# Patient Record
Sex: FEMALE | Race: WHITE | NOT HISPANIC OR LATINO | URBAN - METROPOLITAN AREA
[De-identification: names, ages, dates, MRNs, and addresses within clinical notes are randomized per-mention and may not be internally consistent; named-entity substitution may affect disease eponyms.]

---

## 2015-03-27 RX ORDER — FUROSEMIDE 40 MG
1 TABLET ORAL
Qty: 0 | Refills: 0 | COMMUNITY
Start: 2015-03-27

## 2017-10-15 ENCOUNTER — INPATIENT (INPATIENT)
Facility: HOSPITAL | Age: 82
LOS: 16 days | Discharge: HOME | DRG: 682 | End: 2017-11-01
Attending: INTERNAL MEDICINE | Admitting: INTERNAL MEDICINE
Payer: MEDICARE

## 2017-10-15 VITALS
SYSTOLIC BLOOD PRESSURE: 139 MMHG | OXYGEN SATURATION: 99 % | HEART RATE: 57 BPM | TEMPERATURE: 98 F | RESPIRATION RATE: 18 BRPM | DIASTOLIC BLOOD PRESSURE: 59 MMHG

## 2017-10-15 PROCEDURE — 99285 EMERGENCY DEPT VISIT HI MDM: CPT | Mod: 25,GC

## 2017-10-15 PROCEDURE — 93010 ELECTROCARDIOGRAM REPORT: CPT

## 2017-10-15 NOTE — ED PROVIDER NOTE - PHYSICAL EXAMINATION
Gen: AAOx3, non-toxic, appears tired  Head: NCAT  HEENT: EOMI, oral mucosa moist, normal conjunctiva  Lung: CTAB,  no respiratory distress, no wheezes/rhonchi/rales B/L, speaking in full sentences  CV: irregular rhythm, bradycardia, +2 radial pulses bilaterally, +1 bilateral pedal edema  Abd: soft, NTND, no guarding, negative guaiac  MSK: no visible deformities  Neuro: No focal sensory or motor deficits  Skin: Warm, well perfused, no rash  Psych: normal affect.  ~Anna Dailey M.D. Resident

## 2017-10-15 NOTE — ED PROVIDER NOTE - ATTENDING CONTRIBUTION TO CARE
Attending MD Lopez:  I personally have seen and examined this patient.  Resident note reviewed and agree on plan of care and except where noted.  See MDM for details.

## 2017-10-15 NOTE — ED ADULT NURSE NOTE - OBJECTIVE STATEMENT
Pt presents to ED awake and alert, brought in by EMS and accompanied by her family d/t generalized weakness. Pt Pt presents to ED awake and alert, brought in by EMS and accompanied by her family d/t generalized weakness. Pt states recently she had an incident with her INR being high and increased bleeding that has since resolved after being managed by her PCP. Pt reports since that incident, she feels like she has not "bounced back" and has decreased energy. Pt reports normal PO intake, ambulating normally with her cane and afebrile. EMS reports pt had a BG of 226 and had a low O2 sat of 93% so they placed her on 2L nasal cannula. Pt reports dark stools which she attributes to taking iron supplements. Pt denies dysuria, abdominal pain, cough, LE edema, chest pain or any other s/s. Pt found to be in Afib, bradycardic and tachypneic. A&Ox4. No acute respiratory distress noted and pt is speaking in complete sentences.

## 2017-10-15 NOTE — ED PROVIDER NOTE - MEDICAL DECISION MAKING DETAILS
88 yo F PMHx Afib on Coumadin, valve replacement, breast and uterine CA presents with generalized weakness, with bradycardia, DDx: GI bleed, electrolyte disturbances, dehydration, sepsis, will obtain basic labs, cardiac enzymes, CXR, UA 86 yo F PMHx Afib on Coumadin, valve replacement, breast and uterine CA presents with generalized weakness, with bradycardia, DDx: GI bleed, electrolyte disturbances, dehydration, sepsis, will obtain basic labs, cardiac enzymes, CXR, UA    John LOWE: 88 y/o female with PMH of HTN, HLD, CHF, A fib on Coumadin, s/p Aortic valve replacement here with generalized weakness. Patient is A/XO3 and reports generalized weakness for the past 2 weeks associated with intermittent SOB. Patient called EMS tonight and her FS was 220. Patient also reports her INR was 6.1  two weeks ago and she saw her GI doctor who ruled out melena. Patient also reports her INR last was 2.3. Denies CP, palpitations, fever, cough, abd pain/distention, LE edema, urinary symptoms, back pain, rash or travel. Exam shows a female A/OX3 and with decreased lung sounds and heart S1S2 and irregular. No LE edema, No JVD. Abd is nontender and nondistended. Patient is moving all her extremities.  Patient appears slightly dehydrated. Consider GI bleed, Worse anemia, UTI/Pyelo, CAP, New endocrine dysfunction. Plan CBC, CMp, Cardiacs, CXR, EKG, Cultures, UA, Guaiac, IVF and reassess

## 2017-10-15 NOTE — ED ADULT NURSE NOTE - PMH
AF (atrial fibrillation)  on coumadin and aspirin  Arthritis    Breast cancer diagnosed in 2000  s/p right lumpectomy and right axillary lymph node removal, radiation  Dyslipidemia    HTN (hypertension)    Severe aortic stenosis    Uterine cancer

## 2017-10-15 NOTE — ED PROVIDER NOTE - NS ED ROS FT
GENERAL: No fever or chills, EYES: no change in vision, HEENT: no trouble swallowing or speaking, CARDIAC: no chest pain, PULMONARY: no cough or SOB, GI: no abdominal pain, no nausea, no vomiting, no diarrhea or constipation, : No changes in urination, SKIN: no rashes, NEURO: no headache,  MSK: No joint pain ~Anna Dailey M.D. Resident GENERAL: No fever or chills, +fatigue EYES: no change in vision, HEENT: no trouble swallowing or speaking, CARDIAC: no chest pain, PULMONARY: no cough, +SOB, GI: no abdominal pain, no nausea, no vomiting, no diarrhea or constipation, : No changes in urination, SKIN: no rashes, NEURO: no headache,  MSK: No joint pain ~Anna Dailey M.D. Resident

## 2017-10-15 NOTE — ED PROVIDER NOTE - OBJECTIVE STATEMENT
John LWOE: 86 y/o female with PMH of HTN, HLD, CHF, A fib on Coumadin, s/p Aortic valve replacement here with generalized weakness. Patient is A/XO3 and reports generalized weakness for the past 2 weeks associated with intermittent SOB. Patient called EMS tonight and her FS was 220. Patient also reports her INR was 6.1  two weeks ago and she saw her GI doctor who ruled out melena. Patient also reports her INR last was 2.3. Denies CP, palpitations, fever, cough, abd pain/distention, LE edema, urinary symptoms, back pain, rash or travel. Exam shows a female A/OX3 and with decreased lung sounds and heart S1S2 and irregular. No LE edema, No JVD. Abd is nontender and nondistended. Patient is moving all her extremities.  Patient appears slightly dehydrated. Consider GI bleed, Worse anemia, UTI/Pyelo, CAP, New endocrine dysfunction. Plan CBC, CMp John LOWE: 86 y/o female with PMH of HTN, HLD, CHF, A fib on Coumadin, s/p Aortic valve replacement here with generalized weakness. Patient is A/XO3 and reports generalized weakness for the past 2 weeks associated with intermittent SOB. Patient called EMS tonight and her FS was 220. Patient also reports her INR was 6.1  two weeks ago and she saw her GI doctor who ruled out melena. Patient also reports her INR last was 2.3. Denies CP, palpitations, fever, cough, abd pain/distention, LE edema, urinary symptoms, back pain, rash or travel. Exam shows a female A/OX3 and with decreased lung sounds and heart S1S2 and irregular. No LE edema, No JVD. Abd is nontender and nondistended. Patient is moving all her extremities.  Patient appears slightly dehydrated. Consider GI bleed, Worse anemia, UTI/Pyelo, CAP, New endocrine dysfunction. Plan CBC, CMp, Cardiacs, CXR, EKG, Cultures, UA, Guaiac, IVF and reassess

## 2017-10-15 NOTE — ED PROVIDER NOTE - PSH
H/O: hysterectomy  4/2012  S/P cholecystectomy    S/P D&C    s/p surgical excision of left eye Skin cancer  Bilateral lower eye lids

## 2017-10-16 DIAGNOSIS — R53.1 WEAKNESS: ICD-10-CM

## 2017-10-16 DIAGNOSIS — M79.89 OTHER SPECIFIED SOFT TISSUE DISORDERS: ICD-10-CM

## 2017-10-16 DIAGNOSIS — R00.1 BRADYCARDIA, UNSPECIFIED: ICD-10-CM

## 2017-10-16 DIAGNOSIS — I10 ESSENTIAL (PRIMARY) HYPERTENSION: ICD-10-CM

## 2017-10-16 DIAGNOSIS — N17.9 ACUTE KIDNEY FAILURE, UNSPECIFIED: ICD-10-CM

## 2017-10-16 DIAGNOSIS — I48.0 PAROXYSMAL ATRIAL FIBRILLATION: ICD-10-CM

## 2017-10-16 LAB
ALBUMIN SERPL ELPH-MCNC: 3.5 G/DL — SIGNIFICANT CHANGE UP (ref 3.3–5)
ALP SERPL-CCNC: 73 U/L — SIGNIFICANT CHANGE UP (ref 40–120)
ALT FLD-CCNC: 16 U/L RC — SIGNIFICANT CHANGE UP (ref 10–45)
ANION GAP SERPL CALC-SCNC: 10 MMOL/L — SIGNIFICANT CHANGE UP (ref 5–17)
ANION GAP SERPL CALC-SCNC: 12 MMOL/L — SIGNIFICANT CHANGE UP (ref 5–17)
APPEARANCE UR: ABNORMAL
APTT BLD: 44.3 SEC — HIGH (ref 27.5–37.4)
AST SERPL-CCNC: 15 U/L — SIGNIFICANT CHANGE UP (ref 10–40)
BACTERIA # UR AUTO: ABNORMAL /HPF
BASOPHILS # BLD AUTO: 0 K/UL — SIGNIFICANT CHANGE UP (ref 0–0.2)
BASOPHILS # BLD AUTO: 0.1 K/UL — SIGNIFICANT CHANGE UP (ref 0–0.2)
BASOPHILS NFR BLD AUTO: 0.5 % — SIGNIFICANT CHANGE UP (ref 0–2)
BASOPHILS NFR BLD AUTO: 0.6 % — SIGNIFICANT CHANGE UP (ref 0–2)
BILIRUB SERPL-MCNC: 0.2 MG/DL — SIGNIFICANT CHANGE UP (ref 0.2–1.2)
BILIRUB UR-MCNC: NEGATIVE — SIGNIFICANT CHANGE UP
BUN SERPL-MCNC: 53 MG/DL — HIGH (ref 7–23)
BUN SERPL-MCNC: 54 MG/DL — HIGH (ref 7–23)
CALCIUM SERPL-MCNC: 8.4 MG/DL — SIGNIFICANT CHANGE UP (ref 8.4–10.5)
CALCIUM SERPL-MCNC: 9.1 MG/DL — SIGNIFICANT CHANGE UP (ref 8.4–10.5)
CHLORIDE SERPL-SCNC: 103 MMOL/L — SIGNIFICANT CHANGE UP (ref 96–108)
CHLORIDE SERPL-SCNC: 108 MMOL/L — SIGNIFICANT CHANGE UP (ref 96–108)
CK MB BLD-MCNC: 11.8 % — HIGH (ref 0–3.5)
CK MB CFR SERPL CALC: 2.9 NG/ML — SIGNIFICANT CHANGE UP (ref 0–3.8)
CK MB CFR SERPL CALC: 4.6 NG/ML — HIGH (ref 0–3.8)
CK SERPL-CCNC: 26 U/L — SIGNIFICANT CHANGE UP (ref 25–170)
CK SERPL-CCNC: 39 U/L — SIGNIFICANT CHANGE UP (ref 25–170)
CO2 SERPL-SCNC: 29 MMOL/L — SIGNIFICANT CHANGE UP (ref 22–31)
CO2 SERPL-SCNC: 30 MMOL/L — SIGNIFICANT CHANGE UP (ref 22–31)
COLOR SPEC: YELLOW — SIGNIFICANT CHANGE UP
CREAT SERPL-MCNC: 2.3 MG/DL — HIGH (ref 0.5–1.3)
CREAT SERPL-MCNC: 2.39 MG/DL — HIGH (ref 0.5–1.3)
DIFF PNL FLD: NEGATIVE — SIGNIFICANT CHANGE UP
EOSINOPHIL # BLD AUTO: 0.1 K/UL — SIGNIFICANT CHANGE UP (ref 0–0.5)
EOSINOPHIL # BLD AUTO: 0.1 K/UL — SIGNIFICANT CHANGE UP (ref 0–0.5)
EOSINOPHIL NFR BLD AUTO: 1.4 % — SIGNIFICANT CHANGE UP (ref 0–6)
EOSINOPHIL NFR BLD AUTO: 1.5 % — SIGNIFICANT CHANGE UP (ref 0–6)
EPI CELLS # UR: SIGNIFICANT CHANGE UP /HPF
GAS PNL BLDV: SIGNIFICANT CHANGE UP
GLUCOSE SERPL-MCNC: 129 MG/DL — HIGH (ref 70–99)
GLUCOSE SERPL-MCNC: 132 MG/DL — HIGH (ref 70–99)
GLUCOSE UR QL: NEGATIVE — SIGNIFICANT CHANGE UP
HCT VFR BLD CALC: 31.5 % — LOW (ref 34.5–45)
HCT VFR BLD CALC: 34.3 % — LOW (ref 34.5–45)
HGB BLD-MCNC: 10.1 G/DL — LOW (ref 11.5–15.5)
HGB BLD-MCNC: 10.8 G/DL — LOW (ref 11.5–15.5)
INR BLD: 4.25 RATIO — HIGH (ref 0.88–1.16)
INR BLD: 4.4 RATIO — HIGH (ref 0.88–1.16)
KETONES UR-MCNC: NEGATIVE — SIGNIFICANT CHANGE UP
LEUKOCYTE ESTERASE UR-ACNC: ABNORMAL
LYMPHOCYTES # BLD AUTO: 0.6 K/UL — LOW (ref 1–3.3)
LYMPHOCYTES # BLD AUTO: 0.7 K/UL — LOW (ref 1–3.3)
LYMPHOCYTES # BLD AUTO: 6.2 % — LOW (ref 13–44)
LYMPHOCYTES # BLD AUTO: 8.9 % — LOW (ref 13–44)
MAGNESIUM SERPL-MCNC: 2.4 MG/DL — SIGNIFICANT CHANGE UP (ref 1.6–2.6)
MCHC RBC-ENTMCNC: 30.2 PG — SIGNIFICANT CHANGE UP (ref 27–34)
MCHC RBC-ENTMCNC: 31 PG — SIGNIFICANT CHANGE UP (ref 27–34)
MCHC RBC-ENTMCNC: 31.4 GM/DL — LOW (ref 32–36)
MCHC RBC-ENTMCNC: 32 GM/DL — SIGNIFICANT CHANGE UP (ref 32–36)
MCV RBC AUTO: 96.3 FL — SIGNIFICANT CHANGE UP (ref 80–100)
MCV RBC AUTO: 97 FL — SIGNIFICANT CHANGE UP (ref 80–100)
MONOCYTES # BLD AUTO: 0.8 K/UL — SIGNIFICANT CHANGE UP (ref 0–0.9)
MONOCYTES # BLD AUTO: 0.8 K/UL — SIGNIFICANT CHANGE UP (ref 0–0.9)
MONOCYTES NFR BLD AUTO: 8.9 % — SIGNIFICANT CHANGE UP (ref 2–14)
MONOCYTES NFR BLD AUTO: 9.3 % — SIGNIFICANT CHANGE UP (ref 2–14)
NEUTROPHILS # BLD AUTO: 6.6 K/UL — SIGNIFICANT CHANGE UP (ref 1.8–7.4)
NEUTROPHILS # BLD AUTO: 7.3 K/UL — SIGNIFICANT CHANGE UP (ref 1.8–7.4)
NEUTROPHILS NFR BLD AUTO: 79.8 % — HIGH (ref 43–77)
NEUTROPHILS NFR BLD AUTO: 82.8 % — HIGH (ref 43–77)
NITRITE UR-MCNC: NEGATIVE — SIGNIFICANT CHANGE UP
PH UR: 6 — SIGNIFICANT CHANGE UP (ref 5–8)
PHOSPHATE SERPL-MCNC: 4.1 MG/DL — SIGNIFICANT CHANGE UP (ref 2.5–4.5)
PLATELET # BLD AUTO: 254 K/UL — SIGNIFICANT CHANGE UP (ref 150–400)
PLATELET # BLD AUTO: 279 K/UL — SIGNIFICANT CHANGE UP (ref 150–400)
POTASSIUM SERPL-MCNC: 5.1 MMOL/L — SIGNIFICANT CHANGE UP (ref 3.5–5.3)
POTASSIUM SERPL-MCNC: 5.2 MMOL/L — SIGNIFICANT CHANGE UP (ref 3.5–5.3)
POTASSIUM SERPL-SCNC: 5.1 MMOL/L — SIGNIFICANT CHANGE UP (ref 3.5–5.3)
POTASSIUM SERPL-SCNC: 5.2 MMOL/L — SIGNIFICANT CHANGE UP (ref 3.5–5.3)
PROT SERPL-MCNC: 6.5 G/DL — SIGNIFICANT CHANGE UP (ref 6–8.3)
PROT UR-MCNC: 30 MG/DL
PROTHROM AB SERPL-ACNC: 47.3 SEC — HIGH (ref 9.8–12.7)
PROTHROM AB SERPL-ACNC: 49.4 SEC — HIGH (ref 9.8–12.7)
RBC # BLD: 3.25 M/UL — LOW (ref 3.8–5.2)
RBC # BLD: 3.56 M/UL — LOW (ref 3.8–5.2)
RBC # FLD: 16.6 % — HIGH (ref 10.3–14.5)
RBC # FLD: 16.7 % — HIGH (ref 10.3–14.5)
RBC CASTS # UR COMP ASSIST: SIGNIFICANT CHANGE UP /HPF (ref 0–2)
SODIUM SERPL-SCNC: 145 MMOL/L — SIGNIFICANT CHANGE UP (ref 135–145)
SODIUM SERPL-SCNC: 147 MMOL/L — HIGH (ref 135–145)
SP GR SPEC: 1.01 — SIGNIFICANT CHANGE UP (ref 1.01–1.02)
TROPONIN T SERPL-MCNC: 0.04 NG/ML — SIGNIFICANT CHANGE UP (ref 0–0.06)
TROPONIN T SERPL-MCNC: 0.07 NG/ML — HIGH (ref 0–0.06)
TSH SERPL-MCNC: 1.91 UIU/ML — SIGNIFICANT CHANGE UP (ref 0.27–4.2)
UROBILINOGEN FLD QL: NEGATIVE — SIGNIFICANT CHANGE UP
WBC # BLD: 8.2 K/UL — SIGNIFICANT CHANGE UP (ref 3.8–10.5)
WBC # BLD: 8.9 K/UL — SIGNIFICANT CHANGE UP (ref 3.8–10.5)
WBC # FLD AUTO: 8.2 K/UL — SIGNIFICANT CHANGE UP (ref 3.8–10.5)
WBC # FLD AUTO: 8.9 K/UL — SIGNIFICANT CHANGE UP (ref 3.8–10.5)
WBC UR QL: SIGNIFICANT CHANGE UP /HPF (ref 0–5)

## 2017-10-16 PROCEDURE — 93971 EXTREMITY STUDY: CPT | Mod: 26

## 2017-10-16 PROCEDURE — 99223 1ST HOSP IP/OBS HIGH 75: CPT

## 2017-10-16 PROCEDURE — 71020: CPT | Mod: 26

## 2017-10-16 PROCEDURE — 93010 ELECTROCARDIOGRAM REPORT: CPT

## 2017-10-16 RX ORDER — POTASSIUM CHLORIDE 20 MEQ
10 PACKET (EA) ORAL
Qty: 0 | Refills: 0 | COMMUNITY

## 2017-10-16 RX ORDER — ATORVASTATIN CALCIUM 80 MG/1
10 TABLET, FILM COATED ORAL AT BEDTIME
Qty: 0 | Refills: 0 | Status: DISCONTINUED | OUTPATIENT
Start: 2017-10-16 | End: 2017-11-01

## 2017-10-16 RX ORDER — METOPROLOL TARTRATE 50 MG
12.5 TABLET ORAL
Qty: 0 | Refills: 0 | Status: DISCONTINUED | OUTPATIENT
Start: 2017-10-16 | End: 2017-10-22

## 2017-10-16 RX ORDER — SODIUM CHLORIDE 9 MG/ML
500 INJECTION INTRAMUSCULAR; INTRAVENOUS; SUBCUTANEOUS ONCE
Qty: 0 | Refills: 0 | Status: COMPLETED | OUTPATIENT
Start: 2017-10-16 | End: 2017-10-16

## 2017-10-16 RX ORDER — ASCORBIC ACID 60 MG
500 TABLET,CHEWABLE ORAL DAILY
Qty: 0 | Refills: 0 | Status: DISCONTINUED | OUTPATIENT
Start: 2017-10-16 | End: 2017-11-01

## 2017-10-16 RX ORDER — WARFARIN SODIUM 2.5 MG/1
1 TABLET ORAL
Qty: 0 | Refills: 0 | COMMUNITY

## 2017-10-16 RX ORDER — CLOPIDOGREL BISULFATE 75 MG/1
75 TABLET, FILM COATED ORAL DAILY
Qty: 0 | Refills: 0 | Status: DISCONTINUED | OUTPATIENT
Start: 2017-10-16 | End: 2017-11-01

## 2017-10-16 RX ORDER — FERROUS SULFATE 325(65) MG
325 TABLET ORAL DAILY
Qty: 0 | Refills: 0 | Status: DISCONTINUED | OUTPATIENT
Start: 2017-10-16 | End: 2017-11-01

## 2017-10-16 RX ORDER — FERROUS SULFATE 325(65) MG
1 TABLET ORAL
Qty: 0 | Refills: 0 | COMMUNITY

## 2017-10-16 RX ORDER — INFLUENZA VIRUS VACCINE 15; 15; 15; 15 UG/.5ML; UG/.5ML; UG/.5ML; UG/.5ML
0.5 SUSPENSION INTRAMUSCULAR ONCE
Qty: 0 | Refills: 0 | Status: DISCONTINUED | OUTPATIENT
Start: 2017-10-16 | End: 2017-11-01

## 2017-10-16 RX ADMIN — Medication 500 MILLIGRAM(S): at 12:06

## 2017-10-16 RX ADMIN — ATORVASTATIN CALCIUM 10 MILLIGRAM(S): 80 TABLET, FILM COATED ORAL at 21:43

## 2017-10-16 RX ADMIN — SODIUM CHLORIDE 500 MILLILITER(S): 9 INJECTION INTRAMUSCULAR; INTRAVENOUS; SUBCUTANEOUS at 01:42

## 2017-10-16 RX ADMIN — Medication 325 MILLIGRAM(S): at 12:07

## 2017-10-16 RX ADMIN — CLOPIDOGREL BISULFATE 75 MILLIGRAM(S): 75 TABLET, FILM COATED ORAL at 12:06

## 2017-10-16 RX ADMIN — Medication 12.5 MILLIGRAM(S): at 06:25

## 2017-10-16 RX ADMIN — Medication 1 TABLET(S): at 12:07

## 2017-10-16 RX ADMIN — Medication 12.5 MILLIGRAM(S): at 21:43

## 2017-10-16 NOTE — H&P ADULT - NSHPLABSRESULTS_GEN_ALL_CORE
Labs personally reviewed:                        10.8   8.9   )-----------( 279      ( 16 Oct 2017 00:15 )             34.3       10-16    145  |  103  |  54<H>  ----------------------------<  132<H>  5.1   |  30  |  2.39<H>    Ca    9.1      16 Oct 2017 00:15    TPro  6.5  /  Alb  3.5  /  TBili  0.2  /  DBili  x   /  AST  15  /  ALT  16  /  AlkPhos  73  10-16      CARDIAC MARKERS ( 16 Oct 2017 00:15 )  x     / 0.05 ng/mL / 29 U/L / x     / 3.2 ng/mL        LIVER FUNCTIONS - ( 16 Oct 2017 00:15 )  Alb: 3.5 g/dL / Pro: 6.5 g/dL / ALK PHOS: 73 U/L / ALT: 16 U/L RC / AST: 15 U/L / GGT: x             PT/INR - ( 16 Oct 2017 00:15 )   PT: 47.3 sec;   INR: 4.25 ratio         PTT - ( 16 Oct 2017 00:15 )  PTT:44.3 sec    Imaging personally reviewed-cxr with small b/l pleural effusions, appears to have mesh from TAVR    EKG personally reviewed-afib with bradycardia to 52bpm    Telemetry monitoring showed brief dip to 39bpm but no sustained severe bradycardia.

## 2017-10-16 NOTE — H&P ADULT - NSHPREVIEWOFSYSTEMS_GEN_ALL_CORE
REVIEW OF SYSTEMS:  CONSTITUTIONAL: see hpi  EYES: No visual changes  ENT: No dysphagia  NECK: No stiffness  RESPIRATORY: No cough, wheezing, hemoptysis; sob at rest see hpi that is intermittent  CARDIOVASCULAR: No chest pain, feels fibrillation. Edema as in HPI  GASTROINTESTINAL: No abdominal pain. No nausea or vomiting. No diarrhea or constipation. No melena or hematochezia.  GENITOURINARY: No dysuria, frequency or hematuria  NEUROLOGICAL: No numbness or focal weakness  SKIN: No itching, burning, rashes, or lesions   ALLERGIES: No drug reactions

## 2017-10-16 NOTE — ED ADULT NURSE REASSESSMENT NOTE - NS ED NURSE REASSESS COMMENT FT1
TOMASA room states pt continues to everett to 30s. Pt is resting in stretcher comfortably with no complaints. Vitals as documented in flowsheet. Dr. Beyer aware

## 2017-10-16 NOTE — H&P ADULT - PROBLEM SELECTOR PLAN 5
Currently holding metoprolol and cardizem  Also with NANCY so holding lisinopril  Monitor BPs, may need to add non-negative inotrope agent that does not harm renal function.

## 2017-10-16 NOTE — CHART NOTE - NSCHARTNOTEFT_GEN_A_CORE
Upon reviewing the patient chart, noticed elevation of cardiac enzymes.   CARDIAC MARKERS ( 16 Oct 2017 18:28 )  x     / 0.07 ng/mL / 39 U/L / x     / 4.6 ng/mL  CARDIAC MARKERS ( 16 Oct 2017 09:17 )  x     / 0.04 ng/mL / 26 U/L / x     / 2.9 ng/mL  CARDIAC MARKERS ( 16 Oct 2017 00:15 )  x     / 0.05 ng/mL / 29 U/L / x     / 3.2 ng/mL    Basic Metabolic Panel - STAT (10.16.17 @ 09:17)    Sodium, Serum: 147 mmol/L    Potassium, Serum: 5.2 mmol/L    Chloride, Serum: 108 mmol/L    Carbon Dioxide, Serum: 29 mmol/L    Anion Gap, Serum: 10 mmol/L    Blood Urea Nitrogen, Serum: 53 mg/dL    Creatinine, Serum: 2.30 mg/dL    Glucose, Serum: 129 mg/dL    Calcium, Total Serum: 8.4 mg/dL    eGFR if Non : 18: Interpretative comment  The units for eGFR are ml/min/1.73m2 (normalized body surface area). The  eGFR is calculated from a serum creatinine using the CKD-EPI equation.  Other variables required for calculation are race, age and sex. Among  patients with chronic kidney disease (CKD), the eGFR is useful in  determining the stage of disease according to KDOQI CKD classification.  All eGFR results are reported numerically with the following  interpretation.          GFR                    With                 Without     (ml/min/1.73 m2)    Kidney Damage       Kidney Damage        >= 90                    Stage 1                     Normal        60-89                    Stage 2                     Decreased GFR        30-59     Stage 3                     Stage 3        15-29                    Stage 4                     Stage 4        < 15                      Stage 5                     Stage 5  Each stage of CKD assumes that the associated GFR level has been in  effect for at least 3 months. Determination of stages one and two (with  eGFR > 59 ml/min/m2) requires estimation of kidney damage for at least 3  months as defined by structural or functional abnormalities.  Limitations: All estimates of GFR will be less accurate for patients at  extremes of muscle mass (including but not limited to frail elderly,  critically ill, or cancer patients), those with unusual diets, and those  with conditions associated with reduced secretion or extrarenal  elimination of creatinine. The eGFR equation is not recommended for use  in patients with unstable creatinine levels. mL/min/1.73M2    eGFR if African American: 21 mL/min/1.73M2      Patient seen and evaluated at the bedside. Asymptomatic. Denies chest pain, palpitations, shortness of breath, dizziness, nausea, vomiting, headache and abdominal pain.     General: A&O x 3, resting comfortably in the bed  Cardiac: regular rate, irregular rhythm, a fib on tele 70-90s  Pulmonary: CTAB, no wheezing  Abdomen: soft, nontender, nondistended, bowel sounds present    HPI:  86 yo F with PMHx of right breast CA S/P lumpectomy in 2000, Uterine Ca S/P total hysterectomy in 2012, Skin CA S/P excision of bilateral lower eyelid skin CA, cholecystectomy, Osteoarthritis of bilateral knees, HTN, hyperlipidemia, paroxysmal Afib on coumadin, severe aortic stenosis now s/p TAVR, who presents with onset of generalized weakness x 2 weeks.  Patient reports that over the last two weeks she had noticed increased weakness. This was also associated with occasional intermittent episodes of shortness of breath that seemed to occur at rest that come and go for a few minutes at a time.  No chest pain. In addition she had noticed that she was going "into fibrillation" as patient reports that she can feel her episodic fibrillation.  No fevers, no chills, no nausea or vomiting. Patient reports that she has been very thirsty but her family told her not to drink too much water so she has been drinking much less liquids. Chronic R>L leg edema that is not changed (16 Oct 2017 05:43)    Elevated Cardiac enzymes R/O ACS- unlikely in setting of acute kidney injury Cr 2.30  - EKG obtained- atrial fibrillation rate 91, LVH, PVC's, no ST changes  - Will repeat cardiac enzymes with the morning labs   - Will continue to monitor for change in symptoms     Christine Padron PA-C   [id number]

## 2017-10-16 NOTE — H&P ADULT - HISTORY OF PRESENT ILLNESS
86 yo F with PMHx of right breast CA S/P lumpectomy in 2000, Uterine Ca S/P total hysterectomy in 2012, Skin CA S/P excision of bilateral lower eyelid skin CA, cholecystectomy, Osteoarthritis of bilateral knees, HTN, hyperlipidemia, Afib on coumadin, severe aortic stenosis now s/p TAVR, who presents with onset of generalized weakness x 2 weeks 88 yo F with PMHx of right breast CA S/P lumpectomy in 2000, Uterine Ca S/P total hysterectomy in 2012, Skin CA S/P excision of bilateral lower eyelid skin CA, cholecystectomy, Osteoarthritis of bilateral knees, HTN, hyperlipidemia, paroxysmal Afib on coumadin, severe aortic stenosis now s/p TAVR, who presents with onset of generalized weakness x 2 weeks.  Patient reports that over the last two weeks she had noticed increased weakness. This was also associated with occasional intermittent episodes of shortness of breath that seemed to occur at rest that come and go for a few minutes at a time.  No chest pain. In addition she had noticed that she was going "into fibrillation" as patient reports that she can feel her episodic fibrillation.  No fevers, no chills, no nausea or vomiting. Patient reports that she has been very thirsty but her family told her not to drink too much water so she has been drinking much less liquids. Chronic R>L leg edema that is not changed

## 2017-10-16 NOTE — PATIENT PROFILE ADULT. - ANESTHESIA, PREVIOUS REACTION, PROFILE
needed respirator  with D+C in 11/2011, pt saw pulmonologist since and had PFT/respiratory complications

## 2017-10-16 NOTE — H&P ADULT - PROBLEM SELECTOR PLAN 4
Holding rate control agents as above  Supra therapeutic INR noted  Hold coumadin for now and restart once INR no longer supertherapeutic.

## 2017-10-16 NOTE — PROGRESS NOTE ADULT - SUBJECTIVE AND OBJECTIVE BOX
Patient is a 87y old  Female who presents with a chief complaint of "I have uterine cancer" (16 Oct 2017 12:15)      SUBJECTIVE / OVERNIGHT EVENTS:    MEDICATIONS  (STANDING):  ascorbic acid 500 milliGRAM(s) Oral daily  atorvastatin 10 milliGRAM(s) Oral at bedtime  clopidogrel Tablet 75 milliGRAM(s) Oral daily  ferrous    sulfate 325 milliGRAM(s) Oral daily  influenza   Vaccine 0.5 milliLiter(s) IntraMuscular once  metoprolol 12.5 milliGRAM(s) Oral two times a day  multivitamin 1 Tablet(s) Oral daily    MEDICATIONS  (PRN):      Vital Signs Last 24 Hrs  T(F): 97.5 (10-16-17 @ 19:38), Max: 99.3 (10-16-17 @ 00:21)  HR: 80 (10-16-17 @ 19:38) (52 - 80)  BP: 135/80 (10-16-17 @ 19:38) (102/65 - 146/66)  RR: 17 (10-16-17 @ 19:38) (17 - 22)  SpO2: 99% (10-16-17 @ 19:38) (96% - 99%)  Telemetry:   CAPILLARY BLOOD GLUCOSE      POCT Blood Glucose.: 131 mg/dL (16 Oct 2017 00:19)    I&O's Summary    16 Oct 2017 07:01  -  16 Oct 2017 22:19  --------------------------------------------------------  IN: 0 mL / OUT: 250 mL / NET: -250 mL        PHYSICAL EXAM:  GENERAL: NAD, well-developed  HEAD:  Atraumatic, Normocephalic  EYES: EOMI, PERRLA, conjunctiva and sclera clear  NECK: Supple, No JVD  CHEST/LUNG: Clear to auscultation bilaterally; No wheeze  HEART: Regular rate and rhythm; No murmurs, rubs, or gallops  ABDOMEN: Soft, Nontender, Nondistended; Bowel sounds present  EXTREMITIES:  2+ Peripheral Pulses, No clubbing, cyanosis, or edema  PSYCH: AAOx3  NEUROLOGY: non-focal  SKIN: No rashes or lesions    LABS:                        10.1   8.2   )-----------( 254      ( 16 Oct 2017 09:03 )             31.5     10-16    147<H>  |  108  |  53<H>  ----------------------------<  129<H>  5.2   |  29  |  2.30<H>    Ca    8.4      16 Oct 2017 09:17  Phos  4.1     10-16  Mg     2.4     10-16    TPro  6.5  /  Alb  3.5  /  TBili  0.2  /  DBili  x   /  AST  15  /  ALT  16  /  AlkPhos  73  10-16    PT/INR - ( 16 Oct 2017 09:19 )   PT: 49.4 sec;   INR: 4.40 ratio         PTT - ( 16 Oct 2017 00:15 )  PTT:44.3 sec  CARDIAC MARKERS ( 16 Oct 2017 18:28 )  x     / 0.07 ng/mL / 39 U/L / x     / 4.6 ng/mL  CARDIAC MARKERS ( 16 Oct 2017 09:17 )  x     / 0.04 ng/mL / 26 U/L / x     / 2.9 ng/mL  CARDIAC MARKERS ( 16 Oct 2017 00:15 )  x     / 0.05 ng/mL / 29 U/L / x     / 3.2 ng/mL      Urinalysis Basic - ( 16 Oct 2017 12:01 )    Color: Yellow / Appearance: SL Turbid / S.012 / pH: x  Gluc: x / Ketone: Negative  / Bili: Negative / Urobili: Negative   Blood: x / Protein: 30 mg/dL / Nitrite: Negative   Leuk Esterase: Large / RBC: 0-2 /HPF / WBC 26-50 /HPF   Sq Epi: x / Non Sq Epi: OCC /HPF / Bacteria: Few /HPF        RADIOLOGY & ADDITIONAL TESTS:    Imaging Personally Reviewed:    Consultant(s) Notes Reviewed:      Care Discussed with Consultants/Other Providers:

## 2017-10-16 NOTE — ED ADULT NURSE REASSESSMENT NOTE - NS ED NURSE REASSESS COMMENT FT1
Received report from HALLEY Sultana, pt alerted and oriented x3, complaining of cold, warm blankets provided. Vitals WNL, Atrial Fibrilation noted on cardiac monitor. Patient denies pain at this moment. Will continue to monitor closely,

## 2017-10-16 NOTE — H&P ADULT - PROBLEM SELECTOR PLAN 3
Generalized weakness  Associated with afib as well as intermittent SOB though none at this time  Monitor on telemetry  Check TTE  Check cardiac enzymes Generalized weakness  Associated with afib as well as intermittent SOB though none at this time  Monitor on telemetry  Check TTE  Check cardiac enzymes  Check UA

## 2017-10-16 NOTE — H&P ADULT - PROBLEM SELECTOR PLAN 1
Pt with significant bradycardia, mostly in 50s in afib on monitoring but occasionally with lower drops.  Does have generalized weakness so have concern that this may be related to patient's weakness.  ? if patient may have excessive negative inotropes in system at this time.  Hold metoprolol and cardizem for now  Cardiology consult in AM  Telemetry monitoring  No need for calcium infusion or similar at this time.

## 2017-10-16 NOTE — CONSULT NOTE ADULT - SUBJECTIVE AND OBJECTIVE BOX
Patient is a 87y old  Female who presents with a chief complaint of "I have uterine cancer" (16 Oct 2017 12:15)    87 year old woman with a history of TAVR April 2015, chronic AF on anticoagulation with slow ventricular response, with recent supratherapeutic INR and worsening anemia, started on iron for presumed iron deficiency, presenting with a two week history of generalized weakness.  She denies lightheadedness, presyncope, or syncope, chest pain, dyspnea, or palpitations.  She denies fevers, chills, N/V/D, abdominal pain or urinary symptoms.  Lab data shows acute renal failure, Cr normal in September.  Hemoglobin here 10.1.  Denies obvious GI bleeding.  She denies decreased PO intake or NSAID use.    PAST MEDICAL & SURGICAL HISTORY:  Severe aortic stenosis  Uterine cancer  Arthritis  HTN (hypertension)  Breast cancer diagnosed in 2000: s/p right lumpectomy and right axillary lymph node removal, radiation  AF (atrial fibrillation): on coumadin and aspirin  Dyslipidemia  H/O: hysterectomy: 4/2012  s/p surgical excision of left eye Skin cancer: Bilateral lower eye lids  S/P D&C  S/P cholecystectomy    Allergies    No Known Allergies    Intolerances    digoxin (Rash; Drowsiness)    MEDICATIONS  (STANDING):  ascorbic acid 500 milliGRAM(s) Oral daily  atorvastatin 10 milliGRAM(s) Oral at bedtime  clopidogrel Tablet 75 milliGRAM(s) Oral daily  ferrous    sulfate 325 milliGRAM(s) Oral daily  influenza   Vaccine 0.5 milliLiter(s) IntraMuscular once  metoprolol 12.5 milliGRAM(s) Oral two times a day  multivitamin 1 Tablet(s) Oral daily    FAMILY HISTORY:  No pertinent family history in first degree relatives      ROS:  Positive:    General: See HPI  Cardiac: Denies chest pain, SOB, REYES, orthopnea, PND, claudication, edema, snoring, daytime somnolence, palpitations, syncope  Resp: Denies SOB, REYES, cough, sputum, wheezing, hemoptysis  GI: Denies change in bowel habits, diarrhea, weight loss, melena, tarry stools,   nausea, vomiting, jaundice, abdominal pain, dysphagia  : Denies dysuria, nocturia, hematuria  Neuro: Denies tinnitus, headache, visual changes, or vertigo  Musculoskeletal: Denies neck pain back pain joint pain.  Skin: Denies rash, itching, dryness.  Endocrine: Denies polydipsia, polyuria  Psychiatric: Denies depression, anxiety  All other review of systems is negative unless indicated above.    PHYSICAL EXAM:  Vital Signs Last 24 Hrs  T(C): 36.8 (16 Oct 2017 10:54), Max: 37.4 (16 Oct 2017 00:21)  T(F): 98.2 (16 Oct 2017 10:54), Max: 99.3 (16 Oct 2017 00:21)  HR: 69 (16 Oct 2017 10:54) (52 - 69)  BP: 133/60 (16 Oct 2017 10:54) (102/65 - 139/59)  BP(mean): --  RR: 20 (16 Oct 2017 10:54) (18 - 22)  SpO2: 96% (16 Oct 2017 10:54) (96% - 99%)  I&O's Summary    General Appearance: 	 Alert, cooperative, looks weak  HEENT: normocephalic, atraumatic, PERRLA, EOMI, slightly pale  Neck: JVP estimated at around 10 cm  Lungs:  clear to auscultation and percussion bilaterally  Cor:  pmi 5th ICS MCL, regular rate and rhythm, S1 normal intensity, S2 normal intensity, soft GHAZALA  Abdomen:	 soft, non-tender; bowel sounds normal; no masses,  no organomegaly  Extremities: 1-2+ edema  Skin: no rashes; diffuse ecchymoses    EKG:  AF with slow VR 52, LBBB  Telemetry:    LABS:                        10.1   8.2   )-----------( 254      ( 16 Oct 2017 09:03 )             31.5     10-16    147<H>  |  108  |  53<H>  ----------------------------<  129<H>  5.2   |  29  |  2.30<H>    Ca    8.4      16 Oct 2017 09:17  Phos  4.1     10-16  Mg     2.4     10-16    TPro  6.5  /  Alb  3.5  /  TBili  0.2  /  DBili  x   /  AST  15  /  ALT  16  /  AlkPhos  73  10-16    POCT Blood Glucose.: 131 mg/dL (16 Oct 2017 00:19)    PT/INR - ( 16 Oct 2017 09:19 )   PT: 49.4 sec;   INR: 4.40 ratio         PTT - ( 16 Oct 2017 00:15 )  PTT:44.3 sec    CXR: Small BL effusions  UA no clear UTI  No LE DVT  Trop negative X 2.    Impression/Plan: Generalized weakness, new acute renal failure, component of prerenal azotemia  Chronic AF with slow VR, do not think bradycardia is primary cause of her symptoms    Plan:  Hold diuretics, IVF, renal consultation  Hold diltiazem for slow VR  Hold Coumadin for supratherapeutic INR    Leopoldo Funes MD  [facility]

## 2017-10-16 NOTE — H&P ADULT - PROBLEM SELECTOR PLAN 2
Pt here with elevated creatinine concerning for NANCY  Unclear cause but given dry mucous membranes on exam and pt complaint of significant thirst but not drinking liquids may be prerenal?  Check urine electrolytes and urea nitrogen to calculate FeUrea  Trend creatinine on BMP

## 2017-10-16 NOTE — H&P ADULT - ASSESSMENT
88 yo F with PMHx of right breast CA S/P lumpectomy in 2000, Uterine Ca S/P total hysterectomy in 2012, Skin CA S/P excision of bilateral lower eyelid skin CA, cholecystectomy, Osteoarthritis of bilateral knees, HTN, hyperlipidemia, paroxysmal Afib on coumadin, severe aortic stenosis now s/p TAVR, who presents with onset of generalized weakness x 2 weeks

## 2017-10-17 DIAGNOSIS — Z71.89 OTHER SPECIFIED COUNSELING: ICD-10-CM

## 2017-10-17 LAB
ANION GAP SERPL CALC-SCNC: 11 MMOL/L — SIGNIFICANT CHANGE UP (ref 5–17)
APTT BLD: 42.8 SEC — HIGH (ref 27.5–37.4)
BUN SERPL-MCNC: 52 MG/DL — HIGH (ref 7–23)
CALCIUM SERPL-MCNC: 8.5 MG/DL — SIGNIFICANT CHANGE UP (ref 8.4–10.5)
CHLORIDE SERPL-SCNC: 107 MMOL/L — SIGNIFICANT CHANGE UP (ref 96–108)
CK MB CFR SERPL CALC: 4.2 NG/ML — HIGH (ref 0–3.8)
CK SERPL-CCNC: 38 U/L — SIGNIFICANT CHANGE UP (ref 25–170)
CO2 SERPL-SCNC: 28 MMOL/L — SIGNIFICANT CHANGE UP (ref 22–31)
CREAT SERPL-MCNC: 2.14 MG/DL — HIGH (ref 0.5–1.3)
GAS PNL BLDA: SIGNIFICANT CHANGE UP
GLUCOSE SERPL-MCNC: 124 MG/DL — HIGH (ref 70–99)
HCT VFR BLD CALC: 34.3 % — LOW (ref 34.5–45)
HGB BLD-MCNC: 10.6 G/DL — LOW (ref 11.5–15.5)
INR BLD: 3.34 RATIO — HIGH (ref 0.88–1.16)
MCHC RBC-ENTMCNC: 30.5 PG — SIGNIFICANT CHANGE UP (ref 27–34)
MCHC RBC-ENTMCNC: 30.8 GM/DL — LOW (ref 32–36)
MCV RBC AUTO: 99 FL — SIGNIFICANT CHANGE UP (ref 80–100)
PLATELET # BLD AUTO: 251 K/UL — SIGNIFICANT CHANGE UP (ref 150–400)
POTASSIUM SERPL-MCNC: 4.7 MMOL/L — SIGNIFICANT CHANGE UP (ref 3.5–5.3)
POTASSIUM SERPL-SCNC: 4.7 MMOL/L — SIGNIFICANT CHANGE UP (ref 3.5–5.3)
PROTHROM AB SERPL-ACNC: 37.3 SEC — HIGH (ref 9.8–12.7)
RBC # BLD: 3.47 M/UL — LOW (ref 3.8–5.2)
RBC # FLD: 16.6 % — HIGH (ref 10.3–14.5)
SODIUM SERPL-SCNC: 146 MMOL/L — HIGH (ref 135–145)
TROPONIN T SERPL-MCNC: 0.07 NG/ML — HIGH (ref 0–0.06)
WBC # BLD: 9.8 K/UL — SIGNIFICANT CHANGE UP (ref 3.8–10.5)
WBC # FLD AUTO: 9.8 K/UL — SIGNIFICANT CHANGE UP (ref 3.8–10.5)

## 2017-10-17 PROCEDURE — 70450 CT HEAD/BRAIN W/O DYE: CPT | Mod: 26

## 2017-10-17 PROCEDURE — 71250 CT THORAX DX C-: CPT | Mod: 26

## 2017-10-17 RX ORDER — SODIUM CHLORIDE 9 MG/ML
1000 INJECTION INTRAMUSCULAR; INTRAVENOUS; SUBCUTANEOUS
Qty: 0 | Refills: 0 | Status: DISCONTINUED | OUTPATIENT
Start: 2017-10-17 | End: 2017-10-17

## 2017-10-17 RX ORDER — SODIUM CHLORIDE 9 MG/ML
1000 INJECTION INTRAMUSCULAR; INTRAVENOUS; SUBCUTANEOUS
Qty: 0 | Refills: 0 | Status: DISCONTINUED | OUTPATIENT
Start: 2017-10-17 | End: 2017-10-18

## 2017-10-17 NOTE — PROGRESS NOTE ADULT - ASSESSMENT
86 yo F with PMHx of right breast CA S/P lumpectomy in 2000, Uterine Ca S/P total hysterectomy in 2012, Skin CA S/P excision of bilateral lower eyelid skin CA, cholecystectomy, Osteoarthritis of bilateral knees, HTN, hyperlipidemia, paroxysmal Afib on coumadin, severe aortic stenosis now s/p TAVR, who presents with onset of generalized weakness x 2 weeks and palpitations

## 2017-10-17 NOTE — CHART NOTE - NSCHARTNOTEFT_GEN_A_CORE
Pt. is Alert x 1 appears lethargic in am. Blood gas ordered stat. Pt. with respiratory acidosis. Placed on Bipap. Pulmonology will f/u.   Pt. Pending urine cultures and blood cultures. Dr. Young aware.   [facility].

## 2017-10-17 NOTE — PROGRESS NOTE ADULT - SUBJECTIVE AND OBJECTIVE BOX
OSCAR LOPEZ    Patient is a 87y old  Female who presents with a chief complaint of "I have uterine cancer" (16 Oct 2017 12:15)    Lethargy and respiratory acidosis noted.  Breathing appears stable.  Cr is about the same.    Allergies    No Known Allergies    digoxin (Rash; Drowsiness)    MEDICATIONS  (STANDING):  ascorbic acid 500 milliGRAM(s) Oral daily  atorvastatin 10 milliGRAM(s) Oral at bedtime  clopidogrel Tablet 75 milliGRAM(s) Oral daily  ferrous    sulfate 325 milliGRAM(s) Oral daily  influenza   Vaccine 0.5 milliLiter(s) IntraMuscular once  metoprolol 12.5 milliGRAM(s) Oral two times a day  multivitamin 1 Tablet(s) Oral daily  sodium chloride 0.9%. 1000 milliLiter(s) (100 mL/Hr) IV Continuous <Continuous>    MEDICATIONS  (PRN):    PHYSICAL EXAM:  Vital Signs Last 24 Hrs  T(C): 36.3 (17 Oct 2017 14:31), Max: 36.9 (16 Oct 2017 16:49)  T(F): 97.4 (17 Oct 2017 14:31), Max: 98.5 (16 Oct 2017 16:49)  HR: 68 (17 Oct 2017 14:42) (43 - 93)  BP: 139/59 (17 Oct 2017 14:31) (126/62 - 146/66)  BP(mean): --  RR: 20 (17 Oct 2017 14:42) (16 - 20)  SpO2: 100% (17 Oct 2017 14:42) (91% - 100%)  Daily Height in cm: 157.48 (16 Oct 2017 19:38)    Daily Weight in k.4 (17 Oct 2017 09:57)  I&O's Summary    16 Oct 2017 07:  -  17 Oct 2017 07:00  --------------------------------------------------------  IN: 240 mL / OUT: 250 mL / NET: -10 mL    17 Oct 2017 07:01  -  17 Oct 2017 16:09  --------------------------------------------------------  IN: 240 mL / OUT: 150 mL / NET: 90 mL    General Appearance: 	 Alert, cooperative, sleepy  HEENT: normocephalic, atraumatic, PERRLA, EOMI, conjunctiva normal, sclera anicteric,   Neck: no JVD  Lungs:  clear to auscultation and percussion bilaterally  Cor:  pmi 5th ICS MCL, Irregular rate and rhythm, S1 normal intensity, S2 normal intensity, GHAZALA  Abdomen:	 soft, non-tender; bowel sounds normal; no masses,  no organomegaly  Extremities: 1-2+ edema      EKG:  Telemetry:    Labs:  CBC Full  -  ( 17 Oct 2017 09:07 )  WBC Count : 9.8 K/uL  Hemoglobin : 10.6 g/dL  Hematocrit : 34.3 %  Platelet Count - Automated : 251 K/uL  Mean Cell Volume : 99.0 fl  Mean Cell Hemoglobin : 30.5 pg  Mean Cell Hemoglobin Concentration : 30.8 gm/dL  Auto Neutrophil # : x  Auto Lymphocyte # : x  Auto Monocyte # : x  Auto Eosinophil # : x  Auto Basophil # : x  Auto Neutrophil % : x  Auto Lymphocyte % : x  Auto Monocyte % : x  Auto Eosinophil % : x  Auto Basophil % : x  Basic Metabolic Panel (10.17.17 @ 09:06)    Sodium, Serum: 146 mmol/L    Potassium, Serum: 4.7 mmol/L    Chloride, Serum: 107 mmol/L    Carbon Dioxide, Serum: 28 mmol/L    Anion Gap, Serum: 11 mmol/L    Blood Urea Nitrogen, Serum: 52 mg/dL    Creatinine, Serum: 2.14 mg/dL    Glucose, Serum: 124 mg/dL    Calcium, Total Serum: 8.5 mg/dL    eGFR if Non : 20: Interpretative comment  The units for eGFR are ml/min/1.73m2 (normalized body surface area). The  eGFR is calculated from a serum creatinine using the CKD-EPI equation.  Other variables required for calculation are race, age and sex. Among  patients with chronic kidney disease (CKD), the eGFR is useful in  determining the stage of disease according to KDOQI CKD classification.  All eGFR results are reported numerically with the following  interpretation.          GFR                    With                 Without     (ml/min/1.73 m2)    Kidney Damage       Kidney Damage        >= 90                    Stage 1                     Normal        60-89                    Stage 2                     Decreased GFR        30-59     Stage 3                     Stage 3        15-29                    Stage 4                     Stage 4        < 15                      Stage 5                     Stage 5  Each stage of CKD assumes that the associated GFR level has been in  effect for at least 3 months. Determination of stages one and two (with  eGFR > 59 ml/min/m2) requires estimation of kidney damage for at least 3  months as defined by structural or functional abnormalities.  Limitations: All estimates of GFR will be less accurate for patients at  extremes of muscle mass (including but not limited to frail elderly,  critically ill, or cancer patients), those with unusual diets, and those  with conditions associated with reduced secretion or extrarenal  elimination of creatinine. The eGFR equation is not recommended for use  in patients with unstable creatinine levels. mL/min/1.73M2    eGFR if African American: 23 mL/min/1.73M2      CARDIAC MARKERS ( 17 Oct 2017 09:06 )  x     / x     / 38 U/L / x     / x      CARDIAC MARKERS ( 17 Oct 2017 09:05 )  x     / 0.07 ng/mL / x     / x     / 4.2 ng/mL  CARDIAC MARKERS ( 16 Oct 2017 18:28 )  x     / 0.07 ng/mL / 39 U/L / x     / 4.6 ng/mL  CARDIAC MARKERS ( 16 Oct 2017 09:17 )  x     / 0.04 ng/mL / 26 U/L / x     / 2.9 ng/mL  CARDIAC MARKERS ( 16 Oct 2017 00:15 )  x     / 0.05 ng/mL / 29 U/L / x     / 3.2 ng/mL      PT/INR - ( 17 Oct 2017 09:07 )   PT: 37.3 sec;   INR: 3.34 ratio         PTT - ( 17 Oct 2017 09:07 )  PTT:42.8 sec  Urinalysis Basic - ( 16 Oct 2017 12:01 )    Color: Yellow / Appearance: SL Turbid / S.012 / pH: x  Gluc: x / Ketone: Negative  / Bili: Negative / Urobili: Negative   Blood: x / Protein: 30 mg/dL / Nitrite: Negative   Leuk Esterase: Large / RBC: 0-2 /HPF / WBC 26-50 /HPF   Sq Epi: x / Non Sq Epi: OCC /HPF / Bacteria: Few /HPF      Impression/Plan: Respiratory acidosis/acidemia  Acute renal failure  Chronic AF and diastolic CHF, compensated    Plan:  Continue to hold diuretics, gentle IVF, renal evaluation  Noninvasive ventilation as per Dr. Eric Coon coumadin    Leopoldo Funes MD, FACC  [facility]

## 2017-10-17 NOTE — CONSULT NOTE ADULT - ASSESSMENT
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 Joel Goldberg David Katz, MD, [facility] - [phone]  Pulmonary Medicine

## 2017-10-17 NOTE — PROGRESS NOTE ADULT - SUBJECTIVE AND OBJECTIVE BOX
Patient is a 87y old  Female who presents with a chief complaint of "I have uterine cancer" (16 Oct 2017 12:15)      SUBJECTIVE / OVERNIGHT EVENTS: ptn is lethargic, opens her eyes when spoken too but after that goes to sleep. son states ptn is very functional and independent. no falls PTA as per sons knowledge.  has no knowledge of advanced directives from the ptn. ptn has 3 sons and hasnt established a HCP. on tele : afib , frequent pvcs    MEDICATIONS  (STANDING):  ascorbic acid 500 milliGRAM(s) Oral daily  atorvastatin 10 milliGRAM(s) Oral at bedtime  clopidogrel Tablet 75 milliGRAM(s) Oral daily  ferrous    sulfate 325 milliGRAM(s) Oral daily  influenza   Vaccine 0.5 milliLiter(s) IntraMuscular once  metoprolol 12.5 milliGRAM(s) Oral two times a day  multivitamin 1 Tablet(s) Oral daily  sodium chloride 0.9%. 1000 milliLiter(s) (60 mL/Hr) IV Continuous <Continuous>    MEDICATIONS  (PRN):      Vital Signs Last 24 Hrs  T(F): 97.4 (10-17-17 @ 14:31), Max: 98.1 (10-16-17 @ 22:55)  HR: 88 (10-17-17 @ 17:32) (43 - 93)  BP: 139/59 (10-17-17 @ 14:31) (126/62 - 143/61)  RR: 20 (10-17-17 @ 14:42) (16 - 20)  SpO2: 96% (10-17-17 @ 17:32) (91% - 100%)  Telemetry:   CAPILLARY BLOOD GLUCOSE        I&O's Summary    16 Oct 2017 07:  -  17 Oct 2017 07:00  --------------------------------------------------------  IN: 240 mL / OUT: 250 mL / NET: -10 mL    17 Oct 2017 07:  -  17 Oct 2017 20:44  --------------------------------------------------------  IN: 240 mL / OUT: 150 mL / NET: 90 mL        PHYSICAL EXAM:  GENERAL: NAD, well-developed  HEAD:  Atraumatic, Normocephalic  EYES: EOMI, PERRLA, conjunctiva and sclera clear  NECK: Supple, No JVD  CHEST/LUNG: Clear to auscultation bilaterally; No wheeze  HEART: Regular rate and rhythm; No murmurs, rubs, or gallops  ABDOMEN: Soft, Nontender, Nondistended; Bowel sounds present  EXTREMITIES:  2+ Peripheral Pulses, No clubbing, cyanosis, or edema  PSYCH: AAOx3  NEUROLOGY: non-focal  SKIN: No rashes or lesions    LABS:                        10.6   9.8   )-----------( 251      ( 17 Oct 2017 09:07 )             34.3     10-17    146<H>  |  107  |  52<H>  ----------------------------<  124<H>  4.7   |  28  |  2.14<H>    Ca    8.5      17 Oct 2017 09:06  Phos  4.1     10-16  Mg     2.4     10-16    TPro  6.5  /  Alb  3.5  /  TBili  0.2  /  DBili  x   /  AST  15  /  ALT  16  /  AlkPhos  73  10-16    PT/INR - ( 17 Oct 2017 09:07 )   PT: 37.3 sec;   INR: 3.34 ratio         PTT - ( 17 Oct 2017 09:07 )  PTT:42.8 sec  CARDIAC MARKERS ( 17 Oct 2017 09:06 )  x     / x     / 38 U/L / x     / x      CARDIAC MARKERS ( 17 Oct 2017 09:05 )  x     / 0.07 ng/mL / x     / x     / 4.2 ng/mL  CARDIAC MARKERS ( 16 Oct 2017 18:28 )  x     / 0.07 ng/mL / 39 U/L / x     / 4.6 ng/mL  CARDIAC MARKERS ( 16 Oct 2017 09:17 )  x     / 0.04 ng/mL / 26 U/L / x     / 2.9 ng/mL  CARDIAC MARKERS ( 16 Oct 2017 00:15 )  x     / 0.05 ng/mL / 29 U/L / x     / 3.2 ng/mL      Urinalysis Basic - ( 16 Oct 2017 12:01 )    Color: Yellow / Appearance: SL Turbid / S.012 / pH: x  Gluc: x / Ketone: Negative  / Bili: Negative / Urobili: Negative   Blood: x / Protein: 30 mg/dL / Nitrite: Negative   Leuk Esterase: Large / RBC: 0-2 /HPF / WBC 26-50 /HPF   Sq Epi: x / Non Sq Epi: OCC /HPF / Bacteria: Few /HPF        RADIOLOGY & ADDITIONAL TESTS:    Imaging Personally Reviewed:    Consultant(s) Notes Reviewed:      Care Discussed with Consultants/Other Providers:

## 2017-10-17 NOTE — CONSULT NOTE ADULT - SUBJECTIVE AND OBJECTIVE BOX
[facility] - [facility]  CONSULT NOTE    HPI: 87 year old gentlewoman, lifelong non-smoker, with history of severe aortic stenosis s/p TAVR in 2014, atrial fibrillation on chronic anticoagulation, breast cancer s/p right lumpectomy/lymph node dissection and RT in , uterine cancer s/p hysterectomy in  and skin cancer resection from the lower eyelids of both eyes.    PMHX:  Aortic stenosis  PAF (atrial fibrillation)  HTN (hypertension)  Dyslipidemia  Uterine cancer  Osteoarthritis  Breast cancer diagnosed in           PSHX:  TAVR 2015  right lumpectomy and right axillary lymph node removal, radiation in   H/O: hysterectomy  s/p surgical excision of left eye skin cancer  S/P D&C  S/P cholecystectomy      FAMILY HISTORY:  No pertinent family history in first degree relatives      SOCIAL HISTORY:    Pulmonary Medications:       Antimicrobials:      Cardiology:  metoprolol 12.5 milliGRAM(s) Oral two times a day      Other:  ascorbic acid 500 milliGRAM(s) Oral daily  atorvastatin 10 milliGRAM(s) Oral at bedtime  clopidogrel Tablet 75 milliGRAM(s) Oral daily  ferrous    sulfate 325 milliGRAM(s) Oral daily  influenza   Vaccine 0.5 milliLiter(s) IntraMuscular once  multivitamin 1 Tablet(s) Oral daily      Allergies    No Known Allergies    Intolerances    digoxin (Rash; Drowsiness)      HOME MEDICATIONS:    	furosemide 40 mg oral tablet: 1.5 tabs daily except 2 tabs per day saturday and    · 	Multiple Vitamins oral tablet: 1 tab(s) orally once a day  · 	lisinopril 20 mg oral tablet: 1 tab(s) orally once a day  · 	atorvastatin 10 mg oral tablet: 1 tab(s) orally once a day (at bedtime)  · 	diltiazem 360 mg/24 hours oral capsule, extended release: 1 cap(s) orally once a day  · 	docusate sodium 100 mg oral capsule: 1 cap(s) orally once a day, As needed, Constipation  · 	Vitamin C 500 mg oral tablet: 1 tab(s) orally once a day  · 	metoprolol tartrate 25 mg oral tablet: 0.5 tab(s) orally 2 times a day  · 	Klor-Con 10 oral tablet, extended release: 10 milliequivalent(s) orally 2 times a day  · 	ferrous sulfate 324 mg (65 mg elemental iron) oral tablet: 1 tab(s) orally once a day  · 	Plavix 75 mg oral tablet: 1 tab(s) orally once a day  · 	Coumadin 3 mg oral tablet: 1 tab(s) orally once a day M-F, / tablet on sat and sun    REVIEW OF SYSTEMS:  Constitutional: As per HPI  HEENT: Within normal limits  CV: As per HPI  Resp: As per HPI  GI: Within normal limits   : Within normal limits  Musculoskeletal: Within normal limits  Skin: Within normal limits  Neurological: Within normal limits  Psychiatric: Within normal limits  Endocrine: Within normal limits  Hematologic/Lymphatic: Within normal limits  Allergic/Immunologic: Within normal limits    [ ] All other systems negative    OBJECTIVE:      I&O's Detail    16 Oct 2017 07:01  -  17 Oct 2017 07:00  --------------------------------------------------------  IN:    Oral Fluid: 240 mL  Total IN: 240 mL    OUT:    Voided: 250 mL  Total OUT: 250 mL    Total NET: -10 mL      17 Oct 2017 07:01  -  17 Oct 2017 13:52  --------------------------------------------------------  IN:    Oral Fluid: 240 mL  Total IN: 240 mL    OUT:    Voided: 150 mL  Total OUT: 150 mL    Total NET: 90 mL      Daily Height in cm: 157.48 (16 Oct 2017 19:38)    Daily Weight in k.4 (17 Oct 2017 09:57)      PHYSICAL EXAM:  ICU Vital Signs Last 24 Hrs  T(C): 36.2 (17 Oct 2017 04:36), Max: 36.9 (16 Oct 2017 16:49)  T(F): 97.2 (17 Oct 2017 04:36), Max: 98.5 (16 Oct 2017 16:49)  HR: 88 (17 Oct 2017 04:36) (80 - 93)  BP: 143/61 (17 Oct 2017 04:36) (126/62 - 146/66)  BP(mean): --  ABP: --  ABP(mean): --  RR: 18 (17 Oct 2017 04:36) (16 - 18)  SpO2: 94% (17 Oct 2017 04:36) (91% - 99%)    General: Awake. Alert. Cooperative. No distress. Appears stated age 	  HEENT:   Atraumatic. Normocephalic. Anicteric. Normal oral mucosa, PERRL, EOMI  Neck: Supple. Trachea midline. Thyroid without enlargement/tenderness/nodules. No carotid bruit. No JVD	  Cardiovascular: Regular rate and rhythm. S1 S2 normal. No murmurs, rubs or gallops  Respiratory: Respirations unlabored. Clear to auscultation and percussion bilaterally  Abdomen: Soft. Non-tender. Non-distended. No organomegaly. No masses. Normal bowel sounds	  Extremities: Warm to touch. No clubbing or cyanosis. No pedal edema.  Pulses: 2+ peripheral pulses all extremities	  Skin: Normal skin color. No rashes or lesions. No ecchymoses, No cyanosis. Warm to touch  Lymph Nodes: Cervical, supraclavicular and axillary nodes normal  Neurological: Motor and sensory examination equal and normal. A and O x 3  Psychiatry: Appropriate mood and affect.      LABS:                          10.6   9.8   )-----------( 251      ( 17 Oct 2017 09:07 )             34.3                         10.1   8.2   )-----------( 254      ( 16 Oct 2017 09:03 )             31.5     10-    146<H>  |  107  |  52<H>  ----------------------------<  124<H>  4.7   |  28  |  2.14<H>    10-    147<H>  |  108  |  53<H>  ----------------------------<  129<H>  5.2   |  29  |  2.30<H>    Ca      8.5      10-    Ca      8.4      10-    Phos    4.1     10-16      Mg       2.4     10-    TPro  6.5  /  Alb  3.5  /  TBili  0.2  /  DBili  x   /  AST  15  /  ALT  16  /  AlkPhos  73  10-    PT/INR - ( 17 Oct 2017 09:07 )   PT: 37.3 sec;   INR: 3.34 ratio       PTT - ( 17 Oct 2017 09:07 )  PTT:42.8 sec    ABG - ( 17 Oct 2017 13:27 )  pH: 7.22  /  pCO2: 75    /  pO2: 111   / HCO3: 30    / Base Excess: .8    /  SaO2: 98        Venous Blood Gas:  10-16 @ 00:15  7.31/64/46/32/73  VBG Lactate: 1.4      CARDIAC MARKERS ( 17 Oct 2017 09:06 )  x     / x     / 38 U/L / x     / x      CARDIAC MARKERS ( 17 Oct 2017 09:05 )  x     / 0.07 ng/mL / x     / x     / 4.2 ng/mL  CARDIAC MARKERS ( 16 Oct 2017 18:28 )  x     / 0.07 ng/mL / 39 U/L / x     / 4.6 ng/mL  CARDIAC MARKERS ( 16 Oct 2017 09:17 )  x     / 0.04 ng/mL / 26 U/L / x     / 2.9 ng/mL  CARDIAC MARKERS ( 16 Oct 2017 00:15 )  x     / 0.05 ng/mL / 29 U/L / x     / 3.2 ng/mL      < from: Transthoracic Echocardiogram w/Doppler (12 @ 13:31) >    Patient name: OSCAR LOPEZ  YOB: 1929   Age: 82 (F)   MR#: [id number]  Study Date: 3/14/2012  [id number]: Jada Lagunas RDCS  Study quality: Technically fair  Referring Physician: Pro Tracy MD  Blood Pressure: 124/51 mmHg  Height: 5ft 3in  Weight: 164 lb  BSA: 1.8 m2  ------------------------------------------------------------------------  PROCEDURE: Transthoracic echocardiogram with 2-D, M-Mode  and complete spectral and color flow Doppler.  INDICATION: Aortic Stenosis (424.1), Atrial Fibrillation  (427.31)  ------------------------------------------------------------------------  Dimensions:    Normal Values:  LA:     3.1    2.0 - 4.0 cm  Ao:     3.1    2.0 - 3.8 cm  SEPTUM: 1.2    0.6 - 1.2cm  PWT:    1.3    0.6 - 1.1 cm  LVIDd:  4.3    3.0 - 5.6 cm  LVIDs:  2.6    1.8 - 4.0 cm  Derived variables:  LVMI: 110 g/m2  RWT: 0.60  Fractional short: 40 %  Ejection Fraction: >70 %  Doppler Peak Velocity (m/sec): AoV=4.0  ------------------------------------------------------------------------  Observations:  Mitral Valve: Mitral annular calcification and calcified  mitral leaflets with normal diastolic opening. Minimal  mitral regurgitation.  Aortic Valve/Aorta: Heavily calcified trileaflet aortic  valve with decreased opening. Peak transaortic valve  gradient equals 62 mm Hg, mean transaortic valve gradient  equals 37 mm Hg, estimated aortic valve area equals 0.9  sqcm (by continuity equation), consistent with severe  aortic stenosis.Mild-moderate aortic regurgitation.  Pressure halft - time is 361 ms.  Normal aortic root.  Left Atrium: Severely dilated left atrium.  LA volume index  = 51 cc/m2.  Left Ventricle: Hyperdynamic left ventricle. Mild to  moderate concentric left ventricular hypertrophy. Atrial  fibrillation precludes a comprehensive diastolic  assessment. However, findings are suggestive of moderate  diastolic dysfunction.  Right Heart: Severe right atrial enlargement. RA volume  index = 53 cc/m2.  Normal right ventricular size and  function. Normal tricuspid valve. Mild-moderate tricuspid  regurgitation. Normal pulmonic valve. Minimal pulmonic  regurgitation.  Pericardium/Pleura: Normal pericardium with no pericardial  effusion.  Hemodynamic: Estimated right atrial pressure is 5 mm Hg.  Estimated right ventricular systolic pressure equals 46 mm  Hg, assuming right atrial pressure equals 5 mm Hg,  consistent with mild pulmonary hypertension.  ------------------------------------------------------------------------  Conclusions:  1. Mitral annular calcification and calcified mitral  leaflets with normal diastolic opening. Minimal mitral  regurgitation.  2. Heavily calcified trileaflet aortic valve with decreased  opening. Peak transaortic valve gradientequals 62 mm Hg,  mean transaortic valve gradient equals 37 mm Hg, estimated  aortic valve area equals 0.9 sqcm (by continuity equation),  consistent with severe aortic stenosis. Mild-moderate  aortic regurgitation. Pressure halft - time is 361 ms.  3. Severely dilated left atrium.  LA volume index = 51  cc/m2.  4. Mild to moderate concentric left ventricular  hypertrophy.  5. Hyperdynamic left ventricle.  6. Atrial fibrillation precludes a comprehensive diastolic  assessment. However, findings are suggestive of moderate  diastolic dysfunction.  7. Severe right atrial enlargement. RA volume index = 53  cc/m2.  8. Normal right ventricular size and function.  9. Estimated right ventricular systolic pressure equals 46  mm Hg, assuming right atrial pressure equals 5 mm Hg,  consistent with mild pulmonary hypertension.  10. Normal tricuspid valve. Mild-moderate tricuspid  regurgitation.  *** No previous Echo exam.  ------------------------------------------------------------------------  Confirmed on  3/14/2012 - 15:25:18 by Armen Siddiqui M.D.  ------------------------------------------------------------------------    < end of copied text >      MICROBIOLOGY:   Urinalysis Basic - ( 16 Oct 2017 12:01 )    Color: Yellow / Appearance: SL Turbid / S.012 / pH: x  Gluc: x / Ketone: Negative  / Bili: Negative / Urobili: Negative   Blood: x / Protein: 30 mg/dL / Nitrite: Negative   Leuk Esterase: Large / RBC: 0-2 /HPF / WBC 26-50 /HPF   Sq Epi: x / Non Sq Epi: OCC /HPF / Bacteria: Few /HPF      RADIOLOGY:  [x ] Chest radiographs reviewed and interpreted by me    < from: Xray Chest 2 Views PA/Lat (10.16.17 @ 00:27) >    EXAM:  CHEST PA & LAT                            PROCEDURE DATE:  10/16/2017        INTERPRETATION:  CLINICAL INDICATION: Mild hypoxia for 6 hours.    TECHNIQUE: Frontal and Lateral views of the chest    COMPARISON: Chest x-ray 3/23/2015.    FINDINGS:     Status post TAVR. Surgical clips are noted in the right axilla and right   upper lobe.    The heart is enlarged, unchanged.    There is no focal consolidation.    Small bilateral pleural effusions. No pneumothorax.    IMPRESSION:   Small bilateral pleural effusions. No focal consolidation.       RYLEE EWING M.D., RADIOLOGY RESIDENT  This document has been electronically signed.  TEN PRICE M.D., ATTENDING RADIOLOGIST  This document has been electronically signed. Oct 16 2017  9:11AM      < end of copied text >  --------------------------------------------------------------------------------------------------------- [facility] - [facility]  CONSULT NOTE    HPI: 87 year old gentlewoman, lifelong non-smoker, with history of severe aortic stenosis s/p TAVR in 2015, atrial fibrillation on chronic anticoagulation, breast cancer s/p right lumpectomy/lymph node dissection and RT in , uterine cancer s/p hysterectomy in  and skin cancer resection from the lower eyelids of both eyes. The patient recently was found to have anemia in the setting of a supratherapeutic INR attributed to a GI bleed. However, FOBT was negative on 3 occasions and the patient's     PMHX:  Aortic stenosis  AF (atrial fibrillation)  HTN (hypertension)  Dyslipidemia  Uterine cancer  Osteoarthritis  Breast cancer diagnosed in     PSHX:  TAVR 2015  right lumpectomy and right axillary lymph node removal, radiation in   H/O: hysterectomy  s/p surgical excision of left eye skin cancer  S/P D&C  S/P cholecystectomy      FAMILY HISTORY:  No pertinent family history in first degree relatives      SOCIAL HISTORY:    Pulmonary Medications:       Antimicrobials:      Cardiology:  metoprolol 12.5 milliGRAM(s) Oral two times a day      Other:  ascorbic acid 500 milliGRAM(s) Oral daily  atorvastatin 10 milliGRAM(s) Oral at bedtime  clopidogrel Tablet 75 milliGRAM(s) Oral daily  ferrous    sulfate 325 milliGRAM(s) Oral daily  influenza   Vaccine 0.5 milliLiter(s) IntraMuscular once  multivitamin 1 Tablet(s) Oral daily      Allergies    No Known Allergies    Intolerances    digoxin (Rash; Drowsiness)      HOME MEDICATIONS:    	furosemide 40 mg oral tablet: 1.5 tabs daily except 2 tabs per day saturday and    · 	Multiple Vitamins oral tablet: 1 tab(s) orally once a day  · 	lisinopril 20 mg oral tablet: 1 tab(s) orally once a day  · 	atorvastatin 10 mg oral tablet: 1 tab(s) orally once a day (at bedtime)  · 	diltiazem 360 mg/24 hours oral capsule, extended release: 1 cap(s) orally once a day  · 	docusate sodium 100 mg oral capsule: 1 cap(s) orally once a day, As needed, Constipation  · 	Vitamin C 500 mg oral tablet: 1 tab(s) orally once a day  · 	metoprolol tartrate 25 mg oral tablet: 0.5 tab(s) orally 2 times a day  · 	Klor-Con 10 oral tablet, extended release: 10 milliequivalent(s) orally 2 times a day  · 	ferrous sulfate 324 mg (65 mg elemental iron) oral tablet: 1 tab(s) orally once a day  · 	Plavix 75 mg oral tablet: 1 tab(s) orally once a day  · 	Coumadin 3 mg oral tablet: 1 tab(s) orally once a day M-F,  tablet on sat and sun    REVIEW OF SYSTEMS:  Constitutional: As per HPI  HEENT: Within normal limits  CV: As per HPI  Resp: As per HPI  GI: Within normal limits   : Within normal limits  Musculoskeletal: Within normal limits  Skin: Within normal limits  Neurological: Within normal limits  Psychiatric: Within normal limits  Endocrine: Within normal limits  Hematologic/Lymphatic: Within normal limits  Allergic/Immunologic: Within normal limits    [ ] All other systems negative    OBJECTIVE:      I&O's Detail    16 Oct 2017 07:  -  17 Oct 2017 07:00  --------------------------------------------------------  IN:    Oral Fluid: 240 mL  Total IN: 240 mL    OUT:    Voided: 250 mL  Total OUT: 250 mL    Total NET: -10 mL      17 Oct 2017 07:01  -  17 Oct 2017 13:52  --------------------------------------------------------  IN:    Oral Fluid: 240 mL  Total IN: 240 mL    OUT:    Voided: 150 mL  Total OUT: 150 mL    Total NET: 90 mL      Daily Height in cm: 157.48 (16 Oct 2017 19:38)    Daily Weight in k.4 (17 Oct 2017 09:57)      PHYSICAL EXAM:  ICU Vital Signs Last 24 Hrs  T(C): 36.2 (17 Oct 2017 04:36), Max: 36.9 (16 Oct 2017 16:49)  T(F): 97.2 (17 Oct 2017 04:36), Max: 98.5 (16 Oct 2017 16:49)  HR: 88 (17 Oct 2017 04:36) (80 - 93)  BP: 143/61 (17 Oct 2017 04:36) (126/62 - 146/66)  BP(mean): --  ABP: --  ABP(mean): --  RR: 18 (17 Oct 2017 04:36) (16 - 18)  SpO2: 94% (17 Oct 2017 04:36) (91% - 99%)    General: Awake. Alert. Cooperative. No distress. Appears stated age 	  HEENT:   Atraumatic. Normocephalic. Anicteric. Normal oral mucosa, PERRL, EOMI  Neck: Supple. Trachea midline. Thyroid without enlargement/tenderness/nodules. No carotid bruit. No JVD	  Cardiovascular: Regular rate and rhythm. S1 S2 normal. No murmurs, rubs or gallops  Respiratory: Respirations unlabored. Clear to auscultation and percussion bilaterally  Abdomen: Soft. Non-tender. Non-distended. No organomegaly. No masses. Normal bowel sounds	  Extremities: Warm to touch. No clubbing or cyanosis. No pedal edema.  Pulses: 2+ peripheral pulses all extremities	  Skin: Normal skin color. No rashes or lesions. No ecchymoses, No cyanosis. Warm to touch  Lymph Nodes: Cervical, supraclavicular and axillary nodes normal  Neurological: Motor and sensory examination equal and normal. A and O x 3  Psychiatry: Appropriate mood and affect.      LABS:                          10.6   9.8   )-----------( 251      ( 17 Oct 2017 09:07 )             34.3                         10.1   8.2   )-----------( 254      ( 16 Oct 2017 09:03 )             31.5     10-17    146<H>  |  107  |  52<H>  ----------------------------<  124<H>  4.7   |  28  |  2.14<H>    10-16    147<H>  |  108  |  53<H>  ----------------------------<  129<H>  5.2   |  29  |  2.30<H>    Ca      8.5      10-17    Ca      8.4      10-16    Phos    4.1     10-16      Mg       2.4     10-16    TPro  6.5  /  Alb  3.5  /  TBili  0.2  /  DBili  x   /  AST  15  /  ALT  16  /  AlkPhos  73  10-16    PT/INR - ( 17 Oct 2017 09:07 )   PT: 37.3 sec;   INR: 3.34 ratio       PTT - ( 17 Oct 2017 09:07 )  PTT:42.8 sec    ABG - ( 17 Oct 2017 13:27 )  pH: 7.22  /  pCO2: 75    /  pO2: 111   / HCO3: 30    / Base Excess: .8    /  SaO2: 98        Venous Blood Gas:  10-16 @ 00:15  7.31/64/46/32/73  VBG Lactate: 1.4      CARDIAC MARKERS ( 17 Oct 2017 09:06 )  x     / x     / 38 U/L / x     / x      CARDIAC MARKERS ( 17 Oct 2017 09:05 )  x     / 0.07 ng/mL / x     / x     / 4.2 ng/mL  CARDIAC MARKERS ( 16 Oct 2017 18:28 )  x     / 0.07 ng/mL / 39 U/L / x     / 4.6 ng/mL  CARDIAC MARKERS ( 16 Oct 2017 09:17 )  x     / 0.04 ng/mL / 26 U/L / x     / 2.9 ng/mL  CARDIAC MARKERS ( 16 Oct 2017 00:15 )  x     / 0.05 ng/mL / 29 U/L / x     / 3.2 ng/mL      < from: Transthoracic Echocardiogram w/Doppler (12 @ 13:31) >    Patient name: OSCAR LOPEZ  YOB: 1929   Age: 82 (F)   MR#: [id number]  Study Date: 3/14/2012  [id number]: Jada Lagunas RDCS  Study quality: Technically fair  Referring Physician: Pro Tracy MD  Blood Pressure: 124/51 mmHg  Height: 5ft 3in  Weight: 164 lb  BSA: 1.8 m2  ------------------------------------------------------------------------  PROCEDURE: Transthoracic echocardiogram with 2-D, M-Mode  and complete spectral and color flow Doppler.  INDICATION: Aortic Stenosis (424.1), Atrial Fibrillation  (427.31)  ------------------------------------------------------------------------  Dimensions:    Normal Values:  LA:     3.1    2.0 - 4.0 cm  Ao:     3.1    2.0 - 3.8 cm  SEPTUM: 1.2    0.6 - 1.2cm  PWT:    1.3    0.6 - 1.1 cm  LVIDd:  4.3    3.0 - 5.6 cm  LVIDs:  2.6    1.8 - 4.0 cm  Derived variables:  LVMI: 110 g/m2  RWT: 0.60  Fractional short: 40 %  Ejection Fraction: >70 %  Doppler Peak Velocity (m/sec): AoV=4.0  ------------------------------------------------------------------------  Observations:  Mitral Valve: Mitral annular calcification and calcified  mitral leaflets with normal diastolic opening. Minimal  mitral regurgitation.  Aortic Valve/Aorta: Heavily calcified trileaflet aortic  valve with decreased opening. Peak transaortic valve  gradient equals 62 mm Hg, mean transaortic valve gradient  equals 37 mm Hg, estimated aortic valve area equals 0.9  sqcm (by continuity equation), consistent with severe  aortic stenosis.Mild-moderate aortic regurgitation.  Pressure halft - time is 361 ms.  Normal aortic root.  Left Atrium: Severely dilated left atrium.  LA volume index  = 51 cc/m2.  Left Ventricle: Hyperdynamic left ventricle. Mild to  moderate concentric left ventricular hypertrophy. Atrial  fibrillation precludes a comprehensive diastolic  assessment. However, findings are suggestive of moderate  diastolic dysfunction.  Right Heart: Severe right atrial enlargement. RA volume  index = 53 cc/m2.  Normal right ventricular size and  function. Normal tricuspid valve. Mild-moderate tricuspid  regurgitation. Normal pulmonic valve. Minimal pulmonic  regurgitation.  Pericardium/Pleura: Normal pericardium with no pericardial  effusion.  Hemodynamic: Estimated right atrial pressure is 5 mm Hg.  Estimated right ventricular systolic pressure equals 46 mm  Hg, assuming right atrial pressure equals 5 mm Hg,  consistent with mild pulmonary hypertension.  ------------------------------------------------------------------------  Conclusions:  1. Mitral annular calcification and calcified mitral  leaflets with normal diastolic opening. Minimal mitral  regurgitation.  2. Heavily calcified trileaflet aortic valve with decreased  opening. Peak transaortic valve gradientequals 62 mm Hg,  mean transaortic valve gradient equals 37 mm Hg, estimated  aortic valve area equals 0.9 sqcm (by continuity equation),  consistent with severe aortic stenosis. Mild-moderate  aortic regurgitation. Pressure halft - time is 361 ms.  3. Severely dilated left atrium.  LA volume index = 51  cc/m2.  4. Mild to moderate concentric left ventricular  hypertrophy.  5. Hyperdynamic left ventricle.  6. Atrial fibrillation precludes a comprehensive diastolic  assessment. However, findings are suggestive of moderate  diastolic dysfunction.  7. Severe right atrial enlargement. RA volume index = 53  cc/m2.  8. Normal right ventricular size and function.  9. Estimated right ventricular systolic pressure equals 46  mm Hg, assuming right atrial pressure equals 5 mm Hg,  consistent with mild pulmonary hypertension.  10. Normal tricuspid valve. Mild-moderate tricuspid  regurgitation.  *** No previous Echo exam.  ------------------------------------------------------------------------  Confirmed on  3/14/2012 - 15:25:18 by Armen Siddiqui M.D.  ------------------------------------------------------------------------    < end of copied text >      MICROBIOLOGY:   Urinalysis Basic - ( 16 Oct 2017 12:01 )    Color: Yellow / Appearance: SL Turbid / S.012 / pH: x  Gluc: x / Ketone: Negative  / Bili: Negative / Urobili: Negative   Blood: x / Protein: 30 mg/dL / Nitrite: Negative   Leuk Esterase: Large / RBC: 0-2 /HPF / WBC 26-50 /HPF   Sq Epi: x / Non Sq Epi: OCC /HPF / Bacteria: Few /HPF      RADIOLOGY:  [x ] Chest radiographs reviewed and interpreted by me    < from: Xray Chest 2 Views PA/Lat (10.16.17 @ 00:27) >    EXAM:  CHEST PA & LAT                            PROCEDURE DATE:  10/16/2017        INTERPRETATION:  CLINICAL INDICATION: Mild hypoxia for 6 hours.    TECHNIQUE: Frontal and Lateral views of the chest    COMPARISON: Chest x-ray 3/23/2015.    FINDINGS:     Status post TAVR. Surgical clips are noted in the right axilla and right   upper lobe.    The heart is enlarged, unchanged.    There is no focal consolidation.    Small bilateral pleural effusions. No pneumothorax.    IMPRESSION:   Small bilateral pleural effusions. No focal consolidation.       RYLEE EWING M.D., RADIOLOGY RESIDENT  This document has been electronically signed.  TEN PRICE M.D., ATTENDING RADIOLOGIST  This document has been electronically signed. Oct 16 2017  9:11AM      < end of copied text >  --------------------------------------------------------------------------------------------------------- [facility] - [facility]  CONSULT NOTE    HPI: 87 year old gentlewoman, lifelong non-smoker, with history of severe aortic stenosis s/p TAVR in 2015, atrial fibrillation on chronic anticoagulation, breast cancer s/p right lumpectomy/lymph node dissection and RT in , uterine cancer s/p hysterectomy in  and skin cancer resection from the lower eyelids of both eyes. The patient recently was found to have anemia in the setting of a supratherapeutic INR attributed to a GI bleed. However, FOBT was negative on 3 occasions and the patient's H/H returned to baseline. She was started on iron and EGD/colonscopy was deferred due to multiple medical issues and fear of renal compromise with a bowel prep. The patient has had several bouts of diarrhea over the last few days after drinking milk shakes. Her family has advised not to drink too much water despite her increased thirst given her history of cardiac disease. Over the last week, she has not "been feeling herself" with weakness and shortness of breath with exertion. She has no chest pain/pressure or palpitations. She has no cough, sputum production, chest congestion or wheeze. No fevers, chills or sweats. This morning, the patient was noted to be disoriented and and lethargic. ABG revealed an acute respiratory acidosis. She is hypoxic on room air.    PMHX:  Aortic stenosis  AF (atrial fibrillation)  HTN (hypertension)  Dyslipidemia  Uterine cancer  Osteoarthritis  Breast cancer diagnosed in     PSHX:  TAVR 2015  right lumpectomy and right axillary lymph node removal, radiation in   H/O: hysterectomy  s/p surgical excision of left eye skin cancer  S/P D&C  S/P cholecystectomy      FAMILY HISTORY:  No pertinent family history in first degree relatives      SOCIAL HISTORY: lifelong non-smoker    Pulmonary Medications:       Antimicrobials:      Cardiology:  metoprolol 12.5 milliGRAM(s) Oral two times a day      Other:  ascorbic acid 500 milliGRAM(s) Oral daily  atorvastatin 10 milliGRAM(s) Oral at bedtime  clopidogrel Tablet 75 milliGRAM(s) Oral daily  ferrous    sulfate 325 milliGRAM(s) Oral daily  influenza   Vaccine 0.5 milliLiter(s) IntraMuscular once  multivitamin 1 Tablet(s) Oral daily      Allergies    No Known Allergies    Intolerances    digoxin (Rash; Drowsiness)      HOME MEDICATIONS:    	furosemide 40 mg oral tablet: 1.5 tabs daily except 2 tabs per day saturday and    · 	Multiple Vitamins oral tablet: 1 tab(s) orally once a day  · 	lisinopril 20 mg oral tablet: 1 tab(s) orally once a day  · 	atorvastatin 10 mg oral tablet: 1 tab(s) orally once a day (at bedtime)  · 	diltiazem 360 mg/24 hours oral capsule, extended release: 1 cap(s) orally once a day  · 	docusate sodium 100 mg oral capsule: 1 cap(s) orally once a day, As needed, Constipation  · 	Vitamin C 500 mg oral tablet: 1 tab(s) orally once a day  · 	metoprolol tartrate 25 mg oral tablet: 0.5 tab(s) orally 2 times a day  · 	Klor-Con 10 oral tablet, extended release: 10 milliequivalent(s) orally 2 times a day  · 	ferrous sulfate 324 mg (65 mg elemental iron) oral tablet: 1 tab(s) orally once a day  · 	Plavix 75 mg oral tablet: 1 tab(s) orally once a day  · 	Coumadin 3 mg oral tablet: 1 tab(s) orally once a day -, 2 tablet on sat and sun    REVIEW OF SYSTEMS:  Constitutional: As per HPI  HEENT: Within normal limits  CV: As per HPI  Resp: As per HPI  GI: As per HPI  : Within normal limits  Musculoskeletal: Within normal limits  Skin: Within normal limits  Neurological: Within normal limits  Psychiatric: Within normal limits  Endocrine: Within normal limits  Hematologic/Lymphatic: Within normal limits  Allergic/Immunologic: Within normal limits    [x] All other systems negative    OBJECTIVE:      I&O's Detail    16 Oct 2017 07:  -  17 Oct 2017 07:00  --------------------------------------------------------  IN:    Oral Fluid: 240 mL  Total IN: 240 mL    OUT:    Voided: 250 mL  Total OUT: 250 mL    Total NET: -10 mL      17 Oct 2017 07:  -  17 Oct 2017 13:52  --------------------------------------------------------  IN:    Oral Fluid: 240 mL  Total IN: 240 mL    OUT:    Voided: 150 mL  Total OUT: 150 mL    Total NET: 90 mL      Daily Height in cm: 157.48 (16 Oct 2017 19:38)    Daily Weight in k.4 (17 Oct 2017 09:57)      PHYSICAL EXAM:  ICU Vital Signs Last 24 Hrs  T(C): 36.2 (17 Oct 2017 04:36), Max: 36.9 (16 Oct 2017 16:49)  T(F): 97.2 (17 Oct 2017 04:36), Max: 98.5 (16 Oct 2017 16:49)  HR: 88 (17 Oct 2017 04:36) (80 - 93)  BP: 143/61 (17 Oct 2017 04:36) (126/62 - 146/66)  BP(mean): --  ABP: --  ABP(mean): --  RR: 18 (17 Oct 2017 04:36) (16 - 18)  SpO2: 94% (17 Oct 2017 04:36) (91% - 99%) on 4lpm - 80% on room air    General: Awake. Confused. Cooperative. No distress. Appears stated age 	  HEENT:   Atraumatic. Normocephalic. Anicteric. Normal oral mucosa, PERRL, EOMI  Neck: Supple. Trachea midline. Thyroid without enlargement/tenderness/nodules. No carotid bruit. No JVD	  Cardiovascular: Irregularly irregular rate and rhythm. S1 S2 normal. II/VI systolic murmur  Respiratory: Respirations unlabored. Diffuse bilateral rales.   Abdomen: Soft. Non-tender. Non-distended. No organomegaly. No masses. Normal bowel sounds	  Extremities: Warm to touch. No clubbing or cyanosis. No pedal edema.  Pulses: 2+ peripheral pulses all extremities	  Skin: Normal skin color. No rashes or lesions. No ecchymoses, No cyanosis. Warm to touch  Lymph Nodes: Cervical, supraclavicular and axillary nodes normal  Neurological: Confused. Moving all extremities. A and O x 1  Psychiatry: Calm/confused      LABS:                          10.6   9.8   )-----------( 251      ( 17 Oct 2017 09:07 )             34.3                         10.1   8.2   )-----------( 254      ( 16 Oct 2017 09:03 )             31.5     10-    146<H>  |  107  |  52<H>  ----------------------------<  124<H>  4.7   |  28  |  2.14<H>    10-    147<H>  |  108  |  53<H>  ----------------------------<  129<H>  5.2   |  29  |  2.30<H>    Ca      8.5      10    Ca      8.4      10-    Phos    4.1     10-      Mg       2.4     10-    TPro  6.5  /  Alb  3.5  /  TBili  0.2  /  DBili  x   /  AST  15  /  ALT  16  /  AlkPhos  73  10    PT/INR - ( 17 Oct 2017 09:07 )   PT: 37.3 sec;   INR: 3.34 ratio       PTT - ( 17 Oct 2017 09:07 )  PTT:42.8 sec    ABG - ( 17 Oct 2017 13:27 )  pH: 7.22  /  pCO2: 75    /  pO2: 111   / HCO3: 30    / Base Excess: .8    /  SaO2: 98        Venous Blood Gas:  10-16 @ 00:15  7.31/64/46/32/73  VBG Lactate: 1.4      CARDIAC MARKERS ( 17 Oct 2017 09:06 )  x     / x     / 38 U/L / x     / x      CARDIAC MARKERS ( 17 Oct 2017 09:05 )  x     / 0.07 ng/mL / x     / x     / 4.2 ng/mL  CARDIAC MARKERS ( 16 Oct 2017 18:28 )  x     / 0.07 ng/mL / 39 U/L / x     / 4.6 ng/mL  CARDIAC MARKERS ( 16 Oct 2017 09:17 )  x     / 0.04 ng/mL / 26 U/L / x     / 2.9 ng/mL  CARDIAC MARKERS ( 16 Oct 2017 00:15 )  x     / 0.05 ng/mL / 29 U/L / x     / 3.2 ng/mL      < from: Transthoracic Echocardiogram w/Doppler (12 @ 13:31) >    Patient name: OSCAR LOPEZ  YOB: 1929   Age: 82 (F)   MR#: [id number]  Study Date: 3/14/2012  [id number]  Study quality: Technically fair  Referring Physician: Pro Tracy MD  Blood Pressure: 124/51 mmHg  Height: 5ft 3in  Weight: 164 lb  BSA: 1.8 m2  ------------------------------------------------------------------------  PROCEDURE: Transthoracic echocardiogram with 2-D, M-Mode  and complete spectral and color flow Doppler.  INDICATION: Aortic Stenosis (424.1), Atrial Fibrillation  (427.31)  ------------------------------------------------------------------------  Dimensions:    Normal Values:  LA:     3.1    2.0 - 4.0 cm  Ao:     3.1    2.0 - 3.8 cm  SEPTUM: 1.2    0.6 - 1.2cm  PWT:    1.3    0.6 - 1.1 cm  LVIDd:  4.3    3.0 - 5.6 cm  LVIDs:  2.6    1.8 - 4.0 cm  Derived variables:  LVMI: 110 g/m2  RWT: 0.60  Fractional short: 40 %  Ejection Fraction: >70 %  Doppler Peak Velocity (m/sec): AoV=4.0  ------------------------------------------------------------------------  Observations:  Mitral Valve: Mitral annular calcification and calcified  mitral leaflets with normal diastolic opening. Minimal  mitral regurgitation.  Aortic Valve/Aorta: Heavily calcified trileaflet aortic  valve with decreased opening. Peak transaortic valve  gradient equals 62 mm Hg, mean transaortic valve gradient  equals 37 mm Hg, estimated aortic valve area equals 0.9  sqcm (by continuity equation), consistent with severe  aortic stenosis.Mild-moderate aortic regurgitation.  Pressure halft - time is 361 ms.  Normal aortic root.  Left Atrium: Severely dilated left atrium.  LA volume index  = 51 cc/m2.  Left Ventricle: Hyperdynamic left ventricle. Mild to  moderate concentric left ventricular hypertrophy. Atrial  fibrillation precludes a comprehensive diastolic  assessment. However, findings are suggestive of moderate  diastolic dysfunction.  Right Heart: Severe right atrial enlargement. RA volume  index = 53 cc/m2.  Normal right ventricular size and  function. Normal tricuspid valve. Mild-moderate tricuspid  regurgitation. Normal pulmonic valve. Minimal pulmonic  regurgitation.  Pericardium/Pleura: Normal pericardium with no pericardial  effusion.  Hemodynamic: Estimated right atrial pressure is 5 mm Hg.  Estimated right ventricular systolic pressure equals 46 mm  Hg, assuming right atrial pressure equals 5 mm Hg,  consistent with mild pulmonary hypertension.  ------------------------------------------------------------------------  Conclusions:  1. Mitral annular calcification and calcified mitral  leaflets with normal diastolic opening. Minimal mitral  regurgitation.  2. Heavily calcified trileaflet aortic valve with decreased  opening. Peak transaortic valve gradientequals 62 mm Hg,  mean transaortic valve gradient equals 37 mm Hg, estimated  aortic valve area equals 0.9 sqcm (by continuity equation),  consistent with severe aortic stenosis. Mild-moderate  aortic regurgitation. Pressure halft - time is 361 ms.  3. Severely dilated left atrium.  LA volume index = 51  cc/m2.  4. Mild to moderate concentric left ventricular  hypertrophy.  5. Hyperdynamic left ventricle.  6. Atrial fibrillation precludes a comprehensive diastolic  assessment. However, findings are suggestive of moderate  diastolic dysfunction.  7. Severe right atrial enlargement. RA volume index = 53  cc/m2.  8. Normal right ventricular size and function.  9. Estimated right ventricular systolic pressure equals 46  mm Hg, assuming right atrial pressure equals 5 mm Hg,  consistent with mild pulmonary hypertension.  10. Normal tricuspid valve. Mild-moderate tricuspid  regurgitation.  *** No previous Echo exam.  ------------------------------------------------------------------------  Confirmed on  3/14/2012 - 15:25:18 by Armen Siddiqui M.D.  ------------------------------------------------------------------------    < end of copied text >      MICROBIOLOGY:   Urinalysis Basic - ( 16 Oct 2017 12:01 )    Color: Yellow / Appearance: SL Turbid / S.012 / pH: x  Gluc: x / Ketone: Negative  / Bili: Negative / Urobili: Negative   Blood: x / Protein: 30 mg/dL / Nitrite: Negative   Leuk Esterase: Large / RBC: 0-2 /HPF / WBC 26-50 /HPF   Sq Epi: x / Non Sq Epi: OCC /HPF / Bacteria: Few /HPF      RADIOLOGY:  [x ] Chest radiographs reviewed and interpreted by me    < from: Xray Chest 2 Views PA/Lat (10.16.17 @ 00:27) >    EXAM:  CHEST PA & LAT                            PROCEDURE DATE:  10/16/2017        INTERPRETATION:  CLINICAL INDICATION: Mild hypoxia for 6 hours.    TECHNIQUE: Frontal and Lateral views of the chest    COMPARISON: Chest x-ray 3/23/2015.    FINDINGS:     Status post TAVR. Surgical clips are noted in the right axilla and right   upper lobe.    The heart is enlarged, unchanged.    There is no focal consolidation.    Small bilateral pleural effusions. No pneumothorax.    IMPRESSION:   Small bilateral pleural effusions. No focal consolidation.       RYLEE EWING M.D., RADIOLOGY RESIDENT  This document has been electronically signed.  TEN PRICE M.D., ATTENDING RADIOLOGIST  This document has been electronically signed. Oct 16 2017  9:11AM      < end of copied text >  --------------------------------------------------------------------------------------------------------- [facility] - [facility]  CONSULT NOTE    HPI: 87 year old gentlewoman, lifelong non-smoker, with history of severe aortic stenosis s/p TAVR in 2015, atrial fibrillation on chronic anticoagulation with intermittent "strong heartbeats" for which beta-blockers have been adjusted on multiple occasions, chronic CHF for which her lasix dose was recently increased, breast cancer s/p right lumpectomy/lymph node dissection and RT in , uterine cancer s/p hysterectomy in  and skin cancer resection from the lower eyelids of both eyes. The patient recently was found to have anemia in the setting of a supratherapeutic INR attributed to a GI bleed. However, FOBT was negative on 3 occasions and the patient's H/H returned to baseline. She was started on iron and EGD/colonscopy was deferred due to multiple medical issues and fear of renal compromise with a bowel prep. The patient has had several bouts of diarrhea over the last few days after drinking milk shakes. Her family has advised not to drink too much water despite her increased thirst given her history of cardiac disease. Over the last week, she has not "been feeling herself" with weakness and shortness of breath with exertion. She has no chest pain/pressure or palpitations. She has no cough, sputum production, chest congestion or wheeze. No fevers, chills or sweats. This morning, the patient was noted to be disoriented and lethargic. ABG revealed an acute respiratory acidosis. She is hypoxic on room air.    PMHX:  Aortic stenosis  AF (atrial fibrillation)  HTN (hypertension)  Dyslipidemia  Uterine cancer  Osteoarthritis  Breast cancer diagnosed in     PSHX:  TAVR 2015  right lumpectomy and right axillary lymph node removal, radiation in   H/O: hysterectomy  s/p surgical excision of left eye skin cancer  S/P D&C  S/P cholecystectomy      FAMILY HISTORY:  No pertinent family history in first degree relatives      SOCIAL HISTORY: lifelong non-smoker    Pulmonary Medications:       Antimicrobials:      Cardiology:  metoprolol 12.5 milliGRAM(s) Oral two times a day      Other:  ascorbic acid 500 milliGRAM(s) Oral daily  atorvastatin 10 milliGRAM(s) Oral at bedtime  clopidogrel Tablet 75 milliGRAM(s) Oral daily  ferrous    sulfate 325 milliGRAM(s) Oral daily  influenza   Vaccine 0.5 milliLiter(s) IntraMuscular once  multivitamin 1 Tablet(s) Oral daily      Allergies    No Known Allergies    Intolerances    digoxin (Rash; Drowsiness)      HOME MEDICATIONS:    	furosemide 40 mg oral tablet: 1.5 tabs daily except 2 tabs per day saturday and    · 	Multiple Vitamins oral tablet: 1 tab(s) orally once a day  · 	lisinopril 20 mg oral tablet: 1 tab(s) orally once a day  · 	atorvastatin 10 mg oral tablet: 1 tab(s) orally once a day (at bedtime)  · 	diltiazem 360 mg/24 hours oral capsule, extended release: 1 cap(s) orally once a day  · 	docusate sodium 100 mg oral capsule: 1 cap(s) orally once a day, As needed, Constipation  · 	Vitamin C 500 mg oral tablet: 1 tab(s) orally once a day  · 	metoprolol tartrate 25 mg oral tablet: 0.5 tab(s) orally 2 times a day  · 	Klor-Con 10 oral tablet, extended release: 10 milliequivalent(s) orally 2 times a day  · 	ferrous sulfate 324 mg (65 mg elemental iron) oral tablet: 1 tab(s) orally once a day  · 	Plavix 75 mg oral tablet: 1 tab(s) orally once a day  · 	Coumadin 3 mg oral tablet: 1 tab(s) orally once a day -,  tablet on sat and sun    REVIEW OF SYSTEMS:  Constitutional: As per HPI  HEENT: Within normal limits  CV: As per HPI  Resp: As per HPI  GI: As per HPI  : Within normal limits  Musculoskeletal: Within normal limits  Skin: Within normal limits  Neurological: Within normal limits  Psychiatric: Within normal limits  Endocrine: Within normal limits  Hematologic/Lymphatic: Within normal limits  Allergic/Immunologic: Within normal limits    [x] All other systems negative    OBJECTIVE:      I&O's Detail    16 Oct 2017 07:  -  17 Oct 2017 07:00  --------------------------------------------------------  IN:    Oral Fluid: 240 mL  Total IN: 240 mL    OUT:    Voided: 250 mL  Total OUT: 250 mL    Total NET: -10 mL      17 Oct 2017 07:  -  17 Oct 2017 13:52  --------------------------------------------------------  IN:    Oral Fluid: 240 mL  Total IN: 240 mL    OUT:    Voided: 150 mL  Total OUT: 150 mL    Total NET: 90 mL      Daily Height in cm: 157.48 (16 Oct 2017 19:38)    Daily Weight in k.4 (17 Oct 2017 09:57)      PHYSICAL EXAM:  ICU Vital Signs Last 24 Hrs  T(C): 36.2 (17 Oct 2017 04:36), Max: 36.9 (16 Oct 2017 16:49)  T(F): 97.2 (17 Oct 2017 04:36), Max: 98.5 (16 Oct 2017 16:49)  HR: 88 (17 Oct 2017 04:36) (80 - 93)  BP: 143/61 (17 Oct 2017 04:36) (126/62 - 146/66)  BP(mean): --  ABP: --  ABP(mean): --  RR: 18 (17 Oct 2017 04:36) (16 - 18)  SpO2: 94% (17 Oct 2017 04:36) (91% - 99%) on 4lpm - 80% on room air    General: Awake. Confused. Cooperative. No distress. Appears stated age 	  HEENT:   Atraumatic. Normocephalic. Anicteric. Normal oral mucosa, PERRL, EOMI  Neck: Supple. Trachea midline. Thyroid without enlargement/tenderness/nodules. No carotid bruit. No JVD	  Cardiovascular: Irregularly irregular rate and rhythm. S1 S2 normal. II/VI systolic murmur  Respiratory: Respirations unlabored. Diffuse bilateral rales.   Abdomen: Soft. Non-tender. Non-distended. No organomegaly. No masses. Normal bowel sounds	  Extremities: Warm to touch. No clubbing or cyanosis. No pedal edema.  Pulses: 2+ peripheral pulses all extremities	  Skin: Normal skin color. No rashes or lesions. No ecchymoses, No cyanosis. Warm to touch  Lymph Nodes: Cervical, supraclavicular and axillary nodes normal  Neurological: Confused. Moving all extremities. A and O x 1  Psychiatry: Calm/confused      LABS:                          10.6   9.8   )-----------( 251      ( 17 Oct 2017 09:07 )             34.3                         10.1   8.2   )-----------( 254      ( 16 Oct 2017 09:03 )             31.5     10-    146<H>  |  107  |  52<H>  ----------------------------<  124<H>  4.7   |  28  |  2.14<H>    10-16    147<H>  |  108  |  53<H>  ----------------------------<  129<H>  5.2   |  29  |  2.30<H>    Ca      8.5      10-17    Ca      8.4      10-16    Phos    4.1     10-16      Mg       2.4     10-16    TPro  6.5  /  Alb  3.5  /  TBili  0.2  /  DBili  x   /  AST  15  /  ALT  16  /  AlkPhos  73  10    PT/INR - ( 17 Oct 2017 09:07 )   PT: 37.3 sec;   INR: 3.34 ratio       PTT - ( 17 Oct 2017 09:07 )  PTT:42.8 sec    ABG - ( 17 Oct 2017 13:27 )  pH: 7.22  /  pCO2: 75    /  pO2: 111   / HCO3: 30    / Base Excess: .8    /  SaO2: 98        Venous Blood Gas:  10-16 @ 00:15  7.31/64/46/32/73  VBG Lactate: 1.4      CARDIAC MARKERS ( 17 Oct 2017 09:06 )  x     / x     / 38 U/L / x     / x      CARDIAC MARKERS ( 17 Oct 2017 09:05 )  x     / 0.07 ng/mL / x     / x     / 4.2 ng/mL  CARDIAC MARKERS ( 16 Oct 2017 18:28 )  x     / 0.07 ng/mL / 39 U/L / x     / 4.6 ng/mL  CARDIAC MARKERS ( 16 Oct 2017 09:17 )  x     / 0.04 ng/mL / 26 U/L / x     / 2.9 ng/mL  CARDIAC MARKERS ( 16 Oct 2017 00:15 )  x     / 0.05 ng/mL / 29 U/L / x     / 3.2 ng/mL      < from: Transthoracic Echocardiogram w/Doppler (12 @ 13:31) >    Patient name: OSCAR LOPEZ  YOB: 1929   Age: 82 (F)   MR#: [id number]  Study Date: 3/14/2012  [id number]: Jada Lagunas RDCS  Study quality: Technically fair  Referring Physician: Pro Tracy MD  Blood Pressure: 124/51 mmHg  Height: 5ft 3in  Weight: 164 lb  BSA: 1.8 m2  ------------------------------------------------------------------------  PROCEDURE: Transthoracic echocardiogram with 2-D, M-Mode  and complete spectral and color flow Doppler.  INDICATION: Aortic Stenosis (424.1), Atrial Fibrillation  (427.31)  ------------------------------------------------------------------------  Dimensions:    Normal Values:  LA:     3.1    2.0 - 4.0 cm  Ao:     3.1    2.0 - 3.8 cm  SEPTUM: 1.2    0.6 - 1.2cm  PWT:    1.3    0.6 - 1.1 cm  LVIDd:  4.3    3.0 - 5.6 cm  LVIDs:  2.6    1.8 - 4.0 cm  Derived variables:  LVMI: 110 g/m2  RWT: 0.60  Fractional short: 40 %  Ejection Fraction: >70 %  Doppler Peak Velocity (m/sec): AoV=4.0  ------------------------------------------------------------------------  Observations:  Mitral Valve: Mitral annular calcification and calcified  mitral leaflets with normal diastolic opening. Minimal  mitral regurgitation.  Aortic Valve/Aorta: Heavily calcified trileaflet aortic  valve with decreased opening. Peak transaortic valve  gradient equals 62 mm Hg, mean transaortic valve gradient  equals 37 mm Hg, estimated aortic valve area equals 0.9  sqcm (by continuity equation), consistent with severe  aortic stenosis .Mild-moderate aortic regurgitation.  Pressure halft - time is 361 ms.  Normal aortic root.  Left Atrium: Severely dilated left atrium.  LA volume index  = 51 cc/m2.  Left Ventricle: Hyperdynamic left ventricle. Mild to  moderate concentric left ventricular hypertrophy. Atrial  fibrillation precludes a comprehensive diastolic  assessment. However, findings are suggestive of moderate  diastolic dysfunction.  Right Heart: Severe right atrial enlargement. RA volume  index = 53 cc/m2.  Normal right ventricular size and  function. Normal tricuspid valve. Mild-moderate tricuspid  regurgitation. Normal pulmonic valve. Minimal pulmonic  regurgitation.  Pericardium/Pleura: Normal pericardium with no pericardial  effusion.  Hemodynamic: Estimated right atrial pressure is 5 mm Hg.  Estimated right ventricular systolic pressure equals 46 mm  Hg, assuming right atrial pressure equals 5 mm Hg,  consistent with mild pulmonary hypertension.  ------------------------------------------------------------------------  Conclusions:  1. Mitral annular calcification and calcified mitral  leaflets with normal diastolic opening. Minimal mitral  regurgitation.  2. Heavily calcified trileaflet aortic valve with decreased  opening. Peak transaortic valve gradientequals 62 mm Hg,  mean transaortic valve gradient equals 37 mm Hg, estimated  aortic valve area equals 0.9 sqcm (by continuity equation),  consistent with severe aortic stenosis. Mild-moderate  aortic regurgitation. Pressure halft - time is 361 ms.  3. Severely dilated left atrium.  LA volume index = 51  cc/m2.  4. Mild to moderate concentric left ventricular  hypertrophy.  5. Hyperdynamic left ventricle.  6. Atrial fibrillation precludes a comprehensive diastolic  assessment. However, findings are suggestive of moderate  diastolic dysfunction.  7. Severe right atrial enlargement. RA volume index = 53  cc/m2.  8. Normal right ventricular size and function.  9. Estimated right ventricular systolic pressure equals 46  mm Hg, assuming right atrial pressure equals 5 mm Hg,  consistent with mild pulmonary hypertension.  10. Normal tricuspid valve. Mild-moderate tricuspid  regurgitation.  *** No previous Echo exam.  ------------------------------------------------------------------------  Confirmed on  3/14/2012 - 15:25:18 by Armen Siddiqui M.D.  ------------------------------------------------------------------------    < end of copied text >      MICROBIOLOGY:   Urinalysis Basic - ( 16 Oct 2017 12:01 )    Color: Yellow / Appearance: SL Turbid / S.012 / pH: x  Gluc: x / Ketone: Negative  / Bili: Negative / Urobili: Negative   Blood: x / Protein: 30 mg/dL / Nitrite: Negative   Leuk Esterase: Large / RBC: 0-2 /HPF / WBC 26-50 /HPF   Sq Epi: x / Non Sq Epi: OCC /HPF / Bacteria: Few /HPF      RADIOLOGY:  [x ] Chest radiographs reviewed and interpreted by me    < from: Xray Chest 2 Views PA/Lat (10.16.17 @ 00:27) >    EXAM:  CHEST PA & LAT                            PROCEDURE DATE:  10/16/2017        INTERPRETATION:  CLINICAL INDICATION: Mild hypoxia for 6 hours.    TECHNIQUE: Frontal and Lateral views of the chest    COMPARISON: Chest x-ray 3/23/2015.    FINDINGS:     Status post TAVR. Surgical clips are noted in the right axilla and right   upper lobe.    The heart is enlarged, unchanged.    There is no focal consolidation.    Small bilateral pleural effusions. No pneumothorax.    IMPRESSION:   Small bilateral pleural effusions. No focal consolidation.       RYLEE EWING M.D., RADIOLOGY RESIDENT  This document has been electronically signed.  TEN PRICE M.D., ATTENDING RADIOLOGIST  This document has been electronically signed. Oct 16 2017  9:11AM      < end of copied text >  --------------------------------------------------------------------------------------------------------- [facility] - [facility]  CONSULT NOTE    HPI: 87 year old gentlewoman, lifelong non-smoker, with history of severe aortic stenosis s/p TAVR in 2015, atrial fibrillation on chronic anticoagulation with intermittent "strong heartbeats" for which beta-blockers have been adjusted on multiple occasions, chronic CHF for which her lasix dose was recently increased, breast cancer s/p right lumpectomy/lymph node dissection and RT in , uterine cancer s/p hysterectomy in  and skin cancer resections from the lower eyelids of both eyes. The patient was recently was found to have anemia in the setting of a supratherapeutic INR which was attributed to a GI bleed. However, FOBT was negative on 3 occasions and the patient's H/H returned to baseline. She was started on iron supplementation and EGD/colonscopy was deferred due to her multiple medical issues and fear of renal compromise with dehydration related to a bowel prep. The patient has had several bouts of diarrhea over the last few days after drinking milk shakes. Her family has advised not to drink too much water despite her increased thirst given her history of cardiac disease. Over the last week, she has not "been feeling herself" with weakness and shortness of breath with exertion. She has no chest pain/pressure or palpitations. She has no cough, sputum production, chest congestion or wheeze. No fevers, chills or sweats. This morning, the patient was noted to be disoriented and lethargic. ABG revealed an acute respiratory acidosis. She is hypoxic on room air.    PMHX:  Aortic stenosis  AF (atrial fibrillation)  HTN (hypertension)  Dyslipidemia  Uterine cancer  Osteoarthritis  Breast cancer diagnosed in     PSHX:  TAVR 2015  right lumpectomy and right axillary lymph node removal, radiation in   H/O: hysterectomy  s/p surgical excision of bilateral eye skin cancers  S/P D&C  S/P cholecystectomy      FAMILY HISTORY:  No pertinent family history in first degree relatives      SOCIAL HISTORY: lifelong non-smoker;    Pulmonary Medications:       Antimicrobials:      Cardiology:  metoprolol 12.5 milliGRAM(s) Oral two times a day      Other:  ascorbic acid 500 milliGRAM(s) Oral daily  atorvastatin 10 milliGRAM(s) Oral at bedtime  clopidogrel Tablet 75 milliGRAM(s) Oral daily  ferrous    sulfate 325 milliGRAM(s) Oral daily  influenza   Vaccine 0.5 milliLiter(s) IntraMuscular once  multivitamin 1 Tablet(s) Oral daily      Allergies    No Known Allergies    Intolerances    digoxin (Rash; Drowsiness)      HOME MEDICATIONS:    	furosemide 40 mg oral tablet: 1.5 tabs daily except 2 tabs per day saturday and    · 	Multiple Vitamins oral tablet: 1 tab(s) orally once a day  · 	lisinopril 20 mg oral tablet: 1 tab(s) orally once a day  · 	atorvastatin 10 mg oral tablet: 1 tab(s) orally once a day (at bedtime)  · 	diltiazem 360 mg/24 hours oral capsule, extended release: 1 cap(s) orally once a day  · 	docusate sodium 100 mg oral capsule: 1 cap(s) orally once a day, As needed, Constipation  · 	Vitamin C 500 mg oral tablet: 1 tab(s) orally once a day  · 	metoprolol tartrate 25 mg oral tablet: 0.5 tab(s) orally 2 times a day  · 	Klor-Con 10 oral tablet, extended release: 10 milliequivalent(s) orally 2 times a day  · 	ferrous sulfate 324 mg (65 mg elemental iron) oral tablet: 1 tab(s) orally once a day  · 	Plavix 75 mg oral tablet: 1 tab(s) orally once a day  · 	Coumadin 3 mg oral tablet: 1 tab(s) orally once a day -F, /2 tablet on sat and sun    REVIEW OF SYSTEMS:  Constitutional: As per HPI  HEENT: Within normal limits  CV: As per HPI  Resp: As per HPI  GI: As per HPI  : Within normal limits  Musculoskeletal: Within normal limits  Skin: Within normal limits  Neurological: As per HPI  Psychiatric: Within normal limits  Endocrine: Within normal limits  Hematologic/Lymphatic: Within normal limits  Allergic/Immunologic: Within normal limits    [x] All other systems negative    OBJECTIVE:      I&O's Detail    16 Oct 2017 07:  -  17 Oct 2017 07:00  --------------------------------------------------------  IN:    Oral Fluid: 240 mL  Total IN: 240 mL    OUT:    Voided: 250 mL  Total OUT: 250 mL    Total NET: -10 mL      17 Oct 2017 07:  -  17 Oct 2017 13:52  --------------------------------------------------------  IN:    Oral Fluid: 240 mL  Total IN: 240 mL    OUT:    Voided: 150 mL  Total OUT: 150 mL    Total NET: 90 mL      Daily Height in cm: 157.48 (16 Oct 2017 19:38)    Daily Weight in k.4 (17 Oct 2017 09:57)      PHYSICAL EXAM:  ICU Vital Signs Last 24 Hrs  T(C): 36.2 (17 Oct 2017 04:36), Max: 36.9 (16 Oct 2017 16:49)  T(F): 97.2 (17 Oct 2017 04:36), Max: 98.5 (16 Oct 2017 16:49)  HR: 88 (17 Oct 2017 04:36) (80 - 93)  BP: 143/61 (17 Oct 2017 04:36) (126/62 - 146/66)  BP(mean): --  ABP: --  ABP(mean): --  RR: 18 (17 Oct 2017 04:36) (16 - 18)  SpO2: 94% (17 Oct 2017 04:36) (91% - 99%) on 4lpm - 80% on room air    General: Awake. Confused. Cooperative. No distress. Appears stated age 	  HEENT:   Atraumatic. Normocephalic. Anicteric. Normal oral mucosa, PERRL, EOMI  Neck: Supple. Trachea midline. Thyroid without enlargement/tenderness/nodules. No carotid bruit. No JVD	  Cardiovascular: Irregularly irregular rate and rhythm. S1 S2 normal. II/VI systolic murmur  Respiratory: Respirations unlabored. Diffuse bilateral rales.   Abdomen: Soft. Non-tender. Non-distended. No organomegaly. No masses. Normal bowel sounds	  Extremities: Warm to touch. No clubbing or cyanosis. No pedal edema.  Pulses: 2+ peripheral pulses all extremities	  Skin: Normal skin color. No rashes or lesions. No ecchymoses, No cyanosis. Warm to touch  Lymph Nodes: Cervical, supraclavicular and axillary nodes normal  Neurological: Confused. Moving all extremities. A and O x 1  Psychiatry: Calm/confused      LABS:                          10.6   9.8   )-----------( 251      ( 17 Oct 2017 09:07 )             34.3                         10.1   8.2   )-----------( 254      ( 16 Oct 2017 09:03 )             31.5     10    146<H>  |  107  |  52<H>  ----------------------------<  124<H>  4.7   |  28  |  2.14<H>    10-16    147<H>  |  108  |  53<H>  ----------------------------<  129<H>  5.2   |  29  |  2.30<H>    Ca      8.5      10-17    Ca      8.4      10-16    Phos    4.1     10-16      Mg       2.4     10-16    TPro  6.5  /  Alb  3.5  /  TBili  0.2  /  DBili  x   /  AST  15  /  ALT  16  /  AlkPhos  73  10    PT/INR - ( 17 Oct 2017 09:07 )   PT: 37.3 sec;   INR: 3.34 ratio       PTT - ( 17 Oct 2017 09:07 )  PTT:42.8 sec    ABG - ( 17 Oct 2017 13:27 )  pH: 7.22  /  pCO2: 75    /  pO2: 111   / HCO3: 30    / Base Excess: .8    /  SaO2: 98        Venous Blood Gas:  10-16 @ 00:15  7.31/64/46/32/73  VBG Lactate: 1.4      CARDIAC MARKERS ( 17 Oct 2017 09:06 )  x     / x     / 38 U/L / x     / x      CARDIAC MARKERS ( 17 Oct 2017 09:05 )  x     / 0.07 ng/mL / x     / x     / 4.2 ng/mL  CARDIAC MARKERS ( 16 Oct 2017 18:28 )  x     / 0.07 ng/mL / 39 U/L / x     / 4.6 ng/mL  CARDIAC MARKERS ( 16 Oct 2017 09:17 )  x     / 0.04 ng/mL / 26 U/L / x     / 2.9 ng/mL  CARDIAC MARKERS ( 16 Oct 2017 00:15 )  x     / 0.05 ng/mL / 29 U/L / x     / 3.2 ng/mL      < from: Transthoracic Echocardiogram w/Doppler (12 @ 13:31) >    Patient name: OSCAR LOPEZ  YOB: 1929   Age: 82 (F)   MR#: [id number]  Study Date: 3/14/2012  Location: [place] [id number]: Jada Lagunas MARGY  Study quality: Technically fair  Referring Physician: Pro Tracy MD  Blood Pressure: 124/51 mmHg  Height: 5ft 3in  Weight: 164 lb  BSA: 1.8 m2  ------------------------------------------------------------------------  PROCEDURE: Transthoracic echocardiogram with 2-D, M-Mode  and complete spectral and color flow Doppler.  INDICATION: Aortic Stenosis (424.1), Atrial Fibrillation  (427.31)  ------------------------------------------------------------------------  Dimensions:    Normal Values:  LA:     3.1    2.0 - 4.0 cm  Ao:     3.1    2.0 - 3.8 cm  SEPTUM: 1.2    0.6 - 1.2cm  PWT:    1.3    0.6 - 1.1 cm  LVIDd:  4.3    3.0 - 5.6 cm  LVIDs:  2.6    1.8 - 4.0 cm  Derived variables:  LVMI: 110 g/m2  RWT: 0.60  Fractional short: 40 %  Ejection Fraction: >70 %  Doppler Peak Velocity (m/sec): AoV=4.0  ------------------------------------------------------------------------  Observations:  Mitral Valve: Mitral annular calcification and calcified  mitral leaflets with normal diastolic opening. Minimal  mitral regurgitation.  Aortic Valve/Aorta: Heavily calcified trileaflet aortic  valve with decreased opening. Peak transaortic valve  gradient equals 62 mm Hg, mean transaortic valve gradient  equals 37 mm Hg, estimated aortic valve area equals 0.9  sqcm (by continuity equation), consistent with severe  aortic stenosis .Mild-moderate aortic regurgitation.  Pressure halft - time is 361 ms.  Normal aortic root.  Left Atrium: Severely dilated left atrium.  LA volume index  = 51 cc/m2.  Left Ventricle: Hyperdynamic left ventricle. Mild to  moderate concentric left ventricular hypertrophy. Atrial  fibrillation precludes a comprehensive diastolic  assessment. However, findings are suggestive of moderate  diastolic dysfunction.  Right Heart: Severe right atrial enlargement. RA volume  index = 53 cc/m2.  Normal right ventricular size and  function. Normal tricuspid valve. Mild-moderate tricuspid  regurgitation. Normal pulmonic valve. Minimal pulmonic  regurgitation.  Pericardium/Pleura: Normal pericardium with no pericardial  effusion.  Hemodynamic: Estimated right atrial pressure is 5 mm Hg.  Estimated right ventricular systolic pressure equals 46 mm  Hg, assuming right atrial pressure equals 5 mm Hg,  consistent with mild pulmonary hypertension.  ------------------------------------------------------------------------  Conclusions:  1. Mitral annular calcification and calcified mitral  leaflets with normal diastolic opening. Minimal mitral  regurgitation.  2. Heavily calcified trileaflet aortic valve with decreased  opening. Peak transaortic valve gradientequals 62 mm Hg,  mean transaortic valve gradient equals 37 mm Hg, estimated  aortic valve area equals 0.9 sqcm (by continuity equation),  consistent with severe aortic stenosis. Mild-moderate  aortic regurgitation. Pressure halft - time is 361 ms.  3. Severely dilated left atrium.  LA volume index = 51  cc/m2.  4. Mild to moderate concentric left ventricular  hypertrophy.  5. Hyperdynamic left ventricle.  6. Atrial fibrillation precludes a comprehensive diastolic  assessment. However, findings are suggestive of moderate  diastolic dysfunction.  7. Severe right atrial enlargement. RA volume index = 53  cc/m2.  8. Normal right ventricular size and function.  9. Estimated right ventricular systolic pressure equals 46  mm Hg, assuming right atrial pressure equals 5 mm Hg,  consistent with mild pulmonary hypertension.  10. Normal tricuspid valve. Mild-moderate tricuspid  regurgitation.  *** No previous Echo exam.  ------------------------------------------------------------------------  Confirmed on  3/14/2012 - 15:25:18 by Armen Siddiqui M.D.  ------------------------------------------------------------------------    < end of copied text >      MICROBIOLOGY:   Urinalysis Basic - ( 16 Oct 2017 12:01 )    Color: Yellow / Appearance: SL Turbid / S.012 / pH: x  Gluc: x / Ketone: Negative  / Bili: Negative / Urobili: Negative   Blood: x / Protein: 30 mg/dL / Nitrite: Negative   Leuk Esterase: Large / RBC: 0-2 /HPF / WBC 26-50 /HPF   Sq Epi: x / Non Sq Epi: OCC /HPF / Bacteria: Few /HPF      RADIOLOGY:  [x ] Chest radiographs reviewed and interpreted by me    < from: Xray Chest 2 Views PA/Lat (10.16.17 @ 00:27) >    EXAM:  CHEST PA & LAT                            PROCEDURE DATE:  10/16/2017        INTERPRETATION:  CLINICAL INDICATION: Mild hypoxia for 6 hours.    TECHNIQUE: Frontal and Lateral views of the chest    COMPARISON: Chest x-ray 3/23/2015.    FINDINGS:     Status post TAVR. Surgical clips are noted in the right axilla and right   upper lobe.    The heart is enlarged, unchanged.    There is no focal consolidation.    Small bilateral pleural effusions. No pneumothorax.    IMPRESSION:   Small bilateral pleural effusions. No focal consolidation.       RYLEE EWING M.D., RADIOLOGY RESIDENT  This document has been electronically signed.  TEN PRICE M.D., ATTENDING RADIOLOGIST  This document has been electronically signed. Oct 16 2017  9:11AM      < end of copied text >  --------------------------------------------------------------------------------------------------------- [facility] - [facility]  CONSULT NOTE    HPI: 87 year old gentlewoman, lifelong non-smoker, with history of severe aortic stenosis s/p TAVR in 2015, atrial fibrillation on chronic anticoagulation with intermittent "strong heartbeats" for which beta-blockers have been adjusted on multiple occasions, chronic CHF for which her lasix dose was recently increased, breast cancer s/p right lumpectomy/lymph node dissection and RT in , uterine cancer s/p hysterectomy in  and skin cancer resections from the lower eyelids of both eyes. The patient was recently was found to have anemia in the setting of a supratherapeutic INR which was attributed to a GI bleed. However, FOBT was negative on 3 occasions and the patient's H/H returned to baseline without intervention. She was started on iron supplementation and EGD/colonscopy was deferred due to her multiple medical issues and fear of renal compromise with dehydration related to a bowel prep. The patient has had several bouts of diarrhea over the last few days after drinking milk shakes. Her family has advised not to drink too much water despite her increased thirst given her history of cardiac disease. Over the last week, she has not "been feeling herself" with weakness and shortness of breath with exertion. She has no chest pain/pressure or palpitations. She has no cough, sputum production, chest congestion or wheeze. No fevers, chills or sweats. This morning, the patient was noted to be disoriented and lethargic. ABG revealed an acute respiratory acidosis. She is hypoxic on room air.    PMHX:  Aortic stenosis  AF (atrial fibrillation)  HTN (hypertension)  Dyslipidemia  Uterine cancer  Osteoarthritis  Breast cancer diagnosed in     PSHX:  TAVR 2015  right lumpectomy and right axillary lymph node removal, radiation in   H/O: hysterectomy  s/p surgical excision of bilateral eye skin cancers  S/P D&C  S/P cholecystectomy      FAMILY HISTORY:  No pertinent family history in first degree relatives      SOCIAL HISTORY: lifelong non-smoker;    Pulmonary Medications:       Antimicrobials:      Cardiology:  metoprolol 12.5 milliGRAM(s) Oral two times a day      Other:  ascorbic acid 500 milliGRAM(s) Oral daily  atorvastatin 10 milliGRAM(s) Oral at bedtime  clopidogrel Tablet 75 milliGRAM(s) Oral daily  ferrous    sulfate 325 milliGRAM(s) Oral daily  influenza   Vaccine 0.5 milliLiter(s) IntraMuscular once  multivitamin 1 Tablet(s) Oral daily      Allergies    No Known Allergies    Intolerances    digoxin (Rash; Drowsiness)      HOME MEDICATIONS:    	furosemide 40 mg oral tablet: 1.5 tabs daily except 2 tabs per day saturday and    · 	Multiple Vitamins oral tablet: 1 tab(s) orally once a day  · 	lisinopril 20 mg oral tablet: 1 tab(s) orally once a day  · 	atorvastatin 10 mg oral tablet: 1 tab(s) orally once a day (at bedtime)  · 	diltiazem 360 mg/24 hours oral capsule, extended release: 1 cap(s) orally once a day  · 	docusate sodium 100 mg oral capsule: 1 cap(s) orally once a day, As needed, Constipation  · 	Vitamin C 500 mg oral tablet: 1 tab(s) orally once a day  · 	metoprolol tartrate 25 mg oral tablet: 0.5 tab(s) orally 2 times a day  · 	Klor-Con 10 oral tablet, extended release: 10 milliequivalent(s) orally 2 times a day  · 	ferrous sulfate 324 mg (65 mg elemental iron) oral tablet: 1 tab(s) orally once a day  · 	Plavix 75 mg oral tablet: 1 tab(s) orally once a day  · 	Coumadin 3 mg oral tablet: 1 tab(s) orally once a day -F, /2 tablet on sat and sun    REVIEW OF SYSTEMS:  Constitutional: As per HPI  HEENT: Within normal limits  CV: As per HPI  Resp: As per HPI  GI: As per HPI  : Within normal limits  Musculoskeletal: Within normal limits  Skin: Within normal limits  Neurological: As per HPI  Psychiatric: Within normal limits  Endocrine: Within normal limits  Hematologic/Lymphatic: Within normal limits  Allergic/Immunologic: Within normal limits    [x] All other systems negative    OBJECTIVE:      I&O's Detail    16 Oct 2017 07:  -  17 Oct 2017 07:00  --------------------------------------------------------  IN:    Oral Fluid: 240 mL  Total IN: 240 mL    OUT:    Voided: 250 mL  Total OUT: 250 mL    Total NET: -10 mL      17 Oct 2017 07:  -  17 Oct 2017 13:52  --------------------------------------------------------  IN:    Oral Fluid: 240 mL  Total IN: 240 mL    OUT:    Voided: 150 mL  Total OUT: 150 mL    Total NET: 90 mL      Daily Height in cm: 157.48 (16 Oct 2017 19:38)    Daily Weight in k.4 (17 Oct 2017 09:57)      PHYSICAL EXAM:  ICU Vital Signs Last 24 Hrs  T(C): 36.2 (17 Oct 2017 04:36), Max: 36.9 (16 Oct 2017 16:49)  T(F): 97.2 (17 Oct 2017 04:36), Max: 98.5 (16 Oct 2017 16:49)  HR: 88 (17 Oct 2017 04:36) (80 - 93)  BP: 143/61 (17 Oct 2017 04:36) (126/62 - 146/66)  BP(mean): --  ABP: --  ABP(mean): --  RR: 18 (17 Oct 2017 04:36) (16 - 18)  SpO2: 94% (17 Oct 2017 04:36) (91% - 99%) on 4lpm - 80% on room air    General: Awake. Confused. Cooperative. No distress. Appears stated age 	  HEENT:   Atraumatic. Normocephalic. Anicteric. Normal oral mucosa, PERRL, EOMI  Neck: Supple. Trachea midline. Thyroid without enlargement/tenderness/nodules. No carotid bruit. No JVD	  Cardiovascular: Irregularly irregular rate and rhythm. S1 S2 normal. II/VI systolic murmur  Respiratory: Respirations unlabored. Diffuse bilateral rales. Kyphoscoliosis  Abdomen: Soft. Non-tender. Non-distended. No organomegaly. No masses. Normal bowel sounds	  Extremities: Warm to touch. No clubbing or cyanosis. No pedal edema.  Pulses: 2+ peripheral pulses all extremities	  Skin: Normal skin color. No rashes or lesions. No ecchymoses, No cyanosis. Warm to touch  Lymph Nodes: Cervical, supraclavicular and axillary nodes normal  Neurological: Confused. Moving all extremities. A and O x 1  Psychiatry: Calm/confused      LABS:                          10.6   9.8   )-----------( 251      ( 17 Oct 2017 09:07 )             34.3                         10.1   8.2   )-----------( 254      ( 16 Oct 2017 09:03 )             31.5     10-    146<H>  |  107  |  52<H>  ----------------------------<  124<H>  4.7   |  28  |  2.14<H>    10    147<H>  |  108  |  53<H>  ----------------------------<  129<H>  5.2   |  29  |  2.30<H>    Ca      8.5      10-17    Ca      8.4      10    Phos    4.1     10      Mg       2.4     10    TPro  6.5  /  Alb  3.5  /  TBili  0.2  /  DBili  x   /  AST  15  /  ALT  16  /  AlkPhos  73  10    PT/INR - ( 17 Oct 2017 09:07 )   PT: 37.3 sec;   INR: 3.34 ratio       PTT - ( 17 Oct 2017 09:07 )  PTT:42.8 sec    ABG - ( 17 Oct 2017 13:27 )  pH: 7.22  /  pCO2: 75    /  pO2: 111   / HCO3: 30    / Base Excess: .8    /  SaO2: 98        Venous Blood Gas:  10-16 @ 00:15  7.31/64/46/32/73  VBG Lactate: 1.4      CARDIAC MARKERS ( 17 Oct 2017 09:06 )  x     / x     / 38 U/L / x     / x      CARDIAC MARKERS ( 17 Oct 2017 09:05 )  x     / 0.07 ng/mL / x     / x     / 4.2 ng/mL  CARDIAC MARKERS ( 16 Oct 2017 18:28 )  x     / 0.07 ng/mL / 39 U/L / x     / 4.6 ng/mL  CARDIAC MARKERS ( 16 Oct 2017 09:17 )  x     / 0.04 ng/mL / 26 U/L / x     / 2.9 ng/mL  CARDIAC MARKERS ( 16 Oct 2017 00:15 )  x     / 0.05 ng/mL / 29 U/L / x     / 3.2 ng/mL      < from: Transthoracic Echocardiogram w/Doppler (12 @ 13:31) >    Patient name: OSCAR LOPEZ  YOB: 1929   Age: 82 (F)   MR#: [id number]  Study Date: 3/14/2012  [id number]: Jada Lagunas MARGY  Study quality: Technically fair  Referring Physician: Pro Tracy MD  Blood Pressure: 124/51 mmHg  Height: 5ft 3in  Weight: 164 lb  BSA: 1.8 m2  ------------------------------------------------------------------------  PROCEDURE: Transthoracic echocardiogram with 2-D, M-Mode  and complete spectral and color flow Doppler.  INDICATION: Aortic Stenosis (424.1), Atrial Fibrillation  (427.31)  ------------------------------------------------------------------------  Dimensions:    Normal Values:  LA:     3.1    2.0 - 4.0 cm  Ao:     3.1    2.0 - 3.8 cm  SEPTUM: 1.2    0.6 - 1.2cm  PWT:    1.3    0.6 - 1.1 cm  LVIDd:  4.3    3.0 - 5.6 cm  LVIDs:  2.6    1.8 - 4.0 cm  Derived variables:  LVMI: 110 g/m2  RWT: 0.60  Fractional short: 40 %  Ejection Fraction: >70 %  Doppler Peak Velocity (m/sec): AoV=4.0  ------------------------------------------------------------------------  Observations:  Mitral Valve: Mitral annular calcification and calcified  mitral leaflets with normal diastolic opening. Minimal  mitral regurgitation.  Aortic Valve/Aorta: Heavily calcified trileaflet aortic  valve with decreased opening. Peak transaortic valve  gradient equals 62 mm Hg, mean transaortic valve gradient  equals 37 mm Hg, estimated aortic valve area equals 0.9  sqcm (by continuity equation), consistent with severe  aortic stenosis .Mild-moderate aortic regurgitation.  Pressure halft - time is 361 ms.  Normal aortic root.  Left Atrium: Severely dilated left atrium.  LA volume index  = 51 cc/m2.  Left Ventricle: Hyperdynamic left ventricle. Mild to  moderate concentric left ventricular hypertrophy. Atrial  fibrillation precludes a comprehensive diastolic  assessment. However, findings are suggestive of moderate  diastolic dysfunction.  Right Heart: Severe right atrial enlargement. RA volume  index = 53 cc/m2.  Normal right ventricular size and  function. Normal tricuspid valve. Mild-moderate tricuspid  regurgitation. Normal pulmonic valve. Minimal pulmonic  regurgitation.  Pericardium/Pleura: Normal pericardium with no pericardial  effusion.  Hemodynamic: Estimated right atrial pressure is 5 mm Hg.  Estimated right ventricular systolic pressure equals 46 mm  Hg, assuming right atrial pressure equals 5 mm Hg,  consistent with mild pulmonary hypertension.  ------------------------------------------------------------------------  Conclusions:  1. Mitral annular calcification and calcified mitral  leaflets with normal diastolic opening. Minimal mitral  regurgitation.  2. Heavily calcified trileaflet aortic valve with decreased  opening. Peak transaortic valve gradientequals 62 mm Hg,  mean transaortic valve gradient equals 37 mm Hg, estimated  aortic valve area equals 0.9 sqcm (by continuity equation),  consistent with severe aortic stenosis. Mild-moderate  aortic regurgitation. Pressure halft - time is 361 ms.  3. Severely dilated left atrium.  LA volume index = 51  cc/m2.  4. Mild to moderate concentric left ventricular  hypertrophy.  5. Hyperdynamic left ventricle.  6. Atrial fibrillation precludes a comprehensive diastolic  assessment. However, findings are suggestive of moderate  diastolic dysfunction.  7. Severe right atrial enlargement. RA volume index = 53  cc/m2.  8. Normal right ventricular size and function.  9. Estimated right ventricular systolic pressure equals 46  mm Hg, assuming right atrial pressure equals 5 mm Hg,  consistent with mild pulmonary hypertension.  10. Normal tricuspid valve. Mild-moderate tricuspid  regurgitation.  *** No previous Echo exam.  ------------------------------------------------------------------------  Confirmed on  3/14/2012 - 15:25:18 by Armen Siddiqui M.D.  ------------------------------------------------------------------------    < end of copied text >      MICROBIOLOGY:   Urinalysis Basic - ( 16 Oct 2017 12:01 )    Color: Yellow / Appearance: SL Turbid / S.012 / pH: x  Gluc: x / Ketone: Negative  / Bili: Negative / Urobili: Negative   Blood: x / Protein: 30 mg/dL / Nitrite: Negative   Leuk Esterase: Large / RBC: 0-2 /HPF / WBC 26-50 /HPF   Sq Epi: x / Non Sq Epi: OCC /HPF / Bacteria: Few /HPF      RADIOLOGY:  [x ] Chest radiographs reviewed and interpreted by me    < from: Xray Chest 2 Views PA/Lat (10.. @ 00:27) >    EXAM:  CHEST PA & LAT                            PROCEDURE DATE:  10/16/2017        INTERPRETATION:  CLINICAL INDICATION: Mild hypoxia for 6 hours.    TECHNIQUE: Frontal and Lateral views of the chest    COMPARISON: Chest x-ray 3/23/2015.    FINDINGS:     Status post TAVR. Surgical clips are noted in the right axilla and right   upper lobe.    The heart is enlarged, unchanged.    There is no focal consolidation.    Small bilateral pleural effusions. No pneumothorax.    IMPRESSION:   Small bilateral pleural effusions. No focal consolidation.       RYLEE EWING M.D., RADIOLOGY RESIDENT  This document has been electronically signed.  TEN PRICE M.D., ATTENDING RADIOLOGIST  This document has been electronically signed. Oct 16 2017  9:11AM      < end of copied text >  ---------------------------------------------------------------------------------------------------------

## 2017-10-18 LAB
ANION GAP SERPL CALC-SCNC: 11 MMOL/L — SIGNIFICANT CHANGE UP (ref 5–17)
BUN SERPL-MCNC: 50 MG/DL — HIGH (ref 7–23)
CALCIUM SERPL-MCNC: 8.8 MG/DL — SIGNIFICANT CHANGE UP (ref 8.4–10.5)
CHLORIDE SERPL-SCNC: 106 MMOL/L — SIGNIFICANT CHANGE UP (ref 96–108)
CO2 SERPL-SCNC: 27 MMOL/L — SIGNIFICANT CHANGE UP (ref 22–31)
CREAT SERPL-MCNC: 2 MG/DL — HIGH (ref 0.5–1.3)
GAS PNL BLDA: SIGNIFICANT CHANGE UP
GLUCOSE SERPL-MCNC: 90 MG/DL — SIGNIFICANT CHANGE UP (ref 70–99)
HCT VFR BLD CALC: 33 % — LOW (ref 34.5–45)
HGB BLD-MCNC: 9.6 G/DL — LOW (ref 11.5–15.5)
INR BLD: 2.87 RATIO — HIGH (ref 0.88–1.16)
MCHC RBC-ENTMCNC: 28.6 PG — SIGNIFICANT CHANGE UP (ref 27–34)
MCHC RBC-ENTMCNC: 29.1 GM/DL — LOW (ref 32–36)
MCV RBC AUTO: 98.2 FL — SIGNIFICANT CHANGE UP (ref 80–100)
PLATELET # BLD AUTO: 229 K/UL — SIGNIFICANT CHANGE UP (ref 150–400)
POTASSIUM SERPL-MCNC: 5.1 MMOL/L — SIGNIFICANT CHANGE UP (ref 3.5–5.3)
POTASSIUM SERPL-SCNC: 5.1 MMOL/L — SIGNIFICANT CHANGE UP (ref 3.5–5.3)
PROTHROM AB SERPL-ACNC: 33.1 SEC — HIGH (ref 10–13.1)
RBC # BLD: 3.36 M/UL — LOW (ref 3.8–5.2)
RBC # FLD: 18.7 % — HIGH (ref 10.3–14.5)
SODIUM SERPL-SCNC: 144 MMOL/L — SIGNIFICANT CHANGE UP (ref 135–145)
WBC # BLD: 8.42 K/UL — SIGNIFICANT CHANGE UP (ref 3.8–10.5)
WBC # FLD AUTO: 8.42 K/UL — SIGNIFICANT CHANGE UP (ref 3.8–10.5)

## 2017-10-18 RX ORDER — WARFARIN SODIUM 2.5 MG/1
1 TABLET ORAL ONCE
Qty: 0 | Refills: 0 | Status: COMPLETED | OUTPATIENT
Start: 2017-10-18 | End: 2017-10-18

## 2017-10-18 RX ORDER — CEFTRIAXONE 500 MG/1
1 INJECTION, POWDER, FOR SOLUTION INTRAMUSCULAR; INTRAVENOUS EVERY 24 HOURS
Qty: 0 | Refills: 0 | Status: DISCONTINUED | OUTPATIENT
Start: 2017-10-19 | End: 2017-10-23

## 2017-10-18 RX ORDER — CEFTRIAXONE 500 MG/1
INJECTION, POWDER, FOR SOLUTION INTRAMUSCULAR; INTRAVENOUS
Qty: 0 | Refills: 0 | Status: DISCONTINUED | OUTPATIENT
Start: 2017-10-18 | End: 2017-10-23

## 2017-10-18 RX ORDER — BUDESONIDE, MICRONIZED 100 %
0.5 POWDER (GRAM) MISCELLANEOUS EVERY 12 HOURS
Qty: 0 | Refills: 0 | Status: DISCONTINUED | OUTPATIENT
Start: 2017-10-18 | End: 2017-11-01

## 2017-10-18 RX ORDER — SODIUM CHLORIDE 9 MG/ML
1000 INJECTION INTRAMUSCULAR; INTRAVENOUS; SUBCUTANEOUS
Qty: 0 | Refills: 0 | Status: DISCONTINUED | OUTPATIENT
Start: 2017-10-18 | End: 2017-10-19

## 2017-10-18 RX ORDER — SODIUM CHLORIDE 9 MG/ML
1000 INJECTION INTRAMUSCULAR; INTRAVENOUS; SUBCUTANEOUS
Qty: 0 | Refills: 0 | Status: DISCONTINUED | OUTPATIENT
Start: 2017-10-18 | End: 2017-10-18

## 2017-10-18 RX ORDER — CEFTRIAXONE 500 MG/1
1 INJECTION, POWDER, FOR SOLUTION INTRAMUSCULAR; INTRAVENOUS ONCE
Qty: 0 | Refills: 0 | Status: COMPLETED | OUTPATIENT
Start: 2017-10-18 | End: 2017-10-18

## 2017-10-18 RX ORDER — FUROSEMIDE 40 MG
20 TABLET ORAL ONCE
Qty: 0 | Refills: 0 | Status: COMPLETED | OUTPATIENT
Start: 2017-10-18 | End: 2017-10-18

## 2017-10-18 RX ORDER — IPRATROPIUM/ALBUTEROL SULFATE 18-103MCG
3 AEROSOL WITH ADAPTER (GRAM) INHALATION EVERY 6 HOURS
Qty: 0 | Refills: 0 | Status: DISCONTINUED | OUTPATIENT
Start: 2017-10-18 | End: 2017-11-01

## 2017-10-18 RX ADMIN — Medication 12.5 MILLIGRAM(S): at 17:40

## 2017-10-18 RX ADMIN — CLOPIDOGREL BISULFATE 75 MILLIGRAM(S): 75 TABLET, FILM COATED ORAL at 12:02

## 2017-10-18 RX ADMIN — SODIUM CHLORIDE 75 MILLILITER(S): 9 INJECTION INTRAMUSCULAR; INTRAVENOUS; SUBCUTANEOUS at 22:46

## 2017-10-18 RX ADMIN — WARFARIN SODIUM 1 MILLIGRAM(S): 2.5 TABLET ORAL at 23:00

## 2017-10-18 RX ADMIN — SODIUM CHLORIDE 50 MILLILITER(S): 9 INJECTION INTRAMUSCULAR; INTRAVENOUS; SUBCUTANEOUS at 18:03

## 2017-10-18 RX ADMIN — Medication 3 MILLILITER(S): at 17:43

## 2017-10-18 RX ADMIN — Medication 1 TABLET(S): at 12:02

## 2017-10-18 RX ADMIN — ATORVASTATIN CALCIUM 10 MILLIGRAM(S): 80 TABLET, FILM COATED ORAL at 21:41

## 2017-10-18 RX ADMIN — SODIUM CHLORIDE 50 MILLILITER(S): 9 INJECTION INTRAMUSCULAR; INTRAVENOUS; SUBCUTANEOUS at 21:41

## 2017-10-18 RX ADMIN — CEFTRIAXONE 100 GRAM(S): 500 INJECTION, POWDER, FOR SOLUTION INTRAMUSCULAR; INTRAVENOUS at 22:46

## 2017-10-18 RX ADMIN — Medication 20 MILLIGRAM(S): at 13:52

## 2017-10-18 RX ADMIN — Medication 500 MILLIGRAM(S): at 12:02

## 2017-10-18 RX ADMIN — Medication 12.5 MILLIGRAM(S): at 06:06

## 2017-10-18 RX ADMIN — Medication 3 MILLILITER(S): at 23:00

## 2017-10-18 RX ADMIN — Medication 0.5 MILLIGRAM(S): at 17:40

## 2017-10-18 RX ADMIN — Medication 325 MILLIGRAM(S): at 12:02

## 2017-10-18 NOTE — CHART NOTE - NSCHARTNOTEFT_GEN_A_CORE
MEDICINE NP    Notified by RN patient with AMS with elevated CO2 levels . This is a 88 yo F with PMHx of right breast CA S/P lumpectomy in 2000, Uterine Ca S/P total hysterectomy in 2012, Skin CA S/P excision of bilateral lower eyelid skin CA, cholecystectomy, Osteoarthritis of bilateral knees, HTN, hyperlipidemia, paroxysmal Afib on coumadin, severe aortic stenosis now s/p TAVR, who presents with onset of generalized weakness x 2 weeks. Seen and examined patient at bedside. Patient is A&Ox1 and confused, NAD. Patient incoherent.    VITAL SIGNS:  T(C): 36.6 (10-17-17 @ 21:00), Max: 36.6 (10-17-17 @ 21:00)  HR: 82 (10-18-17 @ 01:29) (43 - 90)  BP: 116/68 (10-17-17 @ 21:00) (116/68 - 143/61)  RR: 20 (10-17-17 @ 21:00) (18 - 20)  SpO2: 95% (10-18-17 @ 01:29) (89% - 100%)  Wt(kg): --      LABORATORY:                          10.6   9.8   )-----------( 251      ( 17 Oct 2017 09:07 )             34.3       10-17    146<H>  |  107  |  52<H>  ----------------------------<  124<H>  4.7   |  28  |  2.14<H>    Ca    8.5      17 Oct 2017 09:06  Phos  4.1     10-16  Mg     2.4     10-16            MICROBIOLOGY:           RADIOLOGY:          PHYSICAL EXAM:    Constitutional: AOx1. NAD.    Respiratory: clear lungs bilaterally. No wheezing, rhonchi, or crackles.    Cardiovascular: S1 S2. No murmurs.    Gastrointestinal: BS X4 active. soft. nontender.    Extremities/Vascular: +2 pulses bilaterally. No BLE edema.      ASSESSMENT/PLAN:   HPI:  88 yo F with PMHx of right breast CA S/P lumpectomy in 2000, Uterine Ca S/P total hysterectomy in 2012, Skin CA S/P excision of bilateral lower eyelid skin CA, cholecystectomy, Osteoarthritis of bilateral knees, HTN, hyperlipidemia, paroxysmal Afib on coumadin, severe aortic stenosis now s/p TAVR, who presents with onset of generalized weakness x 2 weeks. This was also associated with occasional intermittent episodes of shortness of breath that seemed to occur at rest that come and go for a few minutes at a time.  No chest pain. In addition she had noticed that she was going "into fibrillation" as patient reports that she can feel her episodic fibrillation.  No fevers, no chills, no nausea or vomiting. Patient reports that she has been very thirsty but her family told her not to drink too much water so she has been drinking much less liquids. Chronic R>L leg edema that is not changed (16 Oct 2017 05:43). Now with AMS        1) AMS  CT head  CT chest  -F/U primary team in AM

## 2017-10-18 NOTE — PROGRESS NOTE ADULT - SUBJECTIVE AND OBJECTIVE BOX
Patient is a 87y old  Female who presents with a chief complaint of "I have uterine cancer" (16 Oct 2017 12:15)      SUBJECTIVE / OVERNIGHT EVENTS: on BIPAP, awake, alert, chatty. family at bedside    MEDICATIONS  (STANDING):  ALBUTerol/ipratropium for Nebulization 3 milliLiter(s) Nebulizer every 6 hours  ascorbic acid 500 milliGRAM(s) Oral daily  atorvastatin 10 milliGRAM(s) Oral at bedtime  buDESOnide   0.5 milliGRAM(s) Respule 0.5 milliGRAM(s) Inhalation every 12 hours  clopidogrel Tablet 75 milliGRAM(s) Oral daily  ferrous    sulfate 325 milliGRAM(s) Oral daily  influenza   Vaccine 0.5 milliLiter(s) IntraMuscular once  metoprolol 12.5 milliGRAM(s) Oral two times a day  multivitamin 1 Tablet(s) Oral daily  sodium chloride 0.9%. 1000 milliLiter(s) (50 mL/Hr) IV Continuous <Continuous>    MEDICATIONS  (PRN):      Vital Signs Last 24 Hrs  T(F): 97.9 (10-18-17 @ 14:16), Max: 97.9 (10-18-17 @ 14:16)  HR: 88 (10-18-17 @ 20:40) (80 - 112)  BP: 139/70 (10-18-17 @ 14:16) (136/74 - 139/70)  RR: 20 (10-18-17 @ 14:16) (20 - 20)  SpO2: 97% (10-18-17 @ 20:40) (95% - 100%)  Telemetry:   CAPILLARY BLOOD GLUCOSE        I&O's Summary    17 Oct 2017 07:01  -  18 Oct 2017 07:00  --------------------------------------------------------  IN: 600 mL / OUT: 400 mL / NET: 200 mL    18 Oct 2017 07:01  -  18 Oct 2017 22:24  --------------------------------------------------------  IN: 60 mL / OUT: 250 mL / NET: -190 mL        PHYSICAL EXAM:  GENERAL: NAD, well-developed  HEAD:  Atraumatic, Normocephalic  EYES: EOMI, PERRLA, conjunctiva and sclera clear  NECK: Supple, No JVD  CHEST/LUNG: Clear to auscultation bilaterally; No wheeze  HEART: Regular rate and rhythm; No murmurs, rubs, or gallops  ABDOMEN: Soft, Nontender, Nondistended; Bowel sounds present  EXTREMITIES:  2+ Peripheral Pulses, No clubbing, cyanosis, or edema  PSYCH: AAOx3  NEUROLOGY: non-focal  SKIN: No rashes or lesions    LABS:                        9.6    8.42  )-----------( 229      ( 18 Oct 2017 07:30 )             33.0     10-18    144  |  106  |  50<H>  ----------------------------<  90  5.1   |  27  |  2.00<H>    Ca    8.8      18 Oct 2017 07:39      PT/INR - ( 18 Oct 2017 07:30 )   PT: 33.1 sec;   INR: 2.87 ratio         PTT - ( 17 Oct 2017 09:07 )  PTT:42.8 sec  CARDIAC MARKERS ( 17 Oct 2017 09:06 )  x     / x     / 38 U/L / x     / x      CARDIAC MARKERS ( 17 Oct 2017 09:05 )  x     / 0.07 ng/mL / x     / x     / 4.2 ng/mL          RADIOLOGY & ADDITIONAL TESTS:    Imaging Personally Reviewed:    Consultant(s) Notes Reviewed:      Care Discussed with Consultants/Other Providers:

## 2017-10-18 NOTE — PHYSICAL THERAPY INITIAL EVALUATION ADULT - PRECAUTIONS/LIMITATIONS, REHAB EVAL
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

## 2017-10-18 NOTE — PHYSICAL THERAPY INITIAL EVALUATION ADULT - CRITERIA FOR SKILLED THERAPEUTIC INTERVENTIONS
anticipated discharge recommendation/rehab potential/anticipated equipment needs at discharge/therapy frequency/impairments found/functional limitations in following categories/risk reduction/prevention/predicted duration of therapy intervention

## 2017-10-18 NOTE — PROGRESS NOTE ADULT - ASSESSMENT
88 yo F with PMHx of right breast CA S/P lumpectomy in 2000, Uterine Ca S/P total hysterectomy in 2012, Skin CA S/P excision of bilateral lower eyelid skin CA, cholecystectomy, Osteoarthritis of bilateral knees, HTN, hyperlipidemia, paroxysmal Afib on coumadin, severe aortic stenosis now s/p TAVR, who presents with onset of generalized weakness x 2 weeks and palpitations

## 2017-10-18 NOTE — CONSULT NOTE ADULT - SUBJECTIVE AND OBJECTIVE BOX
HPI:  Ms. Dutta is an 87 year-old woman with history of multiple medical problems including right breast CA S/P lumpectomy in , Uterine Ca S/P total hysterectomy in , hypertension, atrial fibrillation, and aortic stenosis s/p TAVR, who on 10/16/17 presented to the [facility] ER with generalized weakness x 2 weeks as well as intermittent shortness of breath      PAST MEDICAL & SURGICAL HISTORY:  Severe aortic stenosis  Uterine cancer  Arthritis  HTN (hypertension)  Breast cancer diagnosed in : s/p right lumpectomy and right axillary lymph node removal, radiation  AF (atrial fibrillation): on coumadin and aspirin  Dyslipidemia  H/O: hysterectomy: 2012  s/p surgical excision of left eye Skin cancer: Bilateral lower eye lids  S/P D&C  S/P cholecystectomy    Allergies  digoxin (Rash; Drowsiness)    SOCIAL HISTORY:  Denies ETOh,Smoking,     FAMILY HISTORY:  No pertinent family history in first degree relatives    REVIEW OF SYSTEMS:  CONSTITUTIONAL: (+)weakness, no fevers or chills  EYES/ENT: No visual changes;  No vertigo or throat pain   NECK: No pain or stiffness  RESPIRATORY: No cough, wheezing, hemoptysis; (+)shortness of breath  CARDIOVASCULAR: No chest pain or palpitations; (+)swelling  GASTROINTESTINAL: No abdominal or epigastric pain. No nausea, vomiting, or hematemesis; No diarrhea or constipation. No melena or hematochezia.  GENITOURINARY: No dysuria, frequency or hematuria  NEUROLOGICAL: No numbness or weakness  SKIN: No itching, burning, rashes, or lesions   All other review of systems is negative unless indicated above.    VITAL:  T(C): , Max: 36.6 (10-17-17 @ 21:00)  T(F): , Max: 97.8 (10-17-17 @ 21:00)  HR: 87 (10-18-17 @ 05:49)  BP: 136/74 (10-18-17 @ 04:23)  BP(mean): --  RR: 20 (10-18-17 @ 04:23)  SpO2: 96% (10-18-17 @ 05:49)    PHYSICAL EXAM:  Constitutional: NAD, Alert  HEENT: NCAT, MMM  Neck: Supple, No JVD  Respiratory: CTA-b/l  Cardiovascular: RRR s1s2, no m/r/g  Gastrointestinal: BS+, soft, NT/ND  Extremities: No peripheral edema b/l  Neurological: no focal deficits; strength grossly intact  Psychiatric: Normal mood, normal affect  Back: no CVAT b/l  Skin: No rashes, no nevi    LABS:                        9.6    8.42  )-----------( 229      ( 18 Oct 2017 07:30 )             33.0     Na(144)/K(5.1)/Cl(106)/HCO3(27)/BUN(50)/Cr(2.00)Glu(90)/Ca(8.8)/Mg(--)/PO4(--)    10-18 @ 07:39  Na(146)/K(4.7)/Cl(107)/HCO3(28)/BUN(52)/Cr(2.14)Glu(124)/Ca(8.5)/Mg(--)/PO4(--)    10-17 @ 09:06  Na(147)/K(5.2)/Cl(108)/HCO3(29)/BUN(53)/Cr(2.30)Glu(129)/Ca(8.4)/Mg(2.4)/PO4(4.1)    10-16 @ 09:17  Na(145)/K(5.1)/Cl(103)/HCO3(30)/BUN(54)/Cr(2.39)Glu(132)/Ca(9.1)/Mg(--)/PO4(--)    10-16 @ 00:15    (10/18/17) - ABG - 7.24/64/164/27      (3/28/15) - Cr 1.31    Urinalysis Basic - ( 16 Oct 2017 12:01 )  Color: Yellow / Appearance: SL Turbid / S.012 / pH: x  Gluc: x / Ketone: Negative  / Bili: Negative / Urobili: Negative   Blood: x / Protein: 30 mg/dL / Nitrite: Negative   Leuk Esterase: Large / RBC: 0-2 /HPF / WBC 26-50 /HPF   Sq Epi: x / Non Sq Epi: OCC /HPF / Bacteria: Few /HPF    IMAGING:  (10/16/17) - CXR - small b/l pleural effusions        IMPRESSION:  (1)Renal - CKD stage 3-4; is the creatinine of 2mg/dL her baseline at this point? Creatinine was 1.3mg/dL in 3/2015- unclear for now if that remains the baseline or whether chronic renal function has progressively worsened since then. Minimal proteinuria noted on urinalysis; large WBC on UA but few bacteria. AIN? No obvious culprit meds causing such a reaction.    (2)Hyperkalemia - borderline - acceptable for now    (3)Hypernatremia - mild - in association with limited free water intake    (4)Acid-base - primary respiratory acidosis; compensatory metabolic alkalosis. Pulmonary on board - input appreciated - hypercapnia due to kyphoscoliosis/respiratory muscle weakness?      RECOMMEND:  (1)Check urine: lytes, creatinine  (2)Follow up renal ultrasound  (3)Check SPEP/immunofixation  (4)Follow up CT chest  (5)No IVF/No diuretics for now  (6)Dose new meds for GFR 20-30ml/min  (7)Will make effort to obtain recent bloodwork from prior to admission in effort to better determine patient's baseline level of renal function    Thank you for involving [facility] in this patient's care.    With warm regards,    Quinten Parker MD   [facility]  [phone] HPI:  Ms. Dutta is an 87 year-old woman with history of multiple medical problems including right breast CA S/P lumpectomy in , Uterine Ca S/P total hysterectomy in , hypertension, atrial fibrillation, and aortic stenosis s/p TAVR, who on 10/16/17 presented to the [facility] ER with generalized weakness x 2 weeks as well as intermittent shortness of breath. She was noted to be hypercapnic soon after admission; placed on BIPAP; associated improvement in mentation. BUN/creatinine were 54/2.39 on admission - creatinine was 0.89mg/dL in mid September. In light of the NANCY, a renal consultation was requested.       Of note, in mid-2017; she was placed on Lasix - 60mg po qweekday and 80mg po qsat/sun. Despite this intervention, her shortness of breath did not improve. She denies recent NSAID or antibiotic use.     PAST MEDICAL & SURGICAL HISTORY:  Severe aortic stenosis  Uterine cancer  Arthritis  HTN (hypertension)  Breast cancer diagnosed in : s/p right lumpectomy and right axillary lymph node removal, radiation  AF (atrial fibrillation): on coumadin and aspirin  Dyslipidemia  H/O: hysterectomy: 2012  s/p surgical excision of left eye Skin cancer: Bilateral lower eye lids  S/P D&C  S/P cholecystectomy    Allergies  digoxin (Rash; Drowsiness)    SOCIAL HISTORY:  Denies ETOh,Smoking,     FAMILY HISTORY:  No pertinent family history in first degree relatives    REVIEW OF SYSTEMS:  CONSTITUTIONAL: (+)weakness, no fevers or chills  EYES/ENT: No visual changes;  No vertigo or throat pain   NECK: No pain or stiffness  RESPIRATORY: No cough, wheezing, hemoptysis; (+)shortness of breath  CARDIOVASCULAR: No chest pain or palpitations; (+)swelling  GASTROINTESTINAL: No abdominal or epigastric pain. No nausea, vomiting, or hematemesis; No diarrhea or constipation. No melena or hematochezia.  GENITOURINARY: No dysuria, frequency or hematuria  NEUROLOGICAL: No numbness or weakness  SKIN: No itching, burning, rashes, or lesions   All other review of systems is negative unless indicated above.    VITAL:  T(C): , Max: 36.6 (10-17-17 @ 21:00)  T(F): , Max: 97.8 (10-17-17 @ 21:00)  HR: 87 (10-18-17 @ 05:49)  BP: 136/74 (10-18-17 @ 04:23)  RR: 20 (10-18-17 @ 04:23)  SpO2: 96% (10-18-17 @ 05:49)    PHYSICAL EXAM:  Constitutional: NAD, Alert  HEENT: NCAT, MMM  Neck: Supple, No JVD  Respiratory: CTA-b/l  Cardiovascular: RRR s1s2, no m/r/g  Gastrointestinal: BS+, soft, NT/ND  Extremities: No peripheral edema b/l  Neurological: no focal deficits; strength grossly intact  Psychiatric: Normal mood, normal affect  Back: no CVAT b/l  Skin: No rashes, no nevi    LABS:                        9.6    8.42  )-----------( 229      ( 18 Oct 2017 07:30 )             33.0     Na(144)/K(5.1)/Cl(106)/HCO3(27)/BUN(50)/Cr(2.00)Glu(90)/Ca(8.8)/Mg(--)/PO4(--)    10-18 @ 07:39  Na(146)/K(4.7)/Cl(107)/HCO3(28)/BUN(52)/Cr(2.14)Glu(124)/Ca(8.5)/Mg(--)/PO4(--)    10-17 @ 09:06  Na(147)/K(5.2)/Cl(108)/HCO3(29)/BUN(53)/Cr(2.30)Glu(129)/Ca(8.4)/Mg(2.4)/PO4(4.1)    10-16 @ 09:17  Na(145)/K(5.1)/Cl(103)/HCO3(30)/BUN(54)/Cr(2.39)Glu(132)/Ca(9.1)/Mg(--)/PO4(--)    10-16 @ 00:15    (10/18/17) - ABG - 7.24/64/164/27      (3/28/15) - Cr 1.31    Urinalysis Basic - ( 16 Oct 2017 12:01 )  Color: Yellow / Appearance: SL Turbid / S.012 / pH: x  Gluc: x / Ketone: Negative  / Bili: Negative / Urobili: Negative   Blood: x / Protein: 30 mg/dL / Nitrite: Negative   Leuk Esterase: Large / RBC: 0-2 /HPF / WBC 26-50 /HPF   Sq Epi: x / Non Sq Epi: OCC /HPF / Bacteria: Few /HPF    IMAGING:  (10/16/17) - CXR - small b/l pleural effusions        IMPRESSION:  (1)Renal - NANCY on CKD 2-3 (base creatinine <1). Prerenal from overdiuresis? She has received 1.5L of IVF since admission; creatinine has improved a bit, but not particularly much. Minimal proteinuria noted on urinalysis; large WBC on UA but few bacteria. AIN from the Lasix? This is a possibility. Obstruction has also yet to be ruled out.    (2)Hyperkalemia - borderline - acceptable for now    (3)Hypernatremia - mild - in association with limited free water intake and NANCY    (4)Acid-base - primary respiratory acidosis; compensatory metabolic alkalosis. Pulmonary on board - input appreciated - hypercapnia due to kyphoscoliosis/respiratory muscle weakness?      RECOMMEND:  (1)Check urine: lytes, creatinine  (2)Follow up renal ultrasound  (3)Check SPEP/immunofixation  (4)Follow up CT chest  (5)NS 50cc/h  (6)Dose new meds for GFR 20-30ml/min  (7)Will make effort to obtain recent bloodwork from prior to admission in effort to better determine patient's baseline level of renal function    Thank you for involving [facility] in this patient's care.    With warm regards,    Quinten Parker MD   [facility]  [phone] HPI:  Ms. Dutta is an 87 year-old woman with history of multiple medical problems including right breast CA S/P lumpectomy in , Uterine Ca S/P total hysterectomy in , hypertension, atrial fibrillation, and aortic stenosis s/p TAVR, who on 10/16/17 presented to the [facility] ER with generalized weakness x 2 weeks as well as intermittent shortness of breath. She was noted to be hypercapnic soon after admission; placed on BIPAP; associated improvement in mentation. BUN/creatinine were 54/2.39 on admission - creatinine was 0.89mg/dL in mid September. In light of the NANCY, a renal consultation was requested.       Of note, in mid-2017; she was placed on Lasix - 60mg po qweekday and 80mg po qsat/sun. Despite this intervention, her shortness of breath did not improve. She denies recent NSAID or antibiotic use. She denies dysuria/hematuria/fever/chills. Denies rash. Breathing has improved, on BIPAP.    PAST MEDICAL & SURGICAL HISTORY:  Severe aortic stenosis  Uterine cancer  Arthritis  HTN (hypertension)  Breast cancer diagnosed in : s/p right lumpectomy and right axillary lymph node removal, radiation  AF (atrial fibrillation): on coumadin and aspirin  Dyslipidemia  H/O: hysterectomy: 2012  s/p surgical excision of left eye Skin cancer: Bilateral lower eye lids  S/P D&C  S/P cholecystectomy    Allergies  digoxin (Rash; Drowsiness)    SOCIAL HISTORY:  Denies ETOh,Smoking,     FAMILY HISTORY:  No pertinent family history in first degree relatives    REVIEW OF SYSTEMS:  CONSTITUTIONAL: (+)weakness, no fevers or chills  EYES/ENT: No visual changes;  No vertigo or throat pain   NECK: No pain or stiffness  RESPIRATORY: No cough, wheezing, hemoptysis; (+)shortness of breath  CARDIOVASCULAR: No chest pain or palpitations; (+)swelling  GASTROINTESTINAL: No abdominal or epigastric pain. No nausea, vomiting, or hematemesis; No diarrhea or constipation. No melena or hematochezia.  GENITOURINARY: No dysuria, frequency or hematuria  NEUROLOGICAL: No numbness or weakness  SKIN: No itching, burning, rashes, or lesions   All other review of systems is negative unless indicated above.    VITAL:  T(C): , Max: 36.6 (10-17-17 @ 21:00)  T(F): , Max: 97.8 (10-17-17 @ 21:00)  HR: 87 (10-18-17 @ 05:49)  BP: 136/74 (10-18-17 @ 04:23)  RR: 20 (10-18-17 @ 04:23)  SpO2: 96% (10-18-17 @ 05:49)    PHYSICAL EXAM:  Constitutional: NAD, Alert  HEENT: NCAT, MMM  Neck: Supple, No JVD  Respiratory: CTA-b/l  Cardiovascular: RRR s1s2, no m/r/g  Gastrointestinal: BS+, soft, NT/ND  Extremities: No peripheral edema b/l  Neurological: no focal deficits; strength grossly intact  Psychiatric: Normal mood, normal affect  Back: no CVAT b/l  Skin: No rashes, no nevi    LABS:                        9.6    8.42  )-----------( 229      ( 18 Oct 2017 07:30 )             33.0     Na(144)/K(5.1)/Cl(106)/HCO3(27)/BUN(50)/Cr(2.00)Glu(90)/Ca(8.8)/Mg(--)/PO4(--)    10-18 @ 07:39  Na(146)/K(4.7)/Cl(107)/HCO3(28)/BUN(52)/Cr(2.14)Glu(124)/Ca(8.5)/Mg(--)/PO4(--)    10-17 @ 09:06  Na(147)/K(5.2)/Cl(108)/HCO3(29)/BUN(53)/Cr(2.30)Glu(129)/Ca(8.4)/Mg(2.4)/PO4(4.1)    10-16 @ 09:17  Na(145)/K(5.1)/Cl(103)/HCO3(30)/BUN(54)/Cr(2.39)Glu(132)/Ca(9.1)/Mg(--)/PO4(--)    10-16 @ 00:15    (10/18/17) - ABG - 7.24/64/164/27      (3/28/15) - Cr 1.31    Urinalysis Basic - ( 16 Oct 2017 12:01 )  Color: Yellow / Appearance: SL Turbid / S.012 / pH: x  Gluc: x / Ketone: Negative  / Bili: Negative / Urobili: Negative   Blood: x / Protein: 30 mg/dL / Nitrite: Negative   Leuk Esterase: Large / RBC: 0-2 /HPF / WBC 26-50 /HPF   Sq Epi: x / Non Sq Epi: OCC /HPF / Bacteria: Few /HPF    IMAGING:  (10/16/17) - CXR - small b/l pleural effusions        IMPRESSION:  (1)Renal - NANCY on CKD 2-3 (base creatinine <1). Prerenal from overdiuresis? She has received 1.5L of IVF since admission; creatinine has improved a bit, but not particularly much. Minimal proteinuria noted on urinalysis; large WBC on UA but few bacteria. AIN from the Lasix? This is a possibility. Obstruction has also yet to be ruled out.    (2)Hyperkalemia - borderline - acceptable for now    (3)Hypernatremia - mild - in association with limited free water intake and NANCY    (4)Acid-base - primary respiratory acidosis; compensatory metabolic alkalosis. Pulmonary on board - input appreciated - hypercapnia due to kyphoscoliosis/respiratory muscle weakness?      RECOMMEND:  (1)Check urine: lytes, creatinine, eos  (2)Follow up renal ultrasound  (3)Check SPEP/immunofixation  (4)Follow up CT chest  (5)NS 50cc/h  (6)Dose new meds for GFR 20-30ml/min  (7)Will make effort to obtain recent bloodwork from prior to admission in effort to better determine patient's baseline level of renal function    Thank you for involving [facility] in this patient's care.    With warm regards,    Quinten Parker MD   [facility]  [phone]

## 2017-10-18 NOTE — PROGRESS NOTE ADULT - ASSESSMENT
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

## 2017-10-18 NOTE — PHYSICAL THERAPY INITIAL EVALUATION ADULT - GENERAL OBSERVATIONS, REHAB EVAL
Pt encountered supine in bed, + Bipap, Son at bed side, PIV , able to move all 4 extremities ad cordell.

## 2017-10-18 NOTE — PROGRESS NOTE ADULT - SUBJECTIVE AND OBJECTIVE BOX
OSCAR LOPEZ    Back on BIPAP this am for respiratory acidosis.  More awake and alert today.  Head CT no acute change.    Allergies    No Known Allergies    digoxin (Rash; Drowsiness)    MEDICATIONS  (STANDING):  ascorbic acid 500 milliGRAM(s) Oral daily  atorvastatin 10 milliGRAM(s) Oral at bedtime  clopidogrel Tablet 75 milliGRAM(s) Oral daily  ferrous    sulfate 325 milliGRAM(s) Oral daily  influenza   Vaccine 0.5 milliLiter(s) IntraMuscular once  metoprolol 12.5 milliGRAM(s) Oral two times a day  multivitamin 1 Tablet(s) Oral daily    PHYSICAL EXAM:  Vital Signs Last 24 Hrs  T(C): 36.4 (18 Oct 2017 04:23), Max: 36.6 (17 Oct 2017 21:00)  T(F): 97.6 (18 Oct 2017 04:23), Max: 97.8 (17 Oct 2017 21:00)  HR: 80 (18 Oct 2017 10:15) (43 - 106)  BP: 136/74 (18 Oct 2017 04:23) (116/68 - 139/59)  BP(mean): --  RR: 20 (18 Oct 2017 04:23) (20 - 20)  SpO2: 95% (18 Oct 2017 10:15) (89% - 100%)  Daily     Daily Weight in k.5 (18 Oct 2017 06:29)  I&O's Summary    17 Oct 2017 07:01  -  18 Oct 2017 07:00  --------------------------------------------------------  IN: 600 mL / OUT: 400 mL / NET: 200 mL    General Appearance: 	 Alert, cooperative, no distress on BIPAP  HEENT: normocephalic, atraumatic, PERRLA, EOMI, conjunctiva normal, sclera anicteric,   Neck: JVP estimated at around 10 cm  Lungs:  clear to auscultation and percussion bilaterally  Cor:  pmi 5th ICS MCL, Irregular rate and rhythm, S1 normal intensity, S2 normal intensity, GHAZALA  Abdomen:	 soft, non-tender; bowel sounds normal; no masses,  no organomegaly  1-2+ LE edema    EKG: AF  Telemetry:    Labs:  CBC Full  -  ( 18 Oct 2017 07:30 )  WBC Count : 8.42 K/uL  Hemoglobin : 9.6 g/dL  Hematocrit : 33.0 %  Platelet Count - Automated : 229 K/uL  Mean Cell Volume : 98.2 fl  Mean Cell Hemoglobin : 28.6 pg  Mean Cell Hemoglobin Concentration : 29.1 gm/dL  Auto Neutrophil # : x  Auto Lymphocyte # : x  Auto Monocyte # : x  Auto Eosinophil # : x  Auto Basophil # : x  Auto Neutrophil % : x  Auto Lymphocyte % : x  Auto Monocyte % : x  Auto Eosinophil % : x  Auto Basophil % : x    CARDIAC MARKERS ( 17 Oct 2017 09:06 )  x     / x     / 38 U/L / x     / x      CARDIAC MARKERS ( 17 Oct 2017 09:05 )  x     / 0.07 ng/mL / x     / x     / 4.2 ng/mL  CARDIAC MARKERS ( 16 Oct 2017 18:28 )  x     / 0.07 ng/mL / 39 U/L / x     / 4.6 ng/mL      PT/INR - ( 18 Oct 2017 07:30 )   PT: 33.1 sec;   INR: 2.87 ratio         PTT - ( 17 Oct 2017 09:07 )  PTT:42.8 sec  Urinalysis Basic - ( 16 Oct 2017 12:01 )    Color: Yellow / Appearance: SL Turbid / S.012 / pH: x  Gluc: x / Ketone: Negative  / Bili: Negative / Urobili: Negative   Blood: x / Protein: 30 mg/dL / Nitrite: Negative   Leuk Esterase: Large / RBC: 0-2 /HPF / WBC 26-50 /HPF   Sq Epi: x / Non Sq Epi: OCC /HPF / Bacteria: Few /HPF    Basic Metabolic Panel in AM (10.18.17 @ 07:39)    Sodium, Serum: 144 mmol/L    Potassium, Serum: 5.1 mmol/L    Chloride, Serum: 106 mmol/L    Carbon Dioxide, Serum: 27 mmol/L    Anion Gap, Serum: 11 mmol/L    Blood Urea Nitrogen, Serum: 50 mg/dL    Creatinine, Serum: 2.00 mg/dL    Glucose, Serum: 90 mg/dL    Calcium, Total Serum: 8.8 mg/dL    eGFR if Non : 22: Interpretative comment  The units for eGFR are ml/min/1.73m2 (normalized body surface area). The  eGFR is calculated from a serum creatinine using the CKD-EPI equation.  Other variables required for calculation are race, age and sex. Among  patients with chronic kidney disease (CKD), the eGFR is useful in  determining the stage of disease according to KDOQI CKD classification.  All eGFR results are reported numerically with the following  interpretation.          GFR                    With                 Without     (ml/min/1.73 m2)    Kidney Damage       Kidney Damage        >= 90                    Stage 1                     Normal        60-89                    Stage 2                     Decreased GFR        30-59     Stage 3                     Stage 3        15-29                    Stage 4                     Stage 4        < 15                      Stage 5                     Stage 5  Each stage of CKD assumes that the associated GFR level has been in  effect for at least 3 months. Determination of stages one and two (with  eGFR > 59 ml/min/m2) requires estimation of kidney damage for at least 3  months as defined by structural or functional abnormalities.  Limitations: All estimates of GFR will be less accurate for patients at  extremes of muscle mass (including but not limited to frail elderly,  critically ill, or cancer patients), those with unusual diets, and those  with conditions associated with reduced secretion or extrarenal  elimination of creatinine. The eGFR equation is not recommended for use  in patients with unstable creatinine levels. mL/min/1.73M2    eGFR if African American: 25 mL/min/1.73M2    Impression:  Acute renal failure (pts renal function was normal 3 mo ago)  Acute respiratory acidosis  Chronic diastolic CHF  Chronic AF  Anemia  History of supratherapeutic INR    Impression:  Appreciate Dr. Reyes input, BIPAP as needed, follow ABG  Keep I and Os even for now, follow Cr, lasix on hold  Rate control  Dose coumadin per INR    Leopoldo Funes MD, FACC  [facility]

## 2017-10-18 NOTE — PROGRESS NOTE ADULT - SUBJECTIVE AND OBJECTIVE BOX
[facility] - [facility]     PROGRESS NOTE    CHIEF COMPLAINT: ARF; COPD/emphysema; chronic CHFl NANCY    INTERVAL HISTORY: somewhat more awake and alert but still on BIPAP and remains confused and acidotic;     REVIEW OF SYSTEMS:  Constitutional: As per interval history  HEENT: Within normal limits  CV: As per interval history  Resp: As per interval history  GI: Within normal limits   : Within normal limits  Musculoskeletal: Within normal limits  Skin: Within normal limits  Neurological: As per interval history  Psychiatric: Within normal limits  Endocrine: Within normal limits  Hematologic/Lymphatic: Within normal limits  Allergic/Immunologic: Within normal limits      MEDICATIONS:     Pulmonary "      Anti-microbials:      Cardiovascular:  metoprolol 12.5 milliGRAM(s) Oral two times a day      Other:  ascorbic acid 500 milliGRAM(s) Oral daily  atorvastatin 10 milliGRAM(s) Oral at bedtime  clopidogrel Tablet 75 milliGRAM(s) Oral daily  ferrous    sulfate 325 milliGRAM(s) Oral daily  influenza   Vaccine 0.5 milliLiter(s) IntraMuscular once  multivitamin 1 Tablet(s) Oral daily        OBJECTIVE:    I&O's Detail    17 Oct 2017 07:01  -  18 Oct 2017 07:00  --------------------------------------------------------  IN:    Oral Fluid: 600 mL  Total IN: 600 mL    OUT:    Indwelling Catheter - Urethral: 250 mL    Voided: 150 mL  Total OUT: 400 mL    Total NET: 200 mL    Daily Weight in k.5 (18 Oct 2017 06:29)    PHYSICAL EXAM:       ICU Vital Signs Last 24 Hrs  T(C): 36.4 (18 Oct 2017 04:23), Max: 36.6 (17 Oct 2017 21:00)  T(F): 97.6 (18 Oct 2017 04:23), Max: 97.8 (17 Oct 2017 21:00)  HR: 80 (18 Oct 2017 10:15) (43 - 106)  BP: 136/74 (18 Oct 2017 04:23) (116/68 - 139/59)  BP(mean): --  ABP: --  ABP(mean): --  RR: 20 (18 Oct 2017 04:23) (20 - 20)  SpO2: 95% (18 Oct 2017 10:15) (89% - 100%) on BIPAP     General: Awake. Confused. Cooperative. No distress. Appears stated age 	  HEENT:   Atraumatic. Normocephalic. Anicteric. Normal oral mucosa, PERRL, EOMI. BIPAP mask  Neck: Supple. Trachea midline. Thyroid without enlargement/tenderness/nodules. No carotid bruit. No JVD	  Cardiovascular: Irregularly irregular rate and rhythm. S1 S2 normal. II/VI systolic murmur  Respiratory: Respirations unlabored. Decreased breath sounds at bases with dullness ~ 1/2 up. Kyphoscoliosis  Abdomen: Soft. Non-tender. Non-distended. No organomegaly. No masses. Normal bowel sounds	  Extremities: Warm to touch. No clubbing or cyanosis. No pedal edema.  Pulses: 2+ peripheral pulses all extremities	  Skin: Normal skin color. No rashes or lesions. No ecchymoses, No cyanosis. Warm to touch  Lymph Nodes: Cervical, supraclavicular and axillary nodes normal  Neurological: Confused. Moving all extremities. A and O x 1  Psychiatry: Calm/confused      LABS:                        9.6    8.42  )-----------( 229      ( 18 Oct 2017 07:30 )             33.0                         10.6   9.8   )-----------( 251      ( 17 Oct 2017 09:07 )             34.3     10-18    144  |  106  |  50<H>  ----------------------------<  90  5.1   |  27  |  2.00<H>    10-17    146<H>  |  107  |  52<H>  ----------------------------<  124<H>  4.7   |  28  |  2.14<H>    Ca      8.8      10-18    Ca      8.5      10-17    Phos    4.1     10-16      Mg       2.4     10-16    TPro  6.5  /  Alb  3.5  /  TBili  0.2  /  DBili  x   /  AST  15  /  ALT  16  /  AlkPhos  73  10-16    PT/INR - ( 18 Oct 2017 07:30 )   PT: 33.1 sec;   INR: 2.87 ratio       PTT - ( 17 Oct 2017 09:07 )  PTT:42.8 sec    ABG - ( 18 Oct 2017 09:53 )  pH: 7.26  /  pCO2: 67    /  pO2: 122   / HCO3: 29    / Base Excess: 1.2   /  SaO2: 99        ABG - ( 18 Oct 2017 05:42 )  pH: 7.24  /  pCO2: 64    /  pO2: 164   / HCO3: 27    / Base Excess: -.9   /  SaO2: 100       ABG - ( 17 Oct 2017 23:01 )  pH: 7.27  /  pCO2: 64    /  pO2: 173   / HCO3: 28    / Base Excess: .5    /  SaO2: 100       ABG - ( 17 Oct 2017 20:52 )  pH: 7.21  /  pCO2: 75    /  pO2: 112   / HCO3: 29    / Base Excess: .3    /  SaO2: 99        CARDIAC MARKERS ( 17 Oct 2017 09:06 )  x     / x     / 38 U/L / x     / x      CARDIAC MARKERS ( 17 Oct 2017 09:05 )  x     / 0.07 ng/mL / x     / x     / 4.2 ng/mL  CARDIAC MARKERS ( 16 Oct 2017 18:28 )  x     / 0.07 ng/mL / 39 U/L / x     / 4.6 ng/mL  CARDIAC MARKERS ( 16 Oct 2017 09:17 )  x     / 0.04 ng/mL / 26 U/L / x     / 2.9 ng/mL  CARDIAC MARKERS ( 16 Oct 2017 00:15 )  x     / 0.05 ng/mL / 29 U/L / x     / 3.2 ng/mL    < from: Transthoracic Echocardiogram w/Doppler (12 @ 13:31) >    Patient name: OSCAR LOPEZ  YOB: 1929   Age: 82 (F)   MR#: [id number]  Study Date: 3/14/2012  Location: [place] [id number]: Jada Lagunas RDCS  Study quality: Technically fair  Referring Physician: Pro Tracy MD  Blood Pressure: 124/51 mmHg  Height: 5ft 3in  Weight: 164 lb  BSA: 1.8 m2  ------------------------------------------------------------------------  PROCEDURE: Transthoracic echocardiogram with 2-D, M-Mode  and complete spectral and color flow Doppler.  INDICATION: Aortic Stenosis (424.1), Atrial Fibrillation  (427.31)  ------------------------------------------------------------------------  Dimensions:    Normal Values:  LA:     3.1    2.0 - 4.0 cm  Ao:     3.1    2.0 - 3.8 cm  SEPTUM: 1.2    0.6 - 1.2cm  PWT:    1.3    0.6 - 1.1 cm  LVIDd:  4.3    3.0 - 5.6 cm  LVIDs:  2.6    1.8 - 4.0 cm  Derived variables:  LVMI: 110 g/m2  RWT: 0.60  Fractional short: 40 %  Ejection Fraction: >70 %  Doppler Peak Velocity (m/sec): AoV=4.0  ------------------------------------------------------------------------  Observations:  Mitral Valve: Mitral annular calcification and calcified  mitral leaflets with normal diastolic opening. Minimal  mitral regurgitation.  Aortic Valve/Aorta: Heavily calcified trileaflet aortic  valve with decreased opening. Peak transaortic valve  gradient equals 62 mm Hg, mean transaortic valve gradient  equals 37 mm Hg, estimated aortic valve area equals 0.9  sqcm (by continuity equation), consistent with severe  aortic stenosis .Mild-moderate aortic regurgitation.  Pressure halft - time is 361 ms.  Normal aortic root.  Left Atrium: Severely dilated left atrium.  LA volume index  = 51 cc/m2.  Left Ventricle: Hyperdynamic left ventricle. Mild to  moderate concentric left ventricular hypertrophy. Atrial  fibrillation precludes a comprehensive diastolic  assessment. However, findings are suggestive of moderate  diastolic dysfunction.  Right Heart: Severe right atrial enlargement. RA volume  index = 53 cc/m2.  Normal right ventricular size and  function. Normal tricuspid valve. Mild-moderate tricuspid  regurgitation. Normal pulmonic valve. Minimal pulmonic  regurgitation.  Pericardium/Pleura: Normal pericardium with no pericardial  effusion.  Hemodynamic: Estimated right atrial pressure is 5 mm Hg.  Estimated right ventricular systolic pressure equals 46 mm  Hg, assuming right atrial pressure equals 5 mm Hg,  consistent with mild pulmonary hypertension.  ------------------------------------------------------------------------  Conclusions:  1. Mitral annular calcification and calcified mitral  leaflets with normal diastolic opening. Minimal mitral  regurgitation.  2. Heavily calcified trileaflet aortic valve with decreased  opening. Peak transaortic valve gradientequals 62 mm Hg,  mean transaortic valve gradient equals 37 mm Hg, estimated  aortic valve area equals 0.9 sqcm (by continuity equation),  consistent with severe aortic stenosis. Mild-moderate  aortic regurgitation. Pressure halft - time is 361 ms.  3. Severely dilated left atrium.  LA volume index = 51  cc/m2.  4. Mild to moderate concentric left ventricular  hypertrophy.  5. Hyperdynamic left ventricle.  6. Atrial fibrillation precludes a comprehensive diastolic  assessment. However, findings are suggestive of moderate  diastolic dysfunction.  7. Severe right atrial enlargement. RA volume index = 53  cc/m2.  8. Normal right ventricular size and function.  9. Estimated right ventricular systolic pressure equals 46  mm Hg, assuming right atrial pressure equals 5 mm Hg,  consistent with mild pulmonary hypertension.  10. Normal tricuspid valve. Mild-moderate tricuspid  regurgitation.  *** No previous Echo exam.  ------------------------------------------------------------------------  Confirmed on  3/14/2012 - 15:25:18 by Armen Siddiqui M.D.  ------------------------------------------------------------------------    < end of copied text >    MICROBIOLOGY:   Urinalysis Basic - ( 16 Oct 2017 12:01 )    Color: Yellow / Appearance: SL Turbid / S.012 / pH: x  Gluc: x / Ketone: Negative  / Bili: Negative / Urobili: Negative   Blood: x / Protein: 30 mg/dL / Nitrite: Negative   Leuk Esterase: Large / RBC: 0-2 /HPF / WBC 26-50 /HPF   Sq Epi: x / Non Sq Epi: OCC /HPF / Bacteria: Few /HPF      RADIOLOGY:  [x] Chest radiographs reviewed and interpreted by me    < from: CT Chest No Cont (10.17.17 @ 21:38) >    EXAM:  CT CHEST                          PROCEDURE DATE:  10/17/2017      INTERPRETATION:  Clinical information: Evaluate for pneumonia. Exam is   compared to previous study of 4/3/2012.    CT scan of the chest was obtained without administration of intravenous   contrast.    Surgical clips are noted in the right axilla.    No hilar and/or mediastinal adenopathy is noted.     Heart is markedly enlarged in size. Calcification the coronary arteries   is noted. Patient is status post TAVR.No pericardial effusion is noted.   Main pulmonary artery is dilated and measures 3.6 cm.     No endobronchial lesions are noted. Compressive atelectasis is noted   involving portions of both lower lobes. This is secondary to small   bilateral pleural effusions, right more than left.    Below the diaphragm, visualized portions of the abdomen demonstrate   low-attenuation within both kidneys which are too small to be adequately   characterized on this exam.     Degenerative changes of the spine are noted. Subcutaneous edema is noted.    Impression: There is no pneumonia.    Bilateral pleural effusions, right more than left.    MAYI MORALES M.D., ATTENDING RADIOLOGIST  This document has been electronically signed. Oct 18 2017  9:24AM      < end of copied text >  ---------------------------------------------------------------------------------------------------------  < from: CT Head No Cont (10.17.17 @ 21:38) >    EXAM:  CT BRAIN                            PROCEDURE DATE:  10/17/2017        INTERPRETATION:  HISTORY: Uterine cancer. Altered mental status.    COMPARISON: MRI brain performed 2012.    TECHNIQUE: Axial noncontrast CT images from the skull base to the vertex   were obtained and submitted for interpretation. Coronal and sagittal   reformatted images were performed. Bone and soft tissue windows were   evaluated.    FINDINGS:     There is no acute intracranial mass-effect, hemorrhage, midline shift, or   abnormal extra-axial fluid collection.     Ventricles, sulci, and cisterns are normal in size for the patient's age   without hydrocephalus. The basal cisterns are patent.     Patchy and confluent regions of periventricular and deep cerebral white   matter hypoattenuation likely reflects chronic microangiopathic ischemic   changes. Atheromatous calcifications along the carotid siphons are   present.    Minimal right maxillary sinus mucosal thickening. No evidence for acute   paranasal sinus or mastoid inflammatory changes.. The calvarium is   intact.     IMPRESSION:     No acute intracranial bleeding, mass effect, or evidence of an acute   territorial infarct.    ADRIEN MORRIS M.D., RADIOLOGY RESIDENT  This document has been electronically signed.  VINI METZGER M.D., ATTENDING RADIOLOGIST  This document has been electronically signed. Oct 18 2017  9:43AM      < end of copied text >  ---------------------------------------------------------------------------------------------------------  < from: Xray Chest 2 Views PA/Lat (10.16.17 @ 00:27) >    EXAM:  CHEST PA & LAT                            PROCEDURE DATE:  10/16/2017        INTERPRETATION:  CLINICAL INDICATION: Mild hypoxia for 6 hours.    TECHNIQUE: Frontal and Lateral views of the chest    COMPARISON: Chest x-ray 3/23/2015.    FINDINGS:     Status post TAVR. Surgical clips are noted in the right axilla and right   upper lobe.    The heart is enlarged, unchanged.    There is no focal consolidation.    Small bilateral pleural effusions. No pneumothorax.    IMPRESSION:   Small bilateral pleural effusions. No focal consolidation.       RYLEE EWING M.D., RADIOLOGY RESIDENT  This document has been electronically signed.  TEN PRICE M.D., ATTENDING RADIOLOGIST  This document has been electronically signed. Oct 16 2017  9:11AM      < end of copied text >  --------------------------------------------------------------------------------------------------------- [facility] - [facility]     PROGRESS NOTE    CHIEF COMPLAINT: ARF; COPD/emphysema; chronic CHFl NANCY    INTERVAL HISTORY: somewhat more awake and alert but still on BIPAP and remains confused and acidotic; no cough, sputum production, chest congestion or wheeze; no fevers, chills or sweats; no chest pain/pressure or palpitations; renal function minimally improved    REVIEW OF SYSTEMS:  Constitutional: As per interval history  HEENT: Within normal limits  CV: As per interval history  Resp: As per interval history  GI: Within normal limits   : Within normal limits  Musculoskeletal: Within normal limits  Skin: Within normal limits  Neurological: As per interval history  Psychiatric: Within normal limits  Endocrine: Within normal limits  Hematologic/Lymphatic: Within normal limits  Allergic/Immunologic: Within normal limits      MEDICATIONS:     Pulmonary "      Anti-microbials:      Cardiovascular:  metoprolol 12.5 milliGRAM(s) Oral two times a day      Other:  ascorbic acid 500 milliGRAM(s) Oral daily  atorvastatin 10 milliGRAM(s) Oral at bedtime  clopidogrel Tablet 75 milliGRAM(s) Oral daily  ferrous    sulfate 325 milliGRAM(s) Oral daily  influenza   Vaccine 0.5 milliLiter(s) IntraMuscular once  multivitamin 1 Tablet(s) Oral daily        OBJECTIVE:    I&O's Detail    17 Oct 2017 07:01  -  18 Oct 2017 07:00  --------------------------------------------------------  IN:    Oral Fluid: 600 mL  Total IN: 600 mL    OUT:    Indwelling Catheter - Urethral: 250 mL    Voided: 150 mL  Total OUT: 400 mL    Total NET: 200 mL    Daily Weight in k.5 (18 Oct 2017 06:29)    PHYSICAL EXAM:       ICU Vital Signs Last 24 Hrs  T(C): 36.4 (18 Oct 2017 04:23), Max: 36.6 (17 Oct 2017 21:00)  T(F): 97.6 (18 Oct 2017 04:23), Max: 97.8 (17 Oct 2017 21:00)  HR: 80 (18 Oct 2017 10:15) (43 - 106)  BP: 136/74 (18 Oct 2017 04:23) (116/68 - 139/59)  BP(mean): --  ABP: --  ABP(mean): --  RR: 20 (18 Oct 2017 04:23) (20 - 20)  SpO2: 95% (18 Oct 2017 10:15) (89% - 100%) on BIPAP     General: Awake. Confused. Cooperative. No distress. Appears stated age 	  HEENT:   Atraumatic. Normocephalic. Anicteric. Normal oral mucosa, PERRL, EOMI. BIPAP mask  Neck: Supple. Trachea midline. Thyroid without enlargement/tenderness/nodules. No carotid bruit. No JVD	  Cardiovascular: Irregularly irregular rate and rhythm. S1 S2 normal. II/VI systolic murmur  Respiratory: Respirations unlabored. Decreased breath sounds at bases with dullness ~ 1/2 up. Kyphoscoliosis  Abdomen: Soft. Non-tender. Non-distended. No organomegaly. No masses. Normal bowel sounds	  Extremities: Warm to touch. No clubbing or cyanosis. No pedal edema.  Pulses: 2+ peripheral pulses all extremities	  Skin: Normal skin color. No rashes or lesions. No ecchymoses, No cyanosis. Warm to touch  Lymph Nodes: Cervical, supraclavicular and axillary nodes normal  Neurological: Confused. Moving all extremities. A and O x 1  Psychiatry: Calm/confused      LABS:                        9.6    8.42  )-----------( 229      ( 18 Oct 2017 07:30 )             33.0                         10.6   9.8   )-----------( 251      ( 17 Oct 2017 09:07 )             34.3     10-18    144  |  106  |  50<H>  ----------------------------<  90  5.1   |  27  |  2.00<H>    10-    146<H>  |  107  |  52<H>  ----------------------------<  124<H>  4.7   |  28  |  2.14<H>    Ca      8.8      10-    Ca      8.5      10-    Phos    4.1     10-16      Mg       2.4     10-16    TPro  6.5  /  Alb  3.5  /  TBili  0.2  /  DBili  x   /  AST  15  /  ALT  16  /  AlkPhos  73  10-16    PT/INR - ( 18 Oct 2017 07:30 )   PT: 33.1 sec;   INR: 2.87 ratio       PTT - ( 17 Oct 2017 09:07 )  PTT:42.8 sec    ABG - ( 18 Oct 2017 09:53 )  pH: 7.26  /  pCO2: 67    /  pO2: 122   / HCO3: 29    / Base Excess: 1.2   /  SaO2: 99        ABG - ( 18 Oct 2017 05:42 )  pH: 7.24  /  pCO2: 64    /  pO2: 164   / HCO3: 27    / Base Excess: -.9   /  SaO2: 100       ABG - ( 17 Oct 2017 23:01 )  pH: 7.27  /  pCO2: 64    /  pO2: 173   / HCO3: 28    / Base Excess: .5    /  SaO2: 100       ABG - ( 17 Oct 2017 20:52 )  pH: 7.21  /  pCO2: 75    /  pO2: 112   / HCO3: 29    / Base Excess: .3    /  SaO2: 99        CARDIAC MARKERS ( 17 Oct 2017 09:06 )  x     / x     / 38 U/L / x     / x      CARDIAC MARKERS ( 17 Oct 2017 09:05 )  x     / 0.07 ng/mL / x     / x     / 4.2 ng/mL  CARDIAC MARKERS ( 16 Oct 2017 18:28 )  x     / 0.07 ng/mL / 39 U/L / x     / 4.6 ng/mL  CARDIAC MARKERS ( 16 Oct 2017 09:17 )  x     / 0.04 ng/mL / 26 U/L / x     / 2.9 ng/mL  CARDIAC MARKERS ( 16 Oct 2017 00:15 )  x     / 0.05 ng/mL / 29 U/L / x     / 3.2 ng/mL    < from: Transthoracic Echocardiogram w/Doppler (12 @ 13:31) >    Patient name: OSCAR LOPEZ  YOB: 1929   Age: 82 (F)   MR#: [id number]  Study Date: 3/14/2012  [id number]: Jada Lagunas RDCS  Study quality: Technically fair  Referring Physician: Pro Tracy MD  Blood Pressure: 124/51 mmHg  Height: 5ft 3in  Weight: 164 lb  BSA: 1.8 m2  ------------------------------------------------------------------------  PROCEDURE: Transthoracic echocardiogram with 2-D, M-Mode  and complete spectral and color flow Doppler.  INDICATION: Aortic Stenosis (424.1), Atrial Fibrillation  (427.31)  ------------------------------------------------------------------------  Dimensions:    Normal Values:  LA:     3.1    2.0 - 4.0 cm  Ao:     3.1    2.0 - 3.8 cm  SEPTUM: 1.2    0.6 - 1.2cm  PWT:    1.3    0.6 - 1.1 cm  LVIDd:  4.3    3.0 - 5.6 cm  LVIDs:  2.6    1.8 - 4.0 cm  Derived variables:  LVMI: 110 g/m2  RWT: 0.60  Fractional short: 40 %  Ejection Fraction: >70 %  Doppler Peak Velocity (m/sec): AoV=4.0  ------------------------------------------------------------------------  Observations:  Mitral Valve: Mitral annular calcification and calcified  mitral leaflets with normal diastolic opening. Minimal  mitral regurgitation.  Aortic Valve/Aorta: Heavily calcified trileaflet aortic  valve with decreased opening. Peak transaortic valve  gradient equals 62 mm Hg, mean transaortic valve gradient  equals 37 mm Hg, estimated aortic valve area equals 0.9  sqcm (by continuity equation), consistent with severe  aortic stenosis .Mild-moderate aortic regurgitation.  Pressure halft - time is 361 ms.  Normal aortic root.  Left Atrium: Severely dilated left atrium.  LA volume index  = 51 cc/m2.  Left Ventricle: Hyperdynamic left ventricle. Mild to  moderate concentric left ventricular hypertrophy. Atrial  fibrillation precludes a comprehensive diastolic  assessment. However, findings are suggestive of moderate  diastolic dysfunction.  Right Heart: Severe right atrial enlargement. RA volume  index = 53 cc/m2.  Normal right ventricular size and  function. Normal tricuspid valve. Mild-moderate tricuspid  regurgitation. Normal pulmonic valve. Minimal pulmonic  regurgitation.  Pericardium/Pleura: Normal pericardium with no pericardial  effusion.  Hemodynamic: Estimated right atrial pressure is 5 mm Hg.  Estimated right ventricular systolic pressure equals 46 mm  Hg, assuming right atrial pressure equals 5 mm Hg,  consistent with mild pulmonary hypertension.  ------------------------------------------------------------------------  Conclusions:  1. Mitral annular calcification and calcified mitral  leaflets with normal diastolic opening. Minimal mitral  regurgitation.  2. Heavily calcified trileaflet aortic valve with decreased  opening. Peak transaortic valve gradientequals 62 mm Hg,  mean transaortic valve gradient equals 37 mm Hg, estimated  aortic valve area equals 0.9 sqcm (by continuity equation),  consistent with severe aortic stenosis. Mild-moderate  aortic regurgitation. Pressure halft - time is 361 ms.  3. Severely dilated left atrium.  LA volume index = 51  cc/m2.  4. Mild to moderate concentric left ventricular  hypertrophy.  5. Hyperdynamic left ventricle.  6. Atrial fibrillation precludes a comprehensive diastolic  assessment. However, findings are suggestive of moderate  diastolic dysfunction.  7. Severe right atrial enlargement. RA volume index = 53  cc/m2.  8. Normal right ventricular size and function.  9. Estimated right ventricular systolic pressure equals 46  mm Hg, assuming right atrial pressure equals 5 mm Hg,  consistent with mild pulmonary hypertension.  10. Normal tricuspid valve. Mild-moderate tricuspid  regurgitation.  *** No previous Echo exam.  ------------------------------------------------------------------------  Confirmed on  3/14/2012 - 15:25:18 by Armen Siddiqui M.D.  ------------------------------------------------------------------------    < end of copied text >    MICROBIOLOGY:   Urinalysis Basic - ( 16 Oct 2017 12:01 )    Color: Yellow / Appearance: SL Turbid / S.012 / pH: x  Gluc: x / Ketone: Negative  / Bili: Negative / Urobili: Negative   Blood: x / Protein: 30 mg/dL / Nitrite: Negative   Leuk Esterase: Large / RBC: 0-2 /HPF / WBC 26-50 /HPF   Sq Epi: x / Non Sq Epi: OCC /HPF / Bacteria: Few /HPF      RADIOLOGY:  [x] Chest radiographs reviewed and interpreted by me    < from: CT Chest No Cont (10.17.17 @ 21:38) >    EXAM:  CT CHEST                          PROCEDURE DATE:  10/17/2017      INTERPRETATION:  Clinical information: Evaluate for pneumonia. Exam is   compared to previous study of 4/3/2012.    CT scan of the chest was obtained without administration of intravenous   contrast.    Surgical clips are noted in the right axilla.    No hilar and/or mediastinal adenopathy is noted.     Heart is markedly enlarged in size. Calcification the coronary arteries   is noted. Patient is status post TAVR.No pericardial effusion is noted.   Main pulmonary artery is dilated and measures 3.6 cm.     No endobronchial lesions are noted. Compressive atelectasis is noted   involving portions of both lower lobes. This is secondary to small   bilateral pleural effusions, right more than left.    Below the diaphragm, visualized portions of the abdomen demonstrate   low-attenuation within both kidneys which are too small to be adequately   characterized on this exam.     Degenerative changes of the spine are noted. Subcutaneous edema is noted.    Impression: There is no pneumonia.    Bilateral pleural effusions, right more than left.    MAYI MORALES M.D., ATTENDING RADIOLOGIST  This document has been electronically signed. Oct 18 2017  9:24AM      < end of copied text >  ---------------------------------------------------------------------------------------------------------  < from: CT Head No Cont (10.17.17 @ 21:38) >    EXAM:  CT BRAIN                            PROCEDURE DATE:  10/17/2017        INTERPRETATION:  HISTORY: Uterine cancer. Altered mental status.    COMPARISON: MRI brain performed 2012.    TECHNIQUE: Axial noncontrast CT images from the skull base to the vertex   were obtained and submitted for interpretation. Coronal and sagittal   reformatted images were performed. Bone and soft tissue windows were   evaluated.    FINDINGS:     There is no acute intracranial mass-effect, hemorrhage, midline shift, or   abnormal extra-axial fluid collection.     Ventricles, sulci, and cisterns are normal in size for the patient's age   without hydrocephalus. The basal cisterns are patent.     Patchy and confluent regions of periventricular and deep cerebral white   matter hypoattenuation likely reflects chronic microangiopathic ischemic   changes. Atheromatous calcifications along the carotid siphons are   present.    Minimal right maxillary sinus mucosal thickening. No evidence for acute   paranasal sinus or mastoid inflammatory changes.. The calvarium is   intact.     IMPRESSION:     No acute intracranial bleeding, mass effect, or evidence of an acute   territorial infarct.    ADRIEN MORRIS M.D., RADIOLOGY RESIDENT  This document has been electronically signed.  VINI METZGER M.D., ATTENDING RADIOLOGIST  This document has been electronically signed. Oct 18 2017  9:43AM      < end of copied text >  ---------------------------------------------------------------------------------------------------------  < from: Xray Chest 2 Views PA/Lat (10.16.17 @ 00:27) >    EXAM:  CHEST PA & LAT                            PROCEDURE DATE:  10/16/2017        INTERPRETATION:  CLINICAL INDICATION: Mild hypoxia for 6 hours.    TECHNIQUE: Frontal and Lateral views of the chest    COMPARISON: Chest x-ray 3/23/2015.    FINDINGS:     Status post TAVR. Surgical clips are noted in the right axilla and right   upper lobe.    The heart is enlarged, unchanged.    There is no focal consolidation.    Small bilateral pleural effusions. No pneumothorax.    IMPRESSION:   Small bilateral pleural effusions. No focal consolidation.       RYLEE EWING M.D., RADIOLOGY RESIDENT  This document has been electronically signed.  TEN PRICE M.D., ATTENDING RADIOLOGIST  This document has been electronically signed. Oct 16 2017  9:11AM      < end of copied text >  ---------------------------------------------------------------------------------------------------------

## 2017-10-18 NOTE — PHYSICAL THERAPY INITIAL EVALUATION ADULT - ADDITIONAL COMMENTS
87 year old female who PTA was living in a  with 4 steps to enter + hand rail. Pt was living alone, family lives in the area. PTA pt was indpendent in all functional mobility using a cane for gait, able to ambulate house hold and medium level distances. independent in BADL's family helped out with IADL's.

## 2017-10-18 NOTE — PHYSICAL THERAPY INITIAL EVALUATION ADULT - PERTINENT HX OF CURRENT PROBLEM, REHAB EVAL
86 yo F with PMHx of right breast CA S/P lumpectomy in 2000, Uterine Ca S/P total hysterectomy in 2012, Skin CA S/P excision of bilateral lower eyelid skin CA, cholecystectomy, Osteoarthritis of bilateral knees, HTN, hyperlipidemia, paroxysmal Afib on coumadin, severe aortic stenosis now s/p TAVR, who presents with onset of generalized weakness x 2 weeks

## 2017-10-19 LAB
ANION GAP SERPL CALC-SCNC: 13 MMOL/L — SIGNIFICANT CHANGE UP (ref 5–17)
BUN SERPL-MCNC: 51 MG/DL — HIGH (ref 7–23)
CALCIUM SERPL-MCNC: 7.6 MG/DL — LOW (ref 8.4–10.5)
CHLORIDE SERPL-SCNC: 110 MMOL/L — HIGH (ref 96–108)
CHLORIDE UR-SCNC: 74 MMOL/L — SIGNIFICANT CHANGE UP
CO2 SERPL-SCNC: 23 MMOL/L — SIGNIFICANT CHANGE UP (ref 22–31)
CREAT ?TM UR-MCNC: 48 MG/DL — SIGNIFICANT CHANGE UP
CREAT SERPL-MCNC: 1.78 MG/DL — HIGH (ref 0.5–1.3)
EOSINOPHIL NFR URNS MANUAL: NEGATIVE — SIGNIFICANT CHANGE UP
GAS PNL BLDA: SIGNIFICANT CHANGE UP
GLUCOSE BLDC GLUCOMTR-MCNC: 124 MG/DL — HIGH (ref 70–99)
GLUCOSE BLDC GLUCOMTR-MCNC: 141 MG/DL — HIGH (ref 70–99)
GLUCOSE BLDC GLUCOMTR-MCNC: 45 MG/DL — CRITICAL LOW (ref 70–99)
GLUCOSE BLDC GLUCOMTR-MCNC: 47 MG/DL — LOW (ref 70–99)
GLUCOSE SERPL-MCNC: 40 MG/DL — CRITICAL LOW (ref 70–99)
HCT VFR BLD CALC: 29.4 % — LOW (ref 34.5–45)
HGB BLD-MCNC: 9.1 G/DL — LOW (ref 11.5–15.5)
INR BLD: 3.53 RATIO — HIGH (ref 0.88–1.16)
MCHC RBC-ENTMCNC: 30.3 PG — SIGNIFICANT CHANGE UP (ref 27–34)
MCHC RBC-ENTMCNC: 30.9 GM/DL — LOW (ref 32–36)
MCV RBC AUTO: 98.1 FL — SIGNIFICANT CHANGE UP (ref 80–100)
PLATELET # BLD AUTO: 173 K/UL — SIGNIFICANT CHANGE UP (ref 150–400)
POTASSIUM SERPL-MCNC: 4 MMOL/L — SIGNIFICANT CHANGE UP (ref 3.5–5.3)
POTASSIUM SERPL-SCNC: 4 MMOL/L — SIGNIFICANT CHANGE UP (ref 3.5–5.3)
POTASSIUM UR-SCNC: 49 MMOL/L — SIGNIFICANT CHANGE UP
PROTHROM AB SERPL-ACNC: 39.1 SEC — HIGH (ref 9.8–12.7)
RBC # BLD: 3 M/UL — LOW (ref 3.8–5.2)
RBC # FLD: 15.8 % — HIGH (ref 10.3–14.5)
SODIUM SERPL-SCNC: 146 MMOL/L — HIGH (ref 135–145)
SODIUM UR-SCNC: 36 MMOL/L — SIGNIFICANT CHANGE UP
WBC # BLD: 7.1 K/UL — SIGNIFICANT CHANGE UP (ref 3.8–10.5)
WBC # FLD AUTO: 7.1 K/UL — SIGNIFICANT CHANGE UP (ref 3.8–10.5)

## 2017-10-19 RX ORDER — SODIUM CHLORIDE 9 MG/ML
1000 INJECTION, SOLUTION INTRAVENOUS
Qty: 0 | Refills: 0 | Status: DISCONTINUED | OUTPATIENT
Start: 2017-10-19 | End: 2017-10-20

## 2017-10-19 RX ORDER — DEXTROSE 50 % IN WATER 50 %
50 SYRINGE (ML) INTRAVENOUS ONCE
Qty: 0 | Refills: 0 | Status: COMPLETED | OUTPATIENT
Start: 2017-10-19 | End: 2017-10-19

## 2017-10-19 RX ORDER — SODIUM CHLORIDE 9 MG/ML
1000 INJECTION INTRAMUSCULAR; INTRAVENOUS; SUBCUTANEOUS
Qty: 0 | Refills: 0 | Status: DISCONTINUED | OUTPATIENT
Start: 2017-10-19 | End: 2017-10-19

## 2017-10-19 RX ADMIN — Medication 50 MILLILITER(S): at 10:55

## 2017-10-19 RX ADMIN — Medication 0.5 MILLIGRAM(S): at 18:18

## 2017-10-19 RX ADMIN — Medication 0.5 MILLIGRAM(S): at 06:38

## 2017-10-19 RX ADMIN — Medication 3 MILLILITER(S): at 18:18

## 2017-10-19 RX ADMIN — SODIUM CHLORIDE 60 MILLILITER(S): 9 INJECTION, SOLUTION INTRAVENOUS at 11:00

## 2017-10-19 RX ADMIN — CLOPIDOGREL BISULFATE 75 MILLIGRAM(S): 75 TABLET, FILM COATED ORAL at 18:18

## 2017-10-19 RX ADMIN — CEFTRIAXONE 100 GRAM(S): 500 INJECTION, POWDER, FOR SOLUTION INTRAMUSCULAR; INTRAVENOUS at 21:02

## 2017-10-19 RX ADMIN — Medication 3 MILLILITER(S): at 06:38

## 2017-10-19 RX ADMIN — SODIUM CHLORIDE 50 MILLILITER(S): 9 INJECTION INTRAMUSCULAR; INTRAVENOUS; SUBCUTANEOUS at 09:57

## 2017-10-19 RX ADMIN — ATORVASTATIN CALCIUM 10 MILLIGRAM(S): 80 TABLET, FILM COATED ORAL at 19:56

## 2017-10-19 RX ADMIN — Medication 12.5 MILLIGRAM(S): at 18:18

## 2017-10-19 RX ADMIN — Medication 3 MILLILITER(S): at 13:05

## 2017-10-19 RX ADMIN — Medication 12.5 MILLIGRAM(S): at 06:38

## 2017-10-19 NOTE — PROGRESS NOTE ADULT - SUBJECTIVE AND OBJECTIVE BOX
JESSICA OSCAR    Feels a little better today.  More awake.  Currently off BIPAP.      Allergies    No Known Allergies    Intolerances    digoxin (Rash; Drowsiness)    MEDICATIONS  (STANDING):  ALBUTerol/ipratropium for Nebulization 3 milliLiter(s) Nebulizer every 6 hours  ascorbic acid 500 milliGRAM(s) Oral daily  atorvastatin 10 milliGRAM(s) Oral at bedtime  buDESOnide   0.5 milliGRAM(s) Respule 0.5 milliGRAM(s) Inhalation every 12 hours  cefTRIAXone   IVPB      cefTRIAXone   IVPB 1 Gram(s) IV Intermittent every 24 hours  clopidogrel Tablet 75 milliGRAM(s) Oral daily  dextrose 5% + sodium chloride 0.9%. 1000 milliLiter(s) (60 mL/Hr) IV Continuous <Continuous>  ferrous    sulfate 325 milliGRAM(s) Oral daily  influenza   Vaccine 0.5 milliLiter(s) IntraMuscular once  metoprolol 12.5 milliGRAM(s) Oral two times a day  multivitamin 1 Tablet(s) Oral daily    PHYSICAL EXAM:  Vital Signs Last 24 Hrs  T(C): 36.9 (19 Oct 2017 14:13), Max: 36.9 (19 Oct 2017 14:13)  T(F): 98.4 (19 Oct 2017 14:13), Max: 98.4 (19 Oct 2017 14:13)  HR: 80 (19 Oct 2017 14:13) (66 - 112)  BP: 103/60 (19 Oct 2017 14:13) (103/60 - 136/72)  BP(mean): --  RR: 20 (19 Oct 2017 14:13) (20 - 22)  SpO2: 97% (19 Oct 2017 14:13) (96% - 100%)  Daily     Daily Weight in k.6 (19 Oct 2017 07:13)  I&O's Summary    18 Oct 2017 07:  -  19 Oct 2017 07:00  --------------------------------------------------------  IN: 1070 mL / OUT: 750 mL / NET: 320 mL    19 Oct 2017 07:01  -  19 Oct 2017 16:03  --------------------------------------------------------  IN: 240 mL / OUT: 200 mL / NET: 40 mL    General Appearance: 	 Alert, cooperative, no distress  Neck: JVP estimated at 10 cm  Lungs:  Decreased BS at bases  Cor:  pmi 5th ICS MCL, Irregular rate and rhythm, S1 normal intensity, S2 normal intensity  Abdomen:	 soft, non-tender; bowel sounds normal; no masses,  no organomegaly  Extremities: No significant edema    Labs:  CBC Full  -  ( 19 Oct 2017 09:33 )  WBC Count : 7.1 K/uL  Hemoglobin : 9.1 g/dL  Hematocrit : 29.4 %  Platelet Count - Automated : 173 K/uL  Mean Cell Volume : 98.1 fl  Mean Cell Hemoglobin : 30.3 pg  Mean Cell Hemoglobin Concentration : 30.9 gm/dL  Auto Neutrophil # : x  Auto Lymphocyte # : x  Auto Monocyte # : x  Auto Eosinophil # : x  Auto Basophil # : x  Auto Neutrophil % : x  Auto Lymphocyte % : x  Auto Monocyte % : x  Auto Eosinophil % : x  Auto Basophil % : x    PT/INR - ( 19 Oct 2017 09:33 )   PT: 39.1 sec;   INR: 3.53 ratio       Basic Metabolic Panel - STAT (10..17 @ 09:33)    Sodium, Serum: 146 mmol/L    Potassium, Serum: 4.0 mmol/L    Chloride, Serum: 110 mmol/L    Carbon Dioxide, Serum: 23 mmol/L    Anion Gap, Serum: 13 mmol/L    Blood Urea Nitrogen, Serum: 51 mg/dL    Creatinine, Serum: 1.78 mg/dL    Glucose, Serum: 40: TEST REPEATED. mg/dL    Calcium, Total Serum: 7.6 mg/dL    eGFR if Non : 25: Interpretative comment  The units for eGFR are ml/min/1.73m2 (normalized body surface area). The  eGFR is calculated from a serum creatinine using the CKD-EPI equation.  Other variables required for calculation are race, age and sex. Among  patients with chronic kidney disease (CKD), the eGFR is useful in  determining the stage of disease according to KDOQI CKD classification.  All eGFR results are reported numerically with the following  interpretation.          GFR                    With                 Without     (ml/min/1.73 m2)    Kidney Damage       Kidney Damage        >= 90                    Stage 1                     Normal        60-89                    Stage 2                     Decreased GFR        30-59     Stage 3                     Stage 3        15-29                    Stage 4                     Stage 4        < 15                      Stage 5                     Stage 5  Each stage of CKD assumes that the associated GFR level has been in  effect for at least 3 months. Determination of stages one and two (with  eGFR > 59 ml/min/m2) requires estimation of kidney damage for at least 3  months as defined by structural or functional abnormalities.  Limitations: All estimates of GFR will be less accurate for patients at  extremes of muscle mass (including but not limited to frail elderly,  critically ill, or cancer patients), those with unusual diets, and those  with conditions associated with reduced secretion or extrarenal  elimination of creatinine. The eGFR equation is not recommended for use  in patients with unstable creatinine levels. mL/min/1.73M2    eGFR if African American: 29 mL/min/1.73M2    Impression/Plan: ARF, with slightly improved Cr today  Respiratory acidosis on intermittent BIPAP  Diastolic CHF, remains reasonably compensated  Hypernatremia noted, now on free water replacement  Rate controlled AF, on coumadin    Leopoldo Funes MD, FACC  [facility]

## 2017-10-19 NOTE — CONSULT NOTE ADULT - ASSESSMENT
A/P: 87 year old female with PMHx of right breast CA S/P lumpectomy in 2000, Uterine Ca S/P total hysterectomy in 2012, Skin CA S/P excision of bilateral lower eyelid skin CA, cholecystectomy, Osteoarthritis of bilateral knees, HTN, hyperlipidemia, paroxysmal Afib on coumadin, severe aortic stenosis now s/p TAVR, who presents with generalized weakness for 2 weeks.  Patient reports that over the last two weeks she had noticed increased weakness, episodes of shortness of breath at rest.  No chest pain. No fevers, no chills, no nausea or vomiting.  Pt with episodes of lethargy. Requires BiPAP for respiratory acidosis. Noted to have pyuria and K.pneumo in UC.  Currently on Ceftriaxone for suspected UTI. No dysuria.   Pt is Afebrile. No leukocytosis.  UTI vs colonization.  Will administer short course of ABs. F/u UC sensitivity.

## 2017-10-19 NOTE — PROGRESS NOTE ADULT - SUBJECTIVE AND OBJECTIVE BOX
No pain, no shortness of breath      VITAL:  T(C): , Max: 36.8 (10-18-17 @ 22:26)  T(F): , Max: 98.3 (10-18-17 @ 22:26)  HR: 88 (10-19-17 @ 08:44)  BP: 136/72 (10-19-17 @ 04:19)  BP(mean): --  RR: 22 (10-19-17 @ 04:19)  SpO2: 98% (10-19-17 @ 08:44)      PHYSICAL EXAM:  Constitutional: NAD, Alert  HEENT: NCAT, MMM  Neck: Supple, No JVD  Respiratory: CTA-b/l  Cardiovascular: RRR s1s2, no m/r/g  Gastrointestinal: BS+, soft, NT/ND  Extremities: No peripheral edema b/l  Neurological: no focal deficits; strength grossly intact  Psychiatric: Normal mood, normal affect  Back: no CVAT b/l  Skin: No rashes, no nevi      LABS:                        9.6    8.42  )-----------( 229      ( 18 Oct 2017 07:30 )             33.0     Na(144)/K(5.1)/Cl(106)/HCO3(27)/BUN(50)/Cr(2.00)Glu(90)/Ca(8.8)/Mg(--)/PO4(--)    10-18 @ 07:39  Na(146)/K(4.7)/Cl(107)/HCO3(28)/BUN(52)/Cr(2.14)Glu(124)/Ca(8.5)/Mg(--)/PO4(--)    10-17 @ 09:06      Sodium, Random Urine: 36 mmol/L (10-19 @ 01:36)  Chloride, Random Urine: 74 mmol/L (10-19 @ 01:36)  Potassium, Random Urine: 49 mmol/L (10-19 @ 01:36)  Creatinine, Random Urine: 48 mg/dL (10-19 @ 01:36)  Eosinophils negative  UCX + Klebsiella, >100,000 CFU/ml    IMAGING:    < from: CT Chest No Cont (10.17.17 @ 21:38) >  Impression: There is no pneumonia.  Small bilateral pleural effusions, right more than left.          IMPRESSION: 87F w/ past breast and uterine CA, HTN, AFib, and AS-TAVR, 10/16/17 a/w hypercapnic respiratory failure/AMS, as well as NANCY     (1)Renal - NANCY on CKD 2-3 (base creatinine <1). Urine eosinophils negative, and urine culture positive - it appears that the pyuria is from UTI rather than AIN. NANCY most likely from prerenal azotemia     (2)Hyperkalemia - borderline as of yesterday - labs pending for today    (3)Hypernatremia - mild  as of yesterday - labs pending for today    (4)Acid-base - primary respiratory acidosis; compensatory metabolic alkalosis. Pulmonary on board - input appreciated - hypercapnia due to kyphoscoliosis/respiratory muscle weakness?    (5)CV - effusions on CT are small; pulmonary issues not due to the pleural effusions; potential renal benefit of (gentle) IVF administration for now appears to outweigh risk of induction of pulmonary edema       RECOMMEND:  (1)Follow up renal ultrasound  (2)Follow up SPEP/immunofixation  (3)NS 50cc/h  (4)Dose new meds for GFR 20-30ml/min; treatment of UTI per primary team          Quinten Parker MD  [facility]  [phone] No pain, no shortness of breath - off BIPAP at present      VITAL:  T(C): , Max: 36.8 (10-18-17 @ 22:26)  T(F): , Max: 98.3 (10-18-17 @ 22:26)  HR: 88 (10-19-17 @ 08:44)  BP: 136/72 (10-19-17 @ 04:19)  BP(mean): --  RR: 22 (10-19-17 @ 04:19)  SpO2: 98% (10-19-17 @ 08:44)      PHYSICAL EXAM:  Constitutional: NAD, frail; lying flat on NCO2  HEENT: NCAT, DMM  Neck: Supple, No JVD  Respiratory: distant BS b/l  Cardiovascular: RRR s1s2, no m/r/g  Gastrointestinal: BS+, soft, NT/ND  Extremities: No peripheral edema b/l  Neurological: no focal deficits; strength grossly intact  Psychiatric: Normal mood, normal affect  Back: no CVAT b/l  Skin: No rashes, no nevi      LABS:                        9.6    8.42  )-----------( 229      ( 18 Oct 2017 07:30 )             33.0     Na(144)/K(5.1)/Cl(106)/HCO3(27)/BUN(50)/Cr(2.00)Glu(90)/Ca(8.8)/Mg(--)/PO4(--)    10-18 @ 07:39  Na(146)/K(4.7)/Cl(107)/HCO3(28)/BUN(52)/Cr(2.14)Glu(124)/Ca(8.5)/Mg(--)/PO4(--)    10-17 @ 09:06      Sodium, Random Urine: 36 mmol/L (10-19 @ 01:36)  Chloride, Random Urine: 74 mmol/L (10-19 @ 01:36)  Potassium, Random Urine: 49 mmol/L (10-19 @ 01:36)  Creatinine, Random Urine: 48 mg/dL (10-19 @ 01:36)  Eosinophils negative  UCX + Klebsiella, >100,000 CFU/ml    IMAGING:    < from: CT Chest No Cont (10.17.17 @ 21:38) >  Impression: There is no pneumonia.  Small bilateral pleural effusions, right more than left.          IMPRESSION: 87F w/ past breast and uterine CA, HTN, AFib, and AS-TAVR, 10/16/17 a/w hypercapnic respiratory failure/AMS, as well as NANCY     (1)Renal - NANCY on CKD 2-3 (base creatinine <1). Urine eosinophils negative, and urine culture positive - it appears that the pyuria is from UTI rather than AIN. NANCY most likely from prerenal azotemia     (2)Hyperkalemia - borderline as of yesterday - labs pending for today    (3)Hypernatremia - mild  as of yesterday - labs pending for today    (4)Acid-base - primary respiratory acidosis; compensatory metabolic alkalosis. Pulmonary on board - input appreciated - hypercapnia due to kyphoscoliosis/respiratory muscle weakness?    (5)CV - effusions on CT are small; pulmonary issues not due to the pleural effusions; potential renal benefit of (gentle) IVF administration for now appears to outweigh risk of induction of pulmonary edema       RECOMMEND:  (1)Follow up renal ultrasound  (2)Follow up SPEP/immunofixation  (3)NS 50cc/h  (4)Dose new meds for GFR 20-30ml/min; treatment of UTI per primary team          Quinten Parker MD  [facility]  [phone]

## 2017-10-19 NOTE — PROGRESS NOTE ADULT - ASSESSMENT
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    hypernatremia/hypoglycemia off IV fluids and NPO    Klebsiella UTI    PLAN/RECOMMENDATIONS    continue NIV to ventilate to a pH ~ 7.4 with oxygen supplementation to keep saturation greater than 92% - will place 2 hours on and 2 hours off during the day and continuously for sleep to allow for po intake (start regular diet)  albuterol/atrovent/pulmicort nebs  renal follow-up noted noted - IV D5W  cardiac meds: lipitor/plavix/metoprolol/coumadin for INR 2.5 - 3.5 - hold ACE inhibitors - diltiazem on hold due to bradycardia earlier - IVP metoprolol as needed for tachycardia  ceftriaxone  ECHO    Will follow with you; d/w dedicated floor NP and son at bedside    Tommy Reyes MD, [facility] - [phone]  Pulmonary Medicine

## 2017-10-19 NOTE — CONSULT NOTE ADULT - SUBJECTIVE AND OBJECTIVE BOX
HPI:  88 yo F with PMHx of right breast CA S/P lumpectomy in 2000, Uterine Ca S/P total hysterectomy in 2012, Skin CA S/P excision of bilateral lower eyelid skin CA, cholecystectomy, Osteoarthritis of bilateral knees, HTN, hyperlipidemia, paroxysmal Afib on coumadin, severe aortic stenosis now s/p TAVR, who presents with onset of generalized weakness x 2 weeks.  Patient reports that over the last two weeks she had noticed increased weakness. This was also associated with occasional intermittent episodes of shortness of breath that seemed to occur at rest that come and go for a few minutes at a time.  No chest pain. In addition she had noticed that she was going "into fibrillation" as patient reports that she can feel her episodic fibrillation.  No fevers, no chills, no nausea or vomiting. Patient reports that she has been very thirsty but her family told her not to drink too much water so she has been drinking much less liquids. Chronic R>L leg edema that is not changed (16 Oct 2017 05:43)      PAST MEDICAL & SURGICAL HISTORY:  Severe aortic stenosis  Uterine cancer  Arthritis  HTN (hypertension)  Breast cancer diagnosed in 2000: s/p right lumpectomy and right axillary lymph node removal, radiation  AF (atrial fibrillation): on coumadin and aspirin  Dyslipidemia  H/O: hysterectomy: 4/2012  s/p surgical excision of left eye Skin cancer: Bilateral lower eye lids  S/P D&C  S/P cholecystectomy      FAMILY HISTORY:  No pertinent family history in first degree relatives      Social History:    Marital Status:  (   )    (   ) Single    (   )    (  )   Occupation:   Lives with: (  ) alone  (  ) children   (  ) spouse   (  ) parents  (  ) other    Substance Use (street drugs): (  ) never used  (  ) other:  Tobacco Usage:  (   ) never smoked   (   ) former smoker   (   ) current smoker  (     ) pack year  (        ) last cigarette date  Alcohol Usage:      Allergies    No Known Allergies    Intolerances    digoxin (Rash; Drowsiness)      MEDICATIONS  (STANDING):  ALBUTerol/ipratropium for Nebulization 3 milliLiter(s) Nebulizer every 6 hours  ascorbic acid 500 milliGRAM(s) Oral daily  atorvastatin 10 milliGRAM(s) Oral at bedtime  buDESOnide   0.5 milliGRAM(s) Respule 0.5 milliGRAM(s) Inhalation every 12 hours  cefTRIAXone   IVPB      cefTRIAXone   IVPB 1 Gram(s) IV Intermittent every 24 hours  clopidogrel Tablet 75 milliGRAM(s) Oral daily  dextrose 5% + sodium chloride 0.9%. 1000 milliLiter(s) (60 mL/Hr) IV Continuous <Continuous>  ferrous    sulfate 325 milliGRAM(s) Oral daily  influenza   Vaccine 0.5 milliLiter(s) IntraMuscular once  metoprolol 12.5 milliGRAM(s) Oral two times a day  multivitamin 1 Tablet(s) Oral daily    MEDICATIONS  (PRN):        LABS:    CBC Full  -  ( 19 Oct 2017 09:33 )  WBC Count : 7.1 K/uL  Hemoglobin : 9.1 g/dL  Hematocrit : 29.4 %  Platelet Count - Automated : 173 K/uL  Mean Cell Volume : 98.1 fl  Mean Cell Hemoglobin : 30.3 pg  Mean Cell Hemoglobin Concentration : 30.9 gm/dL  Auto Neutrophil # : x  Auto Lymphocyte # : x  Auto Monocyte # : x  Auto Eosinophil # : x  Auto Basophil # : x  Auto Neutrophil % : x  Auto Lymphocyte % : x  Auto Monocyte % : x  Auto Eosinophil % : x  Auto Basophil % : x    10-19    146<H>  |  110<H>  |  51<H>  ----------------------------<  40<LL>  4.0   |  23  |  1.78<H>    Ca    7.6<L>      19 Oct 2017 09:33      PT/INR - ( 19 Oct 2017 09:33 )   PT: 39.1 sec;   INR: 3.53 ratio           ABG - ( 19 Oct 2017 07:20 )  pH: 7.25  /  pCO2: 60    /  pO2: 151   / HCO3: 26    / Base Excess: -1.4  /  SaO2: 99                  Blood Gas Venous - Lactate: 1.4 mmoL/L (10-16 @ 00:15)              Cultures:  RECENT CULTURES:  10-18 @ 00:32 .Urine Catheterized                >100,000 CFU/ml Klebsiella pneumoniae    10-17 @ 15:38 .Blood Blood-Peripheral                No growth to date.            Imaging Studies:    Vital Signs:   Vital Signs Last 24 Hrs  T(C): 36.9 (10-19-17 @ 14:13), Max: 36.9 (10-19-17 @ 14:13)  T(F): 98.4 (10-19-17 @ 14:13), Max: 98.4 (10-19-17 @ 14:13)  HR: 80 (10-19-17 @ 14:13) (66 - 112)  BP: 103/60 (10-19-17 @ 14:13) (103/60 - 136/72)  BP(mean): --  RR: 20 (10-19-17 @ 14:13) (20 - 22)  SpO2: 97% (10-19-17 @ 14:13) (96% - 100%) HPI:  87 year old female with PMHx of right breast CA S/P lumpectomy in 2000, Uterine Ca S/P total hysterectomy in 2012, Skin CA S/P excision of bilateral lower eyelid skin CA, cholecystectomy, Osteoarthritis of bilateral knees, HTN, hyperlipidemia, paroxysmal Afib on coumadin, severe aortic stenosis now s/p TAVR, who presents with generalized weakness for 2 weeks.  Patient reports that over the last two weeks she had noticed increased weakness, episodes of shortness of breath at rest.  No chest pain. No fevers, no chills, no nausea or vomiting.  Pt with episodes of lethargy. Requires BiPAP for respiratory acidosis. Noted to have pyuria and K.pneumo in UC.  Currently on Ceftriaxone for suspected UTI. No dysuria.     PAST MEDICAL & SURGICAL HISTORY:  Severe aortic stenosis  Uterine cancer  Arthritis  HTN (hypertension)  Breast cancer diagnosed in 2000: s/p right lumpectomy and right axillary lymph node removal, radiation  AF (atrial fibrillation): on coumadin and aspirin  Dyslipidemia  H/O: hysterectomy: 4/2012  s/p surgical excision of left eye Skin cancer: Bilateral lower eye lids  S/P D&C  S/P cholecystectomy      Family Hx: Non-contributory review     Social Hx: No smoking, no ETOH.    Allergies    No Known Allergies    Intolerances    digoxin (Rash; Drowsiness)      MEDICATIONS  (STANDING):  ALBUTerol/ipratropium for Nebulization 3 milliLiter(s) Nebulizer every 6 hours  ascorbic acid 500 milliGRAM(s) Oral daily  atorvastatin 10 milliGRAM(s) Oral at bedtime  buDESOnide   0.5 milliGRAM(s) Respule 0.5 milliGRAM(s) Inhalation every 12 hours  cefTRIAXone   IVPB      cefTRIAXone   IVPB 1 Gram(s) IV Intermittent every 24 hours  clopidogrel Tablet 75 milliGRAM(s) Oral daily  dextrose 5% + sodium chloride 0.9%. 1000 milliLiter(s) (60 mL/Hr) IV Continuous <Continuous>  ferrous    sulfate 325 milliGRAM(s) Oral daily  influenza   Vaccine 0.5 milliLiter(s) IntraMuscular once  metoprolol 12.5 milliGRAM(s) Oral two times a day  multivitamin 1 Tablet(s) Oral daily    MEDICATIONS  (PRN):        LABS:    CBC Full  -  ( 19 Oct 2017 09:33 )  WBC Count : 7.1 K/uL  Hemoglobin : 9.1 g/dL  Hematocrit : 29.4 %  Platelet Count - Automated : 173 K/uL  Mean Cell Volume : 98.1 fl  Mean Cell Hemoglobin : 30.3 pg  Mean Cell Hemoglobin Concentration : 30.9 gm/dL  Auto Neutrophil # : x  Auto Lymphocyte # : x  Auto Monocyte # : x  Auto Eosinophil # : x  Auto Basophil # : x  Auto Neutrophil % : x  Auto Lymphocyte % : x  Auto Monocyte % : x  Auto Eosinophil % : x  Auto Basophil % : x    10-19    146<H>  |  110<H>  |  51<H>  ----------------------------<  40<LL>  4.0   |  23  |  1.78<H>    Ca    7.6<L>      19 Oct 2017 09:33      PT/INR - ( 19 Oct 2017 09:33 )   PT: 39.1 sec;   INR: 3.53 ratio           ABG - ( 19 Oct 2017 07:20 )  pH: 7.25  /  pCO2: 60    /  pO2: 151   / HCO3: 26    / Base Excess: -1.4  /  SaO2: 99        Blood Gas Venous - Lactate: 1.4 mmoL/L (10-16 @ 00:15)      Cultures:  RECENT CULTURES:  10-18 @ 00:32 .Urine Catheterized   >100,000 CFU/ml Klebsiella pneumoniae    10-17 @ 15:38 .Blood Blood-Peripheral     No growth to date.      Imaging Studies: CT chest: There is no pneumonia.    Bilateral pleural effusions, right more than left.      Vital Signs:   Vital Signs Last 24 Hrs  T(C): 36.9 (10-19-17 @ 14:13), Max: 36.9 (10-19-17 @ 14:13)  T(F): 98.4 (10-19-17 @ 14:13), Max: 98.4 (10-19-17 @ 14:13)  HR: 80 (10-19-17 @ 14:13) (66 - 112)  BP: 103/60 (10-19-17 @ 14:13) (103/60 - 136/72)  BP(mean): --  RR: 20 (10-19-17 @ 14:13) (20 - 22)  SpO2: 97% (10-19-17 @ 14:13) (96% - 100%)

## 2017-10-19 NOTE — PROGRESS NOTE ADULT - SUBJECTIVE AND OBJECTIVE BOX
[facility] - [facility]     PROGRESS NOTE    CHIEF COMPLAINT: ARF; COPD/emphysema; chronic CHF; NANCY    INTERVAL HISTORY: awake and alert but still on BIPAP with persistent respiratory acidosis; bleeding in left eye; no cough, sputum production, chest congestion or wheeze; no fevers, chills or sweats; no chest pain/pressure or palpitations; very thirsty and hungry; on antibiotics for UTI; renal function improved but worsening hypernatremia; hypoglycemic this AM    REVIEW OF SYSTEMS:  Constitutional: As per interval history  HEENT: As per interval history  CV: As per interval history  Resp: As per interval history  GI: As per interval history  : As per interval history  Musculoskeletal: Within normal limits  Skin: Within normal limits  Neurological: Within normal limits  Psychiatric: Within normal limits  Endocrine: As per interval history  Hematologic/Lymphatic: Within normal limits  Allergic/Immunologic: Within normal limits    MEDICATIONS:     Pulmonary "  ALBUTerol/ipratropium for Nebulization 3 milliLiter(s) Nebulizer every 6 hours  buDESOnide   0.5 milliGRAM(s) Respule 0.5 milliGRAM(s) Inhalation every 12 hours      Anti-microbials:  cefTRIAXone   IVPB      cefTRIAXone   IVPB 1 Gram(s) IV Intermittent every 24 hours      Cardiovascular:  metoprolol 12.5 milliGRAM(s) Oral two times a day      Other:  ascorbic acid 500 milliGRAM(s) Oral daily  atorvastatin 10 milliGRAM(s) Oral at bedtime  clopidogrel Tablet 75 milliGRAM(s) Oral daily  dextrose 5% + sodium chloride 0.9%. 1000 milliLiter(s) IV Continuous <Continuous>  ferrous    sulfate 325 milliGRAM(s) Oral daily  influenza   Vaccine 0.5 milliLiter(s) IntraMuscular once  multivitamin 1 Tablet(s) Oral daily        OBJECTIVE:        I&O's Detail    18 Oct 2017 07:  -  19 Oct 2017 07:00  --------------------------------------------------------  IN:    Oral Fluid: 180 mL    sodium chloride 0.9%: 600 mL    sodium chloride 0.9%: 240 mL    Solution: 50 mL  Total IN: 1070 mL    OUT:    Indwelling Catheter - Urethral: 750 mL  Total OUT: 750 mL    Total NET: 320 mL      19 Oct 2017 07:01  -  19 Oct 2017 11:11  --------------------------------------------------------  IN:  Total IN: 0 mL    OUT:  Total OUT: 0 mL    Total NET: 0 mL    Daily Weight in k.6 (19 Oct 2017 07:13)    POCT Blood Glucose.: 124 mg/dL (19 Oct 2017 11:06)  POCT Blood Glucose.: 45 mg/dL (19 Oct 2017 10:39)  POCT Blood Glucose.: 47 mg/dL (19 Oct 2017 10:37)      PHYSICAL EXAM:       ICU Vital Signs Last 24 Hrs  T(C): 36.5 (19 Oct 2017 04:19), Max: 36.8 (18 Oct 2017 22:26)  T(F): 97.7 (19 Oct 2017 04:19), Max: 98.3 (18 Oct 2017 22:26)  HR: 88 (19 Oct 2017 08:44) (66 - 112)  BP: 136/72 (19 Oct 2017 04:19) (122/70 - 139/70)  BP(mean): --  ABP: --  ABP(mean): --  RR: 22 (19 Oct 2017 04:19) (20 - 22)  SpO2: 98% (19 Oct 2017 08:44) (96% - 100%) on BIPAP 40% Fi02    General: Awake and alert. Cooperative. No distress. Appears stated age 	  HEENT:   Atraumatic. Normocephalic. Anicteric. Normal oral mucosa, PERRL, EOMI. BIPAP mask. Left eye subconjunctival hemorrhage  Neck: Supple. Trachea midline. Thyroid without enlargement/tenderness/nodules. No carotid bruit. No JVD	  Cardiovascular: Irregularly irregular rate and rhythm. S1 S2 normal. II/VI systolic murmur  Respiratory: Respirations unlabored. Decreased breath sounds at bases with dullness ~ 1/3 up. Kyphoscoliosis  Abdomen: Soft. Non-tender. Non-distended. No organomegaly. No masses. Normal bowel sounds	  Extremities: Warm to touch. No clubbing or cyanosis. No pedal edema.  Pulses: 2+ peripheral pulses all extremities	  Skin: Normal skin color. No rashes or lesions. No ecchymoses, No cyanosis. Warm to touch  Lymph Nodes: Cervical, supraclavicular and axillary nodes normal  Neurological: Awake and alert. Moving all extremities. A and O x 3  Psychiatry: Calm        LABS:                        9.1    7.1   )-----------( 173      ( 19 Oct 2017 09:33 )             29.4                         9.6    8.42  )-----------( 229      ( 18 Oct 2017 07:30 )             33.0     10    146<H>  |  110<H>  |  51<H>  ----------------------------<  40<LL>  4.0   |  23  |  1.78<H>    10-18    144  |  106  |  50<H>  ----------------------------<  90  5.1   |  27  |  2.00<H>    Ca      7.6<L>      10-19    Ca      8.8      10-18    Phos    4.1     10-16      Mg       2.4     10-16    TPro  6.5  /  Alb  3.5  /  TBili  0.2  /  DBili  x   /  AST  15  /  ALT  16  /  AlkPhos  73  10-    PT/INR - ( 19 Oct 2017 09:33 )   PT: 39.1 sec;   INR: 3.53 ratio      ABG - ( 19 Oct 2017 07:20 )  pH: 7.25  /  pCO2: 60    /  pO2: 151   / HCO3: 26    / Base Excess: -1.4  /  SaO2: 99        ABG - ( 18 Oct 2017 14:35 )  pH: 7.30  /  pCO2: 58    /  pO2: 143   / HCO3: 28    / Base Excess: 1.3   /  SaO2: 100       ABG - ( 18 Oct 2017 09:53 )  pH: 7.26  /  pCO2: 67    /  pO2: 122   / HCO3: 29    / Base Excess: 1.2   /  SaO2: 99        ABG - ( 18 Oct 2017 05:42 )  pH: 7.24  /  pCO2: 64    /  pO2: 164   / HCO3: 27    / Base Excess: -.9   /  SaO2: 100       CARDIAC MARKERS ( 17 Oct 2017 09:06 )  x     / x     / 38 U/L / x     / x      CARDIAC MARKERS ( 17 Oct 2017 09:05 )  x     / 0.07 ng/mL / x     / x     / 4.2 ng/mL  CARDIAC MARKERS ( 16 Oct 2017 18:28 )  x     / 0.07 ng/mL / 39 U/L / x     / 4.6 ng/mL  CARDIAC MARKERS ( 16 Oct 2017 09:17 )  x     / 0.04 ng/mL / 26 U/L / x     / 2.9 ng/mL  CARDIAC MARKERS ( 16 Oct 2017 00:15 )  x     / 0.05 ng/mL / 29 U/L / x     / 3.2 ng/mL    < from: Transthoracic Echocardiogram w/Doppler (12 @ 13:31) >    Patient name: OSCAR LOPEZ  YOB: 1929   Age: 82 (F)   MR#: [id number]  Study Date: 3/14/2012  [id number]: Jada Lagunas RDCS  Study quality: Technically fair  Referring Physician: Pro Tracy MD  Blood Pressure: 124/51 mmHg  Height: 5ft 3in  Weight: 164 lb  BSA: 1.8 m2  ------------------------------------------------------------------------  PROCEDURE: Transthoracic echocardiogram with 2-D, M-Mode  and complete spectral and color flow Doppler.  INDICATION: Aortic Stenosis (424.1), Atrial Fibrillation  (427.31)  ------------------------------------------------------------------------  Dimensions:    Normal Values:  LA:     3.1    2.0 - 4.0 cm  Ao:     3.1    2.0 - 3.8 cm  SEPTUM: 1.2    0.6 - 1.2cm  PWT:    1.3    0.6 - 1.1 cm  LVIDd:  4.3    3.0 - 5.6 cm  LVIDs:  2.6    1.8 - 4.0 cm  Derived variables:  LVMI: 110 g/m2  RWT: 0.60  Fractional short: 40 %  Ejection Fraction: >70 %  Doppler Peak Velocity (m/sec): AoV=4.0  ------------------------------------------------------------------------  Observations:  Mitral Valve: Mitral annular calcification and calcified  mitral leaflets with normal diastolic opening. Minimal  mitral regurgitation.  Aortic Valve/Aorta: Heavily calcified trileaflet aortic  valve with decreased opening. Peak transaortic valve  gradient equals 62 mm Hg, mean transaortic valve gradient  equals 37 mm Hg, estimated aortic valve area equals 0.9  sqcm (by continuity equation), consistent with severe  aortic stenosis .Mild-moderate aortic regurgitation.  Pressure halft - time is 361 ms.  Normal aortic root.  Left Atrium: Severely dilated left atrium.  LA volume index  = 51 cc/m2.  Left Ventricle: Hyperdynamic left ventricle. Mild to  moderate concentric left ventricular hypertrophy. Atrial  fibrillation precludes a comprehensive diastolic  assessment. However, findings are suggestive of moderate  diastolic dysfunction.  Right Heart: Severe right atrial enlargement. RA volume  index = 53 cc/m2.  Normal right ventricular size and  function. Normal tricuspid valve. Mild-moderate tricuspid  regurgitation. Normal pulmonic valve. Minimal pulmonic  regurgitation.  Pericardium/Pleura: Normal pericardium with no pericardial  effusion.  Hemodynamic: Estimated right atrial pressure is 5 mm Hg.  Estimated right ventricular systolic pressure equals 46 mm  Hg, assuming right atrial pressure equals 5 mm Hg,  consistent with mild pulmonary hypertension.  ------------------------------------------------------------------------  Conclusions:  1. Mitral annular calcification and calcified mitral  leaflets with normal diastolic opening. Minimal mitral  regurgitation.  2. Heavily calcified trileaflet aortic valve with decreased  opening. Peak transaortic valve gradientequals 62 mm Hg,  mean transaortic valve gradient equals 37 mm Hg, estimated  aortic valve area equals 0.9 sqcm (by continuity equation),  consistent with severe aortic stenosis. Mild-moderate  aortic regurgitation. Pressure halft - time is 361 ms.  3. Severely dilated left atrium.  LA volume index = 51  cc/m2.  4. Mild to moderate concentric left ventricular  hypertrophy.  5. Hyperdynamic left ventricle.  6. Atrial fibrillation precludes a comprehensive diastolic  assessment. However, findings are suggestive of moderate  diastolic dysfunction.  7. Severe right atrial enlargement. RA volume index = 53  cc/m2.  8. Normal right ventricular size and function.  9. Estimated right ventricular systolic pressure equals 46  mm Hg, assuming right atrial pressure equals 5 mm Hg,  consistent with mild pulmonary hypertension.  10. Normal tricuspid valve. Mild-moderate tricuspid  regurgitation.  *** No previous Echo exam.  ------------------------------------------------------------------------  Confirmed on  3/14/2012 - 15:25:18 by Armen Siddiqui M.D.    MICROBIOLOGY:     Urinalysis Basic - ( 16 Oct 2017 12:01 )    Color: Yellow / Appearance: SL Turbid / S.012 / pH: x  Gluc: x / Ketone: Negative  / Bili: Negative / Urobili: Negative   Blood: x / Protein: 30 mg/dL / Nitrite: Negative   Leuk Esterase: Large / RBC: 0-2 /HPF / WBC 26-50 /HPF   Sq Epi: x / Non Sq Epi: OCC /HPF / Bacteria: Few /HPF    Culture - Urine (10.18.17 @ 00:32)    Specimen Source: .Urine Catheterized    Culture Results:   >100,000 CFU/ml Klebsiella pneumoniae    Culture - Blood (10.17.17 @ 15:38)    Specimen Source: .Blood Blood-Peripheral    Culture Results:   No growth to date.      RADIOLOGY:  [x] Chest radiographs reviewed and interpreted by me    < from: CT Chest No Cont (10.17.17 @ 21:38) >    EXAM:  CT CHEST                          PROCEDURE DATE:  10/17/2017      INTERPRETATION:  Clinical information: Evaluate for pneumonia. Exam is   compared to previous study of 4/3/2012.    CT scan of the chest was obtained without administration of intravenous   contrast.    Surgical clips are noted in the right axilla.    No hilar and/or mediastinal adenopathy is noted.     Heart is markedly enlarged in size. Calcification the coronary arteries   is noted. Patient is status post TAVR.No pericardial effusion is noted.   Main pulmonary artery is dilated and measures 3.6 cm.     No endobronchial lesions are noted. Compressive atelectasis is noted   involving portions of both lower lobes. This is secondary to small   bilateral pleural effusions, right more than left.    Below the diaphragm, visualized portions of the abdomen demonstrate   low-attenuation within both kidneys which are too small to be adequately   characterized on this exam.     Degenerative changes of the spine are noted. Subcutaneous edema is noted.    Impression: There is no pneumonia.    Bilateral pleural effusions, right more than left.    MAYI MORALES M.D., ATTENDING RADIOLOGIST  This document has been electronically signed. Oct 18 2017  9:24AM      < end of copied text >  ---------------------------------------------------------------------------------------------------------  < from: CT Head No Cont (10.. @ 21:38) >    EXAM:  CT BRAIN                            PROCEDURE DATE:  10/17/2017        INTERPRETATION:  HISTORY: Uterine cancer. Altered mental status.    COMPARISON: MRI brain performed 2012.    TECHNIQUE: Axial noncontrast CT images from the skull base to the vertex   were obtained and submitted for interpretation. Coronal and sagittal   reformatted images were performed. Bone and soft tissue windows were   evaluated.    FINDINGS:     There is no acute intracranial mass-effect, hemorrhage, midline shift, or   abnormal extra-axial fluid collection.     Ventricles, sulci, and cisterns are normal in size for the patient's age   without hydrocephalus. The basal cisterns are patent.     Patchy and confluent regions of periventricular and deep cerebral white   matter hypoattenuation likely reflects chronic microangiopathic ischemic   changes. Atheromatous calcifications along the carotid siphons are   present.    Minimal right maxillary sinus mucosal thickening. No evidence for acute   paranasal sinus or mastoid inflammatory changes.. The calvarium is   intact.     IMPRESSION:     No acute intracranial bleeding, mass effect, or evidence of an acute   territorial infarct.    ADRIEN MORRIS M.D., RADIOLOGY RESIDENT  This document has been electronically signed.  VINI METZGER M.D., ATTENDING RADIOLOGIST  This document has been electronically signed. Oct 18 2017  9:43AM      < end of copied text >  ---------------------------------------------------------------------------------------------------------  < from: Xray Chest 2 Views PA/Lat (10.16.17 @ 00:27) >    EXAM:  CHEST PA & LAT                            PROCEDURE DATE:  10/16/2017        INTERPRETATION:  CLINICAL INDICATION: Mild hypoxia for 6 hours.    TECHNIQUE: Frontal and Lateral views of the chest    COMPARISON: Chest x-ray 3/23/2015.    FINDINGS:     Status post TAVR. Surgical clips are noted in the right axilla and right   upper lobe.    The heart is enlarged, unchanged.    There is no focal consolidation.    Small bilateral pleural effusions. No pneumothorax.    IMPRESSION:   Small bilateral pleural effusions. No focal consolidation.       RYLEE EWING M.D., RADIOLOGY RESIDENT  This document has been electronically signed.  TEN PRICE M.D., ATTENDING RADIOLOGIST  This document has been electronically signed. Oct 16 2017  9:11AM      < end of copied text >  ---------------------------------------------------------------------------------------------------------

## 2017-10-19 NOTE — CHART NOTE - NSCHARTNOTEFT_GEN_A_CORE
Pt. with respiratory acidosis despite continuous BIPAP, pt. examined and is Alert oriented x1-2 and much less confused despite blood gas results. Pt. advanced to have a diet and for hypoglycemia given D50 and started on D5 NS for maintenance of blood sugars. Pt. to wear BiPAP on and off during the day and continuous at night .Will recheck blood gas in the morning for improvement of respiratory acidosis. Pulmonology and Dr. Hector padilla.   [facility].

## 2017-10-19 NOTE — PROGRESS NOTE ADULT - SUBJECTIVE AND OBJECTIVE BOX
Patient is a 87y old  Female who presents with a chief complaint of "I have uterine cancer" (16 Oct 2017 12:15)      SUBJECTIVE / OVERNIGHT EVENTS: no new events, feels better, being weaned off BIPAP, on ABx for UTI, NANCY improving, Sons at bedside. MS at baseline    MEDICATIONS  (STANDING):  ALBUTerol/ipratropium for Nebulization 3 milliLiter(s) Nebulizer every 6 hours  ascorbic acid 500 milliGRAM(s) Oral daily  atorvastatin 10 milliGRAM(s) Oral at bedtime  buDESOnide   0.5 milliGRAM(s) Respule 0.5 milliGRAM(s) Inhalation every 12 hours  cefTRIAXone   IVPB      cefTRIAXone   IVPB 1 Gram(s) IV Intermittent every 24 hours  clopidogrel Tablet 75 milliGRAM(s) Oral daily  dextrose 5% + sodium chloride 0.9%. 1000 milliLiter(s) (60 mL/Hr) IV Continuous <Continuous>  ferrous    sulfate 325 milliGRAM(s) Oral daily  influenza   Vaccine 0.5 milliLiter(s) IntraMuscular once  metoprolol 12.5 milliGRAM(s) Oral two times a day  multivitamin 1 Tablet(s) Oral daily    MEDICATIONS  (PRN):      Vital Signs Last 24 Hrs  T(F): 98.4 (10-19-17 @ 14:13), Max: 98.4 (10-19-17 @ 14:13)  HR: 80 (10-19-17 @ 14:13) (66 - 112)  BP: 103/60 (10-19-17 @ 14:13) (103/60 - 136/72)  RR: 20 (10-19-17 @ 14:13) (20 - 22)  SpO2: 97% (10-19-17 @ 14:13) (96% - 100%)  Telemetry:   CAPILLARY BLOOD GLUCOSE      POCT Blood Glucose.: 141 mg/dL (19 Oct 2017 11:25)  POCT Blood Glucose.: 124 mg/dL (19 Oct 2017 11:06)  POCT Blood Glucose.: 45 mg/dL (19 Oct 2017 10:39)  POCT Blood Glucose.: 47 mg/dL (19 Oct 2017 10:37)    I&O's Summary    18 Oct 2017 07:01  -  19 Oct 2017 07:00  --------------------------------------------------------  IN: 1070 mL / OUT: 750 mL / NET: 320 mL    19 Oct 2017 07:01  -  19 Oct 2017 15:29  --------------------------------------------------------  IN: 240 mL / OUT: 200 mL / NET: 40 mL        PHYSICAL EXAM:  GENERAL: NAD, well-developed  HEAD:  Atraumatic, Normocephalic  EYES: EOMI, PERRLA, conjunctiva and sclera clear  NECK: Supple, No JVD  CHEST/LUNG: Clear to auscultation bilaterally; No wheeze  HEART: Regular rate and rhythm; No murmurs, rubs, or gallops  ABDOMEN: Soft, Nontender, Nondistended; Bowel sounds present  EXTREMITIES:  2+ Peripheral Pulses, No clubbing, cyanosis, or edema  PSYCH: AAOx3  NEUROLOGY: non-focal  SKIN: No rashes or lesions    LABS:                        9.1    7.1   )-----------( 173      ( 19 Oct 2017 09:33 )             29.4     10-19    146<H>  |  110<H>  |  51<H>  ----------------------------<  40<LL>  4.0   |  23  |  1.78<H>    Ca    7.6<L>      19 Oct 2017 09:33      PT/INR - ( 19 Oct 2017 09:33 )   PT: 39.1 sec;   INR: 3.53 ratio                   RADIOLOGY & ADDITIONAL TESTS:    Imaging Personally Reviewed:    Consultant(s) Notes Reviewed:      Care Discussed with Consultants/Other Providers:

## 2017-10-20 LAB
-  AMIKACIN: SIGNIFICANT CHANGE UP
-  AMPICILLIN/SULBACTAM: SIGNIFICANT CHANGE UP
-  AMPICILLIN: SIGNIFICANT CHANGE UP
-  AZTREONAM: SIGNIFICANT CHANGE UP
-  CEFAZOLIN: SIGNIFICANT CHANGE UP
-  CEFEPIME: SIGNIFICANT CHANGE UP
-  CEFOTAXIME: SIGNIFICANT CHANGE UP
-  CEFOXITIN: SIGNIFICANT CHANGE UP
-  CEFTAZIDIME: SIGNIFICANT CHANGE UP
-  CEFTRIAXONE: SIGNIFICANT CHANGE UP
-  CEFUROXIME: SIGNIFICANT CHANGE UP
-  CIPROFLOXACIN: SIGNIFICANT CHANGE UP
-  ERTAPENEM: SIGNIFICANT CHANGE UP
-  GENTAMICIN: SIGNIFICANT CHANGE UP
-  IMIPENEM: SIGNIFICANT CHANGE UP
-  LEVOFLOXACIN: SIGNIFICANT CHANGE UP
-  MEROPENEM: SIGNIFICANT CHANGE UP
-  NITROFURANTOIN: SIGNIFICANT CHANGE UP
-  PIPERACILLIN/TAZOBACTAM: SIGNIFICANT CHANGE UP
-  TIGECYCLINE: SIGNIFICANT CHANGE UP
-  TOBRAMYCIN: SIGNIFICANT CHANGE UP
-  TRIMETHOPRIM/SULFAMETHOXAZOLE: SIGNIFICANT CHANGE UP
ANION GAP SERPL CALC-SCNC: 9 MMOL/L — SIGNIFICANT CHANGE UP (ref 5–17)
BLD GP AB SCN SERPL QL: NEGATIVE — SIGNIFICANT CHANGE UP
BUN SERPL-MCNC: 46 MG/DL — HIGH (ref 7–23)
CALCIUM SERPL-MCNC: 7.9 MG/DL — LOW (ref 8.4–10.5)
CHLORIDE SERPL-SCNC: 108 MMOL/L — SIGNIFICANT CHANGE UP (ref 96–108)
CO2 SERPL-SCNC: 24 MMOL/L — SIGNIFICANT CHANGE UP (ref 22–31)
CREAT SERPL-MCNC: 1.71 MG/DL — HIGH (ref 0.5–1.3)
CULTURE RESULTS: SIGNIFICANT CHANGE UP
GAS PNL BLDA: SIGNIFICANT CHANGE UP
GLUCOSE BLDC GLUCOMTR-MCNC: 131 MG/DL — HIGH (ref 70–99)
GLUCOSE SERPL-MCNC: 542 MG/DL — CRITICAL HIGH (ref 70–99)
HCT VFR BLD CALC: 29.6 % — LOW (ref 34.5–45)
HCT VFR BLD CALC: 29.9 % — LOW (ref 34.5–45)
HGB BLD-MCNC: 8.8 G/DL — LOW (ref 11.5–15.5)
HGB BLD-MCNC: 9.2 G/DL — LOW (ref 11.5–15.5)
INR BLD: 3.48 RATIO — HIGH (ref 0.88–1.16)
MCHC RBC-ENTMCNC: 28.2 PG — SIGNIFICANT CHANGE UP (ref 27–34)
MCHC RBC-ENTMCNC: 29.7 GM/DL — LOW (ref 32–36)
MCHC RBC-ENTMCNC: 30.3 PG — SIGNIFICANT CHANGE UP (ref 27–34)
MCHC RBC-ENTMCNC: 30.8 GM/DL — LOW (ref 32–36)
MCV RBC AUTO: 94.9 FL — SIGNIFICANT CHANGE UP (ref 80–100)
MCV RBC AUTO: 98.2 FL — SIGNIFICANT CHANGE UP (ref 80–100)
METHOD TYPE: SIGNIFICANT CHANGE UP
ORGANISM # SPEC MICROSCOPIC CNT: SIGNIFICANT CHANGE UP
ORGANISM # SPEC MICROSCOPIC CNT: SIGNIFICANT CHANGE UP
PLATELET # BLD AUTO: 163 K/UL — SIGNIFICANT CHANGE UP (ref 150–400)
PLATELET # BLD AUTO: 189 K/UL — SIGNIFICANT CHANGE UP (ref 150–400)
POTASSIUM SERPL-MCNC: 3.9 MMOL/L — SIGNIFICANT CHANGE UP (ref 3.5–5.3)
POTASSIUM SERPL-SCNC: 3.9 MMOL/L — SIGNIFICANT CHANGE UP (ref 3.5–5.3)
PROT SERPL-MCNC: 4.8 G/DL — LOW (ref 6–8.3)
PROT SERPL-MCNC: 4.8 G/DL — LOW (ref 6–8.3)
PROTHROM AB SERPL-ACNC: 40.3 SEC — HIGH (ref 10–13.1)
RBC # BLD: 3.05 M/UL — LOW (ref 3.8–5.2)
RBC # BLD: 3.12 M/UL — LOW (ref 3.8–5.2)
RBC # FLD: 15.8 % — HIGH (ref 10.3–14.5)
RBC # FLD: 18.6 % — HIGH (ref 10.3–14.5)
RH IG SCN BLD-IMP: POSITIVE — SIGNIFICANT CHANGE UP
SODIUM SERPL-SCNC: 141 MMOL/L — SIGNIFICANT CHANGE UP (ref 135–145)
SPECIMEN SOURCE: SIGNIFICANT CHANGE UP
WBC # BLD: 5.55 K/UL — SIGNIFICANT CHANGE UP (ref 3.8–10.5)
WBC # BLD: 7 K/UL — SIGNIFICANT CHANGE UP (ref 3.8–10.5)
WBC # FLD AUTO: 5.55 K/UL — SIGNIFICANT CHANGE UP (ref 3.8–10.5)
WBC # FLD AUTO: 7 K/UL — SIGNIFICANT CHANGE UP (ref 3.8–10.5)

## 2017-10-20 PROCEDURE — 76775 US EXAM ABDO BACK WALL LIM: CPT | Mod: 26

## 2017-10-20 PROCEDURE — 99223 1ST HOSP IP/OBS HIGH 75: CPT | Mod: GC

## 2017-10-20 RX ADMIN — Medication 3 MILLILITER(S): at 00:11

## 2017-10-20 RX ADMIN — Medication 12.5 MILLIGRAM(S): at 17:33

## 2017-10-20 RX ADMIN — SODIUM CHLORIDE 60 MILLILITER(S): 9 INJECTION, SOLUTION INTRAVENOUS at 03:30

## 2017-10-20 RX ADMIN — ATORVASTATIN CALCIUM 10 MILLIGRAM(S): 80 TABLET, FILM COATED ORAL at 21:11

## 2017-10-20 RX ADMIN — Medication 0.5 MILLIGRAM(S): at 17:34

## 2017-10-20 RX ADMIN — CEFTRIAXONE 100 GRAM(S): 500 INJECTION, POWDER, FOR SOLUTION INTRAMUSCULAR; INTRAVENOUS at 21:14

## 2017-10-20 RX ADMIN — Medication 12.5 MILLIGRAM(S): at 05:59

## 2017-10-20 RX ADMIN — Medication 1 TABLET(S): at 11:43

## 2017-10-20 RX ADMIN — Medication 3 MILLILITER(S): at 05:07

## 2017-10-20 RX ADMIN — CLOPIDOGREL BISULFATE 75 MILLIGRAM(S): 75 TABLET, FILM COATED ORAL at 11:43

## 2017-10-20 RX ADMIN — Medication 325 MILLIGRAM(S): at 11:42

## 2017-10-20 RX ADMIN — Medication 0.5 MILLIGRAM(S): at 05:07

## 2017-10-20 RX ADMIN — Medication 3 MILLILITER(S): at 17:34

## 2017-10-20 RX ADMIN — Medication 500 MILLIGRAM(S): at 11:43

## 2017-10-20 NOTE — CONSULT NOTE ADULT - SUBJECTIVE AND OBJECTIVE BOX
Patient is a 87y old  Female who presents with a chief complaint of "I have uterine cancer" (16 Oct 2017 12:15)      HPI:  88 yo F with PMHx of right breast CA S/P lumpectomy in 2000, Uterine Ca S/P total hysterectomy in 2012, Skin CA S/P excision of bilateral lower eyelid skin CA, cholecystectomy, Osteoarthritis of bilateral knees, HTN, hyperlipidemia, paroxysmal Afib on coumadin, severe aortic stenosis now s/p TAVR, who presents with onset of generalized weakness x 2 weeks.  Patient reports that over the last two weeks she had noticed increased weakness. This was also associated with occasional intermittent episodes of shortness of breath that seemed to occur at rest that come and go for a few minutes at a time.  No chest pain. In addition she had noticed that she was going "into fibrillation" as patient reports that she can feel her episodic fibrillation.  No fevers, no chills, no nausea or vomiting. Patient reports that she has been very thirsty but her family told her not to drink too much water so she has been drinking much less liquids. Chronic R>L leg edema that is not changed (16 Oct 2017 05:43)      PAST MEDICAL & SURGICAL HISTORY:  Severe aortic stenosis  Uterine cancer  Arthritis  HTN (hypertension)  Breast cancer diagnosed in 2000: s/p right lumpectomy and right axillary lymph node removal, radiation  AF (atrial fibrillation): on coumadin and aspirin  Dyslipidemia  H/O: hysterectomy: 4/2012  s/p surgical excision of left eye Skin cancer: Bilateral lower eye lids  S/P D&C  S/P cholecystectomy      MEDICATIONS  (STANDING):  ALBUTerol/ipratropium for Nebulization 3 milliLiter(s) Nebulizer every 6 hours  ascorbic acid 500 milliGRAM(s) Oral daily  atorvastatin 10 milliGRAM(s) Oral at bedtime  buDESOnide   0.5 milliGRAM(s) Respule 0.5 milliGRAM(s) Inhalation every 12 hours  cefTRIAXone   IVPB      cefTRIAXone   IVPB 1 Gram(s) IV Intermittent every 24 hours  clopidogrel Tablet 75 milliGRAM(s) Oral daily  ferrous    sulfate 325 milliGRAM(s) Oral daily  influenza   Vaccine 0.5 milliLiter(s) IntraMuscular once  metoprolol 12.5 milliGRAM(s) Oral two times a day  multivitamin 1 Tablet(s) Oral daily      Allergies    No Known Allergies    Intolerances    digoxin (Rash; Drowsiness)      SOCIAL HISTORY:  Denies ETOh,Smoking,     FAMILY HISTORY:  No pertinent family history in first degree relatives      REVIEW OF SYSTEMS:    CONSTITUTIONAL: No weakness, fevers or chills  EYES/ENT: No visual changes;  No vertigo or throat pain   NECK: No pain or stiffness  RESPIRATORY: No cough, wheezing, hemoptysis; No shortness of breath  CARDIOVASCULAR: No chest pain or palpitations  GASTROINTESTINAL: No abdominal or epigastric pain. No nausea, vomiting, or hematemesis; No diarrhea or constipation. No melena or hematochezia.  GENITOURINARY: No dysuria, frequency or hematuria  NEUROLOGICAL: No numbness or weakness  SKIN: No itching, burning, rashes, or lesions   All other review of systems is negative unless indicated above.    VITAL:  T(C): , Max: 36.9 (10-19-17 @ 20:20)  T(F): , Max: 98.4 (10-19-17 @ 20:20)  HR: 97 (10-20-17 @ 15:43)  BP: 132/80 (10-20-17 @ 14:17)  BP(mean): --  RR: 20 (10-20-17 @ 14:17)  SpO2: 100% (10-20-17 @ 15:43)  Wt(kg): --    I and O's:    10-18 @ 07:01  -  10-19 @ 07:00  --------------------------------------------------------  IN: 1070 mL / OUT: 750 mL / NET: 320 mL    10-19 @ 07:01  -  10-20 @ 07:00  --------------------------------------------------------  IN: 840 mL / OUT: 800 mL / NET: 40 mL    10-20 @ 07:01  -  10-20 @ 17:02  --------------------------------------------------------  IN: 720 mL / OUT: 150 mL / NET: 570 mL          PHYSICAL EXAM:    Constitutional: NAD  HEENT: PERRLA,   Neck: No JVD  Respiratory: CTA B/L  Cardiovascular: S1 and S2  Gastrointestinal: BS+, soft, NT/ND  Extremities: No peripheral edema  Neurological: A/O x 3, no focal deficits  Psychiatric: Normal mood, normal affect  : No Gautam  Skin: No rashes  Access: Not applicable  Back: No CVA tenderness    LABS:                        8.8    5.55  )-----------( 189      ( 20 Oct 2017 08:24 )             29.6     10-20    141  |  108  |  46<H>  ----------------------------<  542<HH>  3.9   |  24  |  1.71<H>    Ca    7.9<L>      20 Oct 2017 08:28    TPro  4.8<L>  /  Alb  x   /  TBili  x   /  DBili  x   /  AST  x   /  ALT  x   /  AlkPhos  x   10-19          RADIOLOGY & ADDITIONAL STUDIES:

## 2017-10-20 NOTE — PROGRESS NOTE ADULT - ASSESSMENT
A/P: 87 year old female with PMHx of right breast CA S/P lumpectomy in 2000, Uterine Ca S/P total hysterectomy in 2012, Skin CA S/P excision of bilateral lower eyelid skin CA, cholecystectomy, Osteoarthritis of bilateral knees, HTN, hyperlipidemia, paroxysmal Afib on coumadin, severe aortic stenosis now s/p TAVR, who presents with generalized weakness for 2 weeks.  Patient reports that over the last two weeks she had noticed increased weakness, episodes of shortness of breath at rest.  No chest pain. No fevers, no chills, no nausea or vomiting.  Pt with episodes of lethargy. Requires BiPAP for respiratory acidosis.  Noted to have pyuria and sensitive  K.pneumo in UC.  Currently on Ceftriaxone for suspected UTI.   Pt is Afebrile. No leukocytosis.  Mental status much improved.  Continue short course of ABs.

## 2017-10-20 NOTE — PROGRESS NOTE ADULT - SUBJECTIVE AND OBJECTIVE BOX
Allergies    No Known Allergies    Intolerances    digoxin (Rash; Drowsiness)      MEDICATIONS  (STANDING):  ALBUTerol/ipratropium for Nebulization 3 milliLiter(s) Nebulizer every 6 hours  ascorbic acid 500 milliGRAM(s) Oral daily  atorvastatin 10 milliGRAM(s) Oral at bedtime  buDESOnide   0.5 milliGRAM(s) Respule 0.5 milliGRAM(s) Inhalation every 12 hours  cefTRIAXone   IVPB      cefTRIAXone   IVPB 1 Gram(s) IV Intermittent every 24 hours  clopidogrel Tablet 75 milliGRAM(s) Oral daily  ferrous    sulfate 325 milliGRAM(s) Oral daily  influenza   Vaccine 0.5 milliLiter(s) IntraMuscular once  metoprolol 12.5 milliGRAM(s) Oral two times a day  multivitamin 1 Tablet(s) Oral daily    MEDICATIONS  (PRN):          Antimicrobials:  cefTRIAXone   IVPB      cefTRIAXone   IVPB 1 Gram(s) IV Intermittent every 24 hours        LABS:  CBC Full  -  ( 20 Oct 2017 08:24 )  WBC Count : 5.55 K/uL  Hemoglobin : 8.8 g/dL  Hematocrit : 29.6 %  Platelet Count - Automated : 189 K/uL  Mean Cell Volume : 94.9 fl  Mean Cell Hemoglobin : 28.2 pg  Mean Cell Hemoglobin Concentration : 29.7 gm/dL  Auto Neutrophil # : x  Auto Lymphocyte # : x  Auto Monocyte # : x  Auto Eosinophil # : x  Auto Basophil # : x  Auto Neutrophil % : x  Auto Lymphocyte % : x  Auto Monocyte % : x  Auto Eosinophil % : x  Auto Basophil % : x    10-20    141  |  108  |  46<H>  ----------------------------<  542<HH>  3.9   |  24  |  1.71<H>    Ca    7.9<L>      20 Oct 2017 08:28    TPro  4.8<L>  /  Alb  x   /  TBili  x   /  DBili  x   /  AST  x   /  ALT  x   /  AlkPhos  x   10-19    PT/INR - ( 20 Oct 2017 08:24 )   PT: 40.3 sec;   INR: 3.48 ratio           ABG - ( 20 Oct 2017 06:32 )  pH: 7.28  /  pCO2: 59    /  pO2: 198   / HCO3: 27    / Base Excess: .4    /  SaO2: 100                 Blood Gas Venous - Lactate: 1.4 mmoL/L (10-16 @ 00:15)            Cultures:  RECENT CULTURES:  10-18 @ 00:32 .Urine Catheterized                >100,000 CFU/ml Klebsiella pneumoniae    10-17 @ 15:38 .Blood Blood-Peripheral                No growth to date.              Imaging Studies:    Vital Signs:    Vital Signs Last 24 Hrs  T(C): 36.4 (20 Oct 2017 14:17), Max: 36.9 (19 Oct 2017 20:20)  T(F): 97.5 (20 Oct 2017 14:17), Max: 98.4 (19 Oct 2017 20:20)  HR: 85 (20 Oct 2017 14:17) (66 - 91)  BP: 132/80 (20 Oct 2017 14:17) (131/60 - 135/80)  BP(mean): --  RR: 20 (20 Oct 2017 14:17) (18 - 22)  SpO2: 100% (20 Oct 2017 14:17) (100% - 100%) Afebrile. No leukocytosis.  No new somatic complaints at present time.        Allergies    No Known Allergies    Intolerances    digoxin (Rash; Drowsiness)      MEDICATIONS  (STANDING):  ALBUTerol/ipratropium for Nebulization 3 milliLiter(s) Nebulizer every 6 hours  ascorbic acid 500 milliGRAM(s) Oral daily  atorvastatin 10 milliGRAM(s) Oral at bedtime  buDESOnide   0.5 milliGRAM(s) Respule 0.5 milliGRAM(s) Inhalation every 12 hours  cefTRIAXone   IVPB      cefTRIAXone   IVPB 1 Gram(s) IV Intermittent every 24 hours  clopidogrel Tablet 75 milliGRAM(s) Oral daily  ferrous    sulfate 325 milliGRAM(s) Oral daily  influenza   Vaccine 0.5 milliLiter(s) IntraMuscular once  metoprolol 12.5 milliGRAM(s) Oral two times a day  multivitamin 1 Tablet(s) Oral daily    Antimicrobials:  cefTRIAXone   IVPB      cefTRIAXone   IVPB 1 Gram(s) IV Intermittent every 24 hours        LABS:  CBC Full  -  ( 20 Oct 2017 08:24 )  WBC Count : 5.55 K/uL  Hemoglobin : 8.8 g/dL  Hematocrit : 29.6 %  Platelet Count - Automated : 189 K/uL  Mean Cell Volume : 94.9 fl  Mean Cell Hemoglobin : 28.2 pg  Mean Cell Hemoglobin Concentration : 29.7 gm/dL  Auto Neutrophil # : x  Auto Lymphocyte # : x  Auto Monocyte # : x  Auto Eosinophil # : x  Auto Basophil # : x  Auto Neutrophil % : x  Auto Lymphocyte % : x  Auto Monocyte % : x  Auto Eosinophil % : x  Auto Basophil % : x    10-20    141  |  108  |  46<H>  ----------------------------<  542<HH>  3.9   |  24  |  1.71<H>    Ca    7.9<L>      20 Oct 2017 08:28    TPro  4.8<L>  /  Alb  x   /  TBili  x   /  DBili  x   /  AST  x   /  ALT  x   /  AlkPhos  x   10-19    PT/INR - ( 20 Oct 2017 08:24 )   PT: 40.3 sec;   INR: 3.48 ratio           ABG - ( 20 Oct 2017 06:32 )  pH: 7.28  /  pCO2: 59    /  pO2: 198   / HCO3: 27    / Base Excess: .4    /  SaO2: 100       Blood Gas Venous - Lactate: 1.4 mmoL/L (10-16 @ 00:15)    Cultures:  RECENT CULTURES:  10-18 @ 00:32 .Urine Catheterized   >100,000 CFU/ml Klebsiella pneumoniae    10-17 @ 15:38 .Blood Blood-Peripheral   No growth to date.    Imaging Studies: renal sono:   Increased renal cortical echogenicity bilaterally, which can be seen in   medical renal disease.  Left slightly complex renal cyst not well evaluated on this exam.     Vital Signs:    Vital Signs Last 24 Hrs  T(C): 36.4 (20 Oct 2017 14:17), Max: 36.9 (19 Oct 2017 20:20)  T(F): 97.5 (20 Oct 2017 14:17), Max: 98.4 (19 Oct 2017 20:20)  HR: 85 (20 Oct 2017 14:17) (66 - 91)  BP: 132/80 (20 Oct 2017 14:17) (131/60 - 135/80)  BP(mean): --  RR: 20 (20 Oct 2017 14:17) (18 - 22)  SpO2: 100% (20 Oct 2017 14:17) (100% - 100%)

## 2017-10-20 NOTE — CONSULT NOTE ADULT - ASSESSMENT
1. Pedunculated nodule  - DDx: BCC vs. PG vs. other tumor  - No acute intervention as inpatient  - Will schedule patient for outpatient f/u within 2 weeks of discharge for biopsy 1. Pedunculated nodule  - DDx: BCC vs. Amelanotic melanoma vs. PG vs. other tumor  - If patient is going to remain admitted for several more days, would prefer an excision be done by surgery  - If patient being discharged, will schedule patient for outpatient f/u within 1 week of discharge

## 2017-10-20 NOTE — PROGRESS NOTE ADULT - SUBJECTIVE AND OBJECTIVE BOX
[facility] - [facility]     PROGRESS NOTE    CHIEF COMPLAINT: ARF; COPD/emphysema; chronic CHF; NANCY    INTERVAL HISTORY: awake and alert on and off BIPAP with persistent respiratory acidosis; bleeding in left eye; no cough, sputum production, chest congestion or wheeze; no fevers, chills or sweats; no chest pain/pressure or palpitations; tolerating diet; on antibiotics for UTI; renal function improved with resolved hypernatremia; hyperglycemic this AM    REVIEW OF SYSTEMS:  Constitutional: As per interval history  HEENT: As per interval history  CV: As per interval history  Resp: As per interval history  GI: Within normal limits   : Within normal limits  Musculoskeletal: Within normal limits  Skin: Within normal limits  Neurological: Within normal limits  Psychiatric: Within normal limits  Endocrine: Within normal limits  Hematologic/Lymphatic: Within normal limits  Allergic/Immunologic: Within normal limits      MEDICATIONS:     Pulmonary "  ALBUTerol/ipratropium for Nebulization 3 milliLiter(s) Nebulizer every 6 hours  buDESOnide   0.5 milliGRAM(s) Respule 0.5 milliGRAM(s) Inhalation every 12 hours      Anti-microbials:  cefTRIAXone   IVPB      cefTRIAXone   IVPB 1 Gram(s) IV Intermittent every 24 hours      Cardiovascular:  metoprolol 12.5 milliGRAM(s) Oral two times a day      Other:  ascorbic acid 500 milliGRAM(s) Oral daily  atorvastatin 10 milliGRAM(s) Oral at bedtime  clopidogrel Tablet 75 milliGRAM(s) Oral daily  dextrose 5% + sodium chloride 0.9%. 1000 milliLiter(s) IV Continuous <Continuous>  ferrous    sulfate 325 milliGRAM(s) Oral daily  influenza   Vaccine 0.5 milliLiter(s) IntraMuscular once  multivitamin 1 Tablet(s) Oral daily        OBJECTIVE:    I&O's Detail    19 Oct 2017 07:  -  20 Oct 2017 07:00  --------------------------------------------------------  IN:    Oral Fluid: 840 mL  Total IN: 840 mL    OUT:    Indwelling Catheter - Urethral: 800 mL  Total OUT: 800 mL    Total NET: 40 mL      20 Oct 2017 07:  -  20 Oct 2017 10:50  --------------------------------------------------------  IN:    Oral Fluid: 480 mL  Total IN: 480 mL    OUT:  Total OUT: 0 mL    Total NET: 480 mL    Daily Weight in k.9 (20 Oct 2017 04:23)  POCT Blood Glucose.: 141 mg/dL (19 Oct 2017 11:25)  POCT Blood Glucose.: 124 mg/dL (19 Oct 2017 11:06)      PHYSICAL EXAM:       ICU Vital Signs Last 24 Hrs  T(C): 36.4 (20 Oct 2017 04:23), Max: 36.9 (19 Oct 2017 14:13)  T(F): 97.5 (20 Oct 2017 04:23), Max: 98.4 (19 Oct 2017 14:13)  HR: 91 (20 Oct 2017 08:45) (66 - 91)  BP: 135/80 (20 Oct 2017 04:23) (103/60 - 135/80)  BP(mean): --  ABP: --  ABP(mean): --  RR: 22 (20 Oct 2017 04:23) (18 - 22)  SpO2: 100% (20 Oct 2017 08:45) (97% - 100%) on 4lpm nasal canula     General: Awake and alert. Cooperative. No distress. Appears stated age 	  HEENT:   Atraumatic. Normocephalic. Anicteric. Normal oral mucosa, PERRL, EOMI. BIPAP mask. Left eye subconjunctival hemorrhage  Neck: Supple. Trachea midline. Thyroid without enlargement/tenderness/nodules. No carotid bruit. No JVD	  Cardiovascular: Irregularly irregular rate and rhythm. S1 S2 normal. II/VI systolic murmur  Respiratory: Respirations unlabored. Decreased breath sounds at bases with dullness ~ 1/3 up. Kyphoscoliosis  Abdomen: Soft. Non-tender. Non-distended. No organomegaly. No masses. Normal bowel sounds	  Extremities: Warm to touch. No clubbing or cyanosis. No pedal edema.  Pulses: 2+ peripheral pulses all extremities	  Skin: Normal skin color. No rashes or lesions. No ecchymoses, No cyanosis. Warm to touch  Lymph Nodes: Cervical, supraclavicular and axillary nodes normal  Neurological: Awake and alert. Moving all extremities. A and O x 3  Psychiatry: Calm      LABS:                        8.8    5.55  )-----------( 189      ( 20 Oct 2017 08:24 )             29.6                         9.1    7.1   )-----------( 173      ( 19 Oct 2017 09:33 )             29.4     10-20    141  |  108  |  46<H>  ----------------------------<  542<HH>  3.9   |  24  |  1.71<H>    10-19    146<H>  |  110<H>  |  51<H>  ----------------------------<  40<LL>  4.0   |  23  |  1.78<H>    Ca      7.9<L>      10-20    Ca      7.6<L>      10-19    Phos    4.1     10-16      Mg       2.4     10-16    TPro  4.8<L>  /  Alb  x   /  TBili  x   /  DBili  x   /  AST  x   /  ALT  x   /  AlkPhos  x   10-    TPro  6.5  /  Alb  3.5  /  TBili  0.2  /  DBili  x   /  AST  15  /  ALT  16  /  AlkPhos  73  10-16    PT/INR - ( 20 Oct 2017 08:24 )   PT: 40.3 sec;   INR: 3.48 ratio      ABG - ( 20 Oct 2017 06:32 )  pH: 7.28  /  pCO2: 59    /  pO2: 198   / HCO3: 27    / Base Excess: .4    /  SaO2: 100       ABG - ( 19 Oct 2017 07:20 )  pH: 7.25  /  pCO2: 60    /  pO2: 151   / HCO3: 26    / Base Excess: -1.4  /  SaO2: 99        ABG - ( 18 Oct 2017 14:35 )  pH: 7.30  /  pCO2: 58    /  pO2: 143   / HCO3: 28    / Base Excess: 1.3   /  SaO2: 100       CARDIAC MARKERS ( 17 Oct 2017 09:06 )  x     / x     / 38 U/L / x     / x      CARDIAC MARKERS ( 17 Oct 2017 09:05 )  x     / 0.07 ng/mL / x     / x     / 4.2 ng/mL  CARDIAC MARKERS ( 16 Oct 2017 18:28 )  x     / 0.07 ng/mL / 39 U/L / x     / 4.6 ng/mL  CARDIAC MARKERS ( 16 Oct 2017 09:17 )  x     / 0.04 ng/mL / 26 U/L / x     / 2.9 ng/mL  CARDIAC MARKERS ( 16 Oct 2017 00:15 )  x     / 0.05 ng/mL / 29 U/L / x     / 3.2 ng/mL    < from: Transthoracic Echocardiogram w/Doppler (12 @ 13:31) >    Patient name: OSCAR LOPEZ  YOB: 1929   Age: 82 (F)   MR#: [id number]  Study Date: 3/14/2012  [id number]: Jada Lagunas RDCS  Study quality: Technically fair  Referring Physician: Pro Tracy MD  Blood Pressure: 124/51 mmHg  Height: 5ft 3in  Weight: 164 lb  BSA: 1.8 m2  ------------------------------------------------------------------------  PROCEDURE: Transthoracic echocardiogram with 2-D, M-Mode  and complete spectral and color flow Doppler.  INDICATION: Aortic Stenosis (424.1), Atrial Fibrillation  (427.31)  ------------------------------------------------------------------------  Dimensions:    Normal Values:  LA:     3.1    2.0 - 4.0 cm  Ao:     3.1    2.0 - 3.8 cm  SEPTUM: 1.2    0.6 - 1.2cm  PWT:    1.3    0.6 - 1.1 cm  LVIDd:  4.3    3.0 - 5.6 cm  LVIDs:  2.6    1.8 - 4.0 cm  Derived variables:  LVMI: 110 g/m2  RWT: 0.60  Fractional short: 40 %  Ejection Fraction: >70 %  Doppler Peak Velocity (m/sec): AoV=4.0  ------------------------------------------------------------------------  Observations:  Mitral Valve: Mitral annular calcification and calcified  mitral leaflets with normal diastolic opening. Minimal  mitral regurgitation.  Aortic Valve/Aorta: Heavily calcified trileaflet aortic  valve with decreased opening. Peak transaortic valve  gradient equals 62 mm Hg, mean transaortic valve gradient  equals 37 mm Hg, estimated aortic valve area equals 0.9  sqcm (by continuity equation), consistent with severe  aortic stenosis .Mild-moderate aortic regurgitation.  Pressure halft - time is 361 ms.  Normal aortic root.  Left Atrium: Severely dilated left atrium.  LA volume index  = 51 cc/m2.  Left Ventricle: Hyperdynamic left ventricle. Mild to  moderate concentric left ventricular hypertrophy. Atrial  fibrillation precludes a comprehensive diastolic  assessment. However, findings are suggestive of moderate  diastolic dysfunction.  Right Heart: Severe right atrial enlargement. RA volume  index = 53 cc/m2.  Normal right ventricular size and  function. Normal tricuspid valve. Mild-moderate tricuspid  regurgitation. Normal pulmonic valve. Minimal pulmonic  regurgitation.  Pericardium/Pleura: Normal pericardium with no pericardial  effusion.  Hemodynamic: Estimated right atrial pressure is 5 mm Hg.  Estimated right ventricular systolic pressure equals 46 mm  Hg, assuming right atrial pressure equals 5 mm Hg,  consistent with mild pulmonary hypertension.  ------------------------------------------------------------------------  Conclusions:  1. Mitral annular calcification and calcified mitral  leaflets with normal diastolic opening. Minimal mitral  regurgitation.  2. Heavily calcified trileaflet aortic valve with decreased  opening. Peak transaortic valve gradientequals 62 mm Hg,  mean transaortic valve gradient equals 37 mm Hg, estimated  aortic valve area equals 0.9 sqcm (by continuity equation),  consistent with severe aortic stenosis. Mild-moderate  aortic regurgitation. Pressure halft - time is 361 ms.  3. Severely dilated left atrium.  LA volume index = 51  cc/m2.  4. Mild to moderate concentric left ventricular  hypertrophy.  5. Hyperdynamic left ventricle.  6. Atrial fibrillation precludes a comprehensive diastolic  assessment. However, findings are suggestive of moderate  diastolic dysfunction.  7. Severe right atrial enlargement. RA volume index = 53  cc/m2.  8. Normal right ventricular size and function.  9. Estimated right ventricular systolic pressure equals 46  mm Hg, assuming right atrial pressure equals 5 mm Hg,  consistent with mild pulmonary hypertension.  10. Normal tricuspid valve. Mild-moderate tricuspid  regurgitation.  *** No previous Echo exam.  ------------------------------------------------------------------------  Confirmed on  3/14/2012 - 15:25:18 by Armen Siddiqui M.D.    MICROBIOLOGY:     Urinalysis Basic - ( 16 Oct 2017 12:01 )    Color: Yellow / Appearance: SL Turbid / S.012 / pH: x  Gluc: x / Ketone: Negative  / Bili: Negative / Urobili: Negative   Blood: x / Protein: 30 mg/dL / Nitrite: Negative   Leuk Esterase: Large / RBC: 0-2 /HPF / WBC 26-50 /HPF   Sq Epi: x / Non Sq Epi: OCC /HPF / Bacteria: Few /HPF    Culture - Urine (10.18.17 @ 00:32)    Specimen Source: .Urine Catheterized    Culture Results:   >100,000 CFU/ml Klebsiella pneumoniae    Culture - Blood (10.17.17 @ 15:38)    Specimen Source: .Blood Blood-Peripheral    Culture Results:   No growth to date.    RADIOLOGY:  [x] Chest radiographs reviewed and interpreted by me    < from: CT Chest No Cont (10.17.17 @ 21:38) >    EXAM:  CT CHEST                          PROCEDURE DATE:  10/17/2017      INTERPRETATION:  Clinical information: Evaluate for pneumonia. Exam is   compared to previous study of 4/3/2012.    CT scan of the chest was obtained without administration of intravenous   contrast.    Surgical clips are noted in the right axilla.    No hilar and/or mediastinal adenopathy is noted.     Heart is markedly enlarged in size. Calcification the coronary arteries   is noted. Patient is status post TAVR.No pericardial effusion is noted.   Main pulmonary artery is dilated and measures 3.6 cm.     No endobronchial lesions are noted. Compressive atelectasis is noted   involving portions of both lower lobes. This is secondary to small   bilateral pleural effusions, right more than left.    Below the diaphragm, visualized portions of the abdomen demonstrate   low-attenuation within both kidneys which are too small to be adequately   characterized on this exam.     Degenerative changes of the spine are noted. Subcutaneous edema is noted.    Impression: There is no pneumonia.    Bilateral pleural effusions, right more than left.    MAYI MORALES M.D., ATTENDING RADIOLOGIST  This document has been electronically signed. Oct 18 2017  9:24AM      < end of copied text >  ---------------------------------------------------------------------------------------------------------  < from: CT Head No Cont (10.17.17 @ 21:38) >    EXAM:  CT BRAIN                            PROCEDURE DATE:  10/17/2017        INTERPRETATION:  HISTORY: Uterine cancer. Altered mental status.    COMPARISON: MRI brain performed 2012.    TECHNIQUE: Axial noncontrast CT images from the skull base to the vertex   were obtained and submitted for interpretation. Coronal and sagittal   reformatted images were performed. Bone and soft tissue windows were   evaluated.    FINDINGS:     There is no acute intracranial mass-effect, hemorrhage, midline shift, or   abnormal extra-axial fluid collection.     Ventricles, sulci, and cisterns are normal in size for the patient's age   without hydrocephalus. The basal cisterns are patent.     Patchy and confluent regions of periventricular and deep cerebral white   matter hypoattenuation likely reflects chronic microangiopathic ischemic   changes. Atheromatous calcifications along the carotid siphons are   present.    Minimal right maxillary sinus mucosal thickening. No evidence for acute   paranasal sinus or mastoid inflammatory changes.. The calvarium is   intact.     IMPRESSION:     No acute intracranial bleeding, mass effect, or evidence of an acute   territorial infarct.    ADRIEN MORRIS M.D., RADIOLOGY RESIDENT  This document has been electronically signed.  VINI METZGER M.D., ATTENDING RADIOLOGIST  This document has been electronically signed. Oct 18 2017  9:43AM      < end of copied text >  ---------------------------------------------------------------------------------------------------------  < from: Xray Chest 2 Views PA/Lat (10..17 @ 00:27) >    EXAM:  CHEST PA & LAT                            PROCEDURE DATE:  10/16/2017        INTERPRETATION:  CLINICAL INDICATION: Mild hypoxia for 6 hours.    TECHNIQUE: Frontal and Lateral views of the chest    COMPARISON: Chest x-ray 3/23/2015.    FINDINGS:     Status post TAVR. Surgical clips are noted in the right axilla and right   upper lobe.    The heart is enlarged, unchanged.    There is no focal consolidation.    Small bilateral pleural effusions. No pneumothorax.    IMPRESSION:   Small bilateral pleural effusions. No focal consolidation.       RYLEE EWING M.D., RADIOLOGY RESIDENT  This document has been electronically signed.  TEN PRICE M.D., ATTENDING RADIOLOGIST  This document has been electronically signed. Oct 16 2017  9:11AM      < end of copied text >  ---------------------------------------------------------------------------------------------------------

## 2017-10-20 NOTE — PROGRESS NOTE ADULT - ASSESSMENT
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    hypernatremia resolved/now with hyperglycemia on D5W    Klebsiella UTI    PLAN/RECOMMENDATIONS    continue NIV to ventilate to a pH ~ 7.4 with oxygen supplementation to keep saturation greater than 92% - will place 2 hours on and 2 hours off during the day and continuously for sleep to allow for po intake (start regular diet)  albuterol/atrovent/pulmicort nebs  renal follow-up noted noted - IV D5W with normal saline  cardiac meds: lipitor/plavix/metoprolol/coumadin for INR 2.5 - 3.5 - hold ACE inhibitors - diltiazem on hold due to bradycardia earlier - IVP metoprolol as needed for tachycardia  ceftriaxone  ECHO    Will follow with you; d/w dedicated floor NP and son at bedside    Tommy Reyes MD, [facility] - [phone]  Pulmonary Medicine

## 2017-10-20 NOTE — PROGRESS NOTE ADULT - SUBJECTIVE AND OBJECTIVE BOX
Patient is a 87y old  Female who presents with a chief complaint of "I have uterine cancer" (16 Oct 2017 12:15)      SUBJECTIVE / OVERNIGHT EVENTS:    MEDICATIONS  (STANDING):  ALBUTerol/ipratropium for Nebulization 3 milliLiter(s) Nebulizer every 6 hours  ascorbic acid 500 milliGRAM(s) Oral daily  atorvastatin 10 milliGRAM(s) Oral at bedtime  buDESOnide   0.5 milliGRAM(s) Respule 0.5 milliGRAM(s) Inhalation every 12 hours  cefTRIAXone   IVPB      cefTRIAXone   IVPB 1 Gram(s) IV Intermittent every 24 hours  clopidogrel Tablet 75 milliGRAM(s) Oral daily  ferrous    sulfate 325 milliGRAM(s) Oral daily  influenza   Vaccine 0.5 milliLiter(s) IntraMuscular once  metoprolol 12.5 milliGRAM(s) Oral two times a day  multivitamin 1 Tablet(s) Oral daily    MEDICATIONS  (PRN):      Vital Signs Last 24 Hrs  T(F): 97.5 (10-20-17 @ 14:17), Max: 98.4 (10-19-17 @ 20:20)  HR: 85 (10-20-17 @ 14:17) (66 - 91)  BP: 132/80 (10-20-17 @ 14:17) (131/60 - 135/80)  RR: 20 (10-20-17 @ 14:17) (18 - 22)  SpO2: 100% (10-20-17 @ 14:17) (100% - 100%)  Telemetry:   CAPILLARY BLOOD GLUCOSE      POCT Blood Glucose.: 131 mg/dL (20 Oct 2017 11:27)    I&O's Summary    19 Oct 2017 07:01  -  20 Oct 2017 07:00  --------------------------------------------------------  IN: 840 mL / OUT: 800 mL / NET: 40 mL    20 Oct 2017 07:01  -  20 Oct 2017 14:26  --------------------------------------------------------  IN: 720 mL / OUT: 150 mL / NET: 570 mL        PHYSICAL EXAM:  GENERAL: NAD, well-developed  HEAD:  Atraumatic, Normocephalic  EYES: EOMI, PERRLA, conjunctiva and sclera clear  NECK: Supple, No JVD  CHEST/LUNG: Clear to auscultation bilaterally; No wheeze  HEART: Regular rate and rhythm; No murmurs, rubs, or gallops  ABDOMEN: Soft, Nontender, Nondistended; Bowel sounds present  EXTREMITIES:  2+ Peripheral Pulses, No clubbing, cyanosis, or edema  PSYCH: AAOx3  NEUROLOGY: non-focal  SKIN: No rashes or lesions    LABS:                        8.8    5.55  )-----------( 189      ( 20 Oct 2017 08:24 )             29.6     10-20    141  |  108  |  46<H>  ----------------------------<  542<HH>  3.9   |  24  |  1.71<H>    Ca    7.9<L>      20 Oct 2017 08:28    TPro  4.8<L>  /  Alb  x   /  TBili  x   /  DBili  x   /  AST  x   /  ALT  x   /  AlkPhos  x   10-19    PT/INR - ( 20 Oct 2017 08:24 )   PT: 40.3 sec;   INR: 3.48 ratio                   RADIOLOGY & ADDITIONAL TESTS:    Imaging Personally Reviewed:    Consultant(s) Notes Reviewed:      Care Discussed with Consultants/Other Providers:

## 2017-10-21 LAB
% ALBUMIN: 54.4 % — SIGNIFICANT CHANGE UP
% ALPHA 1: 6.4 % — SIGNIFICANT CHANGE UP
% ALPHA 2: 11.1 % — SIGNIFICANT CHANGE UP
% BETA: 12.2 % — SIGNIFICANT CHANGE UP
% GAMMA: 15.9 % — SIGNIFICANT CHANGE UP
ALBUMIN SERPL ELPH-MCNC: 2.6 G/DL — LOW (ref 3.6–5.5)
ALBUMIN/GLOB SERPL ELPH: 1.2 RATIO — SIGNIFICANT CHANGE UP
ALPHA1 GLOB SERPL ELPH-MCNC: 0.3 G/DL — SIGNIFICANT CHANGE UP (ref 0.1–0.4)
ALPHA2 GLOB SERPL ELPH-MCNC: 0.5 G/DL — SIGNIFICANT CHANGE UP (ref 0.5–1)
ANION GAP SERPL CALC-SCNC: 14 MMOL/L — SIGNIFICANT CHANGE UP (ref 5–17)
B-GLOBULIN SERPL ELPH-MCNC: 0.6 G/DL — SIGNIFICANT CHANGE UP (ref 0.5–1)
BUN SERPL-MCNC: 41 MG/DL — HIGH (ref 7–23)
CALCIUM SERPL-MCNC: 8.9 MG/DL — SIGNIFICANT CHANGE UP (ref 8.4–10.5)
CHLORIDE SERPL-SCNC: 106 MMOL/L — SIGNIFICANT CHANGE UP (ref 96–108)
CO2 SERPL-SCNC: 25 MMOL/L — SIGNIFICANT CHANGE UP (ref 22–31)
CREAT SERPL-MCNC: 1.61 MG/DL — HIGH (ref 0.5–1.3)
FERRITIN SERPL-MCNC: 61 NG/ML — SIGNIFICANT CHANGE UP (ref 15–150)
FOLATE SERPL-MCNC: >20 NG/ML — SIGNIFICANT CHANGE UP (ref 4.8–24.2)
GAMMA GLOBULIN: 0.8 G/DL — SIGNIFICANT CHANGE UP (ref 0.6–1.6)
GLUCOSE SERPL-MCNC: 88 MG/DL — SIGNIFICANT CHANGE UP (ref 70–99)
HCT VFR BLD CALC: 29.4 % — LOW (ref 34.5–45)
HGB BLD-MCNC: 8.9 G/DL — LOW (ref 11.5–15.5)
INR BLD: 2.71 RATIO — HIGH (ref 0.88–1.16)
INTERPRETATION SERPL IFE-IMP: SIGNIFICANT CHANGE UP
IRON SATN MFR SERPL: 16 % — SIGNIFICANT CHANGE UP (ref 14–50)
IRON SATN MFR SERPL: 45 UG/DL — SIGNIFICANT CHANGE UP (ref 30–160)
MCHC RBC-ENTMCNC: 29.1 PG — SIGNIFICANT CHANGE UP (ref 27–34)
MCHC RBC-ENTMCNC: 30.3 GM/DL — LOW (ref 32–36)
MCV RBC AUTO: 96.1 FL — SIGNIFICANT CHANGE UP (ref 80–100)
PLATELET # BLD AUTO: 179 K/UL — SIGNIFICANT CHANGE UP (ref 150–400)
POTASSIUM SERPL-MCNC: 5 MMOL/L — SIGNIFICANT CHANGE UP (ref 3.5–5.3)
POTASSIUM SERPL-SCNC: 5 MMOL/L — SIGNIFICANT CHANGE UP (ref 3.5–5.3)
PROT PATTERN SERPL ELPH-IMP: SIGNIFICANT CHANGE UP
PROTHROM AB SERPL-ACNC: 31.3 SEC — HIGH (ref 10–13.1)
RBC # BLD: 3.06 M/UL — LOW (ref 3.8–5.2)
RBC # BLD: 3.09 M/UL — LOW (ref 3.8–5.2)
RBC # FLD: 18.3 % — HIGH (ref 10.3–14.5)
RETICS #: 59 K/UL — SIGNIFICANT CHANGE UP (ref 25–125)
RETICS/RBC NFR: 1.9 % — SIGNIFICANT CHANGE UP (ref 0.5–2.5)
SODIUM SERPL-SCNC: 145 MMOL/L — SIGNIFICANT CHANGE UP (ref 135–145)
TIBC SERPL-MCNC: 282 UG/DL — SIGNIFICANT CHANGE UP (ref 220–430)
UIBC SERPL-MCNC: 237 UG/DL — SIGNIFICANT CHANGE UP (ref 110–370)
VIT B12 SERPL-MCNC: 976 PG/ML — HIGH (ref 243–894)
WBC # BLD: 5.67 K/UL — SIGNIFICANT CHANGE UP (ref 3.8–10.5)
WBC # FLD AUTO: 5.67 K/UL — SIGNIFICANT CHANGE UP (ref 3.8–10.5)

## 2017-10-21 RX ORDER — ACETAMINOPHEN 500 MG
650 TABLET ORAL ONCE
Qty: 0 | Refills: 0 | Status: COMPLETED | OUTPATIENT
Start: 2017-10-21 | End: 2017-10-21

## 2017-10-21 RX ADMIN — CEFTRIAXONE 100 GRAM(S): 500 INJECTION, POWDER, FOR SOLUTION INTRAMUSCULAR; INTRAVENOUS at 21:14

## 2017-10-21 RX ADMIN — Medication 3 MILLILITER(S): at 05:30

## 2017-10-21 RX ADMIN — Medication 12.5 MILLIGRAM(S): at 05:29

## 2017-10-21 RX ADMIN — Medication 650 MILLIGRAM(S): at 23:22

## 2017-10-21 RX ADMIN — Medication 3 MILLILITER(S): at 11:46

## 2017-10-21 RX ADMIN — Medication 3 MILLILITER(S): at 23:25

## 2017-10-21 RX ADMIN — Medication 3 MILLILITER(S): at 00:00

## 2017-10-21 RX ADMIN — CLOPIDOGREL BISULFATE 75 MILLIGRAM(S): 75 TABLET, FILM COATED ORAL at 11:46

## 2017-10-21 RX ADMIN — Medication 500 MILLIGRAM(S): at 11:46

## 2017-10-21 RX ADMIN — Medication 1 TABLET(S): at 11:46

## 2017-10-21 RX ADMIN — ATORVASTATIN CALCIUM 10 MILLIGRAM(S): 80 TABLET, FILM COATED ORAL at 21:15

## 2017-10-21 RX ADMIN — Medication 3 MILLILITER(S): at 17:51

## 2017-10-21 RX ADMIN — Medication 0.5 MILLIGRAM(S): at 05:30

## 2017-10-21 RX ADMIN — Medication 325 MILLIGRAM(S): at 11:46

## 2017-10-21 RX ADMIN — Medication 0.5 MILLIGRAM(S): at 17:51

## 2017-10-21 RX ADMIN — Medication 12.5 MILLIGRAM(S): at 17:52

## 2017-10-21 NOTE — PROGRESS NOTE ADULT - SUBJECTIVE AND OBJECTIVE BOX
OSCAR LOPEZ        [id number]  10-21-17 @ 10:48  ------------------------------------------------------------------------------------  [facility] - [facility]     PROGRESS NOTE    CHIEF COMPLAINT:  FU for resp failure    INTERVAL HISTORY:     Strikingly alert, responsive and comfortable on BIPAP 18/5, 35%  No specific complaints and feeling better.    -----------------------------------------------------------------------------------  REVIEW OF SYSTEMS:  Constitutional: As per interval history  HEENT: Within normal limits  CV: As per interval history  Resp: As per interval history  GI: Within normal limits   : Within normal limits  Musculoskeletal: Within normal limits  Skin: Within normal limits  Neurological: Within normal limits  Psychiatric: Within normal limits  Endocrine: Within normal limits  Hematologic/Lymphatic: Within normal limits  Allergic/Immunologic: Within normal limits    [ ] Unable to assess ROS because   -------------------------------------------------------------------------------------  MEDICATIONS:     Pulmonary "  ALBUTerol/ipratropium for Nebulization 3 milliLiter(s) Nebulizer every 6 hours  buDESOnide   0.5 milliGRAM(s) Respule 0.5 milliGRAM(s) Inhalation every 12 hours    Anti-microbials:  cefTRIAXone   IVPB      cefTRIAXone   IVPB 1 Gram(s) IV Intermittent every 24 hours    Cardiovascular:  metoprolol 12.5 milliGRAM(s) Oral two times a day    Other:  ascorbic acid 500 milliGRAM(s) Oral daily  atorvastatin 10 milliGRAM(s) Oral at bedtime  clopidogrel Tablet 75 milliGRAM(s) Oral daily  ferrous    sulfate 325 milliGRAM(s) Oral daily  influenza   Vaccine 0.5 milliLiter(s) IntraMuscular once  multivitamin 1 Tablet(s) Oral daily    --------------------------------------------------------------------------------------    OBJECTIVE:    I&O's Detail    20 Oct 2017 07:01  -  21 Oct 2017 07:00  --------------------------------------------------------  IN:    Oral Fluid: 1080 mL  Total IN: 1080 mL    OUT:    Indwelling Catheter - Urethral: 650 mL  Total OUT: 650 mL    Total NET: 430 mL      21 Oct 2017 07:01  -  21 Oct 2017 10:48  --------------------------------------------------------  IN:    Oral Fluid: 360 mL  Total IN: 360 mL    OUT:  Total OUT: 0 mL    Total NET: 360 mL    CAPILLARY BLOOD GLUCOSE    POCT Blood Glucose.: 131 mg/dL (20 Oct 2017 11:27)    ------------------------------------------------------------------------------------------  PHYSICAL EXAM:       ICU Vital Signs Last 24 Hrs  T(C): 36.3 (21 Oct 2017 04:28), Max: 36.4 (20 Oct 2017 14:17)  T(F): 97.4 (21 Oct 2017 04:28), Max: 97.5 (20 Oct 2017 14:17)  HR: 95 (21 Oct 2017 04:28) (68 - 97)  BP: 147/84 (21 Oct 2017 04:28) (124/71 - 147/84)  BP(mean): --  ABP: --  ABP(mean): --  RR: 20 (21 Oct 2017 04:28) (18 - 20)  SpO2: 99% (21 Oct 2017 04:28) (99% - 100%)     Surprisingly comfortable, no acute distress on BIPAP  Alert and oriented  HEENT: unremarkable  Neck 2-3+JVD, no stridor, adenopathy or thyromegaly  Oropharynx is mallampati class 4  Cardiac regular, with john  Lungs are coarse otherwise clear  Abdomen is soft and nontender, nondistended  Extrems are warm, without clubbing, cyanosis or edema  Neurological is grossly intact, nonfocal  ________________________________________________________  LABS:                        8.9    5.67  )-----------( 179      ( 21 Oct 2017 08:48 )             29.4                         9.2    7.0   )-----------( 163      ( 20 Oct 2017 17:49 )             29.9     10-21    145  |  106  |  41<H>  ----------------------------<  88  5.0   |  25  |  1.61<H>  10-20    141  |  108  |  46<H>  ----------------------------<  542<HH>  3.9   |  24  |  1.71<H>    Ca      8.9      10-21    Ca      7.9<L>      10-20      TPro  4.8<L>  /  Alb  2.6<L>  /  TBili  x   /  DBili  x   /  AST  x   /  ALT  x   /  AlkPhos  x   10-19    PT/INR - ( 21 Oct 2017 08:48 )   PT: 31.3 sec;   INR: 2.71 ratio      ABG - ( 20 Oct 2017 06:32 )  pH: 7.28  /  pCO2: 59    /  pO2: 198   / HCO3: 27    / Base Excess: .4    /  SaO2: 100       CARDIAC MARKERS ( 17 Oct 2017 09:06 )  x     / x     / 38 U/L / x     / x      CARDIAC MARKERS ( 17 Oct 2017 09:05 )  x     / 0.07 ng/mL / x     / x     / 4.2 ng/mL  CARDIAC MARKERS ( 16 Oct 2017 18:28 )  x     / 0.07 ng/mL / 39 U/L / x     / 4.6 ng/mL    MICROBIOLOGY:     RADIOLOGY:  [ ] Reviewed and interpreted by me  __________________________________________________________________    IMPRESSION and RECOMMENDATIONS:    Elevate HOB  NIV/BIPAP 18/5, 35% for now and PRN with interval breaks.  Can extend periods off for patients comfort  [facility]  Cardiology mgmt    Gene Norton MD, FCCP, FAASM  Pulmonary and Sleep Medicine  [phone]

## 2017-10-21 NOTE — PROGRESS NOTE ADULT - ASSESSMENT
IMPRESSION: 87F w/ past breast and uterine CA, HTN, AFib, and AS-TAVR, 10/16/17 a/w hypercapnic respiratory failure/AMS, as well as CHARLIE   (1)Renal - CHARLIE on CKD 2-3 (base creatinine <1). Prerenal, +/- component of ischemic ATN. Renal fxn improving  (2)Hyperkalemia - improving  (3)Hypernatremia - improving  (4)Acid-base - primary respiratory acidosis - ?kyphoscoliosis/respiratory muscle weakness. Pulmonary on board; on intermittent noninvasive ventilation    RECOMMEND:  -Follow up immunofixation  -abx as ordered  -NIV per Pulmonary  -no need for further IVF  -low K diet  -continue to defer lasix given Charlie, consider starting in AM  -avoid NSAIDs and nephrotoxins as able  -dose any medication for a CrCl ~30 cc/min    Mini Saba NP  [facility]  [phone]

## 2017-10-21 NOTE — PROGRESS NOTE ADULT - SUBJECTIVE AND OBJECTIVE BOX
NEPHROLOGY-[facility] [phone]    Patient seen and examined off bipap    MEDICATIONS  (STANDING):  ALBUTerol/ipratropium for Nebulization 3 milliLiter(s) Nebulizer every 6 hours  ascorbic acid 500 milliGRAM(s) Oral daily  atorvastatin 10 milliGRAM(s) Oral at bedtime  buDESOnide   0.5 milliGRAM(s) Respule 0.5 milliGRAM(s) Inhalation every 12 hours  cefTRIAXone   IVPB      cefTRIAXone   IVPB 1 Gram(s) IV Intermittent every 24 hours  clopidogrel Tablet 75 milliGRAM(s) Oral daily  ferrous    sulfate 325 milliGRAM(s) Oral daily  influenza   Vaccine 0.5 milliLiter(s) IntraMuscular once  metoprolol 12.5 milliGRAM(s) Oral two times a day  multivitamin 1 Tablet(s) Oral daily    VITAL:  T(C): , Max: 36.6 (10-21-17 @ 12:00)  T(F): , Max: 97.9 (10-21-17 @ 12:00)  HR: 95 (10-21-17 @ 12:00)  BP: 134/77 (10-21-17 @ 12:00)  BP(mean): --  RR: 21 (10-21-17 @ 12:00)  SpO2: 100% (10-21-17 @ 12:00)  Wt(kg): --  I and O's:    10-20 @ 07:01  -  10-21 @ 07:00  --------------------------------------------------------  IN: 1080 mL / OUT: 650 mL / NET: 430 mL    10-21 @ 07:01  -  10-21 @ 14:19  --------------------------------------------------------  IN: 360 mL / OUT: 0 mL / NET: 360 mL      REVIEW OF SYSTEMS:  CONSTITUTIONAL: No fevers or chills  RESPIRATORY: No SOB  CARDIOVASCULAR: No CP or palpitations  GASTROINTESTINAL: No abd pain, n/v/d/constipation  NEUROLOGICAL: No numbness or weakness  All other review of systems is negative unless indicated above.    PHYSICAL EXAM:  Constitutional: NAD  Respiratory: coarse B/L  Cardiovascular: S1 and S2  Gastrointestinal: BS+, soft, NT/ND  Extremities: + edema R>L (Duplex this month shows no evidence of DVT)  Neurological: AAO   : + Gautam  Access: Not applicable    LABS:                        8.9    5.67  )-----------( 179      ( 21 Oct 2017 08:48 )             29.4     10-21    145  |  106  |  41<H>  ----------------------------<  88  5.0   |  25  |  1.61<H>    Ca    8.9      21 Oct 2017 09:04    RADIOLOGY    EXAM:  US KIDNEY(S)                            PROCEDURE DATE:  10/20/2017            INTERPRETATION:  CLINICAL INFORMATION: NANCY    COMPARISON: CT abdomen 4/23/2012    TECHNIQUE: Sonography of the kidneys and bladder.     FINDINGS:    Right kidney:  9.2 cm. echogenic cortex. No renal mass, hydronephrosis or   calculi. Multiple simple cysts the largest is at the upper pole and   measures 2.7 x 2.9 x 2.3 cm.    Left kidney:  10.1 cm. the genic cortex. No renal mass, hydronephrosis or   calculi. Multiple cysts. The largest at the upper pole measures 2.0 x 2.2   x 2.4 cm. This has some internal complexity not well evaluated. Recommend   follow-up in3- 6 months to assess for change.    Urinary bladder: Collapsed around a Gautam catheter limiting evaluation.    IMPRESSION:     Increased renal cortical echogenicity bilaterally, which can be seen in   medical renal disease.    Left slightly complex renal cyst not well evaluated on this exam.   Consider follow-up exam in 3-6 months to assess for change.                        MADHU FAULKNER M.D., ATTENDING RADIOLOGIST  This document has been electronically signed. Oct 20 2017  2:01PM

## 2017-10-21 NOTE — PROGRESS NOTE ADULT - ASSESSMENT
on bipap.  recommend ppi and follow h/h closely.  no paln for endosopic eval. risk, greater than beneift.  will follow with you.

## 2017-10-21 NOTE — PROGRESS NOTE ADULT - SUBJECTIVE AND OBJECTIVE BOX
Patient is a 87y old  Female who presents with a chief complaint of "I have uterine cancer" (16 Oct 2017 12:15)      SUBJECTIVE / OVERNIGHT EVENTS: using BIPAP less. feeling better, more awake.    MEDICATIONS  (STANDING):  ALBUTerol/ipratropium for Nebulization 3 milliLiter(s) Nebulizer every 6 hours  ascorbic acid 500 milliGRAM(s) Oral daily  atorvastatin 10 milliGRAM(s) Oral at bedtime  buDESOnide   0.5 milliGRAM(s) Respule 0.5 milliGRAM(s) Inhalation every 12 hours  cefTRIAXone   IVPB      cefTRIAXone   IVPB 1 Gram(s) IV Intermittent every 24 hours  clopidogrel Tablet 75 milliGRAM(s) Oral daily  ferrous    sulfate 325 milliGRAM(s) Oral daily  influenza   Vaccine 0.5 milliLiter(s) IntraMuscular once  metoprolol 12.5 milliGRAM(s) Oral two times a day  multivitamin 1 Tablet(s) Oral daily    MEDICATIONS  (PRN):      Vital Signs Last 24 Hrs  T(F): 98.9 (10-21-17 @ 21:18), Max: 98.9 (10-21-17 @ 21:18)  HR: 73 (10-21-17 @ 21:18) (73 - 100)  BP: 131/68 (10-21-17 @ 21:18) (131/68 - 147/84)  RR: 18 (10-21-17 @ 21:18) (18 - 21)  SpO2: 97% (10-21-17 @ 21:18) (97% - 100%)  Telemetry:   CAPILLARY BLOOD GLUCOSE        I&O's Summary    20 Oct 2017 07:01  -  21 Oct 2017 07:00  --------------------------------------------------------  IN: 1080 mL / OUT: 650 mL / NET: 430 mL    21 Oct 2017 07:01  -  21 Oct 2017 22:32  --------------------------------------------------------  IN: 600 mL / OUT: 350 mL / NET: 250 mL        PHYSICAL EXAM:  GENERAL: NAD, well-developed  HEAD:  Atraumatic, Normocephalic  EYES: EOMI, PERRLA, conjunctiva and sclera clear  NECK: Supple, No JVD  CHEST/LUNG: Clear to auscultation bilaterally; No wheeze  HEART: Regular rate and rhythm; No murmurs, rubs, or gallops  ABDOMEN: Soft, Nontender, Nondistended; Bowel sounds present  EXTREMITIES:  2+ Peripheral Pulses, No clubbing, cyanosis, or edema  PSYCH: AAOx3  NEUROLOGY: non-focal  SKIN: No rashes or lesions    LABS:                        8.9    5.67  )-----------( 179      ( 21 Oct 2017 08:48 )             29.4     10-21    145  |  106  |  41<H>  ----------------------------<  88  5.0   |  25  |  1.61<H>    Ca    8.9      21 Oct 2017 09:04      PT/INR - ( 21 Oct 2017 08:48 )   PT: 31.3 sec;   INR: 2.71 ratio                   RADIOLOGY & ADDITIONAL TESTS:    Imaging Personally Reviewed:    Consultant(s) Notes Reviewed:      Care Discussed with Consultants/Other Providers:

## 2017-10-21 NOTE — PROGRESS NOTE ADULT - SUBJECTIVE AND OBJECTIVE BOX
OSCAR LOPEZ    She looks better today.  Breathing better.  Has not required BIPAP today.  Has not ambulated.  No chest pain or palpitations.    Allergies    No Known Allergies    Intolerances    digoxin (Rash; Drowsiness)    MEDICATIONS  (STANDING):  ALBUTerol/ipratropium for Nebulization 3 milliLiter(s) Nebulizer every 6 hours  ascorbic acid 500 milliGRAM(s) Oral daily  atorvastatin 10 milliGRAM(s) Oral at bedtime  buDESOnide   0.5 milliGRAM(s) Respule 0.5 milliGRAM(s) Inhalation every 12 hours  cefTRIAXone   IVPB      cefTRIAXone   IVPB 1 Gram(s) IV Intermittent every 24 hours  clopidogrel Tablet 75 milliGRAM(s) Oral daily  ferrous    sulfate 325 milliGRAM(s) Oral daily  influenza   Vaccine 0.5 milliLiter(s) IntraMuscular once  metoprolol 12.5 milliGRAM(s) Oral two times a day  multivitamin 1 Tablet(s) Oral daily    PHYSICAL EXAM:  Vital Signs Last 24 Hrs  T(C): 36.6 (21 Oct 2017 12:00), Max: 36.6 (21 Oct 2017 12:00)  T(F): 97.9 (21 Oct 2017 12:00), Max: 97.9 (21 Oct 2017 12:00)  HR: 95 (21 Oct 2017 12:00) (68 - 95)  BP: 134/77 (21 Oct 2017 12:00) (124/71 - 147/84)  BP(mean): --  RR: 21 (21 Oct 2017 12:00) (18 - 21)  SpO2: 100% (21 Oct 2017 12:00) (99% - 100%)  Daily     Daily Weight in k.5 (21 Oct 2017 12:00)  I&O's Summary    20 Oct 2017 07:  -  21 Oct 2017 07:00  --------------------------------------------------------  IN: 1080 mL / OUT: 650 mL / NET: 430 mL    21 Oct 2017 07:  -  21 Oct 2017 15:58  --------------------------------------------------------  IN: 600 mL / OUT: 150 mL / NET: 450 mL        General Appearance: 	 Alert, cooperative, no distress  Neck: JVP estimated at 12 cm  Lungs:  Decreased BS at bases  Cor:  pmi 5th ICS MCL, Irregular rate and rhythm, S1 normal intensity, S2 normal intensity, GHAZALA  Abdomen:	 soft, non-tender; bowel sounds normal; no masses,  no organomegaly  Extremities: 1-2+ edema BL    EKG:  Telemetry: AF 60s-90s    Labs:  CBC Full  -  ( 21 Oct 2017 08:48 )  WBC Count : 5.67 K/uL  Hemoglobin : 8.9 g/dL  Hematocrit : 29.4 %  Platelet Count - Automated : 179 K/uL  Mean Cell Volume : 96.1 fl  Mean Cell Hemoglobin : 29.1 pg  Mean Cell Hemoglobin Concentration : 30.3 gm/dL  Auto Neutrophil # : x  Auto Lymphocyte # : x  Auto Monocyte # : x  Auto Eosinophil # : x  Auto Basophil # : x  Auto Neutrophil % : x  Auto Lymphocyte % : x  Auto Monocyte % : x  Auto Eosinophil % : x  Auto Basophil % : x    PT/INR - ( 21 Oct 2017 08:48 )   PT: 31.3 sec;   INR: 2.71 ratio    Basic Metabolic Panel in AM (10.21.17 @ 09:04)    Sodium, Serum: 145 mmol/L    Potassium, Serum: 5.0 mmol/L    Chloride, Serum: 106 mmol/L    Carbon Dioxide, Serum: 25 mmol/L    Anion Gap, Serum: 14 mmol/L    Blood Urea Nitrogen, Serum: 41 mg/dL    Creatinine, Serum: 1.61 mg/dL    Glucose, Serum: 88 mg/dL    Calcium, Total Serum: 8.9 mg/dL    eGFR if Non : 28: Interpretative comment  The units for eGFR are ml/min/1.73m2 (normalized body surface area). The  eGFR is calculated from a serum creatinine using the CKD-EPI equation.  Other variables required for calculation are race, age and sex. Among  patients with chronic kidney disease (CKD), the eGFR is useful in  determining the stage of disease according to KDOQI CKD classification.  All eGFR results are reported numerically with the following  interpretation.          GFR                    With                 Without     (ml/min/1.73 m2)    Kidney Damage       Kidney Damage        >= 90                    Stage 1                     Normal        60-89                    Stage 2                     Decreased GFR        30-59     Stage 3                     Stage 3        15-29                    Stage 4                     Stage 4        < 15                      Stage 5                     Stage 5  Each stage of CKD assumes that the associated GFR level has been in  effect for at least 3 months. Determination of stages one and two (with  eGFR > 59 ml/min/m2) requires estimation of kidney damage for at least 3  months as defined by structural or functional abnormalities.  Limitations: All estimates of GFR will be less accurate for patients at  extremes of muscle mass (including but not limited to frail elderly,  critically ill, or cancer patients), those with unusual diets, and those  with conditions associated with reduced secretion or extrarenal  elimination of creatinine. The eGFR equation is not recommended for use  in patients with unstable creatinine levels. mL/min/1.73M2    eGFR if African American: 33 mL/min/1.73M2      Impression/Plan: Improving ARF/Respiratory acidosis requiring intermittent BIPAP  Chronic AF, chronic diastolic CHF compensated  s/p TAVR    Plan: BIPAP as needed  Keep I and Os even  Dose coumadin for INR  PT eval, will need eventual rehab stay most likely      Leopoldo Funes MD, FACC  [facility]

## 2017-10-21 NOTE — PROGRESS NOTE ADULT - SUBJECTIVE AND OBJECTIVE BOX
OSCAR SANDERS:[id number],   87yFemale followed for:  digoxin (Rash; Drowsiness)  No Known Allergies    PAST MEDICAL & SURGICAL HISTORY:  Severe aortic stenosis  Uterine cancer  Arthritis  HTN (hypertension)  Breast cancer diagnosed in 2000: s/p right lumpectomy and right axillary lymph node removal, radiation  AF (atrial fibrillation): on coumadin and aspirin  Dyslipidemia  H/O: hysterectomy: 4/2012  s/p surgical excision of left eye Skin cancer: Bilateral lower eye lids  S/P D&C  S/P cholecystectomy    FAMILY HISTORY:  No pertinent family history in first degree relatives    MEDICATIONS  (STANDING):  ALBUTerol/ipratropium for Nebulization 3 milliLiter(s) Nebulizer every 6 hours  ascorbic acid 500 milliGRAM(s) Oral daily  atorvastatin 10 milliGRAM(s) Oral at bedtime  buDESOnide   0.5 milliGRAM(s) Respule 0.5 milliGRAM(s) Inhalation every 12 hours  cefTRIAXone   IVPB      cefTRIAXone   IVPB 1 Gram(s) IV Intermittent every 24 hours  clopidogrel Tablet 75 milliGRAM(s) Oral daily  ferrous    sulfate 325 milliGRAM(s) Oral daily  influenza   Vaccine 0.5 milliLiter(s) IntraMuscular once  metoprolol 12.5 milliGRAM(s) Oral two times a day  multivitamin 1 Tablet(s) Oral daily    MEDICATIONS  (PRN):      Vital Signs Last 24 Hrs  T(C): 36.3 (21 Oct 2017 04:28), Max: 36.4 (20 Oct 2017 14:17)  T(F): 97.4 (21 Oct 2017 04:28), Max: 97.5 (20 Oct 2017 14:17)  HR: 95 (21 Oct 2017 04:28) (68 - 97)  BP: 147/84 (21 Oct 2017 04:28) (124/71 - 147/84)  BP(mean): --  RR: 20 (21 Oct 2017 04:28) (18 - 20)  SpO2: 99% (21 Oct 2017 04:28) (99% - 100%)  nc/at  s1s2  cta  soft, nt, nd no guarding or rebound  no c/c/e    CBC Full  -  ( 21 Oct 2017 08:48 )  WBC Count : 5.67 K/uL  Hemoglobin : 8.9 g/dL  Hematocrit : 29.4 %  Platelet Count - Automated : 179 K/uL  Mean Cell Volume : 96.1 fl  Mean Cell Hemoglobin : 29.1 pg  Mean Cell Hemoglobin Concentration : 30.3 gm/dL  Auto Neutrophil # : x  Auto Lymphocyte # : x  Auto Monocyte # : x  Auto Eosinophil # : x  Auto Basophil # : x  Auto Neutrophil % : x  Auto Lymphocyte % : x  Auto Monocyte % : x  Auto Eosinophil % : x  Auto Basophil % : x    10-21    145  |  106  |  41<H>  ----------------------------<  88  5.0   |  25  |  1.61<H>    Ca    8.9      21 Oct 2017 09:04      PT/INR - ( 21 Oct 2017 08:48 )   PT: 31.3 sec;   INR: 2.71 ratio

## 2017-10-22 LAB
ANION GAP SERPL CALC-SCNC: 12 MMOL/L — SIGNIFICANT CHANGE UP (ref 5–17)
APTT BLD: 38.2 SEC — HIGH (ref 27.5–37.4)
BUN SERPL-MCNC: 41 MG/DL — HIGH (ref 7–23)
CALCIUM SERPL-MCNC: 9.1 MG/DL — SIGNIFICANT CHANGE UP (ref 8.4–10.5)
CHLORIDE SERPL-SCNC: 104 MMOL/L — SIGNIFICANT CHANGE UP (ref 96–108)
CO2 SERPL-SCNC: 24 MMOL/L — SIGNIFICANT CHANGE UP (ref 22–31)
CREAT SERPL-MCNC: 1.67 MG/DL — HIGH (ref 0.5–1.3)
CULTURE RESULTS: SIGNIFICANT CHANGE UP
GLUCOSE SERPL-MCNC: 146 MG/DL — HIGH (ref 70–99)
HCT VFR BLD CALC: 27.8 % — LOW (ref 34.5–45)
HGB BLD-MCNC: 8.4 G/DL — LOW (ref 11.5–15.5)
INR BLD: 1.92 RATIO — HIGH (ref 0.88–1.16)
MCHC RBC-ENTMCNC: 28.7 PG — SIGNIFICANT CHANGE UP (ref 27–34)
MCHC RBC-ENTMCNC: 30.2 GM/DL — LOW (ref 32–36)
MCV RBC AUTO: 94.9 FL — SIGNIFICANT CHANGE UP (ref 80–100)
PLATELET # BLD AUTO: 172 K/UL — SIGNIFICANT CHANGE UP (ref 150–400)
POTASSIUM SERPL-MCNC: 4.9 MMOL/L — SIGNIFICANT CHANGE UP (ref 3.5–5.3)
POTASSIUM SERPL-SCNC: 4.9 MMOL/L — SIGNIFICANT CHANGE UP (ref 3.5–5.3)
PROTHROM AB SERPL-ACNC: 22 SEC — HIGH (ref 10–13.1)
RBC # BLD: 2.93 M/UL — LOW (ref 3.8–5.2)
RBC # FLD: 18.3 % — HIGH (ref 10.3–14.5)
SODIUM SERPL-SCNC: 140 MMOL/L — SIGNIFICANT CHANGE UP (ref 135–145)
SPECIMEN SOURCE: SIGNIFICANT CHANGE UP
WBC # BLD: 8.58 K/UL — SIGNIFICANT CHANGE UP (ref 3.8–10.5)
WBC # FLD AUTO: 8.58 K/UL — SIGNIFICANT CHANGE UP (ref 3.8–10.5)

## 2017-10-22 RX ORDER — METOPROLOL TARTRATE 50 MG
12.5 TABLET ORAL
Qty: 0 | Refills: 0 | Status: DISCONTINUED | OUTPATIENT
Start: 2017-10-22 | End: 2017-10-30

## 2017-10-22 RX ORDER — WARFARIN SODIUM 2.5 MG/1
2 TABLET ORAL ONCE
Qty: 0 | Refills: 0 | Status: COMPLETED | OUTPATIENT
Start: 2017-10-22 | End: 2017-10-22

## 2017-10-22 RX ADMIN — Medication 12.5 MILLIGRAM(S): at 19:29

## 2017-10-22 RX ADMIN — Medication 500 MILLIGRAM(S): at 11:13

## 2017-10-22 RX ADMIN — WARFARIN SODIUM 2 MILLIGRAM(S): 2.5 TABLET ORAL at 21:38

## 2017-10-22 RX ADMIN — Medication 3 MILLILITER(S): at 05:36

## 2017-10-22 RX ADMIN — Medication 650 MILLIGRAM(S): at 00:20

## 2017-10-22 RX ADMIN — Medication 12.5 MILLIGRAM(S): at 05:36

## 2017-10-22 RX ADMIN — Medication 1 TABLET(S): at 11:13

## 2017-10-22 RX ADMIN — Medication 3 MILLILITER(S): at 18:13

## 2017-10-22 RX ADMIN — Medication 0.5 MILLIGRAM(S): at 18:13

## 2017-10-22 RX ADMIN — CEFTRIAXONE 100 GRAM(S): 500 INJECTION, POWDER, FOR SOLUTION INTRAMUSCULAR; INTRAVENOUS at 21:39

## 2017-10-22 RX ADMIN — Medication 0.5 MILLIGRAM(S): at 05:36

## 2017-10-22 RX ADMIN — Medication 3 MILLILITER(S): at 11:13

## 2017-10-22 RX ADMIN — CLOPIDOGREL BISULFATE 75 MILLIGRAM(S): 75 TABLET, FILM COATED ORAL at 11:13

## 2017-10-22 RX ADMIN — ATORVASTATIN CALCIUM 10 MILLIGRAM(S): 80 TABLET, FILM COATED ORAL at 21:38

## 2017-10-22 RX ADMIN — Medication 325 MILLIGRAM(S): at 11:13

## 2017-10-22 NOTE — PROGRESS NOTE ADULT - ASSESSMENT
87 year old female with chronic AFIB, CHF and ARF. She is improved. Serum creatinine at 1.61.  No CHF on exam. and INR therapeutic.

## 2017-10-22 NOTE — PROGRESS NOTE ADULT - SUBJECTIVE AND OBJECTIVE BOX
NEPHROLOGY-[facility] [phone]    Patient seen and examined, she is in NAD    MEDICATIONS  (STANDING):  ALBUTerol/ipratropium for Nebulization 3 milliLiter(s) Nebulizer every 6 hours  ascorbic acid 500 milliGRAM(s) Oral daily  atorvastatin 10 milliGRAM(s) Oral at bedtime  buDESOnide   0.5 milliGRAM(s) Respule 0.5 milliGRAM(s) Inhalation every 12 hours  cefTRIAXone   IVPB      cefTRIAXone   IVPB 1 Gram(s) IV Intermittent every 24 hours  clopidogrel Tablet 75 milliGRAM(s) Oral daily  ferrous    sulfate 325 milliGRAM(s) Oral daily  influenza   Vaccine 0.5 milliLiter(s) IntraMuscular once  metoprolol 12.5 milliGRAM(s) Oral two times a day  multivitamin 1 Tablet(s) Oral daily    VITAL:  T(C): , Max: 37.2 (10-21-17 @ 21:18)  T(F): , Max: 98.9 (10-21-17 @ 21:18)  HR: 97 (10-22-17 @ 09:10)  BP: 128/67 (10-22-17 @ 04:07)  BP(mean): --  RR: 18 (10-22-17 @ 04:07)  SpO2: 97% (10-22-17 @ 09:10)  Wt(kg): --  I and O's:    10-21 @ 07:01  -  10-22 @ 07:00  --------------------------------------------------------  IN: 650 mL / OUT: 500 mL / NET: 150 mL          REVIEW OF SYSTEMS:  CONSTITUTIONAL: No fevers or chills  RESPIRATORY: +REYES  CARDIOVASCULAR: No CP or palpitations  GASTROINTESTINAL: No abd pain, n/v/d/constipation  GENITOURINARY: No dysuria, frequency or hematuria  NEUROLOGICAL: No numbness or weakness  All other review of systems is negative unless indicated above.    PHYSICAL EXAM:  Constitutional: NAD  Respiratory: diminished B/L  Cardiovascular: S1 and S2  Gastrointestinal: BS+, soft, NT/ND  Extremities: + edema R>L  Neurological: AAO   : No Gautam  Access: Not applicable    LABS:                        8.4    8.58  )-----------( 172      ( 22 Oct 2017 12:18 )             27.8     10-21    145  |  106  |  41<H>  ----------------------------<  88  5.0   |  25  |  1.61<H>    Ca    8.9      21 Oct 2017 09:04

## 2017-10-22 NOTE — PROGRESS NOTE ADULT - SUBJECTIVE AND OBJECTIVE BOX
Infectious Diseases progress note:    Subjective:  Afebrile.  No reported o/n events.    ROS:  CONSTITUTIONAL:  No fever, chills, rigors  CARDIOVASCULAR:  No chest pain or palpitations  RESPIRATORY:   No SOB, cough, dyspnea on exertion.  No wheezing  GASTROINTESTINAL:  No abd pain, N/V, diarrhea/constipation  EXTREMITIES:  No swelling or joint pain  GENITOURINARY:  No burning on urination, increased frequency or urgency.  No flank pain  NEUROLOGIC:  No HA, visual disturbances  SKIN: No rashes    Allergies    No Known Allergies    Intolerances    digoxin (Rash; Drowsiness)      ANTIBIOTICS/RELEVANT:  antimicrobials  cefTRIAXone   IVPB      cefTRIAXone   IVPB 1 Gram(s) IV Intermittent every 24 hours    immunologic:  influenza   Vaccine 0.5 milliLiter(s) IntraMuscular once    OTHER:  ALBUTerol/ipratropium for Nebulization 3 milliLiter(s) Nebulizer every 6 hours  ascorbic acid 500 milliGRAM(s) Oral daily  atorvastatin 10 milliGRAM(s) Oral at bedtime  buDESOnide   0.5 milliGRAM(s) Respule 0.5 milliGRAM(s) Inhalation every 12 hours  clopidogrel Tablet 75 milliGRAM(s) Oral daily  ferrous    sulfate 325 milliGRAM(s) Oral daily  metoprolol 12.5 milliGRAM(s) Oral two times a day  multivitamin 1 Tablet(s) Oral daily      Objective:  Vital Signs Last 24 Hrs  T(C): 36.8 (22 Oct 2017 04:07), Max: 37.2 (21 Oct 2017 21:18)  T(F): 98.3 (22 Oct 2017 04:07), Max: 98.9 (21 Oct 2017 21:18)  HR: 97 (22 Oct 2017 09:10) (73 - 108)  BP: 128/67 (22 Oct 2017 04:07) (128/67 - 140/90)  BP(mean): --  RR: 18 (22 Oct 2017 04:07) (18 - 18)  SpO2: 97% (22 Oct 2017 09:10) (96% - 97%)    PHYSICAL EXAM:  Constitutional:NAD  Eyes:JEANETTE, EOMI  Ear/Nose/Throat: no oral lesion, no sinus tenderness on percussion	  Neck:no JVD, no lymphadenopathy, supple  Respiratory: CTA jillian  Cardiovascular: S1S2 RRR, no murmurs  Gastrointestinal:soft, (+) BS, no HSM  Extremities:no e/e/c        LABS:                        8.4    8.58  )-----------( 172      ( 22 Oct 2017 12:18 )             27.8     10-21    145  |  106  |  41<H>  ----------------------------<  88  5.0   |  25  |  1.61<H>    Ca    8.9      21 Oct 2017 09:04      PT/INR - ( 21 Oct 2017 08:48 )   PT: 31.3 sec;   INR: 2.71 ratio           MICROBIOLOGY:    Culture - Urine (10.18.17 @ 00:32)    -  Amikacin: S <=8    -  Ampicillin: R 16    -  Ampicillin/Sulbactam: S <=4/2    -  Aztreonam: S <=4    -  Cefazolin: S <=2    -  Cefepime: S <=2    -  Cefotaxime: S <=2    -  Cefoxitin: S <=4    -  Ceftazidime: S <=1    -  Ceftriaxone: S <=1    -  Cefuroxime: S <=4    -  Ciprofloxacin: S <=0.5    -  Ertapenem: S <=0.5    -  Gentamicin: S <=1    -  Imipenem: S <=1    -  Levofloxacin: S <=1    -  Meropenem: S <=1    -  Nitrofurantoin: S <=32    -  Piperacillin/Tazobactam: S <=8    -  Tigecycline: S <=1    -  Tobramycin: S <=2    -  Trimethoprim/Sulfamethoxazole: S <=0.5/9.5    Specimen Source: .Urine Catheterized    Culture Results:   >100,000 CFU/ml Klebsiella pneumoniae    Organism Identification: Klebsiella pneumoniae    Organism: Klebsiella pneumoniae    Method Type: ARNOLD    Culture - Blood (10.17.17 @ 15:38)    Specimen Source: .Blood Blood-Peripheral    Culture Results:   No growth to date.          RADIOLOGY & ADDITIONAL STUDIES:    < from: US Renal (10.20.17 @ 14:28) >  IMPRESSION:     Increased renal cortical echogenicity bilaterally, which can be seen in   medical renal disease.    Left slightly complex renal cyst not well evaluated on this exam.   Consider follow-up exam in 3-6 months to assess for change.    < end of copied text >      < from: CT Chest No Cont (10.17.17 @ 21:38) >  INTERPRETATION:  Clinical information: Evaluate for pneumonia. Exam is   compared to previous study of 4/3/2012.    CT scan of the chest was obtained without administration of intravenous   contrast.    Surgical clips are noted in the right axilla.    No hilar and/or mediastinal adenopathy is noted.     Heart is markedly enlarged in size. Calcification the coronary arteries   is noted. Patient is status post TAVR.No pericardial effusion is noted.   Main pulmonary artery is dilated and measures 3.6 cm.     No endobronchial lesions are noted. Compressive atelectasis is noted   involving portions of both lower lobes. This is secondary to small   bilateral pleural effusions, right more than left.    Below the diaphragm, visualized portions of the abdomen demonstrate   low-attenuation within both kidneys which are too small to be adequately   characterized on this exam.     Degenerative changes of the spine are noted. Subcutaneous edema is noted.    Impression: There is no pneumonia.    Bilateral pleural effusions, right more than left.    < end of copied text > Infectious Diseases progress note:    Subjective:  Afebrile.  No reported o/n events.  Feels weak.  No dysuria/ suprapubic pain/flank pain    ROS:  CONSTITUTIONAL:  No fever, chills, rigors  CARDIOVASCULAR:  No chest pain or palpitations  RESPIRATORY:   No SOB, cough, dyspnea on exertion.  No wheezing  GASTROINTESTINAL:  No abd pain, N/V, diarrhea/constipation  EXTREMITIES:  right foot edema "this is my bad leg"  GENITOURINARY:  No burning on urination, increased frequency or urgency.  No flank pain  NEUROLOGIC:  No HA, visual disturbances  SKIN: No rashes    Allergies    No Known Allergies    Intolerances    digoxin (Rash; Drowsiness)      ANTIBIOTICS/RELEVANT:  antimicrobials  cefTRIAXone   IVPB      cefTRIAXone   IVPB 1 Gram(s) IV Intermittent every 24 hours    immunologic:  influenza   Vaccine 0.5 milliLiter(s) IntraMuscular once    OTHER:  ALBUTerol/ipratropium for Nebulization 3 milliLiter(s) Nebulizer every 6 hours  ascorbic acid 500 milliGRAM(s) Oral daily  atorvastatin 10 milliGRAM(s) Oral at bedtime  buDESOnide   0.5 milliGRAM(s) Respule 0.5 milliGRAM(s) Inhalation every 12 hours  clopidogrel Tablet 75 milliGRAM(s) Oral daily  ferrous    sulfate 325 milliGRAM(s) Oral daily  metoprolol 12.5 milliGRAM(s) Oral two times a day  multivitamin 1 Tablet(s) Oral daily      Objective:  Vital Signs Last 24 Hrs  T(C): 36.8 (22 Oct 2017 04:07), Max: 37.2 (21 Oct 2017 21:18)  T(F): 98.3 (22 Oct 2017 04:07), Max: 98.9 (21 Oct 2017 21:18)  HR: 97 (22 Oct 2017 09:10) (73 - 108)  BP: 128/67 (22 Oct 2017 04:07) (128/67 - 140/90)  BP(mean): --  RR: 18 (22 Oct 2017 04:07) (18 - 18)  SpO2: 97% (22 Oct 2017 09:10) (96% - 97%)    PHYSICAL EXAM:  Constitutional:NAD  Eyes:JEANETTE, EOMI  Ear/Nose/Throat: no oral lesion, no sinus tenderness on percussion	  Neck:no JVD, no lymphadenopathy, supple  Respiratory: CTA jillian  Cardiovascular: S1S2 RRR, no murmurs  Gastrointestinal:soft, (+) BS, no HSM  Extremities:no e/e/c.  Rt foot pedal edema with pitting.        LABS:                        8.4    8.58  )-----------( 172      ( 22 Oct 2017 12:18 )             27.8     10-21    145  |  106  |  41<H>  ----------------------------<  88  5.0   |  25  |  1.61<H>    Ca    8.9      21 Oct 2017 09:04      PT/INR - ( 21 Oct 2017 08:48 )   PT: 31.3 sec;   INR: 2.71 ratio           MICROBIOLOGY:    Culture - Urine (10.18.17 @ 00:32)    -  Amikacin: S <=8    -  Ampicillin: R 16    -  Ampicillin/Sulbactam: S <=4/2    -  Aztreonam: S <=4    -  Cefazolin: S <=2    -  Cefepime: S <=2    -  Cefotaxime: S <=2    -  Cefoxitin: S <=4    -  Ceftazidime: S <=1    -  Ceftriaxone: S <=1    -  Cefuroxime: S <=4    -  Ciprofloxacin: S <=0.5    -  Ertapenem: S <=0.5    -  Gentamicin: S <=1    -  Imipenem: S <=1    -  Levofloxacin: S <=1    -  Meropenem: S <=1    -  Nitrofurantoin: S <=32    -  Piperacillin/Tazobactam: S <=8    -  Tigecycline: S <=1    -  Tobramycin: S <=2    -  Trimethoprim/Sulfamethoxazole: S <=0.5/9.5    Specimen Source: .Urine Catheterized    Culture Results:   >100,000 CFU/ml Klebsiella pneumoniae    Organism Identification: Klebsiella pneumoniae    Organism: Klebsiella pneumoniae    Method Type: ARNOLD    Culture - Blood (10.17.17 @ 15:38)    Specimen Source: .Blood Blood-Peripheral    Culture Results:   No growth to date.          RADIOLOGY & ADDITIONAL STUDIES:    < from: US Renal (10.20.17 @ 14:28) >  IMPRESSION:     Increased renal cortical echogenicity bilaterally, which can be seen in   medical renal disease.    Left slightly complex renal cyst not well evaluated on this exam.   Consider follow-up exam in 3-6 months to assess for change.    < end of copied text >      < from: CT Chest No Cont (10.17.17 @ 21:38) >  INTERPRETATION:  Clinical information: Evaluate for pneumonia. Exam is   compared to previous study of 4/3/2012.    CT scan of the chest was obtained without administration of intravenous   contrast.    Surgical clips are noted in the right axilla.    No hilar and/or mediastinal adenopathy is noted.     Heart is markedly enlarged in size. Calcification the coronary arteries   is noted. Patient is status post TAVR.No pericardial effusion is noted.   Main pulmonary artery is dilated and measures 3.6 cm.     No endobronchial lesions are noted. Compressive atelectasis is noted   involving portions of both lower lobes. This is secondary to small   bilateral pleural effusions, right more than left.    Below the diaphragm, visualized portions of the abdomen demonstrate   low-attenuation within both kidneys which are too small to be adequately   characterized on this exam.     Degenerative changes of the spine are noted. Subcutaneous edema is noted.    Impression: There is no pneumonia.    Bilateral pleural effusions, right more than left.    < end of copied text >

## 2017-10-22 NOTE — PROGRESS NOTE ADULT - SUBJECTIVE AND OBJECTIVE BOX
Patient is a 87y old  Female who presents with a chief complaint of "I have uterine cancer" (16 Oct 2017 12:15)      SUBJECTIVE / OVERNIGHT EVENTS: feels better, stable off BIPAP, now only prn. martita last day ABx for UTI    MEDICATIONS  (STANDING):  ALBUTerol/ipratropium for Nebulization 3 milliLiter(s) Nebulizer every 6 hours  ascorbic acid 500 milliGRAM(s) Oral daily  atorvastatin 10 milliGRAM(s) Oral at bedtime  buDESOnide   0.5 milliGRAM(s) Respule 0.5 milliGRAM(s) Inhalation every 12 hours  cefTRIAXone   IVPB      cefTRIAXone   IVPB 1 Gram(s) IV Intermittent every 24 hours  clopidogrel Tablet 75 milliGRAM(s) Oral daily  ferrous    sulfate 325 milliGRAM(s) Oral daily  influenza   Vaccine 0.5 milliLiter(s) IntraMuscular once  metoprolol 12.5 milliGRAM(s) Oral two times a day  multivitamin 1 Tablet(s) Oral daily  warfarin 2 milliGRAM(s) Oral once    MEDICATIONS  (PRN):      Vital Signs Last 24 Hrs  T(F): 98.5 (10-22-17 @ 20:32), Max: 98.5 (10-22-17 @ 20:32)  HR: 51 (10-22-17 @ 20:32) (51 - 116)  BP: 148/76 (10-22-17 @ 20:32) (105/61 - 148/76)  RR: 18 (10-22-17 @ 20:32) (18 - 19)  SpO2: 95% (10-22-17 @ 20:32) (94% - 98%)  Telemetry:   CAPILLARY BLOOD GLUCOSE        I&O's Summary    21 Oct 2017 07:01  -  22 Oct 2017 07:00  --------------------------------------------------------  IN: 650 mL / OUT: 500 mL / NET: 150 mL    22 Oct 2017 07:01  -  22 Oct 2017 21:20  --------------------------------------------------------  IN: 720 mL / OUT: 400 mL / NET: 320 mL        PHYSICAL EXAM:  GENERAL: NAD, well-developed  HEAD:  Atraumatic, Normocephalic  EYES: EOMI, PERRLA, conjunctiva and sclera clear  NECK: Supple, No JVD  CHEST/LUNG: Clear to auscultation bilaterally; No wheeze  HEART: Regular rate and rhythm; No murmurs, rubs, or gallops  ABDOMEN: Soft, Nontender, Nondistended; Bowel sounds present  EXTREMITIES:  2+ Peripheral Pulses, No clubbing, cyanosis, or edema  PSYCH: AAOx3  NEUROLOGY: non-focal  SKIN: No rashes or lesions    LABS:                        8.4    8.58  )-----------( 172      ( 22 Oct 2017 12:18 )             27.8     10-22    140  |  104  |  41<H>  ----------------------------<  146<H>  4.9   |  24  |  1.67<H>    Ca    9.1      22 Oct 2017 12:18      PT/INR - ( 22 Oct 2017 12:18 )   PT: 22.0 sec;   INR: 1.92 ratio         PTT - ( 22 Oct 2017 12:18 )  PTT:38.2 sec          RADIOLOGY & ADDITIONAL TESTS:    Imaging Personally Reviewed:    Consultant(s) Notes Reviewed:      Care Discussed with Consultants/Other Providers:

## 2017-10-22 NOTE — PROGRESS NOTE ADULT - SUBJECTIVE AND OBJECTIVE BOX
Patient is a 87y old  Female who presents with a chief complaint of "I have uterine cancer" (16 Oct 2017 12:15)    She denies c/o chest pain, SOB or palpitations. Not ambulating much.     Allergies: No Known Allergies    Intolerances    digoxin (Rash; Drowsiness)    MEDICATIONS  (STANDING):  ALBUTerol/ipratropium for Nebulization 3 milliLiter(s) Nebulizer every 6 hours  ascorbic acid 500 milliGRAM(s) Oral daily  atorvastatin 10 milliGRAM(s) Oral at bedtime  buDESOnide   0.5 milliGRAM(s) Respule 0.5 milliGRAM(s) Inhalation every 12 hours  cefTRIAXone   IVPB      cefTRIAXone   IVPB 1 Gram(s) IV Intermittent every 24 hours  clopidogrel Tablet 75 milliGRAM(s) Oral daily  ferrous    sulfate 325 milliGRAM(s) Oral daily  influenza   Vaccine 0.5 milliLiter(s) IntraMuscular once  metoprolol 12.5 milliGRAM(s) Oral two times a day  multivitamin 1 Tablet(s) Oral daily    MEDICATIONS  (PRN):      PHYSICAL EXAM:  Vital Signs Last 24 Hrs  T(C): 36.8 (22 Oct 2017 04:07), Max: 37.2 (21 Oct 2017 21:18)  T(F): 98.3 (22 Oct 2017 04:07), Max: 98.9 (21 Oct 2017 21:18)  HR: 102 (22 Oct 2017 05:18) (73 - 108)  BP: 128/67 (22 Oct 2017 04:07) (128/67 - 140/90)  BP(mean): --  RR: 18 (22 Oct 2017 04:07) (18 - 21)  SpO2: 97% (22 Oct 2017 05:18) (96% - 100%)  Daily     Daily Weight in k.5 (21 Oct 2017 12:00)  I&O's Summary    21 Oct 2017 07:01  -  22 Oct 2017 07:00  --------------------------------------------------------  IN: 650 mL / OUT: 500 mL / NET: 150 mL        General Appearance: 	 Alert, cooperative, no distress  HEENT: normocephalic, atraumatic   Neck: no JVD,  carotid 2+  bilaterally without bruits  Lungs:  few coarse BS bilaterally  Cor:  pmi 5th ICS MCL, irregular rate and rhythm, S1 normal intensity, S2 normal intensity, Grade I/VI apical systolic murmur  Abdomen: soft, non-tender; bowel sounds normal; no masses,  no organomegaly  Extremities: without cyanosis, clubbing or edema  Vasc: 2-+ PT and DP pulses; LE varicosities    Labs:  CBC Full  -  ( 21 Oct 2017 08:48 )  WBC Count : 5.67 K/uL  Hemoglobin : 8.9 g/dL  Hematocrit : 29.4 %  Platelet Count - Automated : 179 K/uL  Mean Cell Volume : 96.1 fl  Mean Cell Hemoglobin : 29.1 pg  Mean Cell Hemoglobin Concentration : 30.3 gm/dL  Auto Neutrophil # : x  Auto Lymphocyte # : x  Auto Monocyte # : x  Auto Eosinophil # : x  Auto Basophil # : x  Auto Neutrophil % : x  Auto Lymphocyte % : x  Auto Monocyte % : x  Auto Eosinophil % : x  Auto Basophil % : x    10-21    145  |  106  |  41<H>  ----------------------------<  88  5.0   |  25  |  1.61<H>    Ca    8.9      21 Oct 2017 09:04          PT/INR - ( 21 Oct 2017 08:48 )   PT: 31.3 sec;   INR: 2.71 ratio                             Timothy Emmanuel MD [facility]  [phone]

## 2017-10-22 NOTE — PROGRESS NOTE ADULT - SUBJECTIVE AND OBJECTIVE BOX
OSCAR SANDERS:[id number],   87yFemale followed for:  digoxin (Rash; Drowsiness)  No Known Allergies    PAST MEDICAL & SURGICAL HISTORY:  Severe aortic stenosis  Uterine cancer  Arthritis  HTN (hypertension)  Breast cancer diagnosed in 2000: s/p right lumpectomy and right axillary lymph node removal, radiation  AF (atrial fibrillation): on coumadin and aspirin  Dyslipidemia  H/O: hysterectomy: 4/2012  s/p surgical excision of left eye Skin cancer: Bilateral lower eye lids  S/P D&C  S/P cholecystectomy    FAMILY HISTORY:  No pertinent family history in first degree relatives    MEDICATIONS  (STANDING):  ALBUTerol/ipratropium for Nebulization 3 milliLiter(s) Nebulizer every 6 hours  ascorbic acid 500 milliGRAM(s) Oral daily  atorvastatin 10 milliGRAM(s) Oral at bedtime  buDESOnide   0.5 milliGRAM(s) Respule 0.5 milliGRAM(s) Inhalation every 12 hours  cefTRIAXone   IVPB      cefTRIAXone   IVPB 1 Gram(s) IV Intermittent every 24 hours  clopidogrel Tablet 75 milliGRAM(s) Oral daily  ferrous    sulfate 325 milliGRAM(s) Oral daily  influenza   Vaccine 0.5 milliLiter(s) IntraMuscular once  metoprolol 12.5 milliGRAM(s) Oral two times a day  multivitamin 1 Tablet(s) Oral daily    MEDICATIONS  (PRN):      Vital Signs Last 24 Hrs  T(C): 36.8 (22 Oct 2017 04:07), Max: 37.2 (21 Oct 2017 21:18)  T(F): 98.3 (22 Oct 2017 04:07), Max: 98.9 (21 Oct 2017 21:18)  HR: 97 (22 Oct 2017 09:10) (73 - 108)  BP: 128/67 (22 Oct 2017 04:07) (128/67 - 140/90)  BP(mean): --  RR: 18 (22 Oct 2017 04:07) (18 - 21)  SpO2: 97% (22 Oct 2017 09:10) (96% - 100%)  nc/at  s1s2  cta  soft, nt, nd no guarding or rebound  no c/c/e    CBC Full  -  ( 21 Oct 2017 08:48 )  WBC Count : 5.67 K/uL  Hemoglobin : 8.9 g/dL  Hematocrit : 29.4 %  Platelet Count - Automated : 179 K/uL  Mean Cell Volume : 96.1 fl  Mean Cell Hemoglobin : 29.1 pg  Mean Cell Hemoglobin Concentration : 30.3 gm/dL  Auto Neutrophil # : x  Auto Lymphocyte # : x  Auto Monocyte # : x  Auto Eosinophil # : x  Auto Basophil # : x  Auto Neutrophil % : x  Auto Lymphocyte % : x  Auto Monocyte % : x  Auto Eosinophil % : x  Auto Basophil % : x    10-21    145  |  106  |  41<H>  ----------------------------<  88  5.0   |  25  |  1.61<H>    Ca    8.9      21 Oct 2017 09:04      PT/INR - ( 21 Oct 2017 08:48 )   PT: 31.3 sec;   INR: 2.71 ratio

## 2017-10-22 NOTE — PROGRESS NOTE ADULT - ASSESSMENT
IMPRESSION: 87F w/ past breast and uterine CA, HTN, AFib, and AS-TAVR, 10/16/17 a/w hypercapnic respiratory failure/AMS, as well as NANCY   No labs at time of exam  (1)Renal - NANCY on CKD 2-3 (base creatinine <1). Prerenal, +/- component of ischemic ATN. Renal fxn improving  (2)Hyperkalemia - improving  (3)Hypernatremia - improving  (4)Acid-base - primary respiratory acidosis - ?kyphoscoliosis/respiratory muscle weakness. Pulmonary on board; on intermittent noninvasive ventilation    RECOMMEND:  -abx as ordered  -NIV per Pulmonary  -maintain low K diet  -continue to defer lasix   -avoid NSAIDs and nephrotoxins as able  -dose any medication for a CrCl ~30 cc/min    Mini Saba NP  [facility]  [phone]

## 2017-10-22 NOTE — PROGRESS NOTE ADULT - SUBJECTIVE AND OBJECTIVE BOX
OSCAR LOPEZ        [id number]  10-22-17 @ 11:17  ------------------------------------------------------------------------------------  [facility]     PROGRESS NOTE    CHIEF COMPLAINT:  Fu for resp failure    INTERVAL HISTORY: no interval changes.    Resting comfortably on 3 liters but easily arousable and responsive.  Per RN has been doing well.  "I feel fine now".  Denies cp, sob, cough or HA  -----------------------------------------------------------------------------------  REVIEW OF SYSTEMS:  Constitutional: As per interval history  HEENT: Within normal limits  CV: As per interval history  Resp: As per interval history  GI: Within normal limits   : Within normal limits  Musculoskeletal: Within normal limits  Skin: Within normal limits  Neurological: Within normal limits  Psychiatric: Within normal limits  Endocrine: Within normal limits  Hematologic/Lymphatic: Within normal limits  Allergic/Immunologic: Within normal limits    [ ] Unable to assess ROS because   -------------------------------------------------------------------------------------  MEDICATIONS:     Pulmonary "  ALBUTerol/ipratropium for Nebulization 3 milliLiter(s) Nebulizer every 6 hours  buDESOnide   0.5 milliGRAM(s) Respule 0.5 milliGRAM(s) Inhalation every 12 hours    Anti-microbials:  cefTRIAXone   IVPB      cefTRIAXone   IVPB 1 Gram(s) IV Intermittent every 24 hours    Cardiovascular:  metoprolol 12.5 milliGRAM(s) Oral two times a day    Other:  ascorbic acid 500 milliGRAM(s) Oral daily  atorvastatin 10 milliGRAM(s) Oral at bedtime  clopidogrel Tablet 75 milliGRAM(s) Oral daily  ferrous    sulfate 325 milliGRAM(s) Oral daily  influenza   Vaccine 0.5 milliLiter(s) IntraMuscular once  multivitamin 1 Tablet(s) Oral daily    --------------------------------------------------------------------------------------    OBJECTIVE:    I&O's Detail    21 Oct 2017 07:01  -  22 Oct 2017 07:00  --------------------------------------------------------  IN:    Oral Fluid: 600 mL    Solution: 50 mL  Total IN: 650 mL    OUT:    Indwelling Catheter - Urethral: 500 mL  Total OUT: 500 mL    Total NET: 150 mL  Daily     Daily Weight in k.5 (21 Oct 2017 12:00)    CAPILLARY BLOOD GLUCOSE  ------------------------------------------------------------------------------------------  PHYSICAL EXAM:       ICU Vital Signs Last 24 Hrs  T(C): 36.8 (22 Oct 2017 04:07), Max: 37.2 (21 Oct 2017 21:18)  T(F): 98.3 (22 Oct 2017 04:07), Max: 98.9 (21 Oct 2017 21:18)  HR: 97 (22 Oct 2017 09:10) (73 - 108)  BP: 128/67 (22 Oct 2017 04:07) (128/67 - 140/90)  BP(mean): --  ABP: --  ABP(mean): --  RR: 18 (22 Oct 2017 04:07) (18 - 21)  SpO2: 97% (22 Oct 2017 09:10) (96% - 100%)     relatively well-appearing, no acute distress  Alert and oriented x 3  HEENT: unremarkable  Neck 1-2+ JVD, but no stridor, adenopathy or thyromegaly  Oropharynx is mallampati class 4  Cardiac regular,with john  Lungs are coarse otherwise clear  Abdomen is soft and nontender, nondistended  Extrems are warm, without clubbing, cyanosis or edema  Neurological is grossly intact, nonfocal  ________________________________________________________  LABS:                        8.9    5.67  )-----------( 179      ( 21 Oct 2017 08:48 )             29.4                         9.2    7.0   )-----------( 163      ( 20 Oct 2017 17:49 )             29.9     10-21    145  |  106  |  41<H>  ----------------------------<  88  5.0   |  25  |  1.61<H>  10-20    141  |  108  |  46<H>  ----------------------------<  542<HH>  3.9   |  24  |  1.71<H>    Ca      8.9      10-21    Ca      7.9<L>      1020      TPro  4.8<L>  /  Alb  2.6<L>  /  TBili  x   /  DBili  x   /  AST  x   /  ALT  x   /  AlkPhos  x   10    PT/INR - ( 21 Oct 2017 08:48 )   PT: 31.3 sec;   INR: 2.71 ratio      MICROBIOLOGY: no new    RADIOLOGY:  no new  __________________________________________________________________    IMPRESSION and RECOMMENDATIONS:    Clinically seems much improved.  I have ordered bipap changed to overnight during sleep and PRN as opposed to regularly during the day.  O2 to keep > 92%  Mobilize as able  Tx UTI.  ID recs.    I have discussed with the patient and RN at length.    Gene Norton MD, FCCP, FAA  Pulmonary and Sleep Medicine  [phone]

## 2017-10-23 LAB
APTT BLD: 33.7 SEC — SIGNIFICANT CHANGE UP (ref 27.5–37.4)
GAS PNL BLDA: SIGNIFICANT CHANGE UP
INR BLD: 1.45 RATIO — HIGH (ref 0.88–1.16)
PROTHROM AB SERPL-ACNC: 16.5 SEC — HIGH (ref 10–13.1)

## 2017-10-23 RX ORDER — WARFARIN SODIUM 2.5 MG/1
4 TABLET ORAL ONCE
Qty: 0 | Refills: 0 | Status: COMPLETED | OUTPATIENT
Start: 2017-10-23 | End: 2017-10-23

## 2017-10-23 RX ADMIN — Medication 500 MILLIGRAM(S): at 12:37

## 2017-10-23 RX ADMIN — Medication 3 MILLILITER(S): at 05:31

## 2017-10-23 RX ADMIN — Medication 1 TABLET(S): at 12:37

## 2017-10-23 RX ADMIN — WARFARIN SODIUM 4 MILLIGRAM(S): 2.5 TABLET ORAL at 21:34

## 2017-10-23 RX ADMIN — ATORVASTATIN CALCIUM 10 MILLIGRAM(S): 80 TABLET, FILM COATED ORAL at 21:34

## 2017-10-23 RX ADMIN — Medication 3 MILLILITER(S): at 00:10

## 2017-10-23 RX ADMIN — Medication 12.5 MILLIGRAM(S): at 17:14

## 2017-10-23 RX ADMIN — Medication 12.5 MILLIGRAM(S): at 05:31

## 2017-10-23 RX ADMIN — Medication 0.5 MILLIGRAM(S): at 05:31

## 2017-10-23 RX ADMIN — Medication 325 MILLIGRAM(S): at 12:37

## 2017-10-23 RX ADMIN — Medication 3 MILLILITER(S): at 12:37

## 2017-10-23 RX ADMIN — Medication 3 MILLILITER(S): at 17:15

## 2017-10-23 RX ADMIN — Medication 0.5 MILLIGRAM(S): at 17:15

## 2017-10-23 RX ADMIN — CLOPIDOGREL BISULFATE 75 MILLIGRAM(S): 75 TABLET, FILM COATED ORAL at 12:37

## 2017-10-23 NOTE — PROGRESS NOTE ADULT - ASSESSMENT
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    hypernatremia resolved    Klebsiella UTI s/p antibiotics    PLAN/RECOMMENDATIONS    continue NIV to ventilate to a pH ~ 7.4 with oxygen supplementation to keep saturation greater than 92% - on and off during the day and continuously for sleep to allow for po intake (start regular diet) - needs follow-up ABG which I ordered last night  albuterol/atrovent/pulmicort nebs  renal follow-up noted noted - discontinue oleary catheter - observe off fluids and diuretics  cardiac meds: lipitor/plavix/metoprolol/coumadin for INR 2.5 - 3.5 - hold ACE inhibitors - diltiazem on hold due to bradycardia earlier - IVP metoprolol as needed for tachycardia  s/p course of ceftriaxone  ECHO    Will follow with you; d/w dedicated floor NP     Tommy Reyes MD, [facility] - [phone]  Pulmonary Medicine

## 2017-10-23 NOTE — PROGRESS NOTE ADULT - SUBJECTIVE AND OBJECTIVE BOX
No pain, no shortness of breath      VITAL:  T(C): , Max: 36.9 (10-22-17 @ 20:32)  T(F): , Max: 98.5 (10-22-17 @ 20:32)  HR: 103 (10-23-17 @ 05:20)  BP: 118/65 (10-23-17 @ 04:20)  RR: 20 (10-23-17 @ 04:20)  SpO2: 96% (10-23-17 @ 04:20)      PHYSICAL EXAM:  Constitutional: NAD, frail; lying flat on NCO2  HEENT: NCAT, DMM  Neck: Supple, No JVD  Respiratory: distant BS b/l  Cardiovascular: RRR s1s2, no m/r/g  Gastrointestinal: BS+, soft, NT/ND  Extremities: 2+ b/l LE edema  Neurological: no focal deficits; strength grossly intact  Psychiatric: Normal mood, normal affect  Back: no CVAT b/l  Skin: No rashes, no nevi      LABS:                        8.4    8.58  )-----------( 172      ( 22 Oct 2017 12:18 )             27.8     Na(140)/K(4.9)/Cl(104)/HCO3(24)/BUN(41)/Cr(1.67)Glu(146)/Ca(9.1)/Mg(--)/PO4(--)    10-22 @ 12:18  Na(145)/K(5.0)/Cl(106)/HCO3(25)/BUN(41)/Cr(1.61)Glu(88)/Ca(8.9)/Mg(--)/PO4(--)    10-21 @ 09:04    SPEP - WNL      IMAGING:  Renal ultrasound - no hydronephrosis - increased echogenicity - L complex renal cyst - indicated for repeat imaging in 3-6 months      IMPRESSION: 87F w/ past breast and uterine CA, HTN, AFib, and AS-TAVR, 10/16/17 a/w hypercapnic respiratory failure/AMS, as well as NANCY     (1)Renal - NANCY on CKD 2-3 (base creatinine <1). Prerenal, +/- component of ischemic ATN. Improved relative to admission, s/p volume repletion. Not at baseline creatinine - may be best to allow creatinine to hover in the mid 1s, in effort to minimize edema.     (2)Hyperkalemia - improved    (3)Hypernatremia - improved    (4)Acid-base - primary respiratory acidosis - ?kyphoscoliosis/respiratory muscle weakness. Pulmonary on board; on intermittent noninvasive ventilation    (5) - left complex renal cyst - indicated for imaging f/u 3-6 months      RECOMMEND:  (1)Repeat renal US 3-6 months  (2)No IVF/No diuretics        Quinten Parker MD  [facility]  [phone] No pain, no shortness of breath      VITAL:  T(C): , Max: 36.9 (10-22-17 @ 20:32)  T(F): , Max: 98.5 (10-22-17 @ 20:32)  HR: 103 (10-23-17 @ 05:20)  BP: 118/65 (10-23-17 @ 04:20)  RR: 20 (10-23-17 @ 04:20)  SpO2: 96% (10-23-17 @ 04:20)      PHYSICAL EXAM:  Constitutional: NAD, frail  HEENT: NCAT, DMM  Neck: Supple, No JVD  Respiratory: distant BS b/l  Cardiovascular: RRR s1s2, no m/r/g  Gastrointestinal: BS+, soft, NT/ND  Extremities: 2+ b/l LE edema  Neurological: no focal deficits; strength grossly intact  Psychiatric: Normal mood, normal affect  Back: no CVAT b/l  Skin: No rashes, no nevi      LABS:                        8.4    8.58  )-----------( 172      ( 22 Oct 2017 12:18 )             27.8     Na(140)/K(4.9)/Cl(104)/HCO3(24)/BUN(41)/Cr(1.67)Glu(146)/Ca(9.1)/Mg(--)/PO4(--)    10-22 @ 12:18  Na(145)/K(5.0)/Cl(106)/HCO3(25)/BUN(41)/Cr(1.61)Glu(88)/Ca(8.9)/Mg(--)/PO4(--)    10-21 @ 09:04    SPEP - WNL      IMAGING:  Renal ultrasound - no hydronephrosis - increased echogenicity - L complex renal cyst - indicated for repeat imaging in 3-6 months      IMPRESSION: 87F w/ past breast and uterine CA, HTN, AFib, and AS-TAVR, 10/16/17 a/w hypercapnic respiratory failure/AMS, as well as NANCY     (1)Renal - NANCY on CKD 2-3 (base creatinine <1). Prerenal, +/- component of ischemic ATN. Improved relative to admission, s/p volume repletion. Not at baseline creatinine - may be best to allow creatinine to hover in the mid 1s, in effort to minimize edema.     (2)Hyperkalemia - improved    (3)Hypernatremia - improved    (4)Acid-base - primary respiratory acidosis - ?kyphoscoliosis/respiratory muscle weakness. Pulmonary on board; on intermittent noninvasive ventilation    (5) - left complex renal cyst - indicated for imaging f/u 3-6 months      RECOMMEND:  (1)Repeat renal US 3-6 months  (2)No IVF/No diuretics        Quinten Parker MD  [facility]  [phone] No pain, no shortness of breath      VITAL:  T(C): , Max: 36.9 (10-22-17 @ 20:32)  T(F): , Max: 98.5 (10-22-17 @ 20:32)  HR: 103 (10-23-17 @ 05:20)  BP: 118/65 (10-23-17 @ 04:20)  RR: 20 (10-23-17 @ 04:20)  SpO2: 96% (10-23-17 @ 04:20)      PHYSICAL EXAM:  Constitutional: NAD, frail  HEENT: NCAT, DMM  Neck: Supple, No JVD  Respiratory: distant BS b/l  Cardiovascular: RRR s1s2, no m/r/g  Gastrointestinal: BS+, soft, NT/ND  Extremities: 2+ b/l LE edema  Neurological: no focal deficits; strength grossly intact  Psychiatric: Normal mood, normal affect  Back: no CVAT b/l  Skin: No rashes, no nevi      LABS:                        8.4    8.58  )-----------( 172      ( 22 Oct 2017 12:18 )             27.8     Na(140)/K(4.9)/Cl(104)/HCO3(24)/BUN(41)/Cr(1.67)Glu(146)/Ca(9.1)/Mg(--)/PO4(--)    10-22 @ 12:18  Na(145)/K(5.0)/Cl(106)/HCO3(25)/BUN(41)/Cr(1.61)Glu(88)/Ca(8.9)/Mg(--)/PO4(--)    10-21 @ 09:04    SPEP - WNL      IMAGING:  Renal ultrasound - no hydronephrosis - increased echogenicity - L complex renal cyst - indicated for repeat imaging in 3-6 months      IMPRESSION: 87F w/ past breast and uterine CA, HTN, AFib, and AS-TAVR, 10/16/17 a/w hypercapnic respiratory failure/AMS, as well as NANCY     (1)Renal - NANCY on CKD 2-3 (base creatinine <1). Prerenal, +/- component of ischemic ATN. Improved relative to admission, s/p volume repletion. Not at baseline creatinine - may be best to allow creatinine to hover in the mid 1s, in effort to minimize edema.  No longer needing oleary.    (2)Hyperkalemia - improved    (3)Hypernatremia - improved    (4)Acid-base - primary respiratory acidosis - ?kyphoscoliosis/respiratory muscle weakness. Pulmonary on board; on intermittent noninvasive ventilation    (5) - left complex renal cyst - indicated for imaging f/u 3-6 months.       RECOMMEND:  (1)Repeat renal US 3-6 months  (2)No IVF/No diuretics  (3)D/C oleary      Quinten Parker MD  [facility]  [phone]

## 2017-10-23 NOTE — PROGRESS NOTE ADULT - SUBJECTIVE AND OBJECTIVE BOX
OSCAR SANDERS:[id number],   87yFemale followed for:  digoxin (Rash; Drowsiness)  No Known Allergies    PAST MEDICAL & SURGICAL HISTORY:  Severe aortic stenosis  Uterine cancer  Arthritis  HTN (hypertension)  Breast cancer diagnosed in 2000: s/p right lumpectomy and right axillary lymph node removal, radiation  AF (atrial fibrillation): on coumadin and aspirin  Dyslipidemia  H/O: hysterectomy: 4/2012  s/p surgical excision of left eye Skin cancer: Bilateral lower eye lids  S/P D&C  S/P cholecystectomy    FAMILY HISTORY:  No pertinent family history in first degree relatives    MEDICATIONS  (STANDING):  ALBUTerol/ipratropium for Nebulization 3 milliLiter(s) Nebulizer every 6 hours  ascorbic acid 500 milliGRAM(s) Oral daily  atorvastatin 10 milliGRAM(s) Oral at bedtime  buDESOnide   0.5 milliGRAM(s) Respule 0.5 milliGRAM(s) Inhalation every 12 hours  cefTRIAXone   IVPB      cefTRIAXone   IVPB 1 Gram(s) IV Intermittent every 24 hours  clopidogrel Tablet 75 milliGRAM(s) Oral daily  ferrous    sulfate 325 milliGRAM(s) Oral daily  influenza   Vaccine 0.5 milliLiter(s) IntraMuscular once  metoprolol 12.5 milliGRAM(s) Oral two times a day  multivitamin 1 Tablet(s) Oral daily    MEDICATIONS  (PRN):      Vital Signs Last 24 Hrs  T(C): 36.7 (23 Oct 2017 04:20), Max: 36.9 (22 Oct 2017 20:32)  T(F): 98 (23 Oct 2017 04:20), Max: 98.5 (22 Oct 2017 20:32)  HR: 103 (23 Oct 2017 05:20) (51 - 116)  BP: 118/65 (23 Oct 2017 04:20) (105/61 - 148/76)  BP(mean): --  RR: 20 (23 Oct 2017 04:20) (18 - 20)  SpO2: 96% (23 Oct 2017 04:20) (94% - 98%)  nc/at  s1s2  cta  soft, nt, nd no guarding or rebound  no c/c/e    CBC Full  -  ( 22 Oct 2017 12:18 )  WBC Count : 8.58 K/uL  Hemoglobin : 8.4 g/dL  Hematocrit : 27.8 %  Platelet Count - Automated : 172 K/uL  Mean Cell Volume : 94.9 fl  Mean Cell Hemoglobin : 28.7 pg  Mean Cell Hemoglobin Concentration : 30.2 gm/dL  Auto Neutrophil # : x  Auto Lymphocyte # : x  Auto Monocyte # : x  Auto Eosinophil # : x  Auto Basophil # : x  Auto Neutrophil % : x  Auto Lymphocyte % : x  Auto Monocyte % : x  Auto Eosinophil % : x  Auto Basophil % : x    10-22    140  |  104  |  41<H>  ----------------------------<  146<H>  4.9   |  24  |  1.67<H>    Ca    9.1      22 Oct 2017 12:18      PT/INR - ( 23 Oct 2017 07:29 )   PT: 16.5 sec;   INR: 1.45 ratio         PTT - ( 23 Oct 2017 07:29 )  PTT:33.7 sec

## 2017-10-23 NOTE — PROGRESS NOTE ADULT - SUBJECTIVE AND OBJECTIVE BOX
Patient is a 87y old  Female who presents with a chief complaint of "I have uterine cancer" (16 Oct 2017 12:15)      SUBJECTIVE / OVERNIGHT EVENTS: more awake, using Bipap on and off during the day and cont at hs    MEDICATIONS  (STANDING):  ALBUTerol/ipratropium for Nebulization 3 milliLiter(s) Nebulizer every 6 hours  ascorbic acid 500 milliGRAM(s) Oral daily  atorvastatin 10 milliGRAM(s) Oral at bedtime  buDESOnide   0.5 milliGRAM(s) Respule 0.5 milliGRAM(s) Inhalation every 12 hours  clopidogrel Tablet 75 milliGRAM(s) Oral daily  ferrous    sulfate 325 milliGRAM(s) Oral daily  influenza   Vaccine 0.5 milliLiter(s) IntraMuscular once  metoprolol 12.5 milliGRAM(s) Oral two times a day  multivitamin 1 Tablet(s) Oral daily  warfarin 4 milliGRAM(s) Oral once    MEDICATIONS  (PRN):      Vital Signs Last 24 Hrs  T(F): 98.3 (10-23-17 @ 20:12), Max: 98.3 (10-23-17 @ 20:12)  HR: 65 (10-23-17 @ 20:12) (56 - 103)  BP: 132/79 (10-23-17 @ 20:12) (118/65 - 132/79)  RR: 19 (10-23-17 @ 20:12) (19 - 20)  SpO2: 97% (10-23-17 @ 20:12) (95% - 97%)  Telemetry:   CAPILLARY BLOOD GLUCOSE        I&O's Summary    22 Oct 2017 07:01  -  23 Oct 2017 07:00  --------------------------------------------------------  IN: 770 mL / OUT: 400 mL / NET: 370 mL    23 Oct 2017 07:01  -  23 Oct 2017 20:52  --------------------------------------------------------  IN: 1180 mL / OUT: 200 mL / NET: 980 mL        PHYSICAL EXAM:  GENERAL: NAD, well-developed  HEAD:  Atraumatic, Normocephalic  EYES: EOMI, PERRLA, conjunctiva and sclera clear  NECK: Supple, No JVD  CHEST/LUNG: Clear to auscultation bilaterally; No wheeze  HEART: Regular rate and rhythm; No murmurs, rubs, or gallops  ABDOMEN: Soft, Nontender, Nondistended; Bowel sounds present  EXTREMITIES:  2+ Peripheral Pulses, No clubbing, cyanosis, or edema  PSYCH: AAOx3  NEUROLOGY: non-focal  SKIN: No rashes or lesions    LABS:                        8.4    8.58  )-----------( 172      ( 22 Oct 2017 12:18 )             27.8     10-22    140  |  104  |  41<H>  ----------------------------<  146<H>  4.9   |  24  |  1.67<H>    Ca    9.1      22 Oct 2017 12:18      PT/INR - ( 23 Oct 2017 07:29 )   PT: 16.5 sec;   INR: 1.45 ratio         PTT - ( 23 Oct 2017 07:29 )  PTT:33.7 sec          RADIOLOGY & ADDITIONAL TESTS:    Imaging Personally Reviewed:    Consultant(s) Notes Reviewed:      Care Discussed with Consultants/Other Providers:

## 2017-10-23 NOTE — PROGRESS NOTE ADULT - ASSESSMENT
A/P: 87 year old female with PMHx of right breast CA S/P lumpectomy in 2000, Uterine Ca S/P total hysterectomy in 2012, Skin CA S/P excision of bilateral lower eyelid skin CA, cholecystectomy, Osteoarthritis of bilateral knees, HTN, hyperlipidemia, paroxysmal Afib on coumadin, severe aortic stenosis now s/p TAVR, who presents with generalized weakness for 2 weeks.  Patient reports that over the last two weeks she had noticed increased weakness, episodes of shortness of breath at rest.  No chest pain. No fevers, no chills, no nausea or vomiting.  Pt with episodes of lethargy. Requires BiPAP for respiratory acidosis.  Noted to have pyuria and sensitive  K.pneumo in UC.  Tx with Ceftriaxone for UTI.  Pt is afebrile. No leukocytosis.  Gautam d/c'd.  Complete Ceftriaxone today.

## 2017-10-23 NOTE — PROGRESS NOTE ADULT - SUBJECTIVE AND OBJECTIVE BOX
OSCAR LOPEZ    Patient is a 87y old  Female who presents with a chief complaint of "I have uterine cancer" (16 Oct 2017 12:15)    Continues to look and feel better.  Breathing better.  AF with suboptimal rate control average 100s.  No chest pain or palpitations.  Off BIPAP    Allergies    No Known Allergies    Intolerances    digoxin (Rash; Drowsiness)    MEDICATIONS  (STANDING):  ALBUTerol/ipratropium for Nebulization 3 milliLiter(s) Nebulizer every 6 hours  ascorbic acid 500 milliGRAM(s) Oral daily  atorvastatin 10 milliGRAM(s) Oral at bedtime  buDESOnide   0.5 milliGRAM(s) Respule 0.5 milliGRAM(s) Inhalation every 12 hours  clopidogrel Tablet 75 milliGRAM(s) Oral daily  ferrous    sulfate 325 milliGRAM(s) Oral daily  influenza   Vaccine 0.5 milliLiter(s) IntraMuscular once  metoprolol 12.5 milliGRAM(s) Oral two times a day  multivitamin 1 Tablet(s) Oral daily    PHYSICAL EXAM:  Vital Signs Last 24 Hrs  T(C): 36.7 (23 Oct 2017 04:20), Max: 36.9 (22 Oct 2017 20:32)  T(F): 98 (23 Oct 2017 04:20), Max: 98.5 (22 Oct 2017 20:32)  HR: 103 (23 Oct 2017 05:20) (51 - 116)  BP: 118/65 (23 Oct 2017 04:20) (105/61 - 148/76)  BP(mean): --  RR: 20 (23 Oct 2017 04:20) (18 - 20)  SpO2: 96% (23 Oct 2017 04:20) (94% - 98%)  Daily     Daily Weight in k (23 Oct 2017 05:20)  I&O's Summary    22 Oct 2017 07:  -  23 Oct 2017 07:00  --------------------------------------------------------  IN: 770 mL / OUT: 400 mL / NET: 370 mL    23 Oct 2017 07:  -  23 Oct 2017 11:55  --------------------------------------------------------  IN: 240 mL / OUT: 200 mL / NET: 40 mL        General Appearance: 	 Alert, cooperative, no distress  Neck: JVP about 10 cm  Lungs:  clear to auscultation and percussion bilaterally  Cor:  pmi 5th ICS MCL, regular rate and rhythm, S1 normal intensity, S2 normal intensity, no gallops, GHAZALA  Abdomen:	 soft, non-tender; bowel sounds normal; no masses,  no organomegaly  Extremities: 1+ edema    Telemetry: AF 80-100s    Labs:  CBC Full  -  ( 22 Oct 2017 12:18 )  WBC Count : 8.58 K/uL  Hemoglobin : 8.4 g/dL  Hematocrit : 27.8 %  Platelet Count - Automated : 172 K/uL  Mean Cell Volume : 94.9 fl  Mean Cell Hemoglobin : 28.7 pg  Mean Cell Hemoglobin Concentration : 30.2 gm/dL  Auto Neutrophil # : x  Auto Lymphocyte # : x  Auto Monocyte # : x  Auto Eosinophil # : x  Auto Basophil # : x  Auto Neutrophil % : x  Auto Lymphocyte % : x  Auto Monocyte % : x  Auto Eosinophil % : x  Auto Basophil % : x        PT/INR - ( 23 Oct 2017 07:29 )   PT: 16.5 sec;   INR: 1.45 ratio         Impression/Plan: Acute renal failure, improving  Acute respiratory acidosis, improving  Chronic diastolic CHF compensated  Chronic AF, suboptimal rate control    Plan:  Agree with restarting cardizem  Dose coumadin for INR  Will likely need rehab stay  Hold diuretics, Follow Cr and Hb    Leopoldo Funes MD, FACC  [facility]

## 2017-10-23 NOTE — PROGRESS NOTE ADULT - SUBJECTIVE AND OBJECTIVE BOX
Allergies    No Known Allergies    Intolerances    digoxin (Rash; Drowsiness)      MEDICATIONS  (STANDING):  ALBUTerol/ipratropium for Nebulization 3 milliLiter(s) Nebulizer every 6 hours  ascorbic acid 500 milliGRAM(s) Oral daily  atorvastatin 10 milliGRAM(s) Oral at bedtime  buDESOnide   0.5 milliGRAM(s) Respule 0.5 milliGRAM(s) Inhalation every 12 hours  clopidogrel Tablet 75 milliGRAM(s) Oral daily  ferrous    sulfate 325 milliGRAM(s) Oral daily  influenza   Vaccine 0.5 milliLiter(s) IntraMuscular once  metoprolol 12.5 milliGRAM(s) Oral two times a day  multivitamin 1 Tablet(s) Oral daily    MEDICATIONS  (PRN):          Antimicrobials:        LABS:  CBC Full  -  ( 22 Oct 2017 12:18 )  WBC Count : 8.58 K/uL  Hemoglobin : 8.4 g/dL  Hematocrit : 27.8 %  Platelet Count - Automated : 172 K/uL  Mean Cell Volume : 94.9 fl  Mean Cell Hemoglobin : 28.7 pg  Mean Cell Hemoglobin Concentration : 30.2 gm/dL  Auto Neutrophil # : x  Auto Lymphocyte # : x  Auto Monocyte # : x  Auto Eosinophil # : x  Auto Basophil # : x  Auto Neutrophil % : x  Auto Lymphocyte % : x  Auto Monocyte % : x  Auto Eosinophil % : x  Auto Basophil % : x    10-22    140  |  104  |  41<H>  ----------------------------<  146<H>  4.9   |  24  |  1.67<H>    Ca    9.1      22 Oct 2017 12:18      PT/INR - ( 23 Oct 2017 07:29 )   PT: 16.5 sec;   INR: 1.45 ratio         PTT - ( 23 Oct 2017 07:29 )  PTT:33.7 sec      Blood Gas Venous - Lactate: 1.4 mmoL/L (10-16 @ 00:15)            Cultures:  RECENT CULTURES:  10-18 @ 00:32 .Urine Catheterized   ARNOLD      Klebsiella pneumoniae  Klebsiella pneumoniae     >100,000 CFU/ml Klebsiella pneumoniae    10-17 @ 15:38 .Blood Blood-Peripheral                No growth at 5 days.              Imaging Studies:    Vital Signs:    Vital Signs Last 24 Hrs  T(C): 36.8 (23 Oct 2017 14:38), Max: 36.9 (22 Oct 2017 20:32)  T(F): 98.2 (23 Oct 2017 14:38), Max: 98.5 (22 Oct 2017 20:32)  HR: 57 (23 Oct 2017 14:38) (51 - 110)  BP: 123/62 (23 Oct 2017 14:38) (105/61 - 148/76)  BP(mean): --  RR: 20 (23 Oct 2017 14:38) (18 - 20)  SpO2: 95% (23 Oct 2017 14:38) (95% - 98%) Afebrile. No leukocytosis   No new somatic complaints at present time       Allergies    No Known Allergies    Intolerances    digoxin (Rash; Drowsiness)      MEDICATIONS  (STANDING):  ALBUTerol/ipratropium for Nebulization 3 milliLiter(s) Nebulizer every 6 hours  ascorbic acid 500 milliGRAM(s) Oral daily  atorvastatin 10 milliGRAM(s) Oral at bedtime  buDESOnide   0.5 milliGRAM(s) Respule 0.5 milliGRAM(s) Inhalation every 12 hours  clopidogrel Tablet 75 milliGRAM(s) Oral daily  ferrous    sulfate 325 milliGRAM(s) Oral daily  influenza   Vaccine 0.5 milliLiter(s) IntraMuscular once  metoprolol 12.5 milliGRAM(s) Oral two times a day  multivitamin 1 Tablet(s) Oral daily    MEDICATIONS  (PRN):      Antimicrobials: Ceftriaxone        LABS:  CBC Full  -  ( 22 Oct 2017 12:18 )  WBC Count : 8.58 K/uL  Hemoglobin : 8.4 g/dL  Hematocrit : 27.8 %  Platelet Count - Automated : 172 K/uL  Mean Cell Volume : 94.9 fl  Mean Cell Hemoglobin : 28.7 pg  Mean Cell Hemoglobin Concentration : 30.2 gm/dL  Auto Neutrophil # : x  Auto Lymphocyte # : x  Auto Monocyte # : x  Auto Eosinophil # : x  Auto Basophil # : x  Auto Neutrophil % : x  Auto Lymphocyte % : x  Auto Monocyte % : x  Auto Eosinophil % : x  Auto Basophil % : x    10-22    140  |  104  |  41<H>  ----------------------------<  146<H>  4.9   |  24  |  1.67<H>    Ca    9.1      22 Oct 2017 12:18      PT/INR - ( 23 Oct 2017 07:29 )   PT: 16.5 sec;   INR: 1.45 ratio         PTT - ( 23 Oct 2017 07:29 )  PTT:33.7 sec      Blood Gas Venous - Lactate: 1.4 mmoL/L (10-16 @ 00:15)            Cultures:  RECENT CULTURES:  10-18 @ 00:32 .Urine Catheterized    >100,000 CFU/ml Klebsiella pneumoniae    10-17 @ 15:38 .Blood Blood-Peripheral   No growth at 5 days.        Vital Signs:    Vital Signs Last 24 Hrs  T(C): 36.8 (23 Oct 2017 14:38), Max: 36.9 (22 Oct 2017 20:32)  T(F): 98.2 (23 Oct 2017 14:38), Max: 98.5 (22 Oct 2017 20:32)  HR: 57 (23 Oct 2017 14:38) (51 - 110)  BP: 123/62 (23 Oct 2017 14:38) (105/61 - 148/76)  BP(mean): --  RR: 20 (23 Oct 2017 14:38) (18 - 20)  SpO2: 95% (23 Oct 2017 14:38) (95% - 98%)

## 2017-10-23 NOTE — PROGRESS NOTE ADULT - SUBJECTIVE AND OBJECTIVE BOX
[facility] - [facility]     PROGRESS NOTE    CHIEF COMPLAINT: ARF; COPD/emphysema; chronic CHF; CKD/NANCY    INTERVAL HISTORY: awake and alert on and off BIPAP sitting in the chair eating lunch; no recent ABG; no cough, sputum production, chest congestion or wheeze; no fevers, chills or sweats; no chest pain/pressure or palpitations; s/p antibiotics for UTI; renal function improved with resolved hypernatremia; glucose well controlled; bouts of AF with RVR up to 150 bpm    REVIEW OF SYSTEMS:  Constitutional: As per interval history  HEENT: Within normal limits  CV: As per interval history  Resp: As per interval history  GI: Within normal limits   : Within normal limits  Musculoskeletal: Within normal limits  Skin: Within normal limits  Neurological: Within normal limits  Psychiatric: Within normal limits  Endocrine: Within normal limits  Hematologic/Lymphatic: Within normal limits  Allergic/Immunologic: Within normal limits      MEDICATIONS:     Pulmonary "  ALBUTerol/ipratropium for Nebulization 3 milliLiter(s) Nebulizer every 6 hours  buDESOnide   0.5 milliGRAM(s) Respule 0.5 milliGRAM(s) Inhalation every 12 hours      Anti-microbials:      Cardiovascular:  metoprolol 12.5 milliGRAM(s) Oral two times a day      Other:  ascorbic acid 500 milliGRAM(s) Oral daily  atorvastatin 10 milliGRAM(s) Oral at bedtime  clopidogrel Tablet 75 milliGRAM(s) Oral daily  ferrous    sulfate 325 milliGRAM(s) Oral daily  influenza   Vaccine 0.5 milliLiter(s) IntraMuscular once  multivitamin 1 Tablet(s) Oral daily        OBJECTIVE:    I&O's Detail    22 Oct 2017 07:  -  23 Oct 2017 07:00  --------------------------------------------------------  IN:    Oral Fluid: 720 mL    Solution: 50 mL  Total IN: 770 mL    OUT:    Indwelling Catheter - Urethral: 400 mL  Total OUT: 400 mL    Total NET: 370 mL      23 Oct 2017 07:  -  23 Oct 2017 13:02  --------------------------------------------------------  IN:    Oral Fluid: 240 mL  Total IN: 240 mL    OUT:    Indwelling Catheter - Urethral: 200 mL  Total OUT: 200 mL    Total NET: 40 mL    Daily Weight in k (23 Oct 2017 05:20)    PHYSICAL EXAM:       ICU Vital Signs Last 24 Hrs  T(C): 36.7 (23 Oct 2017 04:20), Max: 36.9 (22 Oct 2017 20:32)  T(F): 98 (23 Oct 2017 04:20), Max: 98.5 (22 Oct 2017 20:32)  HR: 103 (23 Oct 2017 05:20) (51 - 116)  BP: 118/65 (23 Oct 2017 04:20) (105/61 - 148/76)  BP(mean): --  ABP: --  ABP(mean): --  RR: 20 (23 Oct 2017 04:20) (18 - 20)  SpO2: 96% (23 Oct 2017 04:20) (94% - 98%) on 2lpm     General: Awake and alert. Cooperative. No distress. Appears stated age 	  HEENT:   Atraumatic. Normocephalic. Anicteric. Normal oral mucosa, PERRL, EOMI. BIPAP mask. Left eye subconjunctival hemorrhage resolving  Neck: Supple. Trachea midline. Thyroid without enlargement/tenderness/nodules. No carotid bruit. No JVD	  Cardiovascular: Irregularly irregular rate and rhythm. S1 S2 normal. II/VI systolic murmur  Respiratory: Respirations unlabored. Decreased breath sounds at bases with dullness ~ 1/3 up. Kyphoscoliosis  Abdomen: Soft. Non-tender. Non-distended. No organomegaly. No masses. Normal bowel sounds	  Extremities: Warm to touch. No clubbing or cyanosis. No pedal edema.  Pulses: 2+ peripheral pulses all extremities	  Skin: Normal skin color. No rashes or lesions. No ecchymoses, No cyanosis. Warm to touch  Lymph Nodes: Cervical, supraclavicular and axillary nodes normal  Neurological: Awake and alert. Moving all extremities. A and O x 3  Psychiatry: Calm      LABS:                        8.4    8.58  )-----------( 172      ( 22 Oct 2017 12:18 )             27.8     10-22    140  |  104  |  41<H>  ----------------------------<  146<H>  4.9   |  24  |  1.67<H>    10-    145  |  106  |  41<H>  ----------------------------<  88  5.0   |  25  |  1.61<H>    Ca      9.1      10-    Ca      8.9      10-      TPro  4.8<L>  /  Alb  2.6<L>  /  TBili  x   /  DBili  x   /  AST  x   /  ALT  x   /  AlkPhos  x   10-    PT/INR - ( 23 Oct 2017 07:29 )   PT: 16.5 sec;   INR: 1.45 ratio       PTT - ( 23 Oct 2017 07:29 )  PTT:33.7 sec    ABG - ( 20 Oct 2017 06:32 )  pH: 7.28  /  pCO2: 59    /  pO2: 198   / HCO3: 27    / Base Excess: .4    /  SaO2: 100       ABG - ( 19 Oct 2017 07:20 )  pH: 7.25  /  pCO2: 60    /  pO2: 151   / HCO3: 26    / Base Excess: -1.4  /  SaO2: 99        ABG - ( 18 Oct 2017 14:35 )  pH: 7.30  /  pCO2: 58    /  pO2: 143   / HCO3: 28    / Base Excess: 1.3   /  SaO2: 100       CARDIAC MARKERS ( 17 Oct 2017 09:06 )  x     / x     / 38 U/L / x     / x      CARDIAC MARKERS ( 17 Oct 2017 09:05 )  x     / 0.07 ng/mL / x     / x     / 4.2 ng/mL  CARDIAC MARKERS ( 16 Oct 2017 18:28 )  x     / 0.07 ng/mL / 39 U/L / x     / 4.6 ng/mL  CARDIAC MARKERS ( 16 Oct 2017 09:17 )  x     / 0.04 ng/mL / 26 U/L / x     / 2.9 ng/mL  CARDIAC MARKERS ( 16 Oct 2017 00:15 )  x     / 0.05 ng/mL / 29 U/L / x     / 3.2 ng/mL    < from: Transthoracic Echocardiogram w/Doppler (12 @ 13:31) >    Patient name: OSCAR LOPEZ  YOB: 1929   Age: 82 (F)   MR#: [id number]  Study Date: 3/14/2012  Location: [place] [id number]: Jada Lagunas RDCS  Study quality: Technically fair  Referring Physician: Pro Tracy MD  Blood Pressure: 124/51 mmHg  Height: 5ft 3in  Weight: 164 lb  BSA: 1.8 m2  ------------------------------------------------------------------------  PROCEDURE: Transthoracic echocardiogram with 2-D, M-Mode  and complete spectral and color flow Doppler.  INDICATION: Aortic Stenosis (424.1), Atrial Fibrillation  (427.31)  ------------------------------------------------------------------------  Dimensions:    Normal Values:  LA:     3.1    2.0 - 4.0 cm  Ao:     3.1    2.0 - 3.8 cm  SEPTUM: 1.2    0.6 - 1.2cm  PWT:    1.3    0.6 - 1.1 cm  LVIDd:  4.3    3.0 - 5.6 cm  LVIDs:  2.6    1.8 - 4.0 cm  Derived variables:  LVMI: 110 g/m2  RWT: 0.60  Fractional short: 40 %  Ejection Fraction: >70 %  Doppler Peak Velocity (m/sec): AoV=4.0  ------------------------------------------------------------------------  Observations:  Mitral Valve: Mitral annular calcification and calcified  mitral leaflets with normal diastolic opening. Minimal  mitral regurgitation.  Aortic Valve/Aorta: Heavily calcified trileaflet aortic  valve with decreased opening. Peak transaortic valve  gradient equals 62 mm Hg, mean transaortic valve gradient  equals 37 mm Hg, estimated aortic valve area equals 0.9  sqcm (by continuity equation), consistent with severe  aortic stenosis .Mild-moderate aortic regurgitation.  Pressure halft - time is 361 ms.  Normal aortic root.  Left Atrium: Severely dilated left atrium.  LA volume index  = 51 cc/m2.  Left Ventricle: Hyperdynamic left ventricle. Mild to  moderate concentric left ventricular hypertrophy. Atrial  fibrillation precludes a comprehensive diastolic  assessment. However, findings are suggestive of moderate  diastolic dysfunction.  Right Heart: Severe right atrial enlargement. RA volume  index = 53 cc/m2.  Normal right ventricular size and  function. Normal tricuspid valve. Mild-moderate tricuspid  regurgitation. Normal pulmonic valve. Minimal pulmonic  regurgitation.  Pericardium/Pleura: Normal pericardium with no pericardial  effusion.  Hemodynamic: Estimated right atrial pressure is 5 mm Hg.  Estimated right ventricular systolic pressure equals 46 mm  Hg, assuming right atrial pressure equals 5 mm Hg,  consistent with mild pulmonary hypertension.  ------------------------------------------------------------------------  Conclusions:  1. Mitral annular calcification and calcified mitral  leaflets with normal diastolic opening. Minimal mitral  regurgitation.  2. Heavily calcified trileaflet aortic valve with decreased  opening. Peak transaortic valve gradientequals 62 mm Hg,  mean transaortic valve gradient equals 37 mm Hg, estimated  aortic valve area equals 0.9 sqcm (by continuity equation),  consistent with severe aortic stenosis. Mild-moderate  aortic regurgitation. Pressure halft - time is 361 ms.  3. Severely dilated left atrium.  LA volume index = 51  cc/m2.  4. Mild to moderate concentric left ventricular  hypertrophy.  5. Hyperdynamic left ventricle.  6. Atrial fibrillation precludes a comprehensive diastolic  assessment. However, findings are suggestive of moderate  diastolic dysfunction.  7. Severe right atrial enlargement. RA volume index = 53  cc/m2.  8. Normal right ventricular size and function.  9. Estimated right ventricular systolic pressure equals 46  mm Hg, assuming right atrial pressure equals 5 mm Hg,  consistent with mild pulmonary hypertension.  10. Normal tricuspid valve. Mild-moderate tricuspid  regurgitation.  *** No previous Echo exam.  ------------------------------------------------------------------------  MICROBIOLOGY:         Urinalysis Basic - ( 16 Oct 2017 12:01 )    Color: Yellow / Appearance: SL Turbid / S.012 / pH: x  Gluc: x / Ketone: Negative  / Bili: Negative / Urobili: Negative   Blood: x / Protein: 30 mg/dL / Nitrite: Negative   Leuk Esterase: Large / RBC: 0-2 /HPF / WBC 26-50 /HPF   Sq Epi: x / Non Sq Epi: OCC /HPF / Bacteria: Few /HPF    Culture - Urine (10.18.17 @ 00:32)    Specimen Source: .Urine Catheterized    Culture Results:   >100,000 CFU/ml Klebsiella pneumoniae    Culture - Blood (10.17.17 @ 15:38)    Specimen Source: .Blood Blood-Peripheral    Culture Results:   No growth to date.    RADIOLOGY:  [x] Chest radiographs reviewed and interpreted by me    < from: US Renal (10.20.17 @ 14:28) >    EXAM:  US KIDNEY(S)                            PROCEDURE DATE:  10/20/2017        INTERPRETATION:  CLINICAL INFORMATION: NANCY    COMPARISON: CT abdomen 2012    TECHNIQUE: Sonography of the kidneys and bladder.     FINDINGS:    Right kidney: 9.2 cm. echogenic cortex. No renal mass, hydronephrosis or   calculi. Multiple simple cysts the largest is at the upper pole and   measures 2.7 x 2.9 x 2.3 cm.    Left kidney:  10.1 cm. the genic cortex. No renal mass, hydronephrosis or   calculi. Multiple cysts. The largest at the upper pole measures 2.0 x 2.2   x 2.4 cm. This has some internal complexity not well evaluated. Recommend   follow-up in3- 6 months to assess for change.    Urinary bladder: Collapsed around a Gautam catheter limiting evaluation.    IMPRESSION:     Increased renal cortical echogenicity bilaterally, which can be seen in   medical renal disease.    Left slightly complex renal cyst not well evaluated on this exam.   Consider follow-up exam in 3-6 months to assess for change.    MADHU FAULKNER M.D., ATTENDING RADIOLOGIST  This document has been electronically signed. Oct 20 2017  2:01PM     < end of copied text >  ---------------------------------------------------------------------------------------------------------  < from: CT Chest No Cont (10.17.17 @ 21:38) >    EXAM:  CT CHEST                          PROCEDURE DATE:  10/17/2017      INTERPRETATION:  Clinical information: Evaluate for pneumonia. Exam is   compared to previous study of 4/3/2012.    CT scan of the chest was obtained without administration of intravenous   contrast.    Surgical clips are noted in the right axilla.    No hilar and/or mediastinal adenopathy is noted.     Heart is markedly enlarged in size. Calcification the coronary arteries   is noted. Patient is status post TAVR.No pericardial effusion is noted.   Main pulmonary artery is dilated and measures 3.6 cm.     No endobronchial lesions are noted. Compressive atelectasis is noted   involving portions of both lower lobes. This is secondary to small   bilateral pleural effusions, right more than left.    Below the diaphragm, visualized portions of the abdomen demonstrate   low-attenuation within both kidneys which are too small to be adequately   characterized on this exam.     Degenerative changes of the spine are noted. Subcutaneous edema is noted.    Impression: There is no pneumonia.    Bilateral pleural effusions, right more than left.    MAYI MORALES M.D., ATTENDING RADIOLOGIST  This document has been electronically signed. Oct 18 2017  9:24AM      < end of copied text >  ---------------------------------------------------------------------------------------------------------  < from: CT Head No Cont (10.17.17 @ 21:38) >    EXAM:  CT BRAIN                            PROCEDURE DATE:  10/17/2017        INTERPRETATION:  HISTORY: Uterine cancer. Altered mental status.    COMPARISON: MRI brain performed 2012.    TECHNIQUE: Axial noncontrast CT images from the skull base to the vertex   were obtained and submitted for interpretation. Coronal and sagittal   reformatted images were performed. Bone and soft tissue windows were   evaluated.    FINDINGS:     There is no acute intracranial mass-effect, hemorrhage, midline shift, or   abnormal extra-axial fluid collection.     Ventricles, sulci, and cisterns are normal in size for the patient's age   without hydrocephalus. The basal cisterns are patent.     Patchy and confluent regions of periventricular and deep cerebral white   matter hypoattenuation likely reflects chronic microangiopathic ischemic   changes. Atheromatous calcifications along the carotid siphons are   present.    Minimal right maxillary sinus mucosal thickening. No evidence for acute   paranasal sinus or mastoid inflammatory changes.. The calvarium is   intact.     IMPRESSION:     No acute intracranial bleeding, mass effect, or evidence of an acute   territorial infarct.    ADRIEN MORRIS M.D., RADIOLOGY RESIDENT  This document has been electronically signed.  VINI METZGER M.D., ATTENDING RADIOLOGIST  This document has been electronically signed. Oct 18 2017  9:43AM      < end of copied text >  ---------------------------------------------------------------------------------------------------------  < from: Xray Chest 2 Views PA/Lat (10.16.17 @ 00:27) >    EXAM:  CHEST PA & LAT                            PROCEDURE DATE:  10/16/2017        INTERPRETATION:  CLINICAL INDICATION: Mild hypoxia for 6 hours.    TECHNIQUE: Frontal and Lateral views of the chest    COMPARISON: Chest x-ray 3/23/2015.    FINDINGS:     Status post TAVR. Surgical clips are noted in the right axilla and right   upper lobe.    The heart is enlarged, unchanged.    There is no focal consolidation.    Small bilateral pleural effusions. No pneumothorax.    IMPRESSION:   Small bilateral pleural effusions. No focal consolidation.       RYLEE EWING M.D., RADIOLOGY RESIDENT  This document has been electronically signed.  TEN PRICE M.D., ATTENDING RADIOLOGIST  This document has been electronically signed. Oct 16 2017  9:11AM      < end of copied text >  --------------------------------------------------------------------------------------------------------- [facility] - [facility]     PROGRESS NOTE    CHIEF COMPLAINT: ARF; COPD/emphysema; chronic CHF; CKD/NANCY    INTERVAL HISTORY: awake and alert on and off BIPAP sitting in the chair eating lunch; no recent ABG; no cough, sputum production, chest congestion or wheeze; no fevers, chills or sweats; no chest pain/pressure or palpitations; s/p antibiotics for UTI; renal function improved with resolved hypernatremia; glucose well controlled; bouts of AF with RVR up to 150 bpm    REVIEW OF SYSTEMS:  Constitutional: As per interval history  HEENT: Within normal limits  CV: As per interval history  Resp: As per interval history  GI: Within normal limits   : Within normal limits  Musculoskeletal: Within normal limits  Skin: Within normal limits  Neurological: Within normal limits  Psychiatric: Within normal limits  Endocrine: Within normal limits  Hematologic/Lymphatic: Within normal limits  Allergic/Immunologic: Within normal limits      MEDICATIONS:     Pulmonary "  ALBUTerol/ipratropium for Nebulization 3 milliLiter(s) Nebulizer every 6 hours  buDESOnide   0.5 milliGRAM(s) Respule 0.5 milliGRAM(s) Inhalation every 12 hours      Anti-microbials:      Cardiovascular:  metoprolol 12.5 milliGRAM(s) Oral two times a day      Other:  ascorbic acid 500 milliGRAM(s) Oral daily  atorvastatin 10 milliGRAM(s) Oral at bedtime  clopidogrel Tablet 75 milliGRAM(s) Oral daily  ferrous    sulfate 325 milliGRAM(s) Oral daily  influenza   Vaccine 0.5 milliLiter(s) IntraMuscular once  multivitamin 1 Tablet(s) Oral daily        OBJECTIVE:    I&O's Detail    22 Oct 2017 07:  -  23 Oct 2017 07:00  --------------------------------------------------------  IN:    Oral Fluid: 720 mL    Solution: 50 mL  Total IN: 770 mL    OUT:    Indwelling Catheter - Urethral: 400 mL  Total OUT: 400 mL    Total NET: 370 mL      23 Oct 2017 07:  -  23 Oct 2017 13:02  --------------------------------------------------------  IN:    Oral Fluid: 240 mL  Total IN: 240 mL    OUT:    Indwelling Catheter - Urethral: 200 mL  Total OUT: 200 mL    Total NET: 40 mL    Daily Weight in k (23 Oct 2017 05:20)    PHYSICAL EXAM:       ICU Vital Signs Last 24 Hrs  T(C): 36.7 (23 Oct 2017 04:20), Max: 36.9 (22 Oct 2017 20:32)  T(F): 98 (23 Oct 2017 04:20), Max: 98.5 (22 Oct 2017 20:32)  HR: 103 (23 Oct 2017 05:20) (51 - 116)  BP: 118/65 (23 Oct 2017 04:20) (105/61 - 148/76)  BP(mean): --  ABP: --  ABP(mean): --  RR: 20 (23 Oct 2017 04:20) (18 - 20)  SpO2: 96% (23 Oct 2017 04:20) (94% - 98%) on 2lpm     General: Awake and alert. Cooperative. No distress. Appears stated age 	  HEENT:   Atraumatic. Normocephalic. Anicteric. Normal oral mucosa, PERRL, EOMI. BIPAP mask. Left eye subconjunctival hemorrhage resolving  Neck: Supple. Trachea midline. Thyroid without enlargement/tenderness/nodules. No carotid bruit. No JVD	  Cardiovascular: Irregularly irregular rate and rhythm. S1 S2 normal. II/VI systolic murmur  Respiratory: Respirations unlabored. Decreased breath sounds at bases with dullness ~ 1/3 up. Kyphoscoliosis  Abdomen: Soft. Non-tender. Non-distended. No organomegaly. No masses. Normal bowel sounds	  Extremities: Warm to touch. No clubbing or cyanosis. Moderate lower extremity edema  Pulses: 2+ peripheral pulses all extremities	  Skin: Normal skin color. No rashes or lesions. No ecchymoses, No cyanosis. Warm to touch  Lymph Nodes: Cervical, supraclavicular and axillary nodes normal  Neurological: Awake and alert. Moving all extremities. A and O x 3  Psychiatry: Calm      LABS:                        8.4    8.58  )-----------( 172      ( 22 Oct 2017 12:18 )             27.8     10-22    140  |  104  |  41<H>  ----------------------------<  146<H>  4.9   |  24  |  1.67<H>    10-    145  |  106  |  41<H>  ----------------------------<  88  5.0   |  25  |  1.61<H>    Ca      9.1      10-    Ca      8.9      10-      TPro  4.8<L>  /  Alb  2.6<L>  /  TBili  x   /  DBili  x   /  AST  x   /  ALT  x   /  AlkPhos  x   10-    PT/INR - ( 23 Oct 2017 07:29 )   PT: 16.5 sec;   INR: 1.45 ratio       PTT - ( 23 Oct 2017 07:29 )  PTT:33.7 sec    ABG - ( 20 Oct 2017 06:32 )  pH: 7.28  /  pCO2: 59    /  pO2: 198   / HCO3: 27    / Base Excess: .4    /  SaO2: 100       ABG - ( 19 Oct 2017 07:20 )  pH: 7.25  /  pCO2: 60    /  pO2: 151   / HCO3: 26    / Base Excess: -1.4  /  SaO2: 99        ABG - ( 18 Oct 2017 14:35 )  pH: 7.30  /  pCO2: 58    /  pO2: 143   / HCO3: 28    / Base Excess: 1.3   /  SaO2: 100       CARDIAC MARKERS ( 17 Oct 2017 09:06 )  x     / x     / 38 U/L / x     / x      CARDIAC MARKERS ( 17 Oct 2017 09:05 )  x     / 0.07 ng/mL / x     / x     / 4.2 ng/mL  CARDIAC MARKERS ( 16 Oct 2017 18:28 )  x     / 0.07 ng/mL / 39 U/L / x     / 4.6 ng/mL  CARDIAC MARKERS ( 16 Oct 2017 09:17 )  x     / 0.04 ng/mL / 26 U/L / x     / 2.9 ng/mL  CARDIAC MARKERS ( 16 Oct 2017 00:15 )  x     / 0.05 ng/mL / 29 U/L / x     / 3.2 ng/mL    < from: Transthoracic Echocardiogram w/Doppler (12 @ 13:31) >    Patient name: OSCAR LOPEZ  YOB: 1929   Age: 82 (F)   MR#: [id number]  Study Date: 3/14/2012  [id number] Jada Lagunas RDCS  Study quality: Technically fair  Referring Physician: Pro Tracy MD  Blood Pressure: 124/51 mmHg  Height: 5ft 3in  Weight: 164 lb  BSA: 1.8 m2  ------------------------------------------------------------------------  PROCEDURE: Transthoracic echocardiogram with 2-D, M-Mode  and complete spectral and color flow Doppler.  INDICATION: Aortic Stenosis (424.1), Atrial Fibrillation  (427.31)  ------------------------------------------------------------------------  Dimensions:    Normal Values:  LA:     3.1    2.0 - 4.0 cm  Ao:     3.1    2.0 - 3.8 cm  SEPTUM: 1.2    0.6 - 1.2cm  PWT:    1.3    0.6 - 1.1 cm  LVIDd:  4.3    3.0 - 5.6 cm  LVIDs:  2.6    1.8 - 4.0 cm  Derived variables:  LVMI: 110 g/m2  RWT: 0.60  Fractional short: 40 %  Ejection Fraction: >70 %  Doppler Peak Velocity (m/sec): AoV=4.0  ------------------------------------------------------------------------  Observations:  Mitral Valve: Mitral annular calcification and calcified  mitral leaflets with normal diastolic opening. Minimal  mitral regurgitation.  Aortic Valve/Aorta: Heavily calcified trileaflet aortic  valve with decreased opening. Peak transaortic valve  gradient equals 62 mm Hg, mean transaortic valve gradient  equals 37 mm Hg, estimated aortic valve area equals 0.9  sqcm (by continuity equation), consistent with severe  aortic stenosis .Mild-moderate aortic regurgitation.  Pressure halft - time is 361 ms.  Normal aortic root.  Left Atrium: Severely dilated left atrium.  LA volume index  = 51 cc/m2.  Left Ventricle: Hyperdynamic left ventricle. Mild to  moderate concentric left ventricular hypertrophy. Atrial  fibrillation precludes a comprehensive diastolic  assessment. However, findings are suggestive of moderate  diastolic dysfunction.  Right Heart: Severe right atrial enlargement. RA volume  index = 53 cc/m2.  Normal right ventricular size and  function. Normal tricuspid valve. Mild-moderate tricuspid  regurgitation. Normal pulmonic valve. Minimal pulmonic  regurgitation.  Pericardium/Pleura: Normal pericardium with no pericardial  effusion.  Hemodynamic: Estimated right atrial pressure is 5 mm Hg.  Estimated right ventricular systolic pressure equals 46 mm  Hg, assuming right atrial pressure equals 5 mm Hg,  consistent with mild pulmonary hypertension.  ------------------------------------------------------------------------  Conclusions:  1. Mitral annular calcification and calcified mitral  leaflets with normal diastolic opening. Minimal mitral  regurgitation.  2. Heavily calcified trileaflet aortic valve with decreased  opening. Peak transaortic valve gradientequals 62 mm Hg,  mean transaortic valve gradient equals 37 mm Hg, estimated  aortic valve area equals 0.9 sqcm (by continuity equation),  consistent with severe aortic stenosis. Mild-moderate  aortic regurgitation. Pressure halft - time is 361 ms.  3. Severely dilated left atrium.  LA volume index = 51  cc/m2.  4. Mild to moderate concentric left ventricular  hypertrophy.  5. Hyperdynamic left ventricle.  6. Atrial fibrillation precludes a comprehensive diastolic  assessment. However, findings are suggestive of moderate  diastolic dysfunction.  7. Severe right atrial enlargement. RA volume index = 53  cc/m2.  8. Normal right ventricular size and function.  9. Estimated right ventricular systolic pressure equals 46  mm Hg, assuming right atrial pressure equals 5 mm Hg,  consistent with mild pulmonary hypertension.  10. Normal tricuspid valve. Mild-moderate tricuspid  regurgitation.  *** No previous Echo exam.  ------------------------------------------------------------------------  MICROBIOLOGY:         Urinalysis Basic - ( 16 Oct 2017 12:01 )    Color: Yellow / Appearance: SL Turbid / S.012 / pH: x  Gluc: x / Ketone: Negative  / Bili: Negative / Urobili: Negative   Blood: x / Protein: 30 mg/dL / Nitrite: Negative   Leuk Esterase: Large / RBC: 0-2 /HPF / WBC 26-50 /HPF   Sq Epi: x / Non Sq Epi: OCC /HPF / Bacteria: Few /HPF    Culture - Urine (10.18.17 @ 00:32)    Specimen Source: .Urine Catheterized    Culture Results:   >100,000 CFU/ml Klebsiella pneumoniae    Culture - Blood (10.17.17 @ 15:38)    Specimen Source: .Blood Blood-Peripheral    Culture Results:   No growth to date.    RADIOLOGY:  [x] Chest radiographs reviewed and interpreted by me    < from: US Renal (10.20.17 @ 14:28) >    EXAM:  US KIDNEY(S)                            PROCEDURE DATE:  10/20/2017        INTERPRETATION:  CLINICAL INFORMATION: NANCY    COMPARISON: CT abdomen 2012    TECHNIQUE: Sonography of the kidneys and bladder.     FINDINGS:    Right kidney: 9.2 cm. echogenic cortex. No renal mass, hydronephrosis or   calculi. Multiple simple cysts the largest is at the upper pole and   measures 2.7 x 2.9 x 2.3 cm.    Left kidney:  10.1 cm. the genic cortex. No renal mass, hydronephrosis or   calculi. Multiple cysts. The largest at the upper pole measures 2.0 x 2.2   x 2.4 cm. This has some internal complexity not well evaluated. Recommend   follow-up in3- 6 months to assess for change.    Urinary bladder: Collapsed around a Gautam catheter limiting evaluation.    IMPRESSION:     Increased renal cortical echogenicity bilaterally, which can be seen in   medical renal disease.    Left slightly complex renal cyst not well evaluated on this exam.   Consider follow-up exam in 3-6 months to assess for change.    MADHU FAULKNER M.D., ATTENDING RADIOLOGIST  This document has been electronically signed. Oct 20 2017  2:01PM     < end of copied text >  ---------------------------------------------------------------------------------------------------------  < from: CT Chest No Cont (10.17.17 @ 21:38) >    EXAM:  CT CHEST                          PROCEDURE DATE:  10/17/2017      INTERPRETATION:  Clinical information: Evaluate for pneumonia. Exam is   compared to previous study of 4/3/2012.    CT scan of the chest was obtained without administration of intravenous   contrast.    Surgical clips are noted in the right axilla.    No hilar and/or mediastinal adenopathy is noted.     Heart is markedly enlarged in size. Calcification the coronary arteries   is noted. Patient is status post TAVR.No pericardial effusion is noted.   Main pulmonary artery is dilated and measures 3.6 cm.     No endobronchial lesions are noted. Compressive atelectasis is noted   involving portions of both lower lobes. This is secondary to small   bilateral pleural effusions, right more than left.    Below the diaphragm, visualized portions of the abdomen demonstrate   low-attenuation within both kidneys which are too small to be adequately   characterized on this exam.     Degenerative changes of the spine are noted. Subcutaneous edema is noted.    Impression: There is no pneumonia.    Bilateral pleural effusions, right more than left.    MAYI MORALES M.D., ATTENDING RADIOLOGIST  This document has been electronically signed. Oct 18 2017  9:24AM      < end of copied text >  ---------------------------------------------------------------------------------------------------------  < from: CT Head No Cont (10.17.17 @ 21:38) >    EXAM:  CT BRAIN                            PROCEDURE DATE:  10/17/2017        INTERPRETATION:  HISTORY: Uterine cancer. Altered mental status.    COMPARISON: MRI brain performed 2012.    TECHNIQUE: Axial noncontrast CT images from the skull base to the vertex   were obtained and submitted for interpretation. Coronal and sagittal   reformatted images were performed. Bone and soft tissue windows were   evaluated.    FINDINGS:     There is no acute intracranial mass-effect, hemorrhage, midline shift, or   abnormal extra-axial fluid collection.     Ventricles, sulci, and cisterns are normal in size for the patient's age   without hydrocephalus. The basal cisterns are patent.     Patchy and confluent regions of periventricular and deep cerebral white   matter hypoattenuation likely reflects chronic microangiopathic ischemic   changes. Atheromatous calcifications along the carotid siphons are   present.    Minimal right maxillary sinus mucosal thickening. No evidence for acute   paranasal sinus or mastoid inflammatory changes.. The calvarium is   intact.     IMPRESSION:     No acute intracranial bleeding, mass effect, or evidence of an acute   territorial infarct.    ADRIEN MORRIS M.D., RADIOLOGY RESIDENT  This document has been electronically signed.  VINI METZGER M.D., ATTENDING RADIOLOGIST  This document has been electronically signed. Oct 18 2017  9:43AM      < end of copied text >  ---------------------------------------------------------------------------------------------------------  < from: Xray Chest 2 Views PA/Lat (10.16.17 @ 00:27) >    EXAM:  CHEST PA & LAT                            PROCEDURE DATE:  10/16/2017        INTERPRETATION:  CLINICAL INDICATION: Mild hypoxia for 6 hours.    TECHNIQUE: Frontal and Lateral views of the chest    COMPARISON: Chest x-ray 3/23/2015.    FINDINGS:     Status post TAVR. Surgical clips are noted in the right axilla and right   upper lobe.    The heart is enlarged, unchanged.    There is no focal consolidation.    Small bilateral pleural effusions. No pneumothorax.    IMPRESSION:   Small bilateral pleural effusions. No focal consolidation.       RYLEE EWING M.D., RADIOLOGY RESIDENT  This document has been electronically signed.  TEN PRICE M.D., ATTENDING RADIOLOGIST  This document has been electronically signed. Oct 16 2017  9:11AM      < end of copied text >  ---------------------------------------------------------------------------------------------------------

## 2017-10-24 LAB
ANION GAP SERPL CALC-SCNC: 7 MMOL/L — SIGNIFICANT CHANGE UP (ref 5–17)
BUN SERPL-MCNC: 43 MG/DL — HIGH (ref 7–23)
CALCIUM SERPL-MCNC: 8.6 MG/DL — SIGNIFICANT CHANGE UP (ref 8.4–10.5)
CHLORIDE SERPL-SCNC: 101 MMOL/L — SIGNIFICANT CHANGE UP (ref 96–108)
CO2 SERPL-SCNC: 28 MMOL/L — SIGNIFICANT CHANGE UP (ref 22–31)
CREAT SERPL-MCNC: 1.38 MG/DL — HIGH (ref 0.5–1.3)
GLUCOSE SERPL-MCNC: 112 MG/DL — HIGH (ref 70–99)
HCT VFR BLD CALC: 26.8 % — LOW (ref 34.5–45)
HGB BLD-MCNC: 8.2 G/DL — LOW (ref 11.5–15.5)
INR BLD: 1.52 RATIO — HIGH (ref 0.88–1.16)
MAGNESIUM SERPL-MCNC: 2.2 MG/DL — SIGNIFICANT CHANGE UP (ref 1.6–2.6)
MCHC RBC-ENTMCNC: 28.7 PG — SIGNIFICANT CHANGE UP (ref 27–34)
MCHC RBC-ENTMCNC: 30.6 GM/DL — LOW (ref 32–36)
MCV RBC AUTO: 93.7 FL — SIGNIFICANT CHANGE UP (ref 80–100)
PHOSPHATE SERPL-MCNC: 2.9 MG/DL — SIGNIFICANT CHANGE UP (ref 2.5–4.5)
PLATELET # BLD AUTO: 181 K/UL — SIGNIFICANT CHANGE UP (ref 150–400)
POTASSIUM SERPL-MCNC: 5.3 MMOL/L — SIGNIFICANT CHANGE UP (ref 3.5–5.3)
POTASSIUM SERPL-SCNC: 5.3 MMOL/L — SIGNIFICANT CHANGE UP (ref 3.5–5.3)
PROTHROM AB SERPL-ACNC: 17.3 SEC — HIGH (ref 10–13.1)
RBC # BLD: 2.86 M/UL — LOW (ref 3.8–5.2)
RBC # FLD: 18.5 % — HIGH (ref 10.3–14.5)
SODIUM SERPL-SCNC: 136 MMOL/L — SIGNIFICANT CHANGE UP (ref 135–145)
WBC # BLD: 7.28 K/UL — SIGNIFICANT CHANGE UP (ref 3.8–10.5)
WBC # FLD AUTO: 7.28 K/UL — SIGNIFICANT CHANGE UP (ref 3.8–10.5)

## 2017-10-24 PROCEDURE — 71250 CT THORAX DX C-: CPT | Mod: 26

## 2017-10-24 RX ORDER — WARFARIN SODIUM 2.5 MG/1
4 TABLET ORAL ONCE
Qty: 0 | Refills: 0 | Status: COMPLETED | OUTPATIENT
Start: 2017-10-24 | End: 2017-10-24

## 2017-10-24 RX ORDER — DILTIAZEM HCL 120 MG
180 CAPSULE, EXT RELEASE 24 HR ORAL DAILY
Qty: 0 | Refills: 0 | Status: DISCONTINUED | OUTPATIENT
Start: 2017-10-24 | End: 2017-10-26

## 2017-10-24 RX ADMIN — Medication 3 MILLILITER(S): at 12:46

## 2017-10-24 RX ADMIN — Medication 3 MILLILITER(S): at 00:00

## 2017-10-24 RX ADMIN — Medication 0.5 MILLIGRAM(S): at 05:00

## 2017-10-24 RX ADMIN — Medication 0.5 MILLIGRAM(S): at 18:35

## 2017-10-24 RX ADMIN — Medication 3 MILLILITER(S): at 18:35

## 2017-10-24 RX ADMIN — WARFARIN SODIUM 4 MILLIGRAM(S): 2.5 TABLET ORAL at 21:23

## 2017-10-24 RX ADMIN — Medication 12.5 MILLIGRAM(S): at 18:35

## 2017-10-24 RX ADMIN — Medication 12.5 MILLIGRAM(S): at 05:00

## 2017-10-24 RX ADMIN — Medication 325 MILLIGRAM(S): at 12:46

## 2017-10-24 RX ADMIN — Medication 3 MILLILITER(S): at 05:00

## 2017-10-24 RX ADMIN — ATORVASTATIN CALCIUM 10 MILLIGRAM(S): 80 TABLET, FILM COATED ORAL at 21:23

## 2017-10-24 RX ADMIN — Medication 500 MILLIGRAM(S): at 12:46

## 2017-10-24 RX ADMIN — Medication 1 TABLET(S): at 12:47

## 2017-10-24 RX ADMIN — CLOPIDOGREL BISULFATE 75 MILLIGRAM(S): 75 TABLET, FILM COATED ORAL at 12:46

## 2017-10-24 RX ADMIN — Medication 3 MILLILITER(S): at 23:48

## 2017-10-24 RX ADMIN — Medication 180 MILLIGRAM(S): at 12:46

## 2017-10-24 NOTE — DIETITIAN INITIAL EVALUATION ADULT. - ENERGY NEEDS
ht: 62 inches. wt: 138.8 pounds (current, standing, +2 edema B/L leg). BMI: 25.4 kG/m2. UBW: 130 pounds. IBW: 110 pounds +/- 10%. %IBW: 126%  Other pertinent objective information:  87 year old female pt PMH of right breast CA S/P lumpectomy in 2000, Uterine Ca S/P total hysterectomy in 2012, Skin CA S/P excision of bilateral lower eyelid skin CA, cholecystectomy, Osteoarthritis of bilateral knees, HTN, hyperlipidemia, paroxysmal Afib on coumadin, severe aortic stenosis now s/p TAVR, who presents with onset of generalized weakness x 2 weeks. Pt found to be bradycardic, with NANCY/UTI, CHF. Pt completed course of abx, Cr noted as improving. No pressure ulcers noted.

## 2017-10-24 NOTE — PROGRESS NOTE ADULT - SUBJECTIVE AND OBJECTIVE BOX
Resting comfortably in bed on nasal cannula. Gautam d/c and voiding.                            8.2    7.28  )-----------( 181      ( 24 Oct 2017 07:37 )             26.8                           8.2    7.28  )-----------( 181      ( 24 Oct 2017 07:37 )             26.8     10-24    136  |  101  |  43<H>  ----------------------------<  112<H>  5.3   |  28  |  1.38<H>    Ca    8.6      24 Oct 2017 07:29  Phos  2.9     10  Mg     2.2     10        I&O's Detail    23 Oct 2017 07:  -  24 Oct 2017 07:00  --------------------------------------------------------  IN:    Oral Fluid: 1180 mL  Total IN: 1180 mL    OUT:    Indwelling Catheter - Urethral: 200 mL    Voided: 300 mL  Total OUT: 500 mL    Total NET: 680 mL      24 Oct 2017 07:  -  24 Oct 2017 12:21  --------------------------------------------------------  IN:    Oral Fluid: 240 mL  Total IN: 240 mL    OUT:  Total OUT: 0 mL    Total NET: 240 mL          I&O's Summary    23 Oct 2017 07:  -  24 Oct 2017 07:00  --------------------------------------------------------  IN: 1180 mL / OUT: 500 mL / NET: 680 mL    24 Oct 2017 07:  -  24 Oct 2017 12:21  --------------------------------------------------------  IN: 240 mL / OUT: 0 mL / NET: 240 mL        Daily     Daily Weight in k.9 (24 Oct 2017 12:13)    Vital Signs Last 24 Hrs  T(C): 36.8 (24 Oct 2017 04:15), Max: 36.8 (23 Oct 2017 14:38)  T(F): 98.2 (24 Oct 2017 04:15), Max: 98.3 (23 Oct 2017 20:12)  HR: 104 (24 Oct 2017 04:15) (57 - 117)  BP: 129/70 (24 Oct 2017 04:15) (123/62 - 132/79)  BP(mean): --  RR: 22 (24 Oct 2017 04:15) (19 - 22)  SpO2: 96% (24 Oct 2017 06:54) (92% - 97%)      MEDICATIONS  (STANDING):  ALBUTerol/ipratropium for Nebulization 3 milliLiter(s) Nebulizer every 6 hours  ascorbic acid 500 milliGRAM(s) Oral daily  atorvastatin 10 milliGRAM(s) Oral at bedtime  buDESOnide   0.5 milliGRAM(s) Respule 0.5 milliGRAM(s) Inhalation every 12 hours  clopidogrel Tablet 75 milliGRAM(s) Oral daily  diltiazem    milliGRAM(s) Oral daily  ferrous    sulfate 325 milliGRAM(s) Oral daily  influenza   Vaccine 0.5 milliLiter(s) IntraMuscular once  metoprolol 12.5 milliGRAM(s) Oral two times a day  multivitamin 1 Tablet(s) Oral daily  warfarin 4 milliGRAM(s) Oral once    MEDICATIONS  (PRN):      Blood Gas Arterial, Lactate (10.23.17 @ 17:58)    Blood Gas Arterial, Lactate: 0.6    Blood Gas Profile - Arterial (10.23.17 @ 17:58)    pH, Arterial: 7.34    pCO2, Arterial: 49 mmHg    pO2, Arterial: 106 mmHg    HCO3, Arterial: 26 mmoL/L    Base Excess, Arterial: .5 mmol/L    Oxygen Saturation, Arterial: 99 %    Total CO2, Arterial: 27 mmoL/L    Blood Gas Source Arterial: Arterial

## 2017-10-24 NOTE — PROGRESS NOTE ADULT - ASSESSMENT
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 Prerenal, +/- component of ischemic ATN. Renal function improving     (2)Hyperkalemia - slowly rising    (3)Hypernatremia - improving    (4) - left complex renal cyst - indicated for imaging f/u 3-6 months.       RECOMMEND:  (1)monitor urine output  (2)awaiting echo  (3)NIV per pulm  (4)BMP in am

## 2017-10-24 NOTE — PROGRESS NOTE ADULT - SUBJECTIVE AND OBJECTIVE BOX
[facility] - [facility]     PROGRESS NOTE    CHIEF COMPLAINT: ARF; COPD/emphysema; chronic CHF; CKD/NANCY    INTERVAL HISTORY: awake and alert on and off BIPAP sitting in the chair eating breakfast; ABG with improving respiratory acidosis; no cough, sputum production, chest congestion or wheeze; no fevers, chills or sweats; no chest pain/pressure or palpitations; s/p antibiotics for UTI; renal function improved with resolved hypernatremia; glucose well controlled; ongoing bouts of AF with RVR up to 150 bpm    REVIEW OF SYSTEMS:  Constitutional: As per interval history  HEENT: Within normal limits  CV: As per interval history  Resp: As per interval history  GI: Within normal limits   : Within normal limits  Musculoskeletal: Within normal limits  Skin: Within normal limits  Neurological: Within normal limits  Psychiatric: Within normal limits  Endocrine: Within normal limits  Hematologic/Lymphatic: Within normal limits  Allergic/Immunologic: Within normal limits      MEDICATIONS:     Pulmonary "  ALBUTerol/ipratropium for Nebulization 3 milliLiter(s) Nebulizer every 6 hours  buDESOnide   0.5 milliGRAM(s) Respule 0.5 milliGRAM(s) Inhalation every 12 hours      Anti-microbials:      Cardiovascular:  metoprolol 12.5 milliGRAM(s) Oral two times a day      Other:  ascorbic acid 500 milliGRAM(s) Oral daily  atorvastatin 10 milliGRAM(s) Oral at bedtime  clopidogrel Tablet 75 milliGRAM(s) Oral daily  ferrous    sulfate 325 milliGRAM(s) Oral daily  influenza   Vaccine 0.5 milliLiter(s) IntraMuscular once  multivitamin 1 Tablet(s) Oral daily        OBJECTIVE:        I&O's Detail    23 Oct 2017 07:01  -  24 Oct 2017 07:00  --------------------------------------------------------  IN:    Oral Fluid: 1180 mL  Total IN: 1180 mL    OUT:    Indwelling Catheter - Urethral: 200 mL    Voided: 300 mL  Total OUT: 500 mL    Total NET: 680 mL    PHYSICAL EXAM:       ICU Vital Signs Last 24 Hrs  T(C): 36.8 (24 Oct 2017 04:15), Max: 36.8 (23 Oct 2017 14:38)  T(F): 98.2 (24 Oct 2017 04:15), Max: 98.3 (23 Oct 2017 20:12)  HR: 104 (24 Oct 2017 04:15) (57 - 117)  BP: 129/70 (24 Oct 2017 04:15) (123/62 - 132/79)  BP(mean): --  ABP: --  ABP(mean): --  RR: 22 (24 Oct 2017 04:15) (19 - 22)  SpO2: 96% (24 Oct 2017 06:54) (92% - 97%) on 2lpm - 80% on room air     General: Awake and alert. Cooperative. No distress. Appears stated age 	  HEENT:   Atraumatic. Normocephalic. Anicteric. Normal oral mucosa, PERRL, EOMI. BIPAP mask. Left eye subconjunctival hemorrhage resolving  Neck: Supple. Trachea midline. Thyroid without enlargement/tenderness/nodules. No carotid bruit. No JVD	  Cardiovascular: Irregularly irregular rate and rhythm. S1 S2 normal. II/VI systolic murmur  Respiratory: Respirations unlabored. Decreased breath sounds at bases with dullness ~ 1/4 up. Kyphoscoliosis  Abdomen: Soft. Non-tender. Non-distended. No organomegaly. No masses. Normal bowel sounds	  Extremities: Warm to touch. No clubbing or cyanosis. Mild to moderate lower extremity edema  Pulses: 2+ peripheral pulses all extremities	  Skin: Normal skin color. No rashes or lesions. No ecchymoses, No cyanosis. Warm to touch  Lymph Nodes: Cervical, supraclavicular and axillary nodes normal  Neurological: Awake and alert. Moving all extremities. A and O x 3  Psychiatry: Calm      LABS:                        8.2    7.28  )-----------( 181      ( 24 Oct 2017 07:37 )             26.8                         8.4    8.58  )-----------( 172      ( 22 Oct 2017 12:18 )             27.8     10-    136  |  101  |  43<H>  ----------------------------<  112<H>  5.3   |  28  |  1.38<H>    10    140  |  104  |  41<H>  ----------------------------<  146<H>  4.9   |  24  |  1.67<H>    Ca      8.6      10-24    Ca      9.1      10-22    Phos    2.9     10-24      Mg       2.2     10-24    TPro  4.8<L>  /  Alb  2.6<L>  /  TBili  x   /  DBili  x   /  AST  x   /  ALT  x   /  AlkPhos  x   10-    PT/INR - ( 24 Oct 2017 07:37 )   PT: 17.3 sec;   INR: 1.52 ratio       PTT - ( 23 Oct 2017 07:29 )  PTT: 33.7 sec    ABG - ( 23 Oct 2017 17:58 )  pH: 7.34  /  pCO2: 49    /  pO2: 106   / HCO3: 26    / Base Excess: .5    /  SaO2: 99        ABG - ( 20 Oct 2017 06:32 )  pH: 7.28  /  pCO2: 59    /  pO2: 198   / HCO3: 27    / Base Excess: .4    /  SaO2: 100       ABG - ( 19 Oct 2017 07:20 )  pH: 7.25  /  pCO2: 60    /  pO2: 151   / HCO3: 26    / Base Excess: -1.4  /  SaO2: 99        ABG - ( 18 Oct 2017 14:35 )  pH: 7.30  /  pCO2: 58    /  pO2: 143   / HCO3: 28    / Base Excess: 1.3   /  SaO2: 100       CARDIAC MARKERS ( 17 Oct 2017 09:06 )  x     / x     / 38 U/L / x     / x      CARDIAC MARKERS ( 17 Oct 2017 09:05 )  x     / 0.07 ng/mL / x     / x     / 4.2 ng/mL  CARDIAC MARKERS ( 16 Oct 2017 18:28 )  x     / 0.07 ng/mL / 39 U/L / x     / 4.6 ng/mL  CARDIAC MARKERS ( 16 Oct 2017 09:17 )  x     / 0.04 ng/mL / 26 U/L / x     / 2.9 ng/mL  CARDIAC MARKERS ( 16 Oct 2017 00:15 )  x     / 0.05 ng/mL / 29 U/L / x     / 3.2 ng/mL    < from: Transthoracic Echocardiogram w/Doppler (12 @ 13:31) >    Patient name: OSCAR LOPEZ  YOB: 1929   Age: 82 (F)   MR#: [id number]  Study Date: 3/14/2012  Location: [place] [id number]: [facility]  Study quality: Technically fair  Referring Physician: Pro Tracy MD  Blood Pressure: 124/51 mmHg  Height: 5ft 3in  Weight: 164 lb  BSA: 1.8 m2  ------------------------------------------------------------------------  PROCEDURE: Transthoracic echocardiogram with 2-D, M-Mode  and complete spectral and color flow Doppler.  INDICATION: Aortic Stenosis (424.1), Atrial Fibrillation  (427.31)  ------------------------------------------------------------------------  Dimensions:    Normal Values:  LA:     3.1    2.0 - 4.0 cm  Ao:     3.1    2.0 - 3.8 cm  SEPTUM: 1.2    0.6 - 1.2cm  PWT:    1.3    0.6 - 1.1 cm  LVIDd:  4.3    3.0 - 5.6 cm  LVIDs:  2.6    1.8 - 4.0 cm  Derived variables:  LVMI: 110 g/m2  RWT: 0.60  Fractional short: 40 %  Ejection Fraction: >70 %  Doppler Peak Velocity (m/sec): AoV=4.0  ------------------------------------------------------------------------  Observations:  Mitral Valve: Mitral annular calcification and calcified  mitral leaflets with normal diastolic opening. Minimal  mitral regurgitation.  Aortic Valve/Aorta: Heavily calcified trileaflet aortic  valve with decreased opening. Peak transaortic valve  gradient equals 62 mm Hg, mean transaortic valve gradient  equals 37 mm Hg, estimated aortic valve area equals 0.9  sqcm (by continuity equation), consistent with severe  aortic stenosis .Mild-moderate aortic regurgitation.  Pressure halft - time is 361 ms.  Normal aortic root.  Left Atrium: Severely dilated left atrium.  LA volume index  = 51 cc/m2.  Left Ventricle: Hyperdynamic left ventricle. Mild to  moderate concentric left ventricular hypertrophy. Atrial  fibrillation precludes a comprehensive diastolic  assessment. However, findings are suggestive of moderate  diastolic dysfunction.  Right Heart: Severe right atrial enlargement. RA volume  index = 53 cc/m2.  Normal right ventricular size and  function. Normal tricuspid valve. Mild-moderate tricuspid  regurgitation. Normal pulmonic valve. Minimal pulmonic  regurgitation.  Pericardium/Pleura: Normal pericardium with no pericardial  effusion.  Hemodynamic: Estimated right atrial pressure is 5 mm Hg.  Estimated right ventricular systolic pressure equals 46 mm  Hg, assuming right atrial pressure equals 5 mm Hg,  consistent with mild pulmonary hypertension.  ------------------------------------------------------------------------  Conclusions:  1. Mitral annular calcification and calcified mitral  leaflets with normal diastolic opening. Minimal mitral  regurgitation.  2. Heavily calcified trileaflet aortic valve with decreased  opening. Peak transaortic valve gradientequals 62 mm Hg,  mean transaortic valve gradient equals 37 mm Hg, estimated  aortic valve area equals 0.9 sqcm (by continuity equation),  consistent with severe aortic stenosis. Mild-moderate  aortic regurgitation. Pressure halft - time is 361 ms.  3. Severely dilated left atrium.  LA volume index = 51  cc/m2.  4. Mild to moderate concentric left ventricular  hypertrophy.  5. Hyperdynamic left ventricle.  6. Atrial fibrillation precludes a comprehensive diastolic  assessment. However, findings are suggestive of moderate  diastolic dysfunction.  7. Severe right atrial enlargement. RA volume index = 53  cc/m2.  8. Normal right ventricular size and function.  9. Estimated right ventricular systolic pressure equals 46  mm Hg, assuming right atrial pressure equals 5 mm Hg,  consistent with mild pulmonary hypertension.  10. Normal tricuspid valve. Mild-moderate tricuspid  regurgitation.  *** No previous Echo exam.  ------------------------------------------------------------------------  MICROBIOLOGY:     Color: Yellow / Appearance: SL Turbid / S.012 / pH: x  Gluc: x / Ketone: Negative  / Bili: Negative / Urobili: Negative   Blood: x / Protein: 30 mg/dL / Nitrite: Negative   Leuk Esterase: Large / RBC: 0-2 /HPF / WBC 26-50 /HPF   Sq Epi: x / Non Sq Epi: OCC /HPF / Bacteria: Few /HPF    Culture - Urine (10.18.17 @ 00:32)    Specimen Source: .Urine Catheterized    Culture Results:   >100,000 CFU/ml Klebsiella pneumoniae    Culture - Blood (10.17.17 @ 15:38)    Specimen Source: .Blood Blood-Peripheral    Culture Results:   No growth to date.    RADIOLOGY:  [x] Chest radiographs reviewed and interpreted by me    < from: US Renal (10.20.17 @ 14:28) >    EXAM:  US KIDNEY(S)                            PROCEDURE DATE:  10/20/2017        INTERPRETATION:  CLINICAL INFORMATION: NANCY    COMPARISON: CT abdomen 2012    TECHNIQUE: Sonography of the kidneys and bladder.     FINDINGS:    Right kidney: 9.2 cm. echogenic cortex. No renal mass, hydronephrosis or   calculi. Multiple simple cysts the largest is at the upper pole and   measures 2.7 x 2.9 x 2.3 cm.    Left kidney:  10.1 cm. the genic cortex. No renal mass, hydronephrosis or   calculi. Multiple cysts. The largest at the upper pole measures 2.0 x 2.2   x 2.4 cm. This has some internal complexity not well evaluated. Recommend   follow-up in3- 6 months to assess for change.    Urinary bladder: Collapsed around a Gautam catheter limiting evaluation.    IMPRESSION:     Increased renal cortical echogenicity bilaterally, which can be seen in   medical renal disease.    Left slightly complex renal cyst not well evaluated on this exam.   Consider follow-up exam in 3-6 months to assess for change.    MADHU FAULKNER M.D., ATTENDING RADIOLOGIST  This document has been electronically signed. Oct 20 2017  2:01PM     < end of copied text >  ---------------------------------------------------------------------------------------------------------  < from: CT Chest No Cont (10.17.17 @ 21:38) >    EXAM:  CT CHEST                          PROCEDURE DATE:  10/17/2017      INTERPRETATION:  Clinical information: Evaluate for pneumonia. Exam is   compared to previous study of 4/3/2012.    CT scan of the chest was obtained without administration of intravenous   contrast.    Surgical clips are noted in the right axilla.    No hilar and/or mediastinal adenopathy is noted.     Heart is markedly enlarged in size. Calcification the coronary arteries   is noted. Patient is status post TAVR.No pericardial effusion is noted.   Main pulmonary artery is dilated and measures 3.6 cm.     No endobronchial lesions are noted. Compressive atelectasis is noted   involving portions of both lower lobes. This is secondary to small   bilateral pleural effusions, right more than left.    Below the diaphragm, visualized portions of the abdomen demonstrate   low-attenuation within both kidneys which are too small to be adequately   characterized on this exam.     Degenerative changes of the spine are noted. Subcutaneous edema is noted.    Impression: There is no pneumonia.    Bilateral pleural effusions, right more than left.    MAYI MORALES M.D., ATTENDING RADIOLOGIST  This document has been electronically signed. Oct 18 2017  9:24AM      < end of copied text >  ---------------------------------------------------------------------------------------------------------  < from: CT Head No Cont (10.17.17 @ 21:38) >    EXAM:  CT BRAIN                            PROCEDURE DATE:  10/17/2017        INTERPRETATION:  HISTORY: Uterine cancer. Altered mental status.    COMPARISON: MRI brain performed 2012.    TECHNIQUE: Axial noncontrast CT images from the skull base to the vertex   were obtained and submitted for interpretation. Coronal and sagittal   reformatted images were performed. Bone and soft tissue windows were   evaluated.    FINDINGS:     There is no acute intracranial mass-effect, hemorrhage, midline shift, or   abnormal extra-axial fluid collection.     Ventricles, sulci, and cisterns are normal in size for the patient's age   without hydrocephalus. The basal cisterns are patent.     Patchy and confluent regions of periventricular and deep cerebral white   matter hypoattenuation likely reflects chronic microangiopathic ischemic   changes. Atheromatous calcifications along the carotid siphons are   present.    Minimal right maxillary sinus mucosal thickening. No evidence for acute   paranasal sinus or mastoid inflammatory changes.. The calvarium is   intact.     IMPRESSION:     No acute intracranial bleeding, mass effect, or evidence of an acute   territorial infarct.    ADRIEN MORRIS M.D., RADIOLOGY RESIDENT  This document has been electronically signed.  VINI METZGER M.D., ATTENDING RADIOLOGIST  This document has been electronically signed. Oct 18 2017  9:43AM      < end of copied text >  ---------------------------------------------------------------------------------------------------------  < from: Xray Chest 2 Views PA/Lat (10.16.17 @ 00:27) >    EXAM:  CHEST PA & LAT                            PROCEDURE DATE:  10/16/2017        INTERPRETATION:  CLINICAL INDICATION: Mild hypoxia for 6 hours.    TECHNIQUE: Frontal and Lateral views of the chest    COMPARISON: Chest x-ray 3/23/2015.    FINDINGS:     Status post TAVR. Surgical clips are noted in the right axilla and right   upper lobe.    The heart is enlarged, unchanged.    There is no focal consolidation.    Small bilateral pleural effusions. No pneumothorax.    IMPRESSION:   Small bilateral pleural effusions. No focal consolidation.       RYLEE EWING M.D., RADIOLOGY RESIDENT  This document has been electronically signed.  TEN PRICE M.D., ATTENDING RADIOLOGIST  This document has been electronically signed. Oct 16 2017  9:11AM      < end of copied text >  ---------------------------------------------------------------------------------------------------------

## 2017-10-24 NOTE — DIETITIAN INITIAL EVALUATION ADULT. - OTHER INFO
Pt seen for LOS. Pt deeply asleep at time of visit. Per records, pt eating fair at meals 50-75%, no documented GI distress, food allergies/intolerance. Per previous RD notes pt tried to avoid adding sat to her meals and avoids consumption of excess concentrated sweets. Pt noted with weight gain this admission 11 pounds, likely from increased B/L leg edema now noted +2 edema, none noted upon admit. Pt with no reported difficulty chewing/swallowing.

## 2017-10-24 NOTE — PROGRESS NOTE ADULT - SUBJECTIVE AND OBJECTIVE BOX
Allergies    No Known Allergies    Intolerances    digoxin (Rash; Drowsiness)      MEDICATIONS  (STANDING):  ALBUTerol/ipratropium for Nebulization 3 milliLiter(s) Nebulizer every 6 hours  ascorbic acid 500 milliGRAM(s) Oral daily  atorvastatin 10 milliGRAM(s) Oral at bedtime  buDESOnide   0.5 milliGRAM(s) Respule 0.5 milliGRAM(s) Inhalation every 12 hours  clopidogrel Tablet 75 milliGRAM(s) Oral daily  diltiazem    milliGRAM(s) Oral daily  ferrous    sulfate 325 milliGRAM(s) Oral daily  influenza   Vaccine 0.5 milliLiter(s) IntraMuscular once  metoprolol 12.5 milliGRAM(s) Oral two times a day  multivitamin 1 Tablet(s) Oral daily  warfarin 4 milliGRAM(s) Oral once    MEDICATIONS  (PRN):          Antimicrobials:        LABS:  CBC Full  -  ( 24 Oct 2017 07:37 )  WBC Count : 7.28 K/uL  Hemoglobin : 8.2 g/dL  Hematocrit : 26.8 %  Platelet Count - Automated : 181 K/uL  Mean Cell Volume : 93.7 fl  Mean Cell Hemoglobin : 28.7 pg  Mean Cell Hemoglobin Concentration : 30.6 gm/dL  Auto Neutrophil # : x  Auto Lymphocyte # : x  Auto Monocyte # : x  Auto Eosinophil # : x  Auto Basophil # : x  Auto Neutrophil % : x  Auto Lymphocyte % : x  Auto Monocyte % : x  Auto Eosinophil % : x  Auto Basophil % : x    10-24    136  |  101  |  43<H>  ----------------------------<  112<H>  5.3   |  28  |  1.38<H>    Ca    8.6      24 Oct 2017 07:29  Phos  2.9     10-24  Mg     2.2     10-24      PT/INR - ( 24 Oct 2017 07:37 )   PT: 17.3 sec;   INR: 1.52 ratio         PTT - ( 23 Oct 2017 07:29 )  PTT:33.7 sec  ABG - ( 23 Oct 2017 17:58 )  pH: 7.34  /  pCO2: 49    /  pO2: 106   / HCO3: 26    / Base Excess: .5    /  SaO2: 99                  Blood Gas Venous - Lactate: 1.4 mmoL/L (10-16 @ 00:15)            Cultures:  RECENT CULTURES:  10-18 @ 00:32 .Urine Catheterized   ARNOLD      Klebsiella pneumoniae  Klebsiella pneumoniae     >100,000 CFU/ml Klebsiella pneumoniae              Imaging Studies:    Vital Signs:    Vital Signs Last 24 Hrs  T(C): 36.9 (24 Oct 2017 14:12), Max: 36.9 (24 Oct 2017 14:12)  T(F): 98.4 (24 Oct 2017 14:12), Max: 98.4 (24 Oct 2017 14:12)  HR: 90 (24 Oct 2017 14:12) (57 - 117)  BP: 121/65 (24 Oct 2017 14:12) (121/65 - 132/79)  BP(mean): --  RR: 20 (24 Oct 2017 14:12) (19 - 22)  SpO2: 99% (24 Oct 2017 14:12) (92% - 99%) Afebrile. No leukocytosis.  No new somatic complaints at present time.  States that urinated only once today in am.    Allergies    No Known Allergies  siness)      MEDICATIONS  (STANDING):  ALBUTerol/ipratropium for Nebulization 3 milliLiter(s) Nebulizer every 6 hours  ascorbic acid 500 milliGRAM(s) Oral daily  atorvastatin 10 milliGRAM(s) Oral at bedtime  buDESOnide   0.5 milliGRAM(s) Respule 0.5 milliGRAM(s) Inhalation every 12 hours  clopidogrel Tablet 75 milliGRAM(s) Oral daily  diltiazem    milliGRAM(s) Oral daily  ferrous    sulfate 325 milliGRAM(s) Oral daily  influenza   Vaccine 0.5 milliLiter(s) IntraMuscular once  metoprolol 12.5 milliGRAM(s) Oral two times a day  multivitamin 1 Tablet(s) Oral daily  warfarin 4 milliGRAM(s) Oral once        LABS:  CBC Full  -  ( 24 Oct 2017 07:37 )  WBC Count : 7.28 K/uL  Hemoglobin : 8.2 g/dL  Hematocrit : 26.8 %  Platelet Count - Automated : 181 K/uL  Mean Cell Volume : 93.7 fl  Mean Cell Hemoglobin : 28.7 pg  Mean Cell Hemoglobin Concentration : 30.6 gm/dL  Auto Neutrophil # : x  Auto Lymphocyte # : x  Auto Monocyte # : x  Auto Eosinophil # : x  Auto Basophil # : x  Auto Neutrophil % : x  Auto Lymphocyte % : x  Auto Monocyte % : x  Auto Eosinophil % : x  Auto Basophil % : x    10-24    136  |  101  |  43<H>  ----------------------------<  112<H>  5.3   |  28  |  1.38<H>    Ca    8.6      24 Oct 2017 07:29  Phos  2.9     10-24  Mg     2.2     10-24      PT/INR - ( 24 Oct 2017 07:37 )   PT: 17.3 sec;   INR: 1.52 ratio         PTT - ( 23 Oct 2017 07:29 )  PTT:33.7 sec  ABG - ( 23 Oct 2017 17:58 )  pH: 7.34  /  pCO2: 49    /  pO2: 106   / HCO3: 26    / Base Excess: .5    /  SaO2: 99          Cultures:  RECENT CULTURES:  10-18 @ 00:32 .Urine Catheterized     >100,000 CFU/ml Klebsiella pneumoniae    Vital Signs:    Vital Signs Last 24 Hrs  T(C): 36.9 (24 Oct 2017 14:12), Max: 36.9 (24 Oct 2017 14:12)  T(F): 98.4 (24 Oct 2017 14:12), Max: 98.4 (24 Oct 2017 14:12)  HR: 90 (24 Oct 2017 14:12) (57 - 117)  BP: 121/65 (24 Oct 2017 14:12) (121/65 - 132/79)  BP(mean): --  RR: 20 (24 Oct 2017 14:12) (19 - 22)  SpO2: 99% (24 Oct 2017 14:12) (92% - 99%)

## 2017-10-24 NOTE — PROGRESS NOTE ADULT - ASSESSMENT
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    hypernatremia resolved    Klebsiella UTI s/p antibiotics    PLAN/RECOMMENDATIONS    continue NIV to ventilate to a pH ~ 7.4 with oxygen supplementation to keep saturation greater than 92% - on and off during the day alternating with nasal canula and continuously for sleep to allow for po intake (start regular diet) - needs follow-up ABG in AM which has been ordered  repeat chest CT  albuterol/atrovent/pulmicort nebs  renal follow-up noted noted - oleary catheter discontinued - observe off fluids and diuretics  cardiac meds: lipitor/plavix/metoprolol/coumadin for INR 2.5 - 3.5 - hold ACE inhibitors - diltiazem on hold due to bradycardia earlier - IVP metoprolol as needed for tachycardia  s/p course of ceftriaxone  still awaiting ECHO    Will follow with you; d/w dedicated floor NP     Tommy Reyes MD, [facility] - [phone]  Pulmonary Medicine 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    hypernatremia resolved    Klebsiella UTI s/p antibiotics    PLAN/RECOMMENDATIONS    continue NIV to ventilate to a pH ~ 7.4 with oxygen supplementation to keep saturation greater than 92% - on and off during the day alternating with nasal canula and continuously for sleep to allow for po intake (start regular diet) - needs follow-up ABG in AM which has been ordered  repeat chest CT  patient will benefit from a trilogy vent which I will arrange  albuterol/atrovent/pulmicort nebs  renal follow-up noted noted - oleary catheter discontinued - observe off fluids and diuretics  cardiac meds: lipitor/plavix/metoprolol/coumadin for INR 2.5 - 3.5 - hold ACE inhibitors - d/w cardiology, Dr. Leopoldo Funes - will restart Cardizem CD 180mg daily of AF  s/p course of ceftriaxone  still awaiting ECHO    Will follow with you; d/w dedicated floor NP     Tommy Reyes MD, [place] [facility] - [phone]  Pulmonary Medicine 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 BIPAP has been unsuccessful in treating the patient's advanced disease processes. I am ordering NIV to be used for sleep, during daytime naps and as needed during the day for periods of shortness of breath and increased work of breathing. Without NIV, I am concerned that the patient will continue to experience further clinical deterioration resulting in possible medical harm and recurrent hospitalizations  albuterol/atrovent/pulmicort nebs  renal follow-up noted noted - oleary catheter discontinued - observe off fluids and diuretics  cardiac meds: lipitor/plavix/metoprolol/coumadin for INR 2.5 - 3.5 - hold ACE inhibitors - d/w cardiology, Dr. Leopoldo Funes - will restart Cardizem CD 180mg daily of AF  s/p course of ceftriaxone  still awaiting ECHO    Will follow with you; d/w dedicated floor NP     Tommy Reyes MD, [facility] - [phone]  Pulmonary Medicine

## 2017-10-24 NOTE — PROGRESS NOTE ADULT - SUBJECTIVE AND OBJECTIVE BOX
INTERVAL HPI/OVERNIGHT EVENTS: feels well this am, no GI complaints, no signs of active bleeding    MEDICATIONS  (STANDING):  ALBUTerol/ipratropium for Nebulization 3 milliLiter(s) Nebulizer every 6 hours  ascorbic acid 500 milliGRAM(s) Oral daily  atorvastatin 10 milliGRAM(s) Oral at bedtime  buDESOnide   0.5 milliGRAM(s) Respule 0.5 milliGRAM(s) Inhalation every 12 hours  clopidogrel Tablet 75 milliGRAM(s) Oral daily  ferrous    sulfate 325 milliGRAM(s) Oral daily  influenza   Vaccine 0.5 milliLiter(s) IntraMuscular once  metoprolol 12.5 milliGRAM(s) Oral two times a day  multivitamin 1 Tablet(s) Oral daily    MEDICATIONS  (PRN):      Allergies    No Known Allergies    Intolerances    digoxin (Rash; Drowsiness)          PHYSICAL EXAM:   Vital Signs:  Vital Signs Last 24 Hrs  T(C): 36.8 (24 Oct 2017 04:15), Max: 36.8 (23 Oct 2017 14:38)  T(F): 98.2 (24 Oct 2017 04:15), Max: 98.3 (23 Oct 2017 20:12)  HR: 104 (24 Oct 2017 04:15) (57 - 117)  BP: 129/70 (24 Oct 2017 04:15) (123/62 - 132/79)  BP(mean): --  RR: 22 (24 Oct 2017 04:15) (19 - 22)  SpO2: 96% (24 Oct 2017 06:54) (92% - 97%)  Daily     Daily     GENERAL:  no distress  HEENT:  NC/AT,  anicteric  CHEST:   no increased effort, breath sounds clear  HEART:  Regular rhythm  ABDOMEN:  Soft, non-tender, non-distended, normoactive bowel sounds,  no masses ,no hepato-splenomegaly, no signs of chronic liver disease  EXTEREMITIES:  no cyanosis      LABS:                        8.4    8.58  )-----------( 172      ( 22 Oct 2017 12:18 )             27.8     10-22    140  |  104  |  41<H>  ----------------------------<  146<H>  4.9   |  24  |  1.67<H>    Ca    9.1      22 Oct 2017 12:18      PT/INR - ( 24 Oct 2017 07:37 )   PT: 17.3 sec;   INR: 1.52 ratio         PTT - ( 23 Oct 2017 07:29 )  PTT:33.7 sec      RADIOLOGY & ADDITIONAL TESTS:

## 2017-10-24 NOTE — DIETITIAN INITIAL EVALUATION ADULT. - PROBLEM SELECTOR PLAN 3
Generalized weakness  Associated with afib as well as intermittent SOB though none at this time  Monitor on telemetry  Check TTE  Check cardiac enzymes  Check UA

## 2017-10-24 NOTE — PROGRESS NOTE ADULT - ASSESSMENT
A/P: 87 year old female with PMHx of right breast CA S/P lumpectomy in 2000, Uterine Ca S/P total hysterectomy in 2012, Skin CA S/P excision of bilateral lower eyelid skin CA, cholecystectomy, Osteoarthritis of bilateral knees, HTN, hyperlipidemia, paroxysmal Afib on coumadin, severe aortic stenosis now s/p TAVR, who presents with generalized weakness for 2 weeks.  Patient reports that over the last two weeks she had noticed increased weakness, episodes of shortness of breath at rest.  No chest pain. No fevers, no chills, no nausea or vomiting.  Pt with episodes of lethargy. Requires BiPAP for respiratory acidosis.  Noted to have pyuria and sensitive  K.pneumo in UC.  Completed Ceftriaxone for UTI.  Pt is afebrile. No leukocytosis.  Gautam d/c'd yesterday.  Pt states that urinated only once today.  D/w RN to obtain bladder scan.  Continue off ABs.

## 2017-10-24 NOTE — PROGRESS NOTE ADULT - SUBJECTIVE AND OBJECTIVE BOX
Patient is a 87y old  Female who presents with a chief complaint of "I have uterine cancer" (16 Oct 2017 12:15)      SUBJECTIVE / OVERNIGHT EVENTS:    MEDICATIONS  (STANDING):  ALBUTerol/ipratropium for Nebulization 3 milliLiter(s) Nebulizer every 6 hours  ascorbic acid 500 milliGRAM(s) Oral daily  atorvastatin 10 milliGRAM(s) Oral at bedtime  buDESOnide   0.5 milliGRAM(s) Respule 0.5 milliGRAM(s) Inhalation every 12 hours  clopidogrel Tablet 75 milliGRAM(s) Oral daily  diltiazem    milliGRAM(s) Oral daily  ferrous    sulfate 325 milliGRAM(s) Oral daily  influenza   Vaccine 0.5 milliLiter(s) IntraMuscular once  metoprolol 12.5 milliGRAM(s) Oral two times a day  multivitamin 1 Tablet(s) Oral daily    MEDICATIONS  (PRN):      Vital Signs Last 24 Hrs  T(F): 97.9 (10-24-17 @ 21:28), Max: 98.4 (10-24-17 @ 14:12)  HR: 64 (10-24-17 @ 21:28) (64 - 117)  BP: 138/68 (10-24-17 @ 21:28) (121/65 - 138/68)  RR: 18 (10-24-17 @ 21:28) (18 - 22)  SpO2: 97% (10-24-17 @ 21:28) (92% - 99%)  Telemetry:   CAPILLARY BLOOD GLUCOSE        I&O's Summary    23 Oct 2017 07:01  -  24 Oct 2017 07:00  --------------------------------------------------------  IN: 1180 mL / OUT: 500 mL / NET: 680 mL    24 Oct 2017 07:01  -  24 Oct 2017 22:15  --------------------------------------------------------  IN: 960 mL / OUT: 350 mL / NET: 610 mL        PHYSICAL EXAM:  GENERAL: NAD, well-developed  HEAD:  Atraumatic, Normocephalic  EYES: EOMI, PERRLA, conjunctiva and sclera clear  NECK: Supple, No JVD  CHEST/LUNG: Clear to auscultation bilaterally; No wheeze  HEART: Regular rate and rhythm; No murmurs, rubs, or gallops  ABDOMEN: Soft, Nontender, Nondistended; Bowel sounds present  EXTREMITIES:  2+ Peripheral Pulses, No clubbing, cyanosis, or edema  PSYCH: AAOx3  NEUROLOGY: non-focal  SKIN: No rashes or lesions    LABS:                        8.2    7.28  )-----------( 181      ( 24 Oct 2017 07:37 )             26.8     10-24    136  |  101  |  43<H>  ----------------------------<  112<H>  5.3   |  28  |  1.38<H>    Ca    8.6      24 Oct 2017 07:29  Phos  2.9     10-24  Mg     2.2     10-24      PT/INR - ( 24 Oct 2017 07:37 )   PT: 17.3 sec;   INR: 1.52 ratio         PTT - ( 23 Oct 2017 07:29 )  PTT:33.7 sec          RADIOLOGY & ADDITIONAL TESTS:    Imaging Personally Reviewed:    Consultant(s) Notes Reviewed:      Care Discussed with Consultants/Other Providers:

## 2017-10-24 NOTE — DIETITIAN INITIAL EVALUATION ADULT. - NS AS NUTRI INTERV MEALS SNACK
1) Continue DASH/TLC diet to promote heart health. RD remains available as needed: Felicita Gracia MS, RDN, CDN, CDE. #[phone]

## 2017-10-25 LAB
ANION GAP SERPL CALC-SCNC: 8 MMOL/L — SIGNIFICANT CHANGE UP (ref 5–17)
ANION GAP SERPL CALC-SCNC: 8 MMOL/L — SIGNIFICANT CHANGE UP (ref 5–17)
BASE EXCESS BLDA CALC-SCNC: 0.8 MMOL/L — SIGNIFICANT CHANGE UP (ref -2–2)
BLD GP AB SCN SERPL QL: NEGATIVE — SIGNIFICANT CHANGE UP
BUN SERPL-MCNC: 44 MG/DL — HIGH (ref 7–23)
BUN SERPL-MCNC: 45 MG/DL — HIGH (ref 7–23)
CALCIUM SERPL-MCNC: 8.8 MG/DL — SIGNIFICANT CHANGE UP (ref 8.4–10.5)
CALCIUM SERPL-MCNC: 9 MG/DL — SIGNIFICANT CHANGE UP (ref 8.4–10.5)
CHLORIDE SERPL-SCNC: 102 MMOL/L — SIGNIFICANT CHANGE UP (ref 96–108)
CHLORIDE SERPL-SCNC: 103 MMOL/L — SIGNIFICANT CHANGE UP (ref 96–108)
CO2 BLDA-SCNC: 28 MMOL/L — SIGNIFICANT CHANGE UP (ref 22–30)
CO2 SERPL-SCNC: 28 MMOL/L — SIGNIFICANT CHANGE UP (ref 22–31)
CO2 SERPL-SCNC: 29 MMOL/L — SIGNIFICANT CHANGE UP (ref 22–31)
CREAT SERPL-MCNC: 1.45 MG/DL — HIGH (ref 0.5–1.3)
CREAT SERPL-MCNC: 1.57 MG/DL — HIGH (ref 0.5–1.3)
GAS PNL BLDA: SIGNIFICANT CHANGE UP
GLUCOSE SERPL-MCNC: 103 MG/DL — HIGH (ref 70–99)
GLUCOSE SERPL-MCNC: 135 MG/DL — HIGH (ref 70–99)
HCO3 BLDA-SCNC: 26 MMOL/L — SIGNIFICANT CHANGE UP (ref 21–29)
HCT VFR BLD CALC: 26.7 % — LOW (ref 34.5–45)
HCT VFR BLD CALC: 28.5 % — LOW (ref 34.5–45)
HGB BLD-MCNC: 7.8 G/DL — LOW (ref 11.5–15.5)
HGB BLD-MCNC: 8.9 G/DL — LOW (ref 11.5–15.5)
HOROWITZ INDEX BLDA+IHG-RTO: 28 — SIGNIFICANT CHANGE UP
INR BLD: 2.26 RATIO — HIGH (ref 0.88–1.16)
MCHC RBC-ENTMCNC: 27.7 PG — SIGNIFICANT CHANGE UP (ref 27–34)
MCHC RBC-ENTMCNC: 29.2 GM/DL — LOW (ref 32–36)
MCHC RBC-ENTMCNC: 30.3 PG — SIGNIFICANT CHANGE UP (ref 27–34)
MCHC RBC-ENTMCNC: 31.3 GM/DL — LOW (ref 32–36)
MCV RBC AUTO: 94.7 FL — SIGNIFICANT CHANGE UP (ref 80–100)
MCV RBC AUTO: 96.8 FL — SIGNIFICANT CHANGE UP (ref 80–100)
PCO2 BLDA: 50 MMHG — HIGH (ref 32–46)
PH BLDA: 7.34 — LOW (ref 7.35–7.45)
PLATELET # BLD AUTO: 211 K/UL — SIGNIFICANT CHANGE UP (ref 150–400)
PLATELET # BLD AUTO: 222 K/UL — SIGNIFICANT CHANGE UP (ref 150–400)
PO2 BLDA: 91 MMHG — SIGNIFICANT CHANGE UP (ref 74–108)
POTASSIUM SERPL-MCNC: 5.3 MMOL/L — SIGNIFICANT CHANGE UP (ref 3.5–5.3)
POTASSIUM SERPL-MCNC: 5.5 MMOL/L — HIGH (ref 3.5–5.3)
POTASSIUM SERPL-SCNC: 5.3 MMOL/L — SIGNIFICANT CHANGE UP (ref 3.5–5.3)
POTASSIUM SERPL-SCNC: 5.5 MMOL/L — HIGH (ref 3.5–5.3)
PROTHROM AB SERPL-ACNC: 26 SEC — HIGH (ref 10–13.1)
RBC # BLD: 2.82 M/UL — LOW (ref 3.8–5.2)
RBC # BLD: 2.95 M/UL — LOW (ref 3.8–5.2)
RBC # FLD: 15.9 % — HIGH (ref 10.3–14.5)
RBC # FLD: 18.3 % — HIGH (ref 10.3–14.5)
RH IG SCN BLD-IMP: POSITIVE — SIGNIFICANT CHANGE UP
SAO2 % BLDA: 98 % — HIGH (ref 92–96)
SODIUM SERPL-SCNC: 139 MMOL/L — SIGNIFICANT CHANGE UP (ref 135–145)
SODIUM SERPL-SCNC: 139 MMOL/L — SIGNIFICANT CHANGE UP (ref 135–145)
WBC # BLD: 7.74 K/UL — SIGNIFICANT CHANGE UP (ref 3.8–10.5)
WBC # BLD: 8.8 K/UL — SIGNIFICANT CHANGE UP (ref 3.8–10.5)
WBC # FLD AUTO: 7.74 K/UL — SIGNIFICANT CHANGE UP (ref 3.8–10.5)
WBC # FLD AUTO: 8.8 K/UL — SIGNIFICANT CHANGE UP (ref 3.8–10.5)

## 2017-10-25 RX ORDER — FUROSEMIDE 40 MG
40 TABLET ORAL DAILY
Qty: 0 | Refills: 0 | Status: DISCONTINUED | OUTPATIENT
Start: 2017-10-25 | End: 2017-10-27

## 2017-10-25 RX ORDER — WARFARIN SODIUM 2.5 MG/1
1 TABLET ORAL ONCE
Qty: 0 | Refills: 0 | Status: COMPLETED | OUTPATIENT
Start: 2017-10-25 | End: 2017-10-25

## 2017-10-25 RX ADMIN — Medication 3 MILLILITER(S): at 23:39

## 2017-10-25 RX ADMIN — Medication 325 MILLIGRAM(S): at 11:39

## 2017-10-25 RX ADMIN — CLOPIDOGREL BISULFATE 75 MILLIGRAM(S): 75 TABLET, FILM COATED ORAL at 11:39

## 2017-10-25 RX ADMIN — WARFARIN SODIUM 1 MILLIGRAM(S): 2.5 TABLET ORAL at 21:38

## 2017-10-25 RX ADMIN — Medication 500 MILLIGRAM(S): at 11:39

## 2017-10-25 RX ADMIN — Medication 1 CAPSULE(S): at 17:08

## 2017-10-25 RX ADMIN — Medication 3 MILLILITER(S): at 11:40

## 2017-10-25 RX ADMIN — Medication 3 MILLILITER(S): at 17:09

## 2017-10-25 RX ADMIN — Medication 0.5 MILLIGRAM(S): at 17:09

## 2017-10-25 RX ADMIN — Medication 12.5 MILLIGRAM(S): at 05:28

## 2017-10-25 RX ADMIN — Medication 40 MILLIGRAM(S): at 11:40

## 2017-10-25 RX ADMIN — Medication 0.5 MILLIGRAM(S): at 05:28

## 2017-10-25 RX ADMIN — Medication 12.5 MILLIGRAM(S): at 17:08

## 2017-10-25 RX ADMIN — Medication 1 TABLET(S): at 11:40

## 2017-10-25 RX ADMIN — Medication 180 MILLIGRAM(S): at 05:28

## 2017-10-25 RX ADMIN — ATORVASTATIN CALCIUM 10 MILLIGRAM(S): 80 TABLET, FILM COATED ORAL at 21:38

## 2017-10-25 RX ADMIN — Medication 3 MILLILITER(S): at 05:29

## 2017-10-25 NOTE — CHART NOTE - NSCHARTNOTEFT_GEN_A_CORE
Pt. with a brief episode of HR in the 30's. Currently restarted on avnodal blockers. Pt. was sleeping and once awoke HR returned to 50-60's reange. Dr. Funes aware. Will continue close monitoring on telemetry. Tessapankaj Yung [facility].

## 2017-10-25 NOTE — PROGRESS NOTE ADULT - SUBJECTIVE AND OBJECTIVE BOX
OSCAR SANDERS:[id number],   87yFemale followed for:  digoxin (Rash; Drowsiness)  No Known Allergies    PAST MEDICAL & SURGICAL HISTORY:  Severe aortic stenosis  Uterine cancer  Arthritis  HTN (hypertension)  Breast cancer diagnosed in 2000: s/p right lumpectomy and right axillary lymph node removal, radiation  AF (atrial fibrillation): on coumadin and aspirin  Dyslipidemia  H/O: hysterectomy: 4/2012  s/p surgical excision of left eye Skin cancer: Bilateral lower eye lids  S/P D&C  S/P cholecystectomy    FAMILY HISTORY:  No pertinent family history in first degree relatives    MEDICATIONS  (STANDING):  ALBUTerol/ipratropium for Nebulization 3 milliLiter(s) Nebulizer every 6 hours  ascorbic acid 500 milliGRAM(s) Oral daily  atorvastatin 10 milliGRAM(s) Oral at bedtime  buDESOnide   0.5 milliGRAM(s) Respule 0.5 milliGRAM(s) Inhalation every 12 hours  clopidogrel Tablet 75 milliGRAM(s) Oral daily  diltiazem    milliGRAM(s) Oral daily  ferrous    sulfate 325 milliGRAM(s) Oral daily  influenza   Vaccine 0.5 milliLiter(s) IntraMuscular once  metoprolol 12.5 milliGRAM(s) Oral two times a day  multivitamin 1 Tablet(s) Oral daily    MEDICATIONS  (PRN):      Vital Signs Last 24 Hrs  T(C): 36.9 (25 Oct 2017 04:25), Max: 36.9 (24 Oct 2017 14:12)  T(F): 98.4 (25 Oct 2017 04:25), Max: 98.4 (24 Oct 2017 14:12)  HR: 95 (25 Oct 2017 08:45) (64 - 95)  BP: 136/64 (25 Oct 2017 04:25) (121/65 - 138/68)  BP(mean): --  RR: 22 (25 Oct 2017 04:25) (18 - 22)  SpO2: 97% (25 Oct 2017 08:45) (97% - 99%)  nc/at  s1s2  cta  soft, nt, nd no guarding or rebound  no c/c/e    CBC Full  -  ( 25 Oct 2017 07:26 )  WBC Count : 7.74 K/uL  Hemoglobin : 7.8 g/dL  Hematocrit : 26.7 %  Platelet Count - Automated : 211 K/uL  Mean Cell Volume : 94.7 fl  Mean Cell Hemoglobin : 27.7 pg  Mean Cell Hemoglobin Concentration : 29.2 gm/dL  Auto Neutrophil # : x  Auto Lymphocyte # : x  Auto Monocyte # : x  Auto Eosinophil # : x  Auto Basophil # : x  Auto Neutrophil % : x  Auto Lymphocyte % : x  Auto Monocyte % : x  Auto Eosinophil % : x  Auto Basophil % : x    10-25    139  |  103  |  45<H>  ----------------------------<  103<H>  5.5<H>   |  28  |  1.45<H>    Ca    9.0      25 Oct 2017 08:35  Phos  2.9     10-24  Mg     2.2     10-24      PT/INR - ( 25 Oct 2017 07:26 )   PT: 26.0 sec;   INR: 2.26 ratio

## 2017-10-25 NOTE — CONSULT NOTE ADULT - ATTENDING COMMENTS
seen and examined with fellow. I agree with [facility].  hold cardizem.  Hopefully can avoid PPM seen and examined with fellow. I agree with [facility] & P[facility] A & P.  Tachy-everett AF.  Agree with PPM.  WIll proceed when INR appropriate (if patient agrees).

## 2017-10-25 NOTE — PROGRESS NOTE ADULT - ASSESSMENT
ASSESSMENT: 87F w/ past breast and uterine CA, HTN, AFib, and AS-TAVR, 10/16/17 a/w hypercapnic respiratory failure/AMS, as well as NANCY   (1)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 Prerenal, +/- component of ischemic ATN. Renal function slightly worse today  (2)Hyperkalemia - improving  (3)Hypernatremia - improved  (4) - left complex renal cyst - indicated for imaging f/u 3-6 months.     RECOMMEND:  - encourage oral intake and activity  - BiPAP per pulmonary  - no objection to lasix 40 mg po daily  - dose any medication for a CrCl ~ 30 cc/min  - avoid NSAIDs and nephrotoxins as able    Mini Saba NP  [facility]  [phone] ASSESSMENT: 87F w/ past breast and uterine CA, HTN, AFib, and AS-TAVR, 10/16/17 a/w hypercapnic respiratory failure/AMS, as well as NANCY   (1)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 Prerenal, +/- component of ischemic ATN. Renal function slightly worse today  (2)Hyperkalemia - improving  (3)Hypernatremia - improved  (4) - left complex renal cyst - indicated for imaging f/u 3-6 months.     RECOMMEND:  - encourage oral intake and activity  - BiPAP per pulmonary  - no objection to lasix 40 mg po daily  - dose any medication for a CrCl ~ 30 cc/min  - avoid NSAIDs and nephrotoxins as able  - F/U TTE	    Mini Saba NP  [facility]  [phone] ASSESSMENT: 87F w/ past breast and uterine CA, HTN, AFib, and AS-TAVR, 10/16/17 a/w hypercapnic respiratory failure/AMS, as well as NANCY   (1)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 Prerenal, +/- component of ischemic ATN. Renal function slightly worse today  (2)Hyperkalemia - improving  (3)Hypernatremia - improved  (4) - left complex renal cyst - indicated for imaging f/u 3-6 months.     RECOMMEND:  - encourage oral intake and activity  - BiPAP per pulmonary  - low K diet and change multivitamin to nephrovite 1 tab daily   - no objection to lasix 40 mg po daily  - dose any medication for a CrCl ~ 30 cc/min  - avoid NSAIDs and nephrotoxins as able  - F/U TTE	    Mini Saba NP  [facility]  [phone]

## 2017-10-25 NOTE — CONSULT NOTE ADULT - SUBJECTIVE AND OBJECTIVE BOX
Patient seen and evaluated @ 8 PM  Chief Complaint: Tachy-everett    HPI:  87F with PMHx of right breast CA S/P lumpectomy in , Uterine Ca S/P total hysterectomy in , Skin CA S/P excision of bilateral lower eyelid skin CA, cholecystectomy, Osteoarthritis of bilateral knees, HTN, hyperlipidemia, paroxysmal Afib on coumadin, severe aortic stenosis now s/p TAVR 2 years ago, who presents with onset of generalized weakness x 2 weeks.  Patient reports that over the last two weeks she had noticed increased weakness. This was also associated with occasional intermittent episodes of shortness of breath that seemed to occur at rest that come and go for a few minutes at a time.  No chest pain. In addition she had noticed that she was going "into fibrillation" as patient reports that she can feel her episodic fibrillation.  No fevers, no chills, no nausea or vomiting. Patient reports that she has been very thirsty but her family told her not to drink too much water so she has been drinking much less liquids. Chronic R>L leg edema that is not changed (16 Oct 2017 05:43)    During hospitalization being treated for UTI and requiring BiPAP for hypercarbic respiratory failure now improving.    Episode of afib with bradycardia to 30s. Patient asymptomatic. No episodes of tachy.    PMH:   Severe aortic stenosis  Uterine cancer  Arthritis  HTN (hypertension)  Breast cancer diagnosed in   AF (atrial fibrillation)  H/O: hysterectomy  Dyslipidemia    PSH:   H/O: hysterectomy  s/p surgical excision of left eye Skin cancer  S/P D&C  S/P cholecystectomy    Medications:   ALBUTerol/ipratropium for Nebulization 3 milliLiter(s) Nebulizer every 6 hours  ascorbic acid 500 milliGRAM(s) Oral daily  atorvastatin 10 milliGRAM(s) Oral at bedtime  buDESOnide   0.5 milliGRAM(s) Respule 0.5 milliGRAM(s) Inhalation every 12 hours  clopidogrel Tablet 75 milliGRAM(s) Oral daily  diltiazem    milliGRAM(s) Oral daily  ferrous    sulfate 325 milliGRAM(s) Oral daily  furosemide    Tablet 40 milliGRAM(s) Oral daily  influenza   Vaccine 0.5 milliLiter(s) IntraMuscular once  metoprolol 12.5 milliGRAM(s) Oral two times a day  Nephrocaps 1 Capsule(s) Oral daily  warfarin 1 milliGRAM(s) Oral once    Allergies:  digoxin (Rash; Drowsiness)  No Known Allergies    FAMILY HISTORY:  No pertinent family history in first degree relatives    Social History:  None    Review of Systems:  Constitutional: [ ] Fever [ ] Chills [ ] Fatigue [ ] Weight change   HEENT: [ ] Blurred vision [ ] Eye Pain [ ] Headache [ ] Runny nose [ ] Sore Throat   Respiratory: [ ] Cough [ ] Wheezing [ ] Shortness of breath  Cardiovascular: [ ] Chest Pain [ ] Palpitations [ ] REYES [ ] PND [ ] Orthopnea  Gastrointestinal: [ ] Abdominal Pain [ ] Diarrhea [ ] Constipation [ ] Hemorrhoids [ ] Nausea [ ] Vomiting  Genitourinary: [ ] Nocturia [ ] Dysuria [ ] Incontinence  Extremities: [ ] Swelling [ ] Joint Pain  Neurologic: [ ] Focal deficit [ ] Paresthesias [ ] Syncope  Lymphatic: [ ] Swelling [ ] Lymphadenopathy   Skin: [ ] Rash [ ] Ecchymoses [ ] Wounds [ ] Lesions  Psychiatry: [ ] Depression [ ] Suicidal/Homicidal Ideation [ ] Anxiety [ ] Sleep Disturbances  [ ] 10 point review of systems is otherwise negative except as mentioned above            [ ]Unable to obtain    Physical Exam:  T(C): 36.9 (10-25-17 @ 17:06), Max: 37.1 (10-25-17 @ 14:47)  HR: 76 (10-25-17 @ 17:06) (64 - 95)  BP: 133/69 (10-25-17 @ 17:06) (123/71 - 138/68)  RR: 19 (10-25-17 @ 17:06) (18 - 22)  SpO2: 95% (10-25-17 @ 17:06) (95% - 97%)  Wt(kg): --    10-24 @ 07:01  -  10-25 @ 07:00  --------------------------------------------------------  IN: 960 mL / OUT: 850 mL / NET: 110 mL    10-25 @ 07:01  -  10-25 @ 20:05  --------------------------------------------------------  IN: 480 mL / OUT: 1100 mL / NET: -620 mL      Daily     Daily Weight in k.5 (25 Oct 2017 07:20)    Appearance: NAD  Eyes: PERRL, EOMI  HENT: Normal oral [place], NC/AT  Cardiovascular: normal S1 and S2, RRR, no m/r/g, no edema, normal JVP  Procedural Access Site:  No hematoma, non-tender to palpation, 2+ pulses distally, no bruit, no ecchymosis  Respiratory: Clear to auscultation bilaterally  Gastrointestinal: Soft, non-tender, non-distended, BS+  Musculoskeletal: No clubbing, no joint deformity   Neurologic: Non-focal  Lymphatic: No lymphadenopathy  Psychiatry: AAOx3, mood & affect appropriate  Skin: No rashes, no ecchymoses, no cyanosis    Cardiovascular Diagnostic Testing:  ECG: Afib 50s    Echo:    EF 70%  Conclusions:  1. Mitral annular calcification and calcified mitral  leaflets with normal diastolic opening. Minimal mitral  regurgitation.  2. Heavily calcified trileaflet aortic valve with decreased  opening. Peak transaortic valve gradientequals 62 mm Hg,  mean transaortic valve gradient equals 37 mm Hg, estimated  aortic valve area equals 0.9 sqcm (by continuity equation),  consistent with severe aortic stenosis. Mild-moderate  aortic regurgitation. Pressure halft - time is 361 ms.  3. Severely dilated left atrium.  LA volume index = 51  cc/m2.  4. Mild to moderate concentric left ventricular  hypertrophy.  5. Hyperdynamic left ventricle.  6. Atrial fibrillation precludes a comprehensive diastolic  assessment. However, findings are suggestive of moderate  diastolic dysfunction.  7. Severe right atrial enlargement. RA volume index = 53  cc/m2.  8. Normal right ventricular size and function.  9. Estimated right ventricular systolic pressure equals 46  mm Hg, assuming right atrial pressure equals 5 mm Hg,  consistent with mild pulmonary hypertension.  10. Normal tricuspid valve. Mild-moderate tricuspid  regurgitation.    Stress Testing:  none    Cath:  none in our system    Interpretation of Telemetry:    Imaging:  CT Chest  Multiloculated small to moderate right pleural effusions with right   basilar compressive atelectasis, with slight increased aeration of the   right lower lobe.  Left small pleural effusion with compressive atelectasis, grossly   unchanged.  Interlobular septal thickening consistent with pulmonary edema.  Cardiomegaly.    Labs:                        8.9    8.8   )-----------( 222      ( 25 Oct 2017 11:18 )             28.5     10-    139  |  102  |  44<H>  ----------------------------<  135<H>  5.3   |  29  |  1.57<H>    Ca    8.8      25 Oct 2017 11:18  Phos  2.9     10-  Mg     2.2     10-24      PT/INR - ( 25 Oct 2017 07:26 )   PT: 26.0 sec;   INR: 2.26 ratio Patient seen and evaluated @ 8 PM  Chief Complaint: Tachy-everett    HPI:  87F with PMHx of right breast CA S/P lumpectomy in , Uterine Ca S/P total hysterectomy in , Skin CA S/P excision of bilateral lower eyelid skin CA, cholecystectomy, Osteoarthritis of bilateral knees, HTN, hyperlipidemia, paroxysmal Afib on coumadin, severe aortic stenosis now s/p TAVR 2 years ago, who presents with onset of generalized weakness x 2 weeks.  Patient reports that over the last two weeks she had noticed increased weakness. This was also associated with occasional intermittent episodes of shortness of breath that seemed to occur at rest that come and go for a few minutes at a time.  No chest pain. In addition she had noticed that she was going "into fibrillation" as patient reports that she can feel her episodic fibrillation.  No fevers, no chills, no nausea or vomiting. Patient reports that she has been very thirsty but her family told her not to drink too much water so she has been drinking much less liquids. Chronic R>L leg edema that is not changed (16 Oct 2017 05:43)    During hospitalization being treated for UTI and requiring BiPAP for hypercarbic respiratory failure now improving.    Episode of afib with bradycardia to 30s. Patient asymptomatic. No episodes of tachy.    PMH:   Severe aortic stenosis  Uterine cancer  Arthritis  HTN (hypertension)  Breast cancer diagnosed in   AF (atrial fibrillation)  H/O: hysterectomy  Dyslipidemia    PSH:   H/O: hysterectomy  s/p surgical excision of left eye Skin cancer  S/P D&C  S/P cholecystectomy    Medications:   ALBUTerol/ipratropium for Nebulization 3 milliLiter(s) Nebulizer every 6 hours  ascorbic acid 500 milliGRAM(s) Oral daily  atorvastatin 10 milliGRAM(s) Oral at bedtime  buDESOnide   0.5 milliGRAM(s) Respule 0.5 milliGRAM(s) Inhalation every 12 hours  clopidogrel Tablet 75 milliGRAM(s) Oral daily  diltiazem    milliGRAM(s) Oral daily  ferrous    sulfate 325 milliGRAM(s) Oral daily  furosemide    Tablet 40 milliGRAM(s) Oral daily  influenza   Vaccine 0.5 milliLiter(s) IntraMuscular once  metoprolol 12.5 milliGRAM(s) Oral two times a day  Nephrocaps 1 Capsule(s) Oral daily  warfarin 1 milliGRAM(s) Oral once    Allergies:  digoxin (Rash; Drowsiness)  No Known Allergies    FAMILY HISTORY:  No pertinent family history in first degree relatives    Social History:  None    Review of Systems:  Constitutional: [ ] Fever [ ] Chills [ ] Fatigue [ ] Weight change   HEENT: [ ] Blurred vision [ ] Eye Pain [ ] Headache [ ] Runny nose [ ] Sore Throat   Respiratory: [ ] Cough [ ] Wheezing [ ] Shortness of breath  Cardiovascular: [ ] Chest Pain [ ] Palpitations [ ] REYES [ ] PND [ ] Orthopnea  Gastrointestinal: [ ] Abdominal Pain [ ] Diarrhea [ ] Constipation [ ] Hemorrhoids [ ] Nausea [ ] Vomiting  Genitourinary: [ ] Nocturia [ ] Dysuria [ ] Incontinence  Extremities: [ ] Swelling [ ] Joint Pain  Neurologic: [ ] Focal deficit [ ] Paresthesias [ ] Syncope  Lymphatic: [ ] Swelling [ ] Lymphadenopathy   Skin: [ ] Rash [ ] Ecchymoses [ ] Wounds [ ] Lesions  Psychiatry: [ ] Depression [ ] Suicidal/Homicidal Ideation [ ] Anxiety [ ] Sleep Disturbances  [ ] 10 point review of systems is otherwise negative except as mentioned above            [ ]Unable to obtain    Physical Exam:  T(C): 36.9 (10-25-17 @ 17:06), Max: 37.1 (10-25-17 @ 14:47)  HR: 76 (10-25-17 @ 17:06) (64 - 95)  BP: 133/69 (10-25-17 @ 17:06) (123/71 - 138/68)  RR: 19 (10-25-17 @ 17:06) (18 - 22)  SpO2: 95% (10-25-17 @ 17:06) (95% - 97%)  Wt(kg): --    10-24 @ 07:01  -  10-25 @ 07:00  --------------------------------------------------------  IN: 960 mL / OUT: 850 mL / NET: 110 mL    10-25 @ 07:01  -  10-25 @ 20:05  --------------------------------------------------------  IN: 480 mL / OUT: 1100 mL / NET: -620 mL      Daily     Daily Weight in k.5 (25 Oct 2017 07:20)    Appearance: NAD  Eyes: PERRL, EOMI  HENT: Normal oral [place], NC/AT  Cardiovascular: normal S1 and S2, RRR, no m/r/g, no edema, normal JVP  Respiratory: Clear to auscultation bilaterally  Gastrointestinal: Soft, non-tender, non-distended, BS+  Musculoskeletal: No clubbing, no joint deformity   Neurologic: Non-focal  Lymphatic: No lymphadenopathy  Psychiatry: AAOx3, mood & affect appropriate  Skin: No rashes, no ecchymoses, no cyanosis    Cardiovascular Diagnostic Testing:  ECG: Afib 50s    Echo:  2012  EF 70%  Conclusions:  1. Mitral annular calcification and calcified mitral  leaflets with normal diastolic opening. Minimal mitral  regurgitation.  2. Heavily calcified trileaflet aortic valve with decreased  opening. Peak transaortic valve gradientequals 62 mm Hg,  mean transaortic valve gradient equals 37 mm Hg, estimated  aortic valve area equals 0.9 sqcm (by continuity equation),  consistent with severe aortic stenosis. Mild-moderate  aortic regurgitation. Pressure halft - time is 361 ms.  3. Severely dilated left atrium.  LA volume index = 51  cc/m2.  4. Mild to moderate concentric left ventricular  hypertrophy.  5. Hyperdynamic left ventricle.  6. Atrial fibrillation precludes a comprehensive diastolic  assessment. However, findings are suggestive of moderate  diastolic dysfunction.  7. Severe right atrial enlargement. RA volume index = 53  cc/m2.  8. Normal right ventricular size and function.  9. Estimated right ventricular systolic pressure equals 46  mm Hg, assuming right atrial pressure equals 5 mm Hg,  consistent with mild pulmonary hypertension.  10. Normal tricuspid valve. Mild-moderate tricuspid  regurgitation.    Stress Testing:  none    Cath:  none in our system    Interpretation of Telemetry:    Imaging:  CT Chest  Multiloculated small to moderate right pleural effusions with right   basilar compressive atelectasis, with slight increased aeration of the   right lower lobe.  Left small pleural effusion with compressive atelectasis, grossly   unchanged.  Interlobular septal thickening consistent with pulmonary edema.  Cardiomegaly.    Labs:                        8.9    8.8   )-----------( 222      ( 25 Oct 2017 11:18 )             28.5     10-    139  |  102  |  44<H>  ----------------------------<  135<H>  5.3   |  29  |  1.57<H>    Ca    8.8      25 Oct 2017 11:18  Phos  2.9     10-24  Mg     2.2     10-24      PT/INR - ( 25 Oct 2017 07:26 )   PT: 26.0 sec;   INR: 2.26 ratio

## 2017-10-25 NOTE — PROGRESS NOTE ADULT - SUBJECTIVE AND OBJECTIVE BOX
Patient is a 87y old  Female who presents with a chief complaint of "I have uterine cancer" (16 Oct 2017 12:15)      SUBJECTIVE / OVERNIGHT EVENTS: awake alert, on NC, appears somewhat breathless    MEDICATIONS  (STANDING):  ALBUTerol/ipratropium for Nebulization 3 milliLiter(s) Nebulizer every 6 hours  ascorbic acid 500 milliGRAM(s) Oral daily  atorvastatin 10 milliGRAM(s) Oral at bedtime  buDESOnide   0.5 milliGRAM(s) Respule 0.5 milliGRAM(s) Inhalation every 12 hours  clopidogrel Tablet 75 milliGRAM(s) Oral daily  diltiazem    milliGRAM(s) Oral daily  ferrous    sulfate 325 milliGRAM(s) Oral daily  furosemide    Tablet 40 milliGRAM(s) Oral daily  influenza   Vaccine 0.5 milliLiter(s) IntraMuscular once  metoprolol 12.5 milliGRAM(s) Oral two times a day  Nephrocaps 1 Capsule(s) Oral daily  warfarin 1 milliGRAM(s) Oral once    MEDICATIONS  (PRN):      Vital Signs Last 24 Hrs  T(F): 98.4 (10-25-17 @ 17:06), Max: 98.7 (10-25-17 @ 14:47)  HR: 76 (10-25-17 @ 17:06) (64 - 95)  BP: 133/69 (10-25-17 @ 17:06) (123/71 - 138/68)  RR: 19 (10-25-17 @ 17:06) (18 - 22)  SpO2: 95% (10-25-17 @ 17:06) (95% - 97%)  Telemetry:   CAPILLARY BLOOD GLUCOSE        I&O's Summary    24 Oct 2017 07:01  -  25 Oct 2017 07:00  --------------------------------------------------------  IN: 960 mL / OUT: 850 mL / NET: 110 mL    25 Oct 2017 07:01  -  25 Oct 2017 19:33  --------------------------------------------------------  IN: 480 mL / OUT: 1100 mL / NET: -620 mL        PHYSICAL EXAM:  GENERAL: NAD, well-developed  HEAD:  Atraumatic, Normocephalic  EYES: EOMI, PERRLA, conjunctiva and sclera clear  NECK: Supple, No JVD  CHEST/LUNG: Clear to auscultation bilaterally; No wheeze  HEART: Regular rate and rhythm; No murmurs, rubs, or gallops  ABDOMEN: Soft, Nontender, Nondistended; Bowel sounds present  EXTREMITIES:  2+ Peripheral Pulses, No clubbing, cyanosis, or edema  PSYCH: AAOx3  NEUROLOGY: non-focal  SKIN: No rashes or lesions    LABS:                        8.9    8.8   )-----------( 222      ( 25 Oct 2017 11:18 )             28.5     10-25    139  |  102  |  44<H>  ----------------------------<  135<H>  5.3   |  29  |  1.57<H>    Ca    8.8      25 Oct 2017 11:18  Phos  2.9     10-24  Mg     2.2     10-24      PT/INR - ( 25 Oct 2017 07:26 )   PT: 26.0 sec;   INR: 2.26 ratio                   RADIOLOGY & ADDITIONAL TESTS:    Imaging Personally Reviewed:    Consultant(s) Notes Reviewed:      Care Discussed with Consultants/Other Providers:

## 2017-10-25 NOTE — PROGRESS NOTE ADULT - SUBJECTIVE AND OBJECTIVE BOX
[facility] - [facility]     PROGRESS NOTE    CHIEF COMPLAINT: ARF; COPD/emphysema; chronic CHF; CKD/NANCY    INTERVAL HISTORY: awake and alert on nocturnal BIPAP; sitting in bed eating lunch; ABG with improving respiratory acidosis; no cough, sputum production, chest congestion or wheeze; no fevers, chills or sweats; no chest pain/pressure or palpitations; s/p antibiotics for UTI; renal function improved with resolved hypernatremia; glucose well controlled; ongoing AF bouts of bradycardia especially when asleep; has not walked much;    REVIEW OF SYSTEMS:  Constitutional: As per interval history  HEENT: Within normal limits  CV: As per interval history  Resp: As per interval history  GI: Within normal limits   : Within normal limits  Musculoskeletal: Within normal limits  Skin: Within normal limits  Neurological: Within normal limits  Psychiatric: Within normal limits  Endocrine: Within normal limits  Hematologic/Lymphatic: Within normal limits  Allergic/Immunologic: Within normal limits    MEDICATIONS:     Pulmonary "  ALBUTerol/ipratropium for Nebulization 3 milliLiter(s) Nebulizer every 6 hours  buDESOnide   0.5 milliGRAM(s) Respule 0.5 milliGRAM(s) Inhalation every 12 hours      Anti-microbials:      Cardiovascular:  diltiazem    milliGRAM(s) Oral daily  furosemide    Tablet 40 milliGRAM(s) Oral daily  metoprolol 12.5 milliGRAM(s) Oral two times a day      Other:  ascorbic acid 500 milliGRAM(s) Oral daily  atorvastatin 10 milliGRAM(s) Oral at bedtime  clopidogrel Tablet 75 milliGRAM(s) Oral daily  ferrous    sulfate 325 milliGRAM(s) Oral daily  influenza   Vaccine 0.5 milliLiter(s) IntraMuscular once  multivitamin 1 Tablet(s) Oral daily      OBJECTIVE:    I&O's Detail    24 Oct 2017 07:  -  25 Oct 2017 07:00  --------------------------------------------------------  IN:    Oral Fluid: 960 mL  Total IN: 960 mL    OUT:    Voided: 850 mL  Total OUT: 850 mL    Total NET: 110 mL      25 Oct 2017 07:  -  25 Oct 2017 12:35  --------------------------------------------------------  IN:    Oral Fluid: 480 mL  Total IN: 480 mL    OUT:  Total OUT: 0 mL    Total NET: 480 mL    Daily Weight in k.5 (25 Oct 2017 07:20)      PHYSICAL EXAM:       ICU Vital Signs Last 24 Hrs  T(C): 36.9 (25 Oct 2017 04:25), Max: 36.9 (24 Oct 2017 14:12)  T(F): 98.4 (25 Oct 2017 04:25), Max: 98.4 (24 Oct 2017 14:12)  HR: 95 (25 Oct 2017 08:45) (64 - 95)  BP: 136/64 (25 Oct 2017 04:25) (121/65 - 138/68)  BP(mean): --  ABP: --  ABP(mean): --  RR: 22 (25 Oct 2017 04:25) (18 - 22)  SpO2: 97% (25 Oct 2017 08:45) (97% - 99%) on 3lpm     General: Awake and alert. Cooperative. No distress. Appears stated age 	  HEENT:   Atraumatic. Normocephalic. Anicteric. Normal oral mucosa, PERRL, EOMI. BIPAP mask. Left eye subconjunctival hemorrhage resolving  Neck: Supple. Trachea midline. Thyroid without enlargement/tenderness/nodules. No carotid bruit. No JVD	  Cardiovascular: Irregularly irregular rate and rhythm. S1 S2 normal. II/VI systolic murmur  Respiratory: Respirations unlabored. Decreased breath sounds at bases with dullness ~ 1/4 up. Kyphoscoliosis  Abdomen: Soft. Non-tender. Non-distended. No organomegaly. No masses. Normal bowel sounds	  Extremities: Warm to touch. No clubbing or cyanosis. Mild to moderate lower extremity edema  Pulses: 2+ peripheral pulses all extremities	  Skin: Normal skin color. No rashes or lesions. No ecchymoses, No cyanosis. Warm to touch  Lymph Nodes: Cervical, supraclavicular and axillary nodes normal  Neurological: Awake and alert. Moving all extremities. A and O x 3  Psychiatry: Calm        LABS:                        8.9    8.8   )-----------( 222      ( 25 Oct 2017 11:18 )             28.5                         7.8    7.74  )-----------( 211      ( 25 Oct 2017 07:26 )             26.7     10    139  |  102  |  44<H>  ----------------------------<  135<H>  5.3   |  29  |  1.57<H>    10-25    139  |  103  |  45<H>  ----------------------------<  103<H>  5.5<H>   |  28  |  1.45<H>    Ca      8.8      10-25    Ca      9.0      10-25    Phos    2.9     10-24      Mg       2.2     10-24    PT/INR - ( 25 Oct 2017 07:26 )   PT: 26.0 sec;   INR: 2.26 ratio      ABG - ( 25 Oct 2017 06:51 )  pH: 7.34  /  pCO2: 50    /  pO2: 91    / HCO3: 26    / Base Excess: .8    /  SaO2: 98        ABG - ( 23 Oct 2017 17:58 )  pH: 7.34  /  pCO2: 49    /  pO2: 106   / HCO3: 26    / Base Excess: .5    /  SaO2: 99        ABG - ( 20 Oct 2017 06:32 )  pH: 7.28  /  pCO2: 59    /  pO2: 198   / HCO3: 27    / Base Excess: .4    /  SaO2: 100       ABG - ( 19 Oct 2017 07:20 )  pH: 7.25  /  pCO2: 60    /  pO2: 151   / HCO3: 26    / Base Excess: -1.4  /  SaO2: 99        ABG - ( 18 Oct 2017 14:35 )  pH: 7.30  /  pCO2: 58    /  pO2: 143   / HCO3: 28    / Base Excess: 1.3   /  SaO2: 100       < from: Transthoracic Echocardiogram w/Doppler (12 @ 13:31) >    Patient name: OSCAR LOPEZ  YOB: 1929   Age: 82 (F)   MR#: [id number]  Study Date: 3/14/2012  Location: [place] [id number]: Jada Lagunas RDCS  Study quality: Technically fair  Referring Physician: Pro Tracy MD  Blood Pressure: 124/51 mmHg  Height: 5ft 3in  Weight: 164 lb  BSA: 1.8 m2  ------------------------------------------------------------------------  PROCEDURE: Transthoracic echocardiogram with 2-D, M-Mode  and complete spectral and color flow Doppler.  INDICATION: Aortic Stenosis (424.1), Atrial Fibrillation  (427.31)  ------------------------------------------------------------------------  Dimensions:    Normal Values:  LA:     3.1    2.0 - 4.0 cm  Ao:     3.1    2.0 - 3.8 cm  SEPTUM: 1.2    0.6 - 1.2cm  PWT:    1.3    0.6 - 1.1 cm  LVIDd:  4.3    3.0 - 5.6 cm  LVIDs:  2.6    1.8 - 4.0 cm  Derived variables:  LVMI: 110 g/m2  RWT: 0.60  Fractional short: 40 %  Ejection Fraction: >70 %  Doppler Peak Velocity (m/sec): AoV=4.0  ------------------------------------------------------------------------  Observations:  Mitral Valve: Mitral annular calcification and calcified  mitral leaflets with normal diastolic opening. Minimal  mitral regurgitation.  Aortic Valve/Aorta: Heavily calcified trileaflet aortic  valve with decreased opening. Peak transaortic valve  gradient equals 62 mm Hg, mean transaortic valve gradient  equals 37 mm Hg, estimated aortic valve area equals 0.9  sqcm (by continuity equation), consistent with severe  aortic stenosis .Mild-moderate aortic regurgitation.  Pressure halft - time is 361 ms.  Normal aortic root.  Left Atrium: Severely dilated left atrium.  LA volume index  = 51 cc/m2.  Left Ventricle: Hyperdynamic left ventricle. Mild to  moderate concentric left ventricular hypertrophy. Atrial  fibrillation precludes a comprehensive diastolic  assessment. However, findings are suggestive of moderate  diastolic dysfunction.  Right Heart: Severe right atrial enlargement. RA volume  index = 53 cc/m2.  Normal right ventricular size and  function. Normal tricuspid valve. Mild-moderate tricuspid  regurgitation. Normal pulmonic valve. Minimal pulmonic  regurgitation.  Pericardium/Pleura: Normal pericardium with no pericardial  effusion.  Hemodynamic: Estimated right atrial pressure is 5 mm Hg.  Estimated right ventricular systolic pressure equals 46 mm  Hg, assuming right atrial pressure equals 5 mm Hg,  consistent with mild pulmonary hypertension.  ------------------------------------------------------------------------  Conclusions:  1. Mitral annular calcification and calcified mitral  leaflets with normal diastolic opening. Minimal mitral  regurgitation.  2. Heavily calcified trileaflet aortic valve with decreased  opening. Peak transaortic valve gradientequals 62 mm Hg,  mean transaortic valve gradient equals 37 mm Hg, estimated  aortic valve area equals 0.9 sqcm (by continuity equation),  consistent with severe aortic stenosis. Mild-moderate  aortic regurgitation. Pressure halft - time is 361 ms.  3. Severely dilated left atrium.  LA volume index = 51  cc/m2.  4. Mild to moderate concentric left ventricular  hypertrophy.  5. Hyperdynamic left ventricle.  6. Atrial fibrillation precludes a comprehensive diastolic  assessment. However, findings are suggestive of moderate  diastolic dysfunction.  7. Severe right atrial enlargement. RA volume index = 53  cc/m2.  8. Normal right ventricular size and function.  9. Estimated right ventricular systolic pressure equals 46  mm Hg, assuming right atrial pressure equals 5 mm Hg,  consistent with mild pulmonary hypertension.  10. Normal tricuspid valve. Mild-moderate tricuspid  regurgitation.  *** No previous Echo exam.  ------------------------------------------------------------------------    MICROBIOLOGY:     Color: Yellow / Appearance: SL Turbid / S.012 / pH: x  Gluc: x / Ketone: Negative  / Bili: Negative / Urobili: Negative   Blood: x / Protein: 30 mg/dL / Nitrite: Negative   Leuk Esterase: Large / RBC: 0-2 /HPF / WBC 26-50 /HPF   Sq Epi: x / Non Sq Epi: OCC /HPF / Bacteria: Few /HPF    Culture - Urine (10.18.17 @ 00:32)    Specimen Source: .Urine Catheterized    Culture Results:   >100,000 CFU/ml Klebsiella pneumoniae    Culture - Blood (10.17.17 @ 15:38)    Specimen Source: .Blood Blood-Peripheral    Culture Results:   No growth to date.    RADIOLOGY:  [x] Chest radiographs reviewed and interpreted by me    < from: CT Chest No Cont (10.24.17 @ 12:18) >    EXAM:  CT CHEST                            PROCEDURE DATE:  10/24/2017            INTERPRETATION:  CLINICAL INFORMATION: Acute renal failure due to   congestive heart failure. Chest congestion. Wheezing.    COMPARISON: CT chest from 10/17/2017.    PROCEDURE:   CT of the Chest was performed without intravenous contrast.  Sagittal and coronal reformats were performed.      FINDINGS:    CHEST:     LUNGS AND LARGE AIRWAYS: Patent central airways. Bilateral interlobular   septal thickening and bibasilar compressive atelectasis. Slight increased   aeration of the right lower lobe. 2 mm right upper lobe pulmonary nodule,   unchanged since .    PLEURA: Multiple loculated small to moderate right pleural effusion;   loculated pleural effusion along the fissure. Small left pleural effusion.    VESSELS: Atherosclerosis of thoracic aorta and coronary arteries.     HEART: Heart size is enlarged. Left atrial wall calcification. Mitral   valve annular calcification. Status post transcatheter aorticvalve   repair. No pericardial effusion.    MEDIASTINUM AND CANDI: No lymphadenopathy.    CHEST WALL AND LOWER NECK: Surgical clips on the right chest wall/axilla.   Extensive subcutaneous edema.    VISUALIZED UPPER ABDOMEN: Multiple bilateral renal cysts; interval growth   of left renal cysts compared to CT abdomen pelvis from 2012.   Descending colon diverticulosis.    BONES: Degenerative changes of spine.      IMPRESSION:     Multiloculated small to moderate right pleural effusions with right   basilar compressive atelectasis, with slight increased aeration of the   right lower lobe.  Left small pleural effusion with compressive atelectasis, grossly   unchanged.  Interlobular septal thickening consistent with pulmonary edema.  Cardiomegaly.      MIHIR SIBLEY M.D., RADIOLOGY RESIDENT  This document has been electronically signed.  BLANK WONG M.D. ATTENDING RADIOLOGIST  This document has been electronically signed. Oct 24 2017  3:23PM      < end of copied text >  ---------------------------------------------------------------------------------------------------------  < from: US Renal (10.20.17 @ 14:28) >    EXAM:  US KIDNEY(S)                            PROCEDURE DATE:  10/20/2017        INTERPRETATION:  CLINICAL INFORMATION: NANCY    COMPARISON: CT abdomen 2012    TECHNIQUE: Sonography of the kidneys and bladder.     FINDINGS:    Right kidney: 9.2 cm. echogenic cortex. No renal mass, hydronephrosis or   calculi. Multiple simple cysts the largest is at the upper pole and   measures 2.7 x 2.9 x 2.3 cm.    Left kidney:  10.1 cm. the genic cortex. No renal mass, hydronephrosis or   calculi. Multiple cysts. The largest at the upper pole measures 2.0 x 2.2   x 2.4 cm. This has some internal complexity not well evaluated. Recommend   follow-up in3- 6 months to assess for change.    Urinary bladder: Collapsed around a Gautam catheter limiting evaluation.    IMPRESSION:     Increased renal cortical echogenicity bilaterally, which can be seen in   medical renal disease.    Left slightly complex renal cyst not well evaluated on this exam.   Consider follow-up exam in 3-6 months to assess for change.    MADHU FAULKNER M.D., ATTENDING RADIOLOGIST  This document has been electronically signed. Oct 20 2017  2:01PM     < end of copied text >  ---------------------------------------------------------------------------------------------------------  < from: CT Chest No Cont (10.17.17 @ 21:38) >    EXAM:  CT CHEST                          PROCEDURE DATE:  10/17/2017      INTERPRETATION:  Clinical information: Evaluate for pneumonia. Exam is   compared to previous study of 4/3/2012.    CT scan of the chest was obtained without administration of intravenous   contrast.    Surgical clips are noted in the right axilla.    No hilar and/or mediastinal adenopathy is noted.     Heart is markedly enlarged in size. Calcification the coronary arteries   is noted. Patient is status post TAVR.No pericardial effusion is noted.   Main pulmonary artery is dilated and measures 3.6 cm.     No endobronchial lesions are noted. Compressive atelectasis is noted   involving portions of both lower lobes. This is secondary to small   bilateral pleural effusions, right more than left.    Below the diaphragm, visualized portions of the abdomen demonstrate   low-attenuation within both kidneys which are too small to be adequately   characterized on this exam.     Degenerative changes of the spine are noted. Subcutaneous edema is noted.    Impression: There is no pneumonia.    Bilateral pleural effusions, right more than left.    MAYI MORALES M.D., ATTENDING RADIOLOGIST  This document has been electronically signed. Oct 18 2017  9:24AM      < end of copied text >  ---------------------------------------------------------------------------------------------------------  < from: CT Head No Cont (10.17.17 @ 21:38) >    EXAM:  CT BRAIN                            PROCEDURE DATE:  10/17/2017        INTERPRETATION:  HISTORY: Uterine cancer. Altered mental status.    COMPARISON: MRI brain performed 2012.    TECHNIQUE: Axial noncontrast CT images from the skull base to the vertex   were obtained and submitted for interpretation. Coronal and sagittal   reformatted images were performed. Bone and soft tissue windows were   evaluated.    FINDINGS:     There is no acute intracranial mass-effect, hemorrhage, midline shift, or   abnormal extra-axial fluid collection.     Ventricles, sulci, and cisterns are normal in size for the patient's age   without hydrocephalus. The basal cisterns are patent.     Patchy and confluent regions of periventricular and deep cerebral white   matter hypoattenuation likely reflects chronic microangiopathic ischemic   changes. Atheromatous calcifications along the carotid siphons are   present.    Minimal right maxillary sinus mucosal thickening. No evidence for acute   paranasal sinus or mastoid inflammatory changes.. The calvarium is   intact.     IMPRESSION:     No acute intracranial bleeding, mass effect, or evidence of an acute   territorial infarct.    ADRIEN MORRIS M.D., RADIOLOGY RESIDENT  This document has been electronically signed.  VINI METZGER M.D., ATTENDING RADIOLOGIST  This document has been electronically signed. Oct 18 2017  9:43AM      < end of copied text >  ---------------------------------------------------------------------------------------------------------

## 2017-10-25 NOTE — PROGRESS NOTE ADULT - SUBJECTIVE AND OBJECTIVE BOX
[facility] [phone]    Patient seen and examined; she is in NAD on NC, refusing BiPAP.     MEDICATIONS  (STANDING):  ALBUTerol/ipratropium for Nebulization 3 milliLiter(s) Nebulizer every 6 hours  ascorbic acid 500 milliGRAM(s) Oral daily  atorvastatin 10 milliGRAM(s) Oral at bedtime  buDESOnide   0.5 milliGRAM(s) Respule 0.5 milliGRAM(s) Inhalation every 12 hours  clopidogrel Tablet 75 milliGRAM(s) Oral daily  diltiazem    milliGRAM(s) Oral daily  ferrous    sulfate 325 milliGRAM(s) Oral daily  furosemide    Tablet 40 milliGRAM(s) Oral daily  influenza   Vaccine 0.5 milliLiter(s) IntraMuscular once  metoprolol 12.5 milliGRAM(s) Oral two times a day  multivitamin 1 Tablet(s) Oral daily    VITAL:  T(C): , Max: 36.9 (10-25-17 @ 04:25)  T(F): , Max: 98.4 (10-25-17 @ 04:25)  HR: 95 (10-25-17 @ 08:45)  BP: 136/64 (10-25-17 @ 04:25)  BP(mean): --  RR: 22 (10-25-17 @ 04:25)  SpO2: 97% (10-25-17 @ 08:45)  Wt(kg): --  I and O's:    10-24 @ 07:01  -  10-25 @ 07:00  --------------------------------------------------------  IN: 960 mL / OUT: 850 mL / NET: 110 mL    10-25 @ 07:01  -  10-25 @ 14:36  --------------------------------------------------------  IN: 480 mL / OUT: 500 mL / NET: -20 mL    REVIEW OF SYSTEMS:  CONSTITUTIONAL: No fevers or chills  RESPIRATORY: No dyspnea at rest  CARDIOVASCULAR: No CP or palpitations  GASTROINTESTINAL: No abd pain, n/v/d/constipation  GENITOURINARY: No dysuria, frequency or hematuria  NEUROLOGICAL: No numbness or weakness  All other review of systems is negative unless indicated above.    PHYSICAL EXAM:  Constitutional: NAD  Respiratory: diminished at bases B/L  Cardiovascular: S1 and S2  Gastrointestinal: BS+, soft, NT/ND  Extremities: + edema  Neurological: AAO x 3  : No Gautam  Access: Not applicable    LABS:                        8.9    8.8   )-----------( 222      ( 25 Oct 2017 11:18 )             28.5     10-25    139  |  102  |  44<H>  ----------------------------<  135<H>  5.3   |  29  |  1.57<H>    Ca    8.8      25 Oct 2017 11:18  Phos  2.9     10-24  Mg     2.2     10-24 [facility] [phone]    Patient seen and examined; she is in NAD on NC, refusing BiPAP. Awaiting PRBC transfusion	    MEDICATIONS  (STANDING):  ALBUTerol/ipratropium for Nebulization 3 milliLiter(s) Nebulizer every 6 hours  ascorbic acid 500 milliGRAM(s) Oral daily  atorvastatin 10 milliGRAM(s) Oral at bedtime  buDESOnide   0.5 milliGRAM(s) Respule 0.5 milliGRAM(s) Inhalation every 12 hours  clopidogrel Tablet 75 milliGRAM(s) Oral daily  diltiazem    milliGRAM(s) Oral daily  ferrous    sulfate 325 milliGRAM(s) Oral daily  furosemide    Tablet 40 milliGRAM(s) Oral daily  influenza   Vaccine 0.5 milliLiter(s) IntraMuscular once  metoprolol 12.5 milliGRAM(s) Oral two times a day  multivitamin 1 Tablet(s) Oral daily    VITAL:  T(C): , Max: 36.9 (10-25-17 @ 04:25)  T(F): , Max: 98.4 (10-25-17 @ 04:25)  HR: 95 (10-25-17 @ 08:45)  BP: 136/64 (10-25-17 @ 04:25)  BP(mean): --  RR: 22 (10-25-17 @ 04:25)  SpO2: 97% (10-25-17 @ 08:45)  Wt(kg): --  I and O's:    10-24 @ 07:01  -  10-25 @ 07:00  --------------------------------------------------------  IN: 960 mL / OUT: 850 mL / NET: 110 mL    10-25 @ 07:01  -  10-25 @ 14:36  --------------------------------------------------------  IN: 480 mL / OUT: 500 mL / NET: -20 mL    REVIEW OF SYSTEMS:  CONSTITUTIONAL: No fevers or chills  RESPIRATORY: No dyspnea at rest  CARDIOVASCULAR: No CP or palpitations  GASTROINTESTINAL: No abd pain, n/v/d/constipation  GENITOURINARY: No dysuria, frequency or hematuria  NEUROLOGICAL: No numbness or weakness  All other review of systems is negative unless indicated above.    PHYSICAL EXAM:  Constitutional: NAD  Respiratory: diminished at bases B/L  Cardiovascular: S1 and S2  Gastrointestinal: BS+, soft, NT/ND  Extremities: + edema  Neurological: AAO x 3  : No Gautam  Access: Not applicable    LABS:                        8.9    8.8   )-----------( 222      ( 25 Oct 2017 11:18 )             28.5     10-25    139  |  102  |  44<H>  ----------------------------<  135<H>  5.3   |  29  |  1.57<H>    Ca    8.8      25 Oct 2017 11:18  Phos  2.9     10-24  Mg     2.2     10-24

## 2017-10-25 NOTE — CONSULT NOTE ADULT - ASSESSMENT
87F with PMHx of right breast CA S/P lumpectomy in 2000, Uterine Ca S/P total hysterectomy in 2012, Skin CA S/P excision of bilateral lower eyelid skin CA, cholecystectomy, Osteoarthritis of bilateral knees, HTN, hyperlipidemia, paroxysmal Afib on coumadin, severe aortic stenosis now s/p TAVR 2 years ago, who presents with onset of generalized weakness x 2 weeks with hospital course c/b UTI and hypercarbic respiratory failure now with AFib with bradycardia.    -- would hold diltiazem and continue metoprolol for the time being  -- will discuss with EP need for PPM in the morning    Jhon Avila MD

## 2017-10-25 NOTE — CONSULT NOTE ADULT - CONSULT REQUESTED DATE/TIME
16-Oct-2017 16:23
17-Oct-2017 13:52
18-Oct-2017 09:26
19-Oct-2017 15:22
20-Oct-2017 13:49
25-Oct-2017 20:05
20-Oct-2017 17:01

## 2017-10-25 NOTE — PROGRESS NOTE ADULT - ASSESSMENT
A/P: 87 year old female with PMHx of right breast CA S/P lumpectomy in 2000, Uterine Ca S/P total hysterectomy in 2012, Skin CA S/P excision of bilateral lower eyelid skin CA, cholecystectomy, Osteoarthritis of bilateral knees, HTN, hyperlipidemia, paroxysmal Afib on coumadin, severe aortic stenosis now s/p TAVR, who presents with generalized weakness for 2 weeks.  Patient reports that over the last two weeks she had noticed increased weakness, episodes of shortness of breath at rest.  No chest pain. No fevers, no chills, no nausea or vomiting.  Pt with episodes of lethargy. Requires BiPAP for respiratory acidosis.  Noted to have pyuria and sensitive  K.pneumo in UC.  Completed Ceftriaxone for UTI.  Pt is afebrile. No leukocytosis.  Urinating without Gautam.  Continue off ABs.

## 2017-10-25 NOTE — PROGRESS NOTE ADULT - ASSESSMENT
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 BIPAP has been unsuccessful in treating the patient's advanced disease processes. I am ordering NIV to be used for sleep, during daytime naps and as needed during the day for periods of shortness of breath and increased work of breathing. Without NIV, I am concerned that the patient will continue to experience further clinical deterioration resulting in possible medical harm and recurrent hospitalizations  albuterol/atrovent/pulmicort nebs  renal follow-up noted noted - oleary catheter discontinued - observe off fluids and diuretics  cardiac meds: lipitor/plavix/cardizem CD/metoprolol/coumadin for INR 2.5 - 3.5 - hold ACE inhibitors - cardiology follow-up re bouts of bradycardia  s/p course of ceftriaxone  await ECHO results  transfuse 1 unit PRBCs    Will follow with you; d/w dedicated floor NP     Tommy Reyes MD, [facility] - [phone]  Pulmonary Medicine

## 2017-10-25 NOTE — PROGRESS NOTE ADULT - SUBJECTIVE AND OBJECTIVE BOX
Afebrile. No leukocytosis.  Urinating without Gautma.      Allergies    No Known Allergies    Intolerances    digoxin (Rash; Drowsiness)      MEDICATIONS  (STANDING):  ALBUTerol/ipratropium for Nebulization 3 milliLiter(s) Nebulizer every 6 hours  ascorbic acid 500 milliGRAM(s) Oral daily  atorvastatin 10 milliGRAM(s) Oral at bedtime  buDESOnide   0.5 milliGRAM(s) Respule 0.5 milliGRAM(s) Inhalation every 12 hours  clopidogrel Tablet 75 milliGRAM(s) Oral daily  diltiazem    milliGRAM(s) Oral daily  ferrous    sulfate 325 milliGRAM(s) Oral daily  furosemide    Tablet 40 milliGRAM(s) Oral daily  influenza   Vaccine 0.5 milliLiter(s) IntraMuscular once  metoprolol 12.5 milliGRAM(s) Oral two times a day  Nephrocaps 1 Capsule(s) Oral daily  warfarin 1 milliGRAM(s) Oral once        LABS:  CBC Full  -  ( 25 Oct 2017 11:18 )  WBC Count : 8.8 K/uL  Hemoglobin : 8.9 g/dL  Hematocrit : 28.5 %  Platelet Count - Automated : 222 K/uL  Mean Cell Volume : 96.8 fl  Mean Cell Hemoglobin : 30.3 pg  Mean Cell Hemoglobin Concentration : 31.3 gm/dL  Auto Neutrophil # : x  Auto Lymphocyte # : x  Auto Monocyte # : x  Auto Eosinophil # : x  Auto Basophil # : x  Auto Neutrophil % : x  Auto Lymphocyte % : x  Auto Monocyte % : x  Auto Eosinophil % : x  Auto Basophil % : x    10-25    139  |  102  |  44<H>  ----------------------------<  135<H>  5.3   |  29  |  1.57<H>    Ca    8.8      25 Oct 2017 11:18  Phos  2.9     10-24  Mg     2.2     10-24      PT/INR - ( 25 Oct 2017 07:26 )   PT: 26.0 sec;   INR: 2.26 ratio           ABG - ( 25 Oct 2017 06:51 )  pH: 7.34  /  pCO2: 50    /  pO2: 91    / HCO3: 26    / Base Excess: .8    /  SaO2: 98            Blood Gas Venous - Lactate: 1.4 mmoL/L (10-16 @ 00:15)      Vital Signs:    Vital Signs Last 24 Hrs  T(C): 37.1 (25 Oct 2017 14:47), Max: 37.1 (25 Oct 2017 14:47)  T(F): 98.7 (25 Oct 2017 14:47), Max: 98.7 (25 Oct 2017 14:47)  HR: 70 (25 Oct 2017 14:47) (64 - 95)  BP: 123/71 (25 Oct 2017 14:47) (123/71 - 138/68)  BP(mean): --  RR: 20 (25 Oct 2017 14:47) (18 - 22)  SpO2: 96% (25 Oct 2017 14:47) (96% - 99%)

## 2017-10-26 LAB
ANION GAP SERPL CALC-SCNC: 9 MMOL/L — SIGNIFICANT CHANGE UP (ref 5–17)
BASE EXCESS BLDA CALC-SCNC: 1.8 MMOL/L — SIGNIFICANT CHANGE UP (ref -2–2)
BUN SERPL-MCNC: 45 MG/DL — HIGH (ref 7–23)
CALCIUM SERPL-MCNC: 8.5 MG/DL — SIGNIFICANT CHANGE UP (ref 8.4–10.5)
CHLORIDE SERPL-SCNC: 104 MMOL/L — SIGNIFICANT CHANGE UP (ref 96–108)
CO2 BLDA-SCNC: 28 MMOL/L — SIGNIFICANT CHANGE UP (ref 22–30)
CO2 SERPL-SCNC: 27 MMOL/L — SIGNIFICANT CHANGE UP (ref 22–31)
CREAT SERPL-MCNC: 1.53 MG/DL — HIGH (ref 0.5–1.3)
GLUCOSE SERPL-MCNC: 92 MG/DL — SIGNIFICANT CHANGE UP (ref 70–99)
HCO3 BLDA-SCNC: 27 MMOL/L — SIGNIFICANT CHANGE UP (ref 21–29)
HCT VFR BLD CALC: 23.5 % — LOW (ref 34.5–45)
HCT VFR BLD CALC: 26.4 % — LOW (ref 34.5–45)
HGB BLD-MCNC: 7.3 G/DL — LOW (ref 11.5–15.5)
HGB BLD-MCNC: 8.4 G/DL — LOW (ref 11.5–15.5)
INR BLD: 3.72 RATIO — HIGH (ref 0.88–1.16)
MCHC RBC-ENTMCNC: 28.7 PG — SIGNIFICANT CHANGE UP (ref 27–34)
MCHC RBC-ENTMCNC: 30.4 PG — SIGNIFICANT CHANGE UP (ref 27–34)
MCHC RBC-ENTMCNC: 31.1 GM/DL — LOW (ref 32–36)
MCHC RBC-ENTMCNC: 31.6 GM/DL — LOW (ref 32–36)
MCV RBC AUTO: 92.5 FL — SIGNIFICANT CHANGE UP (ref 80–100)
MCV RBC AUTO: 96.4 FL — SIGNIFICANT CHANGE UP (ref 80–100)
PCO2 BLDA: 47 MMHG — HIGH (ref 32–46)
PH BLDA: 7.38 — SIGNIFICANT CHANGE UP (ref 7.35–7.45)
PLATELET # BLD AUTO: 200 K/UL — SIGNIFICANT CHANGE UP (ref 150–400)
PLATELET # BLD AUTO: 204 K/UL — SIGNIFICANT CHANGE UP (ref 150–400)
PO2 BLDA: 95 MMHG — SIGNIFICANT CHANGE UP (ref 74–108)
POTASSIUM SERPL-MCNC: 4.9 MMOL/L — SIGNIFICANT CHANGE UP (ref 3.5–5.3)
POTASSIUM SERPL-SCNC: 4.9 MMOL/L — SIGNIFICANT CHANGE UP (ref 3.5–5.3)
PROTHROM AB SERPL-ACNC: 43.2 SEC — HIGH (ref 10–13.1)
RBC # BLD: 2.54 M/UL — LOW (ref 3.8–5.2)
RBC # BLD: 2.74 M/UL — LOW (ref 3.8–5.2)
RBC # FLD: 15.9 % — HIGH (ref 10.3–14.5)
RBC # FLD: 18.3 % — HIGH (ref 10.3–14.5)
SAO2 % BLDA: 98 % — HIGH (ref 92–96)
SODIUM SERPL-SCNC: 140 MMOL/L — SIGNIFICANT CHANGE UP (ref 135–145)
WBC # BLD: 5.48 K/UL — SIGNIFICANT CHANGE UP (ref 3.8–10.5)
WBC # BLD: 6.4 K/UL — SIGNIFICANT CHANGE UP (ref 3.8–10.5)
WBC # FLD AUTO: 5.48 K/UL — SIGNIFICANT CHANGE UP (ref 3.8–10.5)
WBC # FLD AUTO: 6.4 K/UL — SIGNIFICANT CHANGE UP (ref 3.8–10.5)

## 2017-10-26 PROCEDURE — 99223 1ST HOSP IP/OBS HIGH 75: CPT | Mod: AI

## 2017-10-26 RX ORDER — SENNA PLUS 8.6 MG/1
2 TABLET ORAL AT BEDTIME
Qty: 0 | Refills: 0 | Status: DISCONTINUED | OUTPATIENT
Start: 2017-10-26 | End: 2017-11-01

## 2017-10-26 RX ORDER — QUETIAPINE FUMARATE 200 MG/1
12.5 TABLET, FILM COATED ORAL
Qty: 0 | Refills: 0 | Status: DISCONTINUED | OUTPATIENT
Start: 2017-10-26 | End: 2017-11-01

## 2017-10-26 RX ORDER — DOCUSATE SODIUM 100 MG
100 CAPSULE ORAL THREE TIMES A DAY
Qty: 0 | Refills: 0 | Status: DISCONTINUED | OUTPATIENT
Start: 2017-10-26 | End: 2017-11-01

## 2017-10-26 RX ADMIN — SENNA PLUS 2 TABLET(S): 8.6 TABLET ORAL at 21:39

## 2017-10-26 RX ADMIN — Medication 0.5 MILLIGRAM(S): at 05:30

## 2017-10-26 RX ADMIN — Medication 325 MILLIGRAM(S): at 11:18

## 2017-10-26 RX ADMIN — Medication 500 MILLIGRAM(S): at 11:18

## 2017-10-26 RX ADMIN — Medication 40 MILLIGRAM(S): at 05:30

## 2017-10-26 RX ADMIN — Medication 12.5 MILLIGRAM(S): at 05:30

## 2017-10-26 RX ADMIN — Medication 180 MILLIGRAM(S): at 05:30

## 2017-10-26 RX ADMIN — Medication 3 MILLILITER(S): at 23:05

## 2017-10-26 RX ADMIN — Medication 0.5 MILLIGRAM(S): at 17:09

## 2017-10-26 RX ADMIN — Medication 100 MILLIGRAM(S): at 18:48

## 2017-10-26 RX ADMIN — CLOPIDOGREL BISULFATE 75 MILLIGRAM(S): 75 TABLET, FILM COATED ORAL at 11:17

## 2017-10-26 RX ADMIN — Medication 12.5 MILLIGRAM(S): at 17:09

## 2017-10-26 RX ADMIN — Medication 3 MILLILITER(S): at 11:17

## 2017-10-26 RX ADMIN — Medication 3 MILLILITER(S): at 17:09

## 2017-10-26 RX ADMIN — ATORVASTATIN CALCIUM 10 MILLIGRAM(S): 80 TABLET, FILM COATED ORAL at 21:39

## 2017-10-26 RX ADMIN — Medication 1 CAPSULE(S): at 11:18

## 2017-10-26 RX ADMIN — Medication 3 MILLILITER(S): at 05:31

## 2017-10-26 NOTE — PROGRESS NOTE ADULT - SUBJECTIVE AND OBJECTIVE BOX
[facility] [phone]    Patient seen and examined, she has no complaints.    MEDICATIONS  (STANDING):  ALBUTerol/ipratropium for Nebulization 3 milliLiter(s) Nebulizer every 6 hours  ascorbic acid 500 milliGRAM(s) Oral daily  atorvastatin 10 milliGRAM(s) Oral at bedtime  buDESOnide   0.5 milliGRAM(s) Respule 0.5 milliGRAM(s) Inhalation every 12 hours  clopidogrel Tablet 75 milliGRAM(s) Oral daily  ferrous    sulfate 325 milliGRAM(s) Oral daily  furosemide    Tablet 40 milliGRAM(s) Oral daily  influenza   Vaccine 0.5 milliLiter(s) IntraMuscular once  metoprolol 12.5 milliGRAM(s) Oral two times a day  Nephrocaps 1 Capsule(s) Oral daily  QUEtiapine 12.5 milliGRAM(s) Oral <User Schedule>    VITAL:  T(C): , Max: 37.1 (10-25-17 @ 14:47)  T(F): , Max: 98.7 (10-25-17 @ 14:47)  HR: 51 (10-26-17 @ 13:24)  BP: 141/67 (10-26-17 @ 13:24)  BP(mean): --  RR: 18 (10-26-17 @ 13:24)  SpO2: 100% (10-26-17 @ 13:24)  Wt(kg): --  I and O's:    10-25 @ 07:01  -  10-26 @ 07:00  --------------------------------------------------------  IN: 840 mL / OUT: 1400 mL / NET: -560 mL    10-26 @ 07:01  -  10-26 @ 14:03  --------------------------------------------------------  IN: 720 mL / OUT: 750 mL / NET: -30 mL    REVIEW OF SYSTEMS:  CONSTITUTIONAL: No fevers or chills  RESPIRATORY: No SOB/REYES  CARDIOVASCULAR: No CP or palpitations  GASTROINTESTINAL: No abd pain, n/v/d/constipation  GENITOURINARY: No dysuria, frequency or hematuria  NEUROLOGICAL: No numbness or weakness  All other review of systems is negative unless indicated above.    PHYSICAL EXAM:  Constitutional: NAD, on NC  Respiratory: diminished BS at bases  Cardiovascular: S1 and S2  Gastrointestinal: BS+, soft, NT/ND  Extremities: + edema  Neurological: AAO   : No Gautam  Access: Not applicable    LABS:                        8.4    6.4   )-----------( 204      ( 26 Oct 2017 12:35 )             26.4     10-26    140  |  104  |  45<H>  ----------------------------<  92  4.9   |  27  |  1.53<H>    Ca    8.5      26 Oct 2017 07:30

## 2017-10-26 NOTE — PROGRESS NOTE ADULT - SUBJECTIVE AND OBJECTIVE BOX
OSCAR SANDERS:[id number],   87yFemale followed for:  digoxin (Rash; Drowsiness)  No Known Allergies    PAST MEDICAL & SURGICAL HISTORY:  Severe aortic stenosis  Uterine cancer  Arthritis  HTN (hypertension)  Breast cancer diagnosed in 2000: s/p right lumpectomy and right axillary lymph node removal, radiation  AF (atrial fibrillation): on coumadin and aspirin  Dyslipidemia  H/O: hysterectomy: 4/2012  s/p surgical excision of left eye Skin cancer: Bilateral lower eye lids  S/P D&C  S/P cholecystectomy    FAMILY HISTORY:  No pertinent family history in first degree relatives    MEDICATIONS  (STANDING):  ALBUTerol/ipratropium for Nebulization 3 milliLiter(s) Nebulizer every 6 hours  ascorbic acid 500 milliGRAM(s) Oral daily  atorvastatin 10 milliGRAM(s) Oral at bedtime  buDESOnide   0.5 milliGRAM(s) Respule 0.5 milliGRAM(s) Inhalation every 12 hours  clopidogrel Tablet 75 milliGRAM(s) Oral daily  diltiazem    milliGRAM(s) Oral daily  ferrous    sulfate 325 milliGRAM(s) Oral daily  furosemide    Tablet 40 milliGRAM(s) Oral daily  influenza   Vaccine 0.5 milliLiter(s) IntraMuscular once  metoprolol 12.5 milliGRAM(s) Oral two times a day  Nephrocaps 1 Capsule(s) Oral daily    MEDICATIONS  (PRN):      Vital Signs Last 24 Hrs  T(C): 36.5 (26 Oct 2017 04:25), Max: 37.1 (25 Oct 2017 14:47)  T(F): 97.7 (26 Oct 2017 04:25), Max: 98.7 (25 Oct 2017 14:47)  HR: 68 (26 Oct 2017 04:25) (60 - 95)  BP: 125/59 (26 Oct 2017 04:25) (123/71 - 134/64)  BP(mean): --  RR: 20 (26 Oct 2017 04:25) (18 - 20)  SpO2: 100% (26 Oct 2017 04:25) (95% - 100%)  nc/at  s1s2  cta  soft, nt, nd no guarding or rebound  no c/c/e    CBC Full  -  ( 26 Oct 2017 07:40 )  WBC Count : 5.48 K/uL  Hemoglobin : 7.3 g/dL  Hematocrit : 23.5 %  Platelet Count - Automated : 200 K/uL  Mean Cell Volume : 92.5 fl  Mean Cell Hemoglobin : 28.7 pg  Mean Cell Hemoglobin Concentration : 31.1 gm/dL  Auto Neutrophil # : x  Auto Lymphocyte # : x  Auto Monocyte # : x  Auto Eosinophil # : x  Auto Basophil # : x  Auto Neutrophil % : x  Auto Lymphocyte % : x  Auto Monocyte % : x  Auto Eosinophil % : x  Auto Basophil % : x    10-26    140  |  104  |  45<H>  ----------------------------<  92  4.9   |  27  |  1.53<H>    Ca    8.5      26 Oct 2017 07:30      PT/INR - ( 26 Oct 2017 07:28 )   PT: 43.2 sec;   INR: 3.72 ratio

## 2017-10-26 NOTE — PROGRESS NOTE ADULT - GUM GEN PE MLT EXAM PC
Normal genitalia; no lesions; no discharge
Normal genitalia; no lesions; no discharge
detailed exam
Normal genitalia; no lesions; no discharge
detailed exam

## 2017-10-26 NOTE — PROGRESS NOTE ADULT - NEUROLOGICAL
detailed exam
Alert & oriented; no sensory, motor or coordination deficits, normal reflexes

## 2017-10-26 NOTE — PROGRESS NOTE ADULT - ASSESSMENT
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 BIPAP has been unsuccessful in treating the patient's advanced disease processes. I am ordering NIV to be used for sleep, during daytime naps and as needed during the day for periods of shortness of breath and increased work of breathing. Without NIV, I am concerned that the patient will continue to experience further clinical deterioration resulting in possible medical harm and recurrent hospitalizations  albuterol/atrovent/pulmicort nebs  renal follow-up noted noted - oleary catheter discontinued - needs ongoing diuresis  cardiac meds: lipitor/plavix/cardizem CD/metoprolol/coumadin for INR 2.5 - 3.5 - hold ACE inhibitors - cardiology follow-up re bouts of bradycardia  s/p course of ceftriaxone  await ECHO results  transfuse as needed    Will follow with you; d/w dedicated floor NP     Tommy Reyes MD, [facility] - [phone]  Pulmonary Medicine

## 2017-10-26 NOTE — PROGRESS NOTE ADULT - ASSESSMENT
A/P: 87 year old female with PMHx of right breast CA S/P lumpectomy in 2000, Uterine Ca S/P total hysterectomy in 2012, Skin CA S/P excision of bilateral lower eyelid skin CA, cholecystectomy, Osteoarthritis of bilateral knees, HTN, hyperlipidemia, paroxysmal Afib on coumadin, severe aortic stenosis now s/p TAVR, who presents with generalized weakness for 2 week, lethargy, CHF, UTI.  Noted to have pyuria and sensitive  K.pneumo in UC. Tx with Ceftriaxone.  Pt is afebrile. No leukocytosis.  Urinating without Gautam.  Constipating.  Completed AB for UTI.  Continue off ABs.

## 2017-10-26 NOTE — PROGRESS NOTE ADULT - SUBJECTIVE AND OBJECTIVE BOX
[facility] - [facility]     PROGRESS NOTE    CHIEF COMPLAINT: ARF; COPD/emphysema; chronic CHF; CKD/NANCY    INTERVAL HISTORY: Cardizem held by EP service due to bouts of atrial fibrillation with bradycardia in hopes of avoiding need for a pacemaker; awake and alert on nocturnal BIPAP only; sitting in chair eating breakfast; ABG with improving respiratory acidosis; no cough, sputum production, chest congestion or wheeze; no fevers, chills or sweats; no chest pain/pressure or palpitations; s/p antibiotics for UTI; renal function improved with resolved hypernatremia; glucose well controlled;     REVIEW OF SYSTEMS:  Constitutional: As per interval history  HEENT: Within normal limits  CV: As per interval history  Resp: As per interval history  GI: Within normal limits   : Within normal limits  Musculoskeletal: Within normal limits  Skin: Within normal limits  Neurological: Within normal limits  Psychiatric: Within normal limits  Endocrine: Within normal limits  Hematologic/Lymphatic: Within normal limits  Allergic/Immunologic: Within normal limits      MEDICATIONS:     Pulmonary "  ALBUTerol/ipratropium for Nebulization 3 milliLiter(s) Nebulizer every 6 hours  buDESOnide   0.5 milliGRAM(s) Respule 0.5 milliGRAM(s) Inhalation every 12 hours      Anti-microbials:      Cardiovascular:  diltiazem    milliGRAM(s) Oral daily  furosemide    Tablet 40 milliGRAM(s) Oral daily  metoprolol 12.5 milliGRAM(s) Oral two times a day      Other:  ascorbic acid 500 milliGRAM(s) Oral daily  atorvastatin 10 milliGRAM(s) Oral at bedtime  clopidogrel Tablet 75 milliGRAM(s) Oral daily  ferrous    sulfate 325 milliGRAM(s) Oral daily  influenza   Vaccine 0.5 milliLiter(s) IntraMuscular once  Nephrocaps 1 Capsule(s) Oral daily        OBJECTIVE:        I&O's Detail    25 Oct 2017 07:  -  26 Oct 2017 07:00  --------------------------------------------------------  IN:    Oral Fluid: 840 mL  Total IN: 840 mL    OUT:    Indwelling Catheter - Urethral: 250 mL    Voided: 1150 mL  Total OUT: 1400 mL    Total NET: -560 mL      26 Oct 2017 07:  -  26 Oct 2017 09:56  --------------------------------------------------------  IN:    Oral Fluid: 480 mL  Total IN: 480 mL    OUT:    Voided: 200 mL  Total OUT: 200 mL    Total NET: 280 mL      Daily Weight in k.2 (26 Oct 2017 07:33)        PHYSICAL EXAM:       ICU Vital Signs Last 24 Hrs  T(C): 36.5 (26 Oct 2017 04:25), Max: 37.1 (25 Oct 2017 14:47)  T(F): 97.7 (26 Oct 2017 04:25), Max: 98.7 (25 Oct 2017 14:47)  HR: 68 (26 Oct 2017 04:25) (60 - 91)  BP: 125/59 (26 Oct 2017 04:25) (123/71 - 134/64)  BP(mean): --  ABP: --  ABP(mean): --  RR: 20 (26 Oct 2017 04:25) (18 - 20)  SpO2: 100% (26 Oct 2017 04:25) (95% - 100%)     General: Awake and alert. Cooperative. No distress. Appears stated age 	  HEENT:   Atraumatic. Normocephalic. Anicteric. Normal oral mucosa, PERRL, EOMI. Left eye subconjunctival hemorrhage resolving  Neck: Supple. Trachea midline. Thyroid without enlargement/tenderness/nodules. No carotid bruit. No JVD	  Cardiovascular: Irregularly irregular rate and rhythm. S1 S2 normal. II/VI systolic murmur  Respiratory: Respirations unlabored. Decreased breath sounds at bases with dullness ~ 1/4 up. Kyphoscoliosis  Abdomen: Soft. Non-tender. Non-distended. No organomegaly. No masses. Normal bowel sounds	  Extremities: Warm to touch. No clubbing or cyanosis. Mild to moderate lower extremity edema  Pulses: 2+ peripheral pulses all extremities	  Skin: Normal skin color. No rashes or lesions. No ecchymoses, No cyanosis. Warm to touch  Lymph Nodes: Cervical, supraclavicular and axillary nodes normal  Neurological: Awake and alert. Moving all extremities. A and O x 3  Psychiatry: Calm      LABS:                        7.3    5.48  )-----------( 200      ( 26 Oct 2017 07:40 )             23.5                         8.9    8.8   )-----------( 222      ( 25 Oct 2017 11:18 )             28.5     10-    140  |  104  |  45<H>  ----------------------------<  92  4.9   |  27  |  1.53<H>    10    139  |  102  |  44<H>  ----------------------------<  135<H>  5.3   |  29  |  1.57<H>    Ca      8.5      10-26    Ca      8.8      10-25    Phos    2.9     10-24      Mg       2.2     10-24      PT/INR - ( 26 Oct 2017 07:28 )   PT: 43.2 sec;   INR: 3.72 ratio      ABG - ( 25 Oct 2017 06:51 )  pH: 7.34  /  pCO2: 50    /  pO2: 91    / HCO3: 26    / Base Excess: .8    /  SaO2: 98        ABG - ( 23 Oct 2017 17:58 )  pH: 7.34  /  pCO2: 49    /  pO2: 106   / HCO3: 26    / Base Excess: .5    /  SaO2: 99        ABG - ( 20 Oct 2017 06:32 )  pH: 7.28  /  pCO2: 59    /  pO2: 198   / HCO3: 27    / Base Excess: .4    /  SaO2: 100       ABG - ( 19 Oct 2017 07:20 )  pH: 7.25  /  pCO2: 60    /  pO2: 151   / HCO3: 26    / Base Excess: -1.4  /  SaO2: 99        ABG - ( 18 Oct 2017 14:35 )  pH: 7.30  /  pCO2: 58    /  pO2: 143   / HCO3: 28    / Base Excess: 1.3   /  SaO2: 100       < from: Transthoracic Echocardiogram w/Doppler (12 @ 13:31) >    Patient name: OCSAR LOPEZ  YOB: 1929   Age: 82 (F)   MR#: [id number]  Study Date: 3/14/2012  Location: [place] [id number]: Jada Lagunas RDCS  Study quality: Technically fair  Referring Physician: Pro Tracy MD  Blood Pressure: 124/51 mmHg  Height: 5ft 3in  Weight: 164 lb  BSA: 1.8 m2  ------------------------------------------------------------------------  PROCEDURE: Transthoracic echocardiogram with 2-D, M-Mode  and complete spectral and color flow Doppler.  INDICATION: Aortic Stenosis (424.1), Atrial Fibrillation  (427.31)  ------------------------------------------------------------------------  Dimensions:    Normal Values:  LA:     3.1    2.0 - 4.0 cm  Ao:     3.1    2.0 - 3.8 cm  SEPTUM: 1.2    0.6 - 1.2cm  PWT:    1.3    0.6 - 1.1 cm  LVIDd:  4.3    3.0 - 5.6 cm  LVIDs:  2.6    1.8 - 4.0 cm  Derived variables:  LVMI: 110 g/m2  RWT: 0.60  Fractional short: 40 %  Ejection Fraction: >70 %  Doppler Peak Velocity (m/sec): AoV=4.0  ------------------------------------------------------------------------  Observations:  Mitral Valve: Mitral annular calcification and calcified  mitral leaflets with normal diastolic opening. Minimal  mitral regurgitation.  Aortic Valve/Aorta: Heavily calcified trileaflet aortic  valve with decreased opening. Peak transaortic valve  gradient equals 62 mm Hg, mean transaortic valve gradient  equals 37 mm Hg, estimated aortic valve area equals 0.9  sqcm (by continuity equation), consistent with severe  aortic stenosis .Mild-moderate aortic regurgitation.  Pressure halft - time is 361 ms.  Normal aortic root.  Left Atrium: Severely dilated left atrium.  LA volume index  = 51 cc/m2.  Left Ventricle: Hyperdynamic left ventricle. Mild to  moderate concentric left ventricular hypertrophy. Atrial  fibrillation precludes a comprehensive diastolic  assessment. However, findings are suggestive of moderate  diastolic dysfunction.  Right Heart: Severe right atrial enlargement. RA volume  index = 53 cc/m2.  Normal right ventricular size and  function. Normal tricuspid valve. Mild-moderate tricuspid  regurgitation. Normal pulmonic valve. Minimal pulmonic  regurgitation.  Pericardium/Pleura: Normal pericardium with no pericardial  effusion.  Hemodynamic: Estimated right atrial pressure is 5 mm Hg.  Estimated right ventricular systolic pressure equals 46 mm  Hg, assuming right atrial pressure equals 5 mm Hg,  consistent with mild pulmonary hypertension.  ------------------------------------------------------------------------  Conclusions:  1. Mitral annular calcification and calcified mitral  leaflets with normal diastolic opening. Minimal mitral  regurgitation.  2. Heavily calcified trileaflet aortic valve with decreased  opening. Peak transaortic valve gradientequals 62 mm Hg,  mean transaortic valve gradient equals 37 mm Hg, estimated  aortic valve area equals 0.9 sqcm (by continuity equation),  consistent with severe aortic stenosis. Mild-moderate  aortic regurgitation. Pressure halft - time is 361 ms.  3. Severely dilated left atrium.  LA volume index = 51  cc/m2.  4. Mild to moderate concentric left ventricular  hypertrophy.  5. Hyperdynamic left ventricle.  6. Atrial fibrillation precludes a comprehensive diastolic  assessment. However, findings are suggestive of moderate  diastolic dysfunction.  7. Severe right atrial enlargement. RA volume index = 53  cc/m2.  8. Normal right ventricular size and function.  9. Estimated right ventricular systolic pressure equals 46  mm Hg, assuming right atrial pressure equals 5 mm Hg,  consistent with mild pulmonary hypertension.  10. Normal tricuspid valve. Mild-moderate tricuspid  regurgitation.  *** No previous Echo exam.  ------------------------------------------------------------------------  MICROBIOLOGY:     Color: Yellow / Appearance: SL Turbid / S.012 / pH: x  Gluc: x / Ketone: Negative  / Bili: Negative / Urobili: Negative   Blood: x / Protein: 30 mg/dL / Nitrite: Negative   Leuk Esterase: Large / RBC: 0-2 /HPF / WBC 26-50 /HPF   Sq Epi: x / Non Sq Epi: OCC /HPF / Bacteria: Few /HPF    Culture - Urine (10.18.17 @ 00:32)    Specimen Source: .Urine Catheterized    Culture Results:   >100,000 CFU/ml Klebsiella pneumoniae    Culture - Blood (10.17.17 @ 15:38)    Specimen Source: .Blood Blood-Peripheral    Culture Results:   No growth to date.    RADIOLOGY:  [x] Chest radiographs reviewed and interpreted by me    < from: CT Chest No Cont (10.24.17 @ 12:18) >    EXAM:  CT CHEST                            PROCEDURE DATE:  10/24/2017      INTERPRETATION:  CLINICAL INFORMATION: Acute renal failure due to   congestive heart failure. Chest congestion. Wheezing.    COMPARISON: CT chest from 10/17/2017.    PROCEDURE:   CT of the Chest was performed without intravenous contrast.  Sagittal and coronal reformats were performed.      FINDINGS:    CHEST:     LUNGS AND LARGE AIRWAYS: Patent central airways. Bilateral interlobular   septal thickening and bibasilar compressive atelectasis. Slight increased   aeration of the right lower lobe. 2 mm right upper lobe pulmonary nodule,   unchanged since .    PLEURA: Multiple loculated small to moderate right pleural effusion;   loculated pleural effusion along the fissure. Small left pleural effusion.    VESSELS: Atherosclerosis of thoracic aorta and coronary arteries.     HEART: Heart size is enlarged. Left atrial wall calcification. Mitral   valve annular calcification. Status post transcatheter aorticvalve   repair. No pericardial effusion.    MEDIASTINUM AND CANDI: No lymphadenopathy.    CHEST WALL AND LOWER NECK: Surgical clips on the right chest wall/axilla.   Extensive subcutaneous edema.    VISUALIZED UPPER ABDOMEN: Multiple bilateral renal cysts; interval growth   of left renal cysts compared to CT abdomen pelvis from 2012.   Descending colon diverticulosis.    BONES: Degenerative changes of spine.      IMPRESSION:     Multiloculated small to moderate right pleural effusions with right   basilar compressive atelectasis, with slight increased aeration of the   right lower lobe.  Left small pleural effusion with compressive atelectasis, grossly   unchanged.  Interlobular septal thickening consistent with pulmonary edema.  Cardiomegaly.      MIHIR SIBLEY M.D., RADIOLOGY RESIDENT  This document has been electronically signed.  BLANK WONG M.D. ATTENDING RADIOLOGIST  This document has been electronically signed. Oct 24 2017  3:23PM      < end of copied text >  ---------------------------------------------------------------------------------------------------------  < from: US Renal (10..17 @ 14:28) >    EXAM:  US KIDNEY(S)                            PROCEDURE DATE:  10/20/2017        INTERPRETATION:  CLINICAL INFORMATION: NANCY    COMPARISON: CT abdomen 2012    TECHNIQUE: Sonography of the kidneys and bladder.     FINDINGS:    Right kidney: 9.2 cm. echogenic cortex. No renal mass, hydronephrosis or   calculi. Multiple simple cysts the largest is at the upper pole and   measures 2.7 x 2.9 x 2.3 cm.    Left kidney:  10.1 cm. the genic cortex. No renal mass, hydronephrosis or   calculi. Multiple cysts. The largest at the upper pole measures 2.0 x 2.2   x 2.4 cm. This has some internal complexity not well evaluated. Recommend   follow-up in3- 6 months to assess for change.    Urinary bladder: Collapsed around a Gautam catheter limiting evaluation.    IMPRESSION:     Increased renal cortical echogenicity bilaterally, which can be seen in   medical renal disease.    Left slightly complex renal cyst not well evaluated on this exam.   Consider follow-up exam in 3-6 months to assess for change.    MADHU FAULKNER M.D., ATTENDING RADIOLOGIST  This document has been electronically signed. Oct 20 2017  2:01PM     < end of copied text >  ---------------------------------------------------------------------------------------------------------  < from: CT Chest No Cont (10.17.17 @ 21:38) >    EXAM:  CT CHEST                          PROCEDURE DATE:  10/17/2017      INTERPRETATION:  Clinical information: Evaluate for pneumonia. Exam is   compared to previous study of 4/3/2012.    CT scan of the chest was obtained without administration of intravenous   contrast.    Surgical clips are noted in the right axilla.    No hilar and/or mediastinal adenopathy is noted.     Heart is markedly enlarged in size. Calcification the coronary arteries   is noted. Patient is status post TAVR.No pericardial effusion is noted.   Main pulmonary artery is dilated and measures 3.6 cm.     No endobronchial lesions are noted. Compressive atelectasis is noted   involving portions of both lower lobes. This is secondary to small   bilateral pleural effusions, right more than left.    Below the diaphragm, visualized portions of the abdomen demonstrate   low-attenuation within both kidneys which are too small to be adequately   characterized on this exam.     Degenerative changes of the spine are noted. Subcutaneous edema is noted.    Impression: There is no pneumonia.    Bilateral pleural effusions, right more than left.    MAIY MORALES M.D., ATTENDING RADIOLOGIST  This document has been electronically signed. Oct 18 2017  9:24AM      < end of copied text >  ---------------------------------------------------------------------------------------------------------  < from: CT Head No Cont (10.17.17 @ 21:38) >    EXAM:  CT BRAIN                            PROCEDURE DATE:  10/17/2017        INTERPRETATION:  HISTORY: Uterine cancer. Altered mental status.    COMPARISON: MRI brain performed 2012.    TECHNIQUE: Axial noncontrast CT images from the skull base to the vertex   were obtained and submitted for interpretation. Coronal and sagittal   reformatted images were performed. Bone and soft tissue windows were   evaluated.    FINDINGS:     There is no acute intracranial mass-effect, hemorrhage, midline shift, or   abnormal extra-axial fluid collection.     Ventricles, sulci, and cisterns are normal in size for the patient's age   without hydrocephalus. The basal cisterns are patent.     Patchy and confluent regions of periventricular and deep cerebral white   matter hypoattenuation likely reflects chronic microangiopathic ischemic   changes. Atheromatous calcifications along the carotid siphons are   present.    Minimal right maxillary sinus mucosal thickening. No evidence for acute   paranasal sinus or mastoid inflammatory changes.. The calvarium is   intact.     IMPRESSION:     No acute intracranial bleeding, mass effect, or evidence of an acute   territorial infarct.    ADRIEN MORRIS M.D., RADIOLOGY RESIDENT  This document has been electronically signed.  VINI METZGER M.D., ATTENDING RADIOLOGIST  This document has been electronically signed. Oct 18 2017  9:43AM      < end of copied text >  ---------------------------------------------------------------------------------------------------------

## 2017-10-26 NOTE — PROGRESS NOTE ADULT - RS GEN PE MLT RESP DETAILS PC
diminished breath sounds, L/diminished breath sounds, R
diminished breath sounds, R/diminished breath sounds, L
diminished breath sounds, R/diminished breath sounds, L
diminished breath sounds, L/diminished breath sounds, R
diminished breath sounds, L/diminished breath sounds, R
diminished breath sounds, R/diminished breath sounds, L

## 2017-10-26 NOTE — PROGRESS NOTE ADULT - SUBJECTIVE AND OBJECTIVE BOX
Patient is a 87y old  Female who presents with a chief complaint of "I have uterine cancer" (16 Oct 2017 12:15)      SUBJECTIVE / OVERNIGHT EVENTS: refused BIPAP overnight. awake, alert, a little lethargic. ABG pending    MEDICATIONS  (STANDING):  ALBUTerol/ipratropium for Nebulization 3 milliLiter(s) Nebulizer every 6 hours  ascorbic acid 500 milliGRAM(s) Oral daily  atorvastatin 10 milliGRAM(s) Oral at bedtime  buDESOnide   0.5 milliGRAM(s) Respule 0.5 milliGRAM(s) Inhalation every 12 hours  clopidogrel Tablet 75 milliGRAM(s) Oral daily  ferrous    sulfate 325 milliGRAM(s) Oral daily  furosemide    Tablet 40 milliGRAM(s) Oral daily  influenza   Vaccine 0.5 milliLiter(s) IntraMuscular once  metoprolol 12.5 milliGRAM(s) Oral two times a day  Nephrocaps 1 Capsule(s) Oral daily  QUEtiapine 12.5 milliGRAM(s) Oral <User Schedule>    MEDICATIONS  (PRN):      Vital Signs Last 24 Hrs  T(F): 97.7 (10-26-17 @ 04:25), Max: 98.7 (10-25-17 @ 14:47)  HR: 68 (10-26-17 @ 04:25) (60 - 91)  BP: 125/59 (10-26-17 @ 04:25) (123/71 - 134/64)  RR: 20 (10-26-17 @ 04:25) (18 - 20)  SpO2: 100% (10-26-17 @ 04:25) (95% - 100%)  Telemetry:   CAPILLARY BLOOD GLUCOSE        I&O's Summary    25 Oct 2017 07:01  -  26 Oct 2017 07:00  --------------------------------------------------------  IN: 840 mL / OUT: 1400 mL / NET: -560 mL    26 Oct 2017 07:01  -  26 Oct 2017 10:48  --------------------------------------------------------  IN: 480 mL / OUT: 200 mL / NET: 280 mL        PHYSICAL EXAM:  GENERAL: NAD, well-developed  HEAD:  Atraumatic, Normocephalic  EYES: EOMI, PERRLA, conjunctiva and sclera clear  NECK: Supple, No JVD  CHEST/LUNG: Clear to auscultation bilaterally; No wheeze  HEART: Regular rate and rhythm; No murmurs, rubs, or gallops  ABDOMEN: Soft, Nontender, Nondistended; Bowel sounds present  EXTREMITIES:  2+ Peripheral Pulses, No clubbing, cyanosis, or edema  PSYCH: AAOx3  NEUROLOGY: non-focal  SKIN: No rashes or lesions    LABS:                        7.3    5.48  )-----------( 200      ( 26 Oct 2017 07:40 )             23.5     10-26    140  |  104  |  45<H>  ----------------------------<  92  4.9   |  27  |  1.53<H>    Ca    8.5      26 Oct 2017 07:30      PT/INR - ( 26 Oct 2017 07:28 )   PT: 43.2 sec;   INR: 3.72 ratio                   RADIOLOGY & ADDITIONAL TESTS:    Imaging Personally Reviewed:    Consultant(s) Notes Reviewed:      Care Discussed with Consultants/Other Providers:

## 2017-10-26 NOTE — PROGRESS NOTE ADULT - SUBJECTIVE AND OBJECTIVE BOX
Allergies    No Known Allergies    Intolerances    digoxin (Rash; Drowsiness)      MEDICATIONS  (STANDING):  ALBUTerol/ipratropium for Nebulization 3 milliLiter(s) Nebulizer every 6 hours  ascorbic acid 500 milliGRAM(s) Oral daily  atorvastatin 10 milliGRAM(s) Oral at bedtime  buDESOnide   0.5 milliGRAM(s) Respule 0.5 milliGRAM(s) Inhalation every 12 hours  clopidogrel Tablet 75 milliGRAM(s) Oral daily  ferrous    sulfate 325 milliGRAM(s) Oral daily  furosemide    Tablet 40 milliGRAM(s) Oral daily  influenza   Vaccine 0.5 milliLiter(s) IntraMuscular once  metoprolol 12.5 milliGRAM(s) Oral two times a day  Nephrocaps 1 Capsule(s) Oral daily  QUEtiapine 12.5 milliGRAM(s) Oral <User Schedule>    MEDICATIONS  (PRN):          Antimicrobials:        LABS:  CBC Full  -  ( 26 Oct 2017 12:35 )  WBC Count : 6.4 K/uL  Hemoglobin : 8.4 g/dL  Hematocrit : 26.4 %  Platelet Count - Automated : 204 K/uL  Mean Cell Volume : 96.4 fl  Mean Cell Hemoglobin : 30.4 pg  Mean Cell Hemoglobin Concentration : 31.6 gm/dL  Auto Neutrophil # : x  Auto Lymphocyte # : x  Auto Monocyte # : x  Auto Eosinophil # : x  Auto Basophil # : x  Auto Neutrophil % : x  Auto Lymphocyte % : x  Auto Monocyte % : x  Auto Eosinophil % : x  Auto Basophil % : x    10-26    140  |  104  |  45<H>  ----------------------------<  92  4.9   |  27  |  1.53<H>    Ca    8.5      26 Oct 2017 07:30      PT/INR - ( 26 Oct 2017 07:28 )   PT: 43.2 sec;   INR: 3.72 ratio           ABG - ( 26 Oct 2017 11:29 )  pH: 7.38  /  pCO2: 47    /  pO2: 95    / HCO3: 27    / Base Excess: 1.8   /  SaO2: 98                  Blood Gas Venous - Lactate: 1.4 mmoL/L (10-16 @ 00:15)            Cultures:  RECENT CULTURES:            Imaging Studies:    Vital Signs:    Vital Signs Last 24 Hrs  T(C): 36.6 (26 Oct 2017 13:24), Max: 36.8 (25 Oct 2017 21:01)  T(F): 97.8 (26 Oct 2017 13:24), Max: 98.3 (25 Oct 2017 21:01)  HR: 69 (26 Oct 2017 17:04) (51 - 91)  BP: 143/80 (26 Oct 2017 17:04) (125/59 - 143/80)  BP(mean): --  RR: 18 (26 Oct 2017 13:24) (18 - 20)  SpO2: 100% (26 Oct 2017 13:24) (97% - 100%) Afebrile. No leukocytosis.  Feeling better today.    No Known Allergies    Intolerances    digoxin (Rash; Drowsiness)      MEDICATIONS  (STANDING):  ALBUTerol/ipratropium for Nebulization 3 milliLiter(s) Nebulizer every 6 hours  ascorbic acid 500 milliGRAM(s) Oral daily  atorvastatin 10 milliGRAM(s) Oral at bedtime  buDESOnide   0.5 milliGRAM(s) Respule 0.5 milliGRAM(s) Inhalation every 12 hours  clopidogrel Tablet 75 milliGRAM(s) Oral daily  ferrous    sulfate 325 milliGRAM(s) Oral daily  furosemide    Tablet 40 milliGRAM(s) Oral daily  influenza   Vaccine 0.5 milliLiter(s) IntraMuscular once  metoprolol 12.5 milliGRAM(s) Oral two times a day  Nephrocaps 1 Capsule(s) Oral daily  QUEtiapine 12.5 milliGRAM(s) Oral <User Schedule>        LABS:  CBC Full  -  ( 26 Oct 2017 12:35 )  WBC Count : 6.4 K/uL  Hemoglobin : 8.4 g/dL  Hematocrit : 26.4 %  Platelet Count - Automated : 204 K/uL  Mean Cell Volume : 96.4 fl  Mean Cell Hemoglobin : 30.4 pg  Mean Cell Hemoglobin Concentration : 31.6 gm/dL  Auto Neutrophil # : x  Auto Lymphocyte # : x  Auto Monocyte # : x  Auto Eosinophil # : x  Auto Basophil # : x  Auto Neutrophil % : x  Auto Lymphocyte % : x  Auto Monocyte % : x  Auto Eosinophil % : x  Auto Basophil % : x    10-26    140  |  104  |  45<H>  ----------------------------<  92  4.9   |  27  |  1.53<H>    Ca    8.5      26 Oct 2017 07:30      PT/INR - ( 26 Oct 2017 07:28 )   PT: 43.2 sec;   INR: 3.72 ratio           ABG - ( 26 Oct 2017 11:29 )  pH: 7.38  /  pCO2: 47    /  pO2: 95    / HCO3: 27    / Base Excess: 1.8   /  SaO2: 98          Vital Signs:    Vital Signs Last 24 Hrs  T(C): 36.6 (26 Oct 2017 13:24), Max: 36.8 (25 Oct 2017 21:01)  T(F): 97.8 (26 Oct 2017 13:24), Max: 98.3 (25 Oct 2017 21:01)  HR: 69 (26 Oct 2017 17:04) (51 - 91)  BP: 143/80 (26 Oct 2017 17:04) (125/59 - 143/80)  BP(mean): --  RR: 18 (26 Oct 2017 13:24) (18 - 20)  SpO2: 100% (26 Oct 2017 13:24) (97% - 100%)

## 2017-10-26 NOTE — PROGRESS NOTE ADULT - ENMT
No oral lesions; no gross abnormalities

## 2017-10-26 NOTE — PROGRESS NOTE ADULT - SUBJECTIVE AND OBJECTIVE BOX
OSCAR LOPEZ    Patient is a 87y old  Female who presents with a chief complaint of "I have uterine cancer" (16 Oct 2017 12:15)    Pt seen and examined this morning.  Breathing stable. Remains in AF, at times with heart rates in 30s. Now off cardizem again.    Allergies    No Known Allergies    Intolerances    digoxin (Rash; Drowsiness)    MEDICATIONS  (STANDING):  ALBUTerol/ipratropium for Nebulization 3 milliLiter(s) Nebulizer every 6 hours  ascorbic acid 500 milliGRAM(s) Oral daily  atorvastatin 10 milliGRAM(s) Oral at bedtime  buDESOnide   0.5 milliGRAM(s) Respule 0.5 milliGRAM(s) Inhalation every 12 hours  clopidogrel Tablet 75 milliGRAM(s) Oral daily  ferrous    sulfate 325 milliGRAM(s) Oral daily  furosemide    Tablet 40 milliGRAM(s) Oral daily  influenza   Vaccine 0.5 milliLiter(s) IntraMuscular once  metoprolol 12.5 milliGRAM(s) Oral two times a day  Nephrocaps 1 Capsule(s) Oral daily  QUEtiapine 12.5 milliGRAM(s) Oral <User Schedule>    PHYSICAL EXAM:  Vital Signs Last 24 Hrs  T(C): 36.6 (26 Oct 2017 13:24), Max: 36.9 (25 Oct 2017 17:06)  T(F): 97.8 (26 Oct 2017 13:24), Max: 98.4 (25 Oct 2017 17:06)  HR: 51 (26 Oct 2017 13:24) (51 - 91)  BP: 141/67 (26 Oct 2017 13:24) (125/59 - 141/67)  BP(mean): --  RR: 18 (26 Oct 2017 13:24) (18 - 20)  SpO2: 100% (26 Oct 2017 13:24) (95% - 100%)  Daily     Daily Weight in k.2 (26 Oct 2017 07:33)  I&O's Summary    25 Oct 2017 07:01  -  26 Oct 2017 07:00  --------------------------------------------------------  IN: 840 mL / OUT: 1400 mL / NET: -560 mL    26 Oct 2017 07:01  -  26 Oct 2017 16:56  --------------------------------------------------------  IN: 720 mL / OUT: 750 mL / NET: -30 mL        General Appearance: 	 Alert, cooperative, no distress  Neck: JVP estimated around 10 cm  Lungs:  clear to auscultation and percussion bilaterally  Cor:  pmi 5th ICS MCL, Irregular, GHAZALA  Abdomen:	 soft, non-tender; bowel sounds normal; no masses,  no organomegaly  Extremities: 1+ BL edema      Labs:  CBC Full  -  ( 26 Oct 2017 12:35 )  WBC Count : 6.4 K/uL  Hemoglobin : 8.4 g/dL  Hematocrit : 26.4 %  Platelet Count - Automated : 204 K/uL  Mean Cell Volume : 96.4 fl  Mean Cell Hemoglobin : 30.4 pg  Mean Cell Hemoglobin Concentration : 31.6 gm/dL  Auto Neutrophil # : x  Auto Lymphocyte # : x  Auto Monocyte # : x  Auto Eosinophil # : x  Auto Basophil # : x  Auto Neutrophil % : x  Auto Lymphocyte % : x  Auto Monocyte % : x  Auto Eosinophil % : x  Auto Basophil % : x    Basic Metabolic Panel in AM (10.26.17 @ 07:30)    Sodium, Serum: 140 mmol/L    Potassium, Serum: 4.9 mmol/L    Chloride, Serum: 104 mmol/L    Carbon Dioxide, Serum: 27 mmol/L    Anion Gap, Serum: 9 mmol/L    Blood Urea Nitrogen, Serum: 45 mg/dL    Creatinine, Serum: 1.53 mg/dL    Glucose, Serum: 92 mg/dL    Calcium, Total Serum: 8.5 mg/dL    eGFR if Non : 30: Interpretative comment  The units for eGFR are ml/min/1.73m2 (normalized body surface area). The  eGFR is calculated from a serum creatinine using the CKD-EPI equation.  Other variables required for calculation are race, age and sex. Among  patients with chronic kidney disease (CKD), the eGFR is useful in  determining the stage of disease according to KDOQI CKD classification.  All eGFR results are reported numerically with the following  interpretation.          GFR                    With                 Without     (ml/min/1.73 m2)    Kidney Damage       Kidney Damage        >= 90                    Stage 1                     Normal        60-89                    Stage 2                     Decreased GFR        30-59     Stage 3                     Stage 3        15-29                    Stage 4                     Stage 4        < 15                      Stage 5                     Stage 5  Each stage of CKD assumes that the associated GFR level has been in  effect for at least 3 months. Determination of stages one and two (with  eGFR > 59 ml/min/m2) requires estimation of kidney damage for at least 3  months as defined by structural or functional abnormalities.  Limitations: All estimates of GFR will be less accurate for patients at  extremes of muscle mass (including but not limited to frail elderly,  critically ill, or cancer patients), those with unusual diets, and those  with conditions associated with reduced secretion or extrarenal  elimination of creatinine. The eGFR equation is not recommended for use  in patients with unstable creatinine levels. mL/min/1.73M2    eGFR if African American: 35 mL/min/1.73M2        PT/INR - ( 26 Oct 2017 07:28 )   PT: 43.2 sec;   INR: 3.72 ratio         Impression/Plan: Improved ARF  Improving respiratory acidosis  Atrial fibrillation with tachy everett syndrome, will need pacer prior to discharge  Compensated diastolic CHF    Plan:  Hold coumadin, pacer when INR appropriate  Continue oral lasix  BIPAP as needed    Leopoldo Funes MD, FACC  [facility]

## 2017-10-26 NOTE — PROGRESS NOTE ADULT - COMMENTS
No new somatic complaints at present time

## 2017-10-26 NOTE — PROGRESS NOTE ADULT - ASSESSMENT
ASSESSMENT: 87F w/ past breast and uterine CA, HTN, AFib, and AS-TAVR, 10/16/17 a/w hypercapnic respiratory failure/AMS, as well as NANCY   (1)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 Prerenal, +/- component of ischemic ATN + lasix. Azotemia rising  (2)Hyperkalemia - improving  (3)Hypernatremia - improved  (4) - left complex renal cyst - indicated for imaging f/u 3-6 months.   (5)Bradycardia - may need PPM    RECOMMEND:  - encourage oral intake and activity  - BiPAP/NiV per pulmonary  - maintain low potassium diet  - would hold Lasix in AM pending Cr result due to the continued rise in azotemia  - dose any medication for a CrCl ~ 30 cc/min  - avoid NSAIDs and nephrotoxins as able  - F/U TTE	    Mini Saba NP  [facility]  [phone] ASSESSMENT: 87F w/ past breast and uterine CA, HTN, AFib, and AS-TAVR, 10/16/17 a/w hypercapnic respiratory failure/AMS, as well as NANCY   (1)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 Prerenal, +/- component of ischemic ATN. Renal function slightly worse today  (2)Hyperkalemia - improving  (3)Hypernatremia - improved  (4) - left complex renal cyst - indicated for imaging f/u 3-6 months.     RECOMMEND:  - encourage oral intake and activity  - BiPAP per pulmonary  - no objection to lasix 40 mg po daily  - dose any medication for a CrCl ~ 30 cc/min  - avoid NSAIDs and nephrotoxins as able  - F/U TTE	    Mini Saba NP  [facility]  [phone]

## 2017-10-26 NOTE — PROGRESS NOTE ADULT - CONSTITUTIONAL
detailed exam
Well-developed, well nourished
detailed exam
detailed exam

## 2017-10-27 LAB
ANION GAP SERPL CALC-SCNC: 12 MMOL/L — SIGNIFICANT CHANGE UP (ref 5–17)
BUN SERPL-MCNC: 46 MG/DL — HIGH (ref 7–23)
CALCIUM SERPL-MCNC: 8.7 MG/DL — SIGNIFICANT CHANGE UP (ref 8.4–10.5)
CHLORIDE SERPL-SCNC: 103 MMOL/L — SIGNIFICANT CHANGE UP (ref 96–108)
CO2 SERPL-SCNC: 25 MMOL/L — SIGNIFICANT CHANGE UP (ref 22–31)
CREAT SERPL-MCNC: 1.78 MG/DL — HIGH (ref 0.5–1.3)
GLUCOSE SERPL-MCNC: 97 MG/DL — SIGNIFICANT CHANGE UP (ref 70–99)
INR BLD: 3.47 RATIO — HIGH (ref 0.88–1.16)
PHOSPHATE SERPL-MCNC: 3.5 MG/DL — SIGNIFICANT CHANGE UP (ref 2.5–4.5)
POTASSIUM SERPL-MCNC: 5 MMOL/L — SIGNIFICANT CHANGE UP (ref 3.5–5.3)
POTASSIUM SERPL-SCNC: 5 MMOL/L — SIGNIFICANT CHANGE UP (ref 3.5–5.3)
PROTHROM AB SERPL-ACNC: 40.2 SEC — HIGH (ref 10–13.1)
SODIUM SERPL-SCNC: 140 MMOL/L — SIGNIFICANT CHANGE UP (ref 135–145)

## 2017-10-27 PROCEDURE — 99231 SBSQ HOSP IP/OBS SF/LOW 25: CPT

## 2017-10-27 PROCEDURE — 71010: CPT | Mod: 26

## 2017-10-27 RX ORDER — POLYETHYLENE GLYCOL 3350 17 G/17G
17 POWDER, FOR SOLUTION ORAL DAILY
Qty: 0 | Refills: 0 | Status: DISCONTINUED | OUTPATIENT
Start: 2017-10-27 | End: 2017-11-01

## 2017-10-27 RX ORDER — FUROSEMIDE 40 MG
40 TABLET ORAL ONCE
Qty: 0 | Refills: 0 | Status: COMPLETED | OUTPATIENT
Start: 2017-10-27 | End: 2017-10-27

## 2017-10-27 RX ORDER — FUROSEMIDE 40 MG
40 TABLET ORAL DAILY
Qty: 0 | Refills: 0 | Status: DISCONTINUED | OUTPATIENT
Start: 2017-10-28 | End: 2017-10-30

## 2017-10-27 RX ADMIN — Medication 325 MILLIGRAM(S): at 11:46

## 2017-10-27 RX ADMIN — SENNA PLUS 2 TABLET(S): 8.6 TABLET ORAL at 21:12

## 2017-10-27 RX ADMIN — Medication 1 CAPSULE(S): at 11:46

## 2017-10-27 RX ADMIN — Medication 100 MILLIGRAM(S): at 21:12

## 2017-10-27 RX ADMIN — CLOPIDOGREL BISULFATE 75 MILLIGRAM(S): 75 TABLET, FILM COATED ORAL at 11:46

## 2017-10-27 RX ADMIN — Medication 500 MILLIGRAM(S): at 11:46

## 2017-10-27 RX ADMIN — Medication 3 MILLILITER(S): at 11:46

## 2017-10-27 RX ADMIN — Medication 0.5 MILLIGRAM(S): at 05:22

## 2017-10-27 RX ADMIN — Medication 0.5 MILLIGRAM(S): at 21:20

## 2017-10-27 RX ADMIN — Medication 100 MILLIGRAM(S): at 05:22

## 2017-10-27 RX ADMIN — ATORVASTATIN CALCIUM 10 MILLIGRAM(S): 80 TABLET, FILM COATED ORAL at 21:12

## 2017-10-27 RX ADMIN — Medication 12.5 MILLIGRAM(S): at 18:22

## 2017-10-27 RX ADMIN — Medication 12.5 MILLIGRAM(S): at 05:22

## 2017-10-27 RX ADMIN — Medication 40 MILLIGRAM(S): at 05:22

## 2017-10-27 RX ADMIN — Medication 100 MILLIGRAM(S): at 11:47

## 2017-10-27 RX ADMIN — Medication 3 MILLILITER(S): at 05:22

## 2017-10-27 RX ADMIN — Medication 3 MILLILITER(S): at 21:12

## 2017-10-27 RX ADMIN — QUETIAPINE FUMARATE 12.5 MILLIGRAM(S): 200 TABLET, FILM COATED ORAL at 21:13

## 2017-10-27 RX ADMIN — Medication 40 MILLIGRAM(S): at 18:30

## 2017-10-27 NOTE — PROGRESS NOTE ADULT - SUBJECTIVE AND OBJECTIVE BOX
Interval events:  No episodes of tachycardia  Afib 70-80s, overnight 50-80s    Review Of Systems:  Constitutional: [ ] Fever [ ] Chills [ ] Fatigue [ ] Weight change   HEENT: [ ] Blurred vision [ ] Eye Pain [ ] Headache [ ] Runny nose [ ] Sore Throat   Respiratory: [ ] Cough [ ] Wheezing [ ] Shortness of breath  Cardiovascular: [ ] Chest Pain [ ] Palpitations [ ] REYES [ ] PND [ ] Orthopnea  Gastrointestinal: [ ] Abdominal Pain [ ] Diarrhea [ ] Constipation [ ] Hemorrhoids [ ] Nausea [ ] Vomiting  Genitourinary: [ ] Nocturia [ ] Dysuria [ ] Incontinence  Extremities: [ ] Swelling [ ] Joint Pain  Neurologic: [ ] Focal deficit [ ] Paresthesias [ ] Syncope  Lymphatic: [ ] Swelling [ ] Lymphadenopathy   Skin: [ ] Rash [ ] Ecchymoses [ ] Wounds [ ] Lesions  Psychiatry: [ ] Depression [ ] Suicidal/Homicidal Ideation [ ] Anxiety [ ] Sleep Disturbances  [ ] 10 point review of systems is otherwise negative except as mentioned above            [ ]Unable to obtain    Medications:  ALBUTerol/ipratropium for Nebulization 3 milliLiter(s) Nebulizer every 6 hours  ascorbic acid 500 milliGRAM(s) Oral daily  atorvastatin 10 milliGRAM(s) Oral at bedtime  buDESOnide   0.5 milliGRAM(s) Respule 0.5 milliGRAM(s) Inhalation every 12 hours  clopidogrel Tablet 75 milliGRAM(s) Oral daily  docusate sodium 100 milliGRAM(s) Oral three times a day  ferrous    sulfate 325 milliGRAM(s) Oral daily  furosemide    Tablet 40 milliGRAM(s) Oral daily  influenza   Vaccine 0.5 milliLiter(s) IntraMuscular once  metoprolol 12.5 milliGRAM(s) Oral two times a day  Nephrocaps 1 Capsule(s) Oral daily  QUEtiapine 12.5 milliGRAM(s) Oral <User Schedule>  senna 2 Tablet(s) Oral at bedtime    PMH/PSH/FH/SH: [ ] Unchanged  Vitals:  T(C): 36.6 (10-27-17 @ 04:53), Max: 36.6 (10-26-17 @ 13:24)  HR: 69 (10-27-17 @ 04:53) (51 - 69)  BP: 149/70 (10-27-17 @ 04:53) (141/67 - 149/70)  BP(mean): --  RR: 18 (10-27-17 @ 04:53) (18 - 18)  SpO2: 98% (10-27-17 @ 04:53) (98% - 100%)  Wt(kg): --  Daily     Daily Weight in k.7 (27 Oct 2017 08:56)  I&O's Summary    26 Oct 2017 07:  -  27 Oct 2017 07:00  --------------------------------------------------------  IN: 1200 mL / OUT: 1595 mL / NET: -395 mL    27 Oct 2017 07:  -  27 Oct 2017 09:19  --------------------------------------------------------  IN: 0 mL / OUT: 250 mL / NET: -250 mL        Physical Exam:  Appearance:  Normal, NAD  Eyes: PERRL, EOMI  HENT: Normal oral [place] NC/AT  Cardiovascular: S1, S2, RRR, No m/r/g appreciated, No edema, no elevation in JVP  Respiratory: Clear to auscultation bilaterally  Gastrointestinal: Soft, Non-tender, Non-distended, BS+  Musculoskeletal:  No clubbing, No joint deformity   Neurologic: Non-focal  Lymphatic: No lymphadenopathy  Psychiatry: AAOx3, Mood & affect appropriate  Skin: No rashes, No ecchymoses, No cyanosis    Labs:                        8.4    6.4   )-----------( 204      ( 26 Oct 2017 12:35 )             26.4     10    140  |  103  |  46<H>  ----------------------------<  97  5.0   |  25  |  1.78<H>    Ca    8.7      27 Oct 2017 07:32  Phos  3.5     10-27      PT/INR - ( 27 Oct 2017 07:41 )   PT: 40.2 sec;   INR: 3.47 ratio      Interpretation of Telemetry: afib 70-80s, 50-60s overnight

## 2017-10-27 NOTE — PROGRESS NOTE ADULT - ASSESSMENT
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    - PPM when INR < 2.5, continue holding coumadin    Jhon Avila MD

## 2017-10-27 NOTE — PROGRESS NOTE ADULT - SUBJECTIVE AND OBJECTIVE BOX
OSCAR LOPEZ    Patient is a 87y old  Female who presents with a chief complaint of weakness,H/O breast ca,uyerine CA,S/P TAVR,atrial fib,HYN admitted with ARF.      Allergies    No Known Allergies    Intolerances    digoxin (Rash; Drowsiness)    MEDICATIONS  (STANDING):  ALBUTerol/ipratropium for Nebulization 3 milliLiter(s) Nebulizer every 6 hours  ascorbic acid 500 milliGRAM(s) Oral daily  atorvastatin 10 milliGRAM(s) Oral at bedtime  buDESOnide   0.5 milliGRAM(s) Respule 0.5 milliGRAM(s) Inhalation every 12 hours  clopidogrel Tablet 75 milliGRAM(s) Oral daily  docusate sodium 100 milliGRAM(s) Oral three times a day  ferrous    sulfate 325 milliGRAM(s) Oral daily  furosemide    Tablet 40 milliGRAM(s) Oral daily  influenza   Vaccine 0.5 milliLiter(s) IntraMuscular once  metoprolol 12.5 milliGRAM(s) Oral two times a day  Nephrocaps 1 Capsule(s) Oral daily  QUEtiapine 12.5 milliGRAM(s) Oral <User Schedule>  senna 2 Tablet(s) Oral at bedtime    MEDICATIONS  (PRN):      ROS:  Positive:Weak    General: Denies weight loss, fevers, rash, decreased hearing  Cardiac: Denies chest pain, SOB, REYES, orthopnea, PND, claudication, edema, snoring, daytime somnolence, palpitations, syncope  Resp: Denies SOB, REYES, cough, sputum, wheezing, hemoptysis  GI: Denies change in bowel habits, diarrhea, weight loss, melena, tarry stools,   nausea, vomiting, jaundice, abdominal pain, dysphagia  : Denies dysuria, nocturia, hematuria  Neuro: Denies tinnitus, headache, visual changes, weakness, dizziness or vertigo  Musculoskeletal: Denies neck pain back pain joint pain.  Skin: Denies rash, itching, dryness.  Endocrine: Denies polydipsia, polyuria  Psychiatric: Denies depression, anxiety      PHYSICAL EXAM:  Vital Signs Last 24 Hrs  T(C): 36.6 (27 Oct 2017 04:53), Max: 36.6 (26 Oct 2017 13:24)  T(F): 97.9 (27 Oct 2017 04:53), Max: 97.9 (27 Oct 2017 04:53)  HR: 69 (27 Oct 2017 04:53) (51 - 69)  BP: 149/70 (27 Oct 2017 04:53) (141/67 - 149/70)  BP(mean): --  RR: 18 (27 Oct 2017 04:53) (18 - 18)  SpO2: 98% (27 Oct 2017 04:53) (98% - 100%)  Daily     Daily Weight in k.2 (26 Oct 2017 07:33)  I&O's Summary    25 Oct 2017 07:01  -  26 Oct 2017 07:00  --------------------------------------------------------  IN: 840 mL / OUT: 1400 mL / NET: -560 mL    26 Oct 2017 07:01  -  27 Oct 2017 06:02  --------------------------------------------------------  IN: 1200 mL / OUT: 1420 mL / NET: -220 mL        General Appearance: 	 Alert, cooperative, no distress  HEENT: normocephalic, atraumatic, PERRLA, EOMI, conjunctiva normal, sclera anicteric,   Neck: no JVD,  carotid 2+  bilaterally without bruits, thyroid normal to inspection and palpation, no adenopathy, trachea midline  Lungs: Decreased BS bases  Cor:  Irregular,2/6 mid peaking systolic murmur  Abdomen:	 soft, non-tender; bowel sounds normal; no masses,  no organomegaly  Extremities: without cyanosis, clubbing or edema  Vasc: 2-+ PT and DP pulses; no varicosities  Neurologic: A&O x 3 (time, place, person). Symmetric strength; limited exam  Musculoskeletal: no kyphosis, scoliosis; normal gait, normal tone  Skin: no rashes; limited exam    Labs:  CBC Full  -  ( 26 Oct 2017 12:35 )  WBC Count : 6.4 K/uL  Hemoglobin : 8.4 g/dL  Hematocrit : 26.4 %  Platelet Count - Automated : 204 K/uL  Mean Cell Volume : 96.4 fl  Mean Cell Hemoglobin : 30.4 pg  Mean Cell Hemoglobin Concentration : 31.6 gm/dL  Auto Neutrophil # : x  Auto Lymphocyte # : x  Auto Monocyte # : x  Auto Eosinophil # : x  Auto Basophil # : x  Auto Neutrophil % : x  Auto Lymphocyte % : x  Auto Monocyte % : x  Auto Eosinophil % : x  Auto Basophil % : x          PT/INR - ( 26 Oct 2017 07:28 )   PT: 43.2 sec;   INR: 3.72 ratio        Impression/Plan:Patient with h/o breast CA,uterine CA,TAVR,atrial fib,HTN admitted with ARF and weakness.Now improved,Stable HR,BP and O2 sat.BUN,CR slow improvement.Continue Lipitor,plavix,lopressor,lasix.Hold coumadin increased INR.OOB/PT.        Regulo Nash MD, [facility]  [facility]

## 2017-10-27 NOTE — PROGRESS NOTE ADULT - SUBJECTIVE AND OBJECTIVE BOX
INTERVAL HPI/OVERNIGHT EVENTS: Doing well, no reports of bleeding, no BM    MEDICATIONS  (STANDING):  ALBUTerol/ipratropium for Nebulization 3 milliLiter(s) Nebulizer every 6 hours  ascorbic acid 500 milliGRAM(s) Oral daily  atorvastatin 10 milliGRAM(s) Oral at bedtime  buDESOnide   0.5 milliGRAM(s) Respule 0.5 milliGRAM(s) Inhalation every 12 hours  clopidogrel Tablet 75 milliGRAM(s) Oral daily  docusate sodium 100 milliGRAM(s) Oral three times a day  ferrous    sulfate 325 milliGRAM(s) Oral daily  furosemide    Tablet 40 milliGRAM(s) Oral daily  influenza   Vaccine 0.5 milliLiter(s) IntraMuscular once  metoprolol 12.5 milliGRAM(s) Oral two times a day  Nephrocaps 1 Capsule(s) Oral daily  QUEtiapine 12.5 milliGRAM(s) Oral <User Schedule>  senna 2 Tablet(s) Oral at bedtime    MEDICATIONS  (PRN):      Allergies    No Known Allergies    Intolerances    digoxin (Rash; Drowsiness)          PHYSICAL EXAM:   Vital Signs:  Vital Signs Last 24 Hrs  T(C): 36.6 (27 Oct 2017 04:53), Max: 36.6 (26 Oct 2017 13:24)  T(F): 97.9 (27 Oct 2017 04:53), Max: 97.9 (27 Oct 2017 04:53)  HR: 69 (27 Oct 2017 04:53) (51 - 69)  BP: 149/70 (27 Oct 2017 04:53) (141/67 - 149/70)  BP(mean): --  RR: 18 (27 Oct 2017 04:53) (18 - 18)  SpO2: 98% (27 Oct 2017 04:53) (98% - 100%)  Daily     Daily     GENERAL:  no distress  HEENT:  NC/AT,  anicteric  CHEST:   no increased effort, breath sounds clear  HEART:  Regular rhythm  ABDOMEN:  Soft, non-tender, non-distended, normoactive bowel sounds,  no masses ,no hepato-splenomegaly, no signs of chronic liver disease  EXTEREMITIES:  no cyanosis      LABS:                        8.4    6.4   )-----------( 204      ( 26 Oct 2017 12:35 )             26.4     10-26    140  |  104  |  45<H>  ----------------------------<  92  4.9   |  27  |  1.53<H>    Ca    8.5      26 Oct 2017 07:30      PT/INR - ( 27 Oct 2017 07:41 )   PT: 40.2 sec;   INR: 3.47 ratio               RADIOLOGY & ADDITIONAL TESTS:

## 2017-10-27 NOTE — PROGRESS NOTE ADULT - ASSESSMENT
ASSESSMENT: 87F w/ past breast and uterine CA, HTN, AFib, and AS-TAVR, 10/16/17 a/w hypercapnic respiratory failure/AMS, as well as NANCY c/b a-fib with tachy-everett  - 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 Prerenal, +/- component of ischemic ATN + lasix. Azotemia rising but acceptable given need for diuresis  - Hyperkalemia - improving  -  - left complex renal cyst - indicated for imaging f/u 3-6 months.   - Cardiology - tachy/everett a-fib - planned for PPM once INR <2.5     RECOMMEND:  - BiPAP/NiV and nebs per pulmonary  - maintain low potassium diet  - continue lasix as ordered  - daily BMPs  - F/U TTE	  - dose any medication for a CrCl ~ 25-30 cc/min  - avoid NSAIDs and nephrotoxins as able    Mini Saba NP  [facility]  [phone] ASSESSMENT: 87F w/ past breast and uterine CA, HTN, AFib, and AS-TAVR, 10/16/17 a/w hypercapnic respiratory failure/AMS, as well as NANCY

## 2017-10-27 NOTE — PROGRESS NOTE ADULT - ASSESSMENT
ASSESSMENT    acute hypoxic and hypercapnic respiratory failure - multifactorial - resolved respiratory acidosis and improved oxygenation    chronic CHF in the setting of aortic stenosis s/p TAVR and diastolic CHF with moderate bilateral pleural effusions/atelectasis - minimal improvement on follow-up chest CT  restrictive lung disease due to respiratory muscle weakness, kyphoscoliosis and effusions  perhaps underlying senile emphysema  VTE disease seems unlikely in the setting of chronic A/C    NANCY due to recent diarrhea and increased diuretic dose with metabolic acidosis - resolved    atrial fibrillation with tachy-everett syndrome    hypernatremia - resolved    Klebsiella UTI s/p antibiotics    anemia    PLAN/RECOMMENDATIONS    continue NIV for sleep - oxygen supplementation to keep saturation greater than 92% via nasal canula during the day - follow-up ABG tomorrow  repeat chest CT reviewed  patient will benefit from a trilogy vent which I have arranged - when discharged to rehab, will need oxygen supplementation via a nasal canula @ 3lpm at rest and 4lpm with exertion and nocturnal BIPAP on the current settings  After reviewing the patient's current respiratory status, I believe that the patient would benefit from NIV. BIPAP has been unsuccessful in treating the patient's advanced disease processes. I am ordering NIV to be used for sleep, during daytime naps and as needed during the day for periods of shortness of breath and increased work of breathing. Without NIV, I am concerned that the patient will continue to experience further clinical deterioration resulting in possible medical harm and recurrent hospitalizations  albuterol/atrovent/pulmicort nebs  renal follow-up noted noted - oleary catheter discontinued - needs ongoing diuresis  cardiac meds: lipitor/plavix/cardizem CD discontinued due to bradycardia/metoprolol/coumadin for INR 2.5 - 3.5 - hold for PPM - off ACE inhibitors - cardiology follow-up noted  s/p course of ceftriaxone  bowel regimen  transfuse as needed  bedtime Seroquel    Will follow with you; discussed plan of care with the dedicated floor NP and patient    Tommy Reyes MD, [facility] - [phone]  Pulmonary Medicine

## 2017-10-27 NOTE — PROGRESS NOTE ADULT - SUBJECTIVE AND OBJECTIVE BOX
[facility] - [facility]     PROGRESS NOTE    CHIEF COMPLAINT: ARF; COPD/emphysema; chronic CHF; CKD/NANCY    INTERVAL HISTORY: no episodes of bradycardia or tachycardia overnight - scheduled for pacemaker placement which will need to be cancelled due to elevated INR despite coumadin being held last night; awake and alert after a night on BIPAP now with resolved respiratory acidosis; sitting in chair eating breakfast; no cough, sputum production, chest congestion or wheeze; no fevers, chills or sweats; no chest pain/pressure or palpitations; s/p antibiotics for UTI; renal function improved/stable with resolved hypernatremia; glucose well controlled;     REVIEW OF SYSTEMS:  Constitutional: As per interval history  HEENT: Within normal limits  CV: As per interval history  Resp: As per interval history  GI: Within normal limits   : Within normal limits  Musculoskeletal: Within normal limits  Skin: Within normal limits  Neurological: Within normal limits  Psychiatric: Within normal limits  Endocrine: Within normal limits  Hematologic/Lymphatic: Within normal limits  Allergic/Immunologic: Within normal limits      MEDICATIONS:     Pulmonary "  ALBUTerol/ipratropium for Nebulization 3 milliLiter(s) Nebulizer every 6 hours  buDESOnide   0.5 milliGRAM(s) Respule 0.5 milliGRAM(s) Inhalation every 12 hours      Anti-microbials:      Cardiovascular:  furosemide    Tablet 40 milliGRAM(s) Oral daily  metoprolol 12.5 milliGRAM(s) Oral two times a day      Other:  ascorbic acid 500 milliGRAM(s) Oral daily  atorvastatin 10 milliGRAM(s) Oral at bedtime  clopidogrel Tablet 75 milliGRAM(s) Oral daily  docusate sodium 100 milliGRAM(s) Oral three times a day  ferrous    sulfate 325 milliGRAM(s) Oral daily  influenza   Vaccine 0.5 milliLiter(s) IntraMuscular once  Nephrocaps 1 Capsule(s) Oral daily  QUEtiapine 12.5 milliGRAM(s) Oral <User Schedule>  senna 2 Tablet(s) Oral at bedtime        OBJECTIVE:        I&O's Detail    26 Oct 2017 07:01  -  27 Oct 2017 07:00  --------------------------------------------------------  IN:    Oral Fluid: 1200 mL  Total IN: 1200 mL    OUT:    Voided: 1595 mL  Total OUT: 1595 mL    Total NET: -395 mL      27 Oct 2017 07:01  -  27 Oct 2017 09:53  --------------------------------------------------------  IN:  Total IN: 0 mL    OUT:    Voided: 250 mL  Total OUT: 250 mL    Total NET: -250 mL    Daily Weight in k.7 (27 Oct 2017 08:56)      PHYSICAL EXAM:       ICU Vital Signs Last 24 Hrs  T(C): 36.6 (27 Oct 2017 04:53), Max: 36.6 (26 Oct 2017 13:24)  T(F): 97.9 (27 Oct 2017 04:53), Max: 97.9 (27 Oct 2017 04:53)  HR: 69 (27 Oct 2017 04:53) (51 - 69)  BP: 149/70 (27 Oct 2017 04:53) (141/67 - 149/70)  BP(mean): --  ABP: --  ABP(mean): --  RR: 18 (27 Oct 2017 04:53) (18 - 18)  SpO2: 98% (27 Oct 2017 04:53) (98% - 100%)     General: Awake and alert. Cooperative. No distress. Appears stated age 	  HEENT:   Atraumatic. Normocephalic. Anicteric. Normal oral mucosa, PERRL, EOMI. Left eye subconjunctival hemorrhage resolving  Neck: Supple. Trachea midline. Thyroid without enlargement/tenderness/nodules. No carotid bruit. No JVD	  Cardiovascular: Irregularly irregular rate and rhythm. S1 S2 normal. II/VI systolic murmur  Respiratory: Respirations unlabored. Decreased breath sounds at bases with dullness ~ 1/4 up. Kyphoscoliosis  Abdomen: Soft. Non-tender. Non-distended. No organomegaly. No masses. Normal bowel sounds	  Extremities: Warm to touch. No clubbing or cyanosis. Mild to moderate lower extremity edema  Pulses: 2+ peripheral pulses all extremities	  Skin: Normal skin color. No rashes or lesions. No ecchymoses, No cyanosis. Warm to touch  Lymph Nodes: Cervical, supraclavicular and axillary nodes normal  Neurological: Awake and alert. Moving all extremities. A and O x 3  Psychiatry: Calm      LABS:                        8.4    6.4   )-----------( 204      ( 26 Oct 2017 12:35 )             26.4                         7.3    5.48  )-----------( 200      ( 26 Oct 2017 07:40 )             23.5     10-27    140  |  103  |  46<H>  ----------------------------<  97  5.0   |  25  |  1.78<H>    10-26    140  |  104  |  45<H>  ----------------------------<  92  4.9   |  27  |  1.53<H>    Ca      8.7      10-27    Ca      8.5      10-26    Phos    3.5     10-    Phos    2.9     10-24      Mg       2.2     10-24    PT/INR - ( 27 Oct 2017 07:41 )   PT: 40.2 sec;   INR: 3.47 ratio      ABG - ( 26 Oct 2017 11:29 )  pH: 7.38  /  pCO2: 47    /  pO2: 95    / HCO3: 27    / Base Excess: 1.8   /  SaO2: 98        ABG - ( 25 Oct 2017 06:51 )  pH: 7.34  /  pCO2: 50    /  pO2: 91    / HCO3: 26    / Base Excess: .8    /  SaO2: 98        ABG - ( 23 Oct 2017 17:58 )  pH: 7.34  /  pCO2: 49    /  pO2: 106   / HCO3: 26    / Base Excess: .5    /  SaO2: 99        ABG - ( 20 Oct 2017 06:32 )  pH: 7.28  /  pCO2: 59    /  pO2: 198   / HCO3: 27    / Base Excess: .4    /  SaO2: 100       ABG - ( 19 Oct 2017 07:20 )  pH: 7.25  /  pCO2: 60    /  pO2: 151   / HCO3: 26    / Base Excess: -1.4  /  SaO2: 99        ABG - ( 18 Oct 2017 14:35 )  pH: 7.30  /  pCO2: 58    /  pO2: 143   / HCO3: 28    / Base Excess: 1.3   /  SaO2: 100       < from: Transthoracic Echocardiogram w/Doppler (12 @ 13:31) >    Patient name: OSCAR LOPEZ  YOB: 1929   Age: 82 (F)   MR#: [id number]  Study Date: 3/14/2012  [id number]: Jada Lagunas MARGY  Study quality: Technically fair  Referring Physician: Pro Tracy MD  Blood Pressure: 124/51 mmHg  Height: 5ft 3in  Weight: 164 lb  BSA: 1.8 m2  ------------------------------------------------------------------------  PROCEDURE: Transthoracic echocardiogram with 2-D, M-Mode  and complete spectral and color flow Doppler.  INDICATION: Aortic Stenosis (424.1), Atrial Fibrillation  (427.31)  ------------------------------------------------------------------------  Dimensions:    Normal Values:  LA:     3.1    2.0 - 4.0 cm  Ao:     3.1    2.0 - 3.8 cm  SEPTUM: 1.2    0.6 - 1.2cm  PWT:    1.3    0.6 - 1.1 cm  LVIDd:  4.3    3.0 - 5.6 cm  LVIDs:  2.6    1.8 - 4.0 cm  Derived variables:  LVMI: 110 g/m2  RWT: 0.60  Fractional short: 40 %  Ejection Fraction: >70 %  Doppler Peak Velocity (m/sec): AoV=4.0  ------------------------------------------------------------------------  Observations:  Mitral Valve: Mitral annular calcification and calcified  mitral leaflets with normal diastolic opening. Minimal  mitral regurgitation.  Aortic Valve/Aorta: Heavily calcified trileaflet aortic  valve with decreased opening. Peak transaortic valve  gradient equals 62 mm Hg, mean transaortic valve gradient  equals 37 mm Hg, estimated aortic valve area equals 0.9  sqcm (by continuity equation), consistent with severe  aortic stenosis .Mild-moderate aortic regurgitation.  Pressure halft - time is 361 ms.  Normal aortic root.  Left Atrium: Severely dilated left atrium.  LA volume index  = 51 cc/m2.  Left Ventricle: Hyperdynamic left ventricle. Mild to  moderate concentric left ventricular hypertrophy. Atrial  fibrillation precludes a comprehensive diastolic  assessment. However, findings are suggestive of moderate  diastolic dysfunction.  Right Heart: Severe right atrial enlargement. RA volume  index = 53 cc/m2.  Normal right ventricular size and  function. Normal tricuspid valve. Mild-moderate tricuspid  regurgitation. Normal pulmonic valve. Minimal pulmonic  regurgitation.  Pericardium/Pleura: Normal pericardium with no pericardial  effusion.  Hemodynamic: Estimated right atrial pressure is 5 mm Hg.  Estimated right ventricular systolic pressure equals 46 mm  Hg, assuming right atrial pressure equals 5 mm Hg,  consistent with mild pulmonary hypertension.  ------------------------------------------------------------------------  Conclusions:  1. Mitral annular calcification and calcified mitral  leaflets with normal diastolic opening. Minimal mitral  regurgitation.  2. Heavily calcified trileaflet aortic valve with decreased  opening. Peak transaortic valve gradientequals 62 mm Hg,  mean transaortic valve gradient equals 37 mm Hg, estimated  aortic valve area equals 0.9 sqcm (by continuity equation),  consistent with severe aortic stenosis. Mild-moderate  aortic regurgitation. Pressure halft - time is 361 ms.  3. Severely dilated left atrium.  LA volume index = 51  cc/m2.  4. Mild to moderate concentric left ventricular  hypertrophy.  5. Hyperdynamic left ventricle.  6. Atrial fibrillation precludes a comprehensive diastolic  assessment. However, findings are suggestive of moderate  diastolic dysfunction.  7. Severe right atrial enlargement. RA volume index = 53  cc/m2.  8. Normal right ventricular size and function.  9. Estimated right ventricular systolic pressure equals 46  mm Hg, assuming right atrial pressure equals 5 mm Hg,  consistent with mild pulmonary hypertension.  10. Normal tricuspid valve. Mild-moderate tricuspid  regurgitation.  *** No previous Echo exam.  ------------------------------------------------------------------------  MICROBIOLOGY:     Color: Yellow / Appearance: SL Turbid / S.012 / pH: x  Gluc: x / Ketone: Negative  / Bili: Negative / Urobili: Negative   Blood: x / Protein: 30 mg/dL / Nitrite: Negative   Leuk Esterase: Large / RBC: 0-2 /HPF / WBC 26-50 /HPF   Sq Epi: x / Non Sq Epi: OCC /HPF / Bacteria: Few /HPF    Culture - Urine (10.18.17 @ 00:32)    Specimen Source: .Urine Catheterized    Culture Results:   >100,000 CFU/ml Klebsiella pneumoniae    Culture - Blood (10.17.17 @ 15:38)    Specimen Source: .Blood Blood-Peripheral    Culture Results:   No growth to date.    RADIOLOGY:  [x] Chest radiographs reviewed and interpreted by me    < from: CT Chest No Cont (10.24.17 @ 12:18) >    EXAM:  CT CHEST                            PROCEDURE DATE:  10/24/2017      INTERPRETATION:  CLINICAL INFORMATION: Acute renal failure due to   congestive heart failure. Chest congestion. Wheezing.    COMPARISON: CT chest from 10/17/2017.    PROCEDURE:   CT of the Chest was performed without intravenous contrast.  Sagittal and coronal reformats were performed.      FINDINGS:    CHEST:     LUNGS AND LARGE AIRWAYS: Patent central airways. Bilateral interlobular   septal thickening and bibasilar compressive atelectasis. Slight increased   aeration of the right lower lobe. 2 mm right upper lobe pulmonary nodule,   unchanged since .    PLEURA: Multiple loculated small to moderate right pleural effusion;   loculated pleural effusion along the fissure. Small left pleural effusion.    VESSELS: Atherosclerosis of thoracic aorta and coronary arteries.     HEART: Heart size is enlarged. Left atrial wall calcification. Mitral   valve annular calcification. Status post transcatheter aorticvalve   repair. No pericardial effusion.    MEDIASTINUM AND CANDI: No lymphadenopathy.    CHEST WALL AND LOWER NECK: Surgical clips on the right chest wall/axilla.   Extensive subcutaneous edema.    VISUALIZED UPPER ABDOMEN: Multiple bilateral renal cysts; interval growth   of left renal cysts compared to CT abdomen pelvis from 2012.   Descending colon diverticulosis.    BONES: Degenerative changes of spine.      IMPRESSION:     Multiloculated small to moderate right pleural effusions with right   basilar compressive atelectasis, with slight increased aeration of the   right lower lobe.  Left small pleural effusion with compressive atelectasis, grossly   unchanged.  Interlobular septal thickening consistent with pulmonary edema.  Cardiomegaly.      MIHIR SIBLEY M.D., RADIOLOGY RESIDENT  This document has been electronically signed.  BLANK WONG M.D. ATTENDING RADIOLOGIST  This document has been electronically signed. Oct 24 2017  3:23PM      < end of copied text >  ---------------------------------------------------------------------------------------------------------  < from: US Renal (10.20.17 @ 14:28) >    EXAM:  US KIDNEY(S)                            PROCEDURE DATE:  10/20/2017        INTERPRETATION:  CLINICAL INFORMATION: NANCY    COMPARISON: CT abdomen 2012    TECHNIQUE: Sonography of the kidneys and bladder.     FINDINGS:    Right kidney: 9.2 cm. echogenic cortex. No renal mass, hydronephrosis or   calculi. Multiple simple cysts the largest is at the upper pole and   measures 2.7 x 2.9 x 2.3 cm.    Left kidney:  10.1 cm. the genic cortex. No renal mass, hydronephrosis or   calculi. Multiple cysts. The largest at the upper pole measures 2.0 x 2.2   x 2.4 cm. This has some internal complexity not well evaluated. Recommend   follow-up in3- 6 months to assess for change.    Urinary bladder: Collapsed around a Gautam catheter limiting evaluation.    IMPRESSION:     Increased renal cortical echogenicity bilaterally, which can be seen in   medical renal disease.    Left slightly complex renal cyst not well evaluated on this exam.   Consider follow-up exam in 3-6 months to assess for change.    MADHU FAULKNER M.D., ATTENDING RADIOLOGIST  This document has been electronically signed. Oct 20 2017  2:01PM     < end of copied text >  ---------------------------------------------------------------------------------------------------------  < from: CT Chest No Cont (10.17.17 @ 21:38) >    EXAM:  CT CHEST                          PROCEDURE DATE:  10/17/2017      INTERPRETATION:  Clinical information: Evaluate for pneumonia. Exam is   compared to previous study of 4/3/2012.    CT scan of the chest was obtained without administration of intravenous   contrast.    Surgical clips are noted in the right axilla.    No hilar and/or mediastinal adenopathy is noted.     Heart is markedly enlarged in size. Calcification the coronary arteries   is noted. Patient is status post TAVR.No pericardial effusion is noted.   Main pulmonary artery is dilated and measures 3.6 cm.     No endobronchial lesions are noted. Compressive atelectasis is noted   involving portions of both lower lobes. This is secondary to small   bilateral pleural effusions, right more than left.    Below the diaphragm, visualized portions of the abdomen demonstrate   low-attenuation within both kidneys which are too small to be adequately   characterized on this exam.     Degenerative changes of the spine are noted. Subcutaneous edema is noted.    Impression: There is no pneumonia.    Bilateral pleural effusions, right more than left.    MAYI MORALES M.D., ATTENDING RADIOLOGIST  This document has been electronically signed. Oct 18 2017  9:24AM      < end of copied text >  ---------------------------------------------------------------------------------------------------------  < from: CT Head No Cont (10.17.17 @ 21:38) >    EXAM:  CT BRAIN                            PROCEDURE DATE:  10/17/2017        INTERPRETATION:  HISTORY: Uterine cancer. Altered mental status.    COMPARISON: MRI brain performed 2012.    TECHNIQUE: Axial noncontrast CT images from the skull base to the vertex   were obtained and submitted for interpretation. Coronal and sagittal   reformatted images were performed. Bone and soft tissue windows were   evaluated.    FINDINGS:     There is no acute intracranial mass-effect, hemorrhage, midline shift, or   abnormal extra-axial fluid collection.     Ventricles, sulci, and cisterns are normal in size for the patient's age   without hydrocephalus. The basal cisterns are patent.     Patchy and confluent regions of periventricular and deep cerebral white   matter hypoattenuation likely reflects chronic microangiopathic ischemic   changes. Atheromatous calcifications along the carotid siphons are   present.    Minimal right maxillary sinus mucosal thickening. No evidence for acute   paranasal sinus or mastoid inflammatory changes.. The calvarium is   intact.     IMPRESSION:     No acute intracranial bleeding, mass effect, or evidence of an acute   territorial infarct.    ADRIEN MORRIS M.D., RADIOLOGY RESIDENT  This document has been electronically signed.  VINI METZGER M.D., ATTENDING RADIOLOGIST  This document has been electronically signed. Oct 18 2017  9:43AM      < end of copied text >  ---------------------------------------------------------------------------------------------------------

## 2017-10-27 NOTE — PROGRESS NOTE ADULT - SUBJECTIVE AND OBJECTIVE BOX
[facility]Y-[facility] [phone]    Patient seen and examined while on NC, in NAD, reports being agitated overnight and is hungry.     MEDICATIONS  (STANDING):  ALBUTerol/ipratropium for Nebulization 3 milliLiter(s) Nebulizer every 6 hours  ascorbic acid 500 milliGRAM(s) Oral daily  atorvastatin 10 milliGRAM(s) Oral at bedtime  buDESOnide   0.5 milliGRAM(s) Respule 0.5 milliGRAM(s) Inhalation every 12 hours  clopidogrel Tablet 75 milliGRAM(s) Oral daily  docusate sodium 100 milliGRAM(s) Oral three times a day  ferrous    sulfate 325 milliGRAM(s) Oral daily  furosemide    Tablet 40 milliGRAM(s) Oral daily  influenza   Vaccine 0.5 milliLiter(s) IntraMuscular once  metoprolol 12.5 milliGRAM(s) Oral two times a day  Nephrocaps 1 Capsule(s) Oral daily  QUEtiapine 12.5 milliGRAM(s) Oral <User Schedule>  senna 2 Tablet(s) Oral at bedtime    VITAL:  T(C): , Max: 36.6 (10-26-17 @ 13:24)  T(F): , Max: 97.9 (10-27-17 @ 04:53)  HR: 69 (10-27-17 @ 04:53)  BP: 149/70 (10-27-17 @ 04:53)  BP(mean): --  RR: 18 (10-27-17 @ 04:53)  SpO2: 98% (10-27-17 @ 04:53)  Wt(kg): --  I and O's:    10-26 @ 07:01  -  10-27 @ 07:00  --------------------------------------------------------  IN: 1200 mL / OUT: 1595 mL / NET: -395 mL    10-27 @ 07:01  -  10-27 @ 10:07  --------------------------------------------------------  IN: 0 mL / OUT: 250 mL / NET: -250 mL    REVIEW OF SYSTEMS:  CONSTITUTIONAL: No fevers or chills  RESPIRATORY: intermittent SOB and REYES  CARDIOVASCULAR: No CP or palpitations  GASTROINTESTINAL: No abd pain, n/v/d/constipation  GENITOURINARY: No dysuria, frequency or hematuria  NEUROLOGICAL: No numbness or weakness  All other review of systems is negative unless indicated above.    PHYSICAL EXAM:  Constitutional: NAD  Respiratory: expiratory wheezing heard  Cardiovascular: S1 and S2  Gastrointestinal: BS+, soft, NT/ND  Extremities: + edema  Neurological: AAO x 2-3  : No Gautam  Access: Not applicable    LABS:                        8.4    6.4   )-----------( 204      ( 26 Oct 2017 12:35 )             26.4     10-27    140  |  103  |  46<H>  ----------------------------<  97  5.0   |  25  |  1.78<H>    Ca    8.7      27 Oct 2017 07:32  Phos  3.5     10-27

## 2017-10-28 LAB
ANION GAP SERPL CALC-SCNC: 10 MMOL/L — SIGNIFICANT CHANGE UP (ref 5–17)
BUN SERPL-MCNC: 46 MG/DL — HIGH (ref 7–23)
CALCIUM SERPL-MCNC: 8.6 MG/DL — SIGNIFICANT CHANGE UP (ref 8.4–10.5)
CHLORIDE SERPL-SCNC: 104 MMOL/L — SIGNIFICANT CHANGE UP (ref 96–108)
CO2 SERPL-SCNC: 27 MMOL/L — SIGNIFICANT CHANGE UP (ref 22–31)
CREAT SERPL-MCNC: 1.56 MG/DL — HIGH (ref 0.5–1.3)
GLUCOSE SERPL-MCNC: 90 MG/DL — SIGNIFICANT CHANGE UP (ref 70–99)
HCT VFR BLD CALC: 24.8 % — LOW (ref 34.5–45)
HGB BLD-MCNC: 7.7 G/DL — LOW (ref 11.5–15.5)
INR BLD: 2.74 RATIO — HIGH (ref 0.88–1.16)
MAGNESIUM SERPL-MCNC: 1.7 MG/DL — SIGNIFICANT CHANGE UP (ref 1.6–2.6)
MCHC RBC-ENTMCNC: 28.8 PG — SIGNIFICANT CHANGE UP (ref 27–34)
MCHC RBC-ENTMCNC: 31 GM/DL — LOW (ref 32–36)
MCV RBC AUTO: 92.9 FL — SIGNIFICANT CHANGE UP (ref 80–100)
PLATELET # BLD AUTO: 237 K/UL — SIGNIFICANT CHANGE UP (ref 150–400)
POTASSIUM SERPL-MCNC: 4 MMOL/L — SIGNIFICANT CHANGE UP (ref 3.5–5.3)
POTASSIUM SERPL-SCNC: 4 MMOL/L — SIGNIFICANT CHANGE UP (ref 3.5–5.3)
PROTHROM AB SERPL-ACNC: 31.6 SEC — HIGH (ref 10–13.1)
RBC # BLD: 2.67 M/UL — LOW (ref 3.8–5.2)
RBC # FLD: 18.6 % — HIGH (ref 10.3–14.5)
SODIUM SERPL-SCNC: 141 MMOL/L — SIGNIFICANT CHANGE UP (ref 135–145)
WBC # BLD: 6.63 K/UL — SIGNIFICANT CHANGE UP (ref 3.8–10.5)
WBC # FLD AUTO: 6.63 K/UL — SIGNIFICANT CHANGE UP (ref 3.8–10.5)

## 2017-10-28 RX ORDER — MAGNESIUM SULFATE 500 MG/ML
2 VIAL (ML) INJECTION ONCE
Qty: 0 | Refills: 0 | Status: COMPLETED | OUTPATIENT
Start: 2017-10-28 | End: 2017-10-28

## 2017-10-28 RX ADMIN — Medication 3 MILLILITER(S): at 05:50

## 2017-10-28 RX ADMIN — Medication 100 MILLIGRAM(S): at 05:50

## 2017-10-28 RX ADMIN — SENNA PLUS 2 TABLET(S): 8.6 TABLET ORAL at 21:03

## 2017-10-28 RX ADMIN — Medication 12.5 MILLIGRAM(S): at 05:50

## 2017-10-28 RX ADMIN — Medication 0.5 MILLIGRAM(S): at 17:30

## 2017-10-28 RX ADMIN — Medication 3 MILLILITER(S): at 23:29

## 2017-10-28 RX ADMIN — Medication 500 MILLIGRAM(S): at 11:05

## 2017-10-28 RX ADMIN — Medication 3 MILLILITER(S): at 11:05

## 2017-10-28 RX ADMIN — Medication 40 MILLIGRAM(S): at 05:50

## 2017-10-28 RX ADMIN — Medication 1 CAPSULE(S): at 11:05

## 2017-10-28 RX ADMIN — QUETIAPINE FUMARATE 12.5 MILLIGRAM(S): 200 TABLET, FILM COATED ORAL at 21:04

## 2017-10-28 RX ADMIN — POLYETHYLENE GLYCOL 3350 17 GRAM(S): 17 POWDER, FOR SOLUTION ORAL at 11:05

## 2017-10-28 RX ADMIN — ATORVASTATIN CALCIUM 10 MILLIGRAM(S): 80 TABLET, FILM COATED ORAL at 21:03

## 2017-10-28 RX ADMIN — Medication 0.5 MILLIGRAM(S): at 05:50

## 2017-10-28 RX ADMIN — Medication 3 MILLILITER(S): at 17:30

## 2017-10-28 RX ADMIN — CLOPIDOGREL BISULFATE 75 MILLIGRAM(S): 75 TABLET, FILM COATED ORAL at 11:05

## 2017-10-28 RX ADMIN — Medication 100 MILLIGRAM(S): at 21:03

## 2017-10-28 RX ADMIN — Medication 50 GRAM(S): at 18:32

## 2017-10-28 RX ADMIN — Medication 12.5 MILLIGRAM(S): at 17:30

## 2017-10-28 RX ADMIN — Medication 325 MILLIGRAM(S): at 11:05

## 2017-10-28 NOTE — PROGRESS NOTE ADULT - SUBJECTIVE AND OBJECTIVE BOX
OSCAR LOPEZ    Comfortable in chair.  Ambulated yesterday.  Used BIPAP overnight.  Now on nc.  Remains in AF 70-100s, no slow VRs overnight.  Breathing better.  No palpitations.    Allergies    No Known Allergies  digoxin (Rash; Drowsiness)    MEDICATIONS  (STANDING):  ALBUTerol/ipratropium for Nebulization 3 milliLiter(s) Nebulizer every 6 hours  ascorbic acid 500 milliGRAM(s) Oral daily  atorvastatin 10 milliGRAM(s) Oral at bedtime  buDESOnide   0.5 milliGRAM(s) Respule 0.5 milliGRAM(s) Inhalation every 12 hours  clopidogrel Tablet 75 milliGRAM(s) Oral daily  docusate sodium 100 milliGRAM(s) Oral three times a day  ferrous    sulfate 325 milliGRAM(s) Oral daily  furosemide   Injectable 40 milliGRAM(s) IV Push daily  influenza   Vaccine 0.5 milliLiter(s) IntraMuscular once  metoprolol 12.5 milliGRAM(s) Oral two times a day  Nephrocaps 1 Capsule(s) Oral daily  polyethylene glycol 3350 17 Gram(s) Oral daily  QUEtiapine 12.5 milliGRAM(s) Oral <User Schedule>  senna 2 Tablet(s) Oral at bedtime    PHYSICAL EXAM:  Vital Signs Last 24 Hrs  T(C): 36.9 (28 Oct 2017 13:32), Max: 36.9 (28 Oct 2017 13:32)  T(F): 98.4 (28 Oct 2017 13:32), Max: 98.4 (28 Oct 2017 13:32)  HR: 68 (28 Oct 2017 13:32) (68 - 94)  BP: 118/73 (28 Oct 2017 13:32) (118/73 - 144/75)  BP(mean): --  RR: 20 (28 Oct 2017 13:32) (18 - 20)  SpO2: 99% (28 Oct 2017 13:32) (96% - 100%)  Daily     Daily Weight in k.9 (28 Oct 2017 08:24)  I&O's Summary    27 Oct 2017 07:01  -  28 Oct 2017 07:00  --------------------------------------------------------  IN: 820 mL / OUT: 1725 mL / NET: -905 mL    28 Oct 2017 07:01  -  28 Oct 2017 15:20  --------------------------------------------------------  IN: 600 mL / OUT: 500 mL / NET: 100 mL        General Appearance: 	 Alert, cooperative, no distress  Neck: JVP estimated at 10 cm  Lungs:  clear to auscultation and percussion bilaterally  Cor:  pmi 5th ICS MCL, Irregular rate and rhythm, S1 normal intensity, S2 normal intensity, no gallops, GHAZALA  Abdomen:	 soft, non-tender; bowel sounds normal; no masses,  no organomegaly  Extremities: 2+ bilateral edema    EKG:  Telemetry: See above    Labs:  CBC Full  -  ( 28 Oct 2017 08:34 )  WBC Count : 6.63 K/uL  Hemoglobin : 7.7 g/dL  Hematocrit : 24.8 %  Platelet Count - Automated : 237 K/uL  Mean Cell Volume : 92.9 fl  Mean Cell Hemoglobin : 28.8 pg  Mean Cell Hemoglobin Concentration : 31.0 gm/dL  Auto Neutrophil # : x  Auto Lymphocyte # : x  Auto Monocyte # : x  Auto Eosinophil # : x  Auto Basophil # : x  Auto Neutrophil % : x  Auto Lymphocyte % : x  Auto Monocyte % : x  Auto Eosinophil % : x  Auto Basophil % : x    Basic Metabolic Panel in AM (10.28. @ 08:27)    Sodium, Serum: 141 mmol/L    Potassium, Serum: 4.0 mmol/L    Chloride, Serum: 104 mmol/L    Carbon Dioxide, Serum: 27 mmol/L    Anion Gap, Serum: 10 mmol/L    Blood Urea Nitrogen, Serum: 46 mg/dL    Creatinine, Serum: 1.56 mg/dL    Glucose, Serum: 90 mg/dL    Calcium, Total Serum: 8.6 mg/dL    eGFR if Non : 30: Interpretative comment  The units for eGFR are ml/min/1.73m2 (normalized body surface area). The  eGFR is calculated from a serum creatinine using the CKD-EPI equation.  Other variables required for calculation are race, age and sex. Among  patients with chronic kidney disease (CKD), the eGFR is useful in  determining the stage of disease according to KDOQI CKD classification.  All eGFR results are reported numerically with the following  interpretation.          GFR                    With                 Without     (ml/min/1.73 m2)    Kidney Damage       Kidney Damage        >= 90                    Stage 1                     Normal        60-89                    Stage 2                     Decreased GFR        30-59     Stage 3                     Stage 3        15-29                    Stage 4                     Stage 4        < 15                      Stage 5                     Stage 5  Each stage of CKD assumes that the associated GFR level has been in  effect for at least 3 months. Determination of stages one and two (with  eGFR > 59 ml/min/m2) requires estimation of kidney damage for at least 3  months as defined by structural or functional abnormalities.  Limitations: All estimates of GFR will be less accurate for patients at  extremes of muscle mass (including but not limited to frail elderly,  critically ill, or cancer patients), those with unusual diets, and those  with conditions associated with reduced secretion or extrarenal  elimination of creatinine. The eGFR equation is not recommended for use  in patients with unstable creatinine levels. mL/min/1.73M2    eGFR if African American: 34 mL/min/1.73M2    PT/INR - ( 28 Oct 2017 08:34 )   PT: 31.6 sec;   INR: 2.74 ratio         Impression/Plan:  Improved renal failure  Improving respiratory acidosis with intermittent BIPAP  Stable diastolic CHF  Chronic AF, with tachy everett syndrome    Plan:  Pacer when INR appropriate, hold coumadin  Continue oral diuretics  Consider transfusion with hemoglobin 7.7, GI evaluation noted no signs of active GIB  Likely will need rehab placement    Leopoldo Funes MD, [facility]  [facility]

## 2017-10-28 NOTE — PROGRESS NOTE ADULT - SUBJECTIVE AND OBJECTIVE BOX
[facility] - [facility]     PROGRESS NOTE    CHIEF COMPLAINT: ARF; COPD/emphysema; chronic CHF; CKD/ANNCY    INTERVAL HISTORY: seems confused; no episodes of bradycardia or tachycardia overnight - scheduled for pacemaker placement on Monday; awake and alert after a night on BIPAP now with resolved respiratory acidosis; sitting in chair eating lunch; no cough, sputum production, chest congestion or wheeze; no fevers, chills or sweats; no chest pain/pressure or palpitations; s/p antibiotics for UTI; renal function improved/stable with resolved hypernatremia; glucose well controlled;     REVIEW OF SYSTEMS:  Constitutional: As per interval history  HEENT: Within normal limits  CV: As per interval history  Resp: As per interval history  GI: Within normal limits   : Within normal limits  Musculoskeletal: Within normal limits  Skin: Within normal limits  Neurological: Within normal limits  Psychiatric: Within normal limits  Endocrine: Within normal limits  Hematologic/Lymphatic: Within normal limits  Allergic/Immunologic: Within normal limits      MEDICATIONS:     Pulmonary "  ALBUTerol/ipratropium for Nebulization 3 milliLiter(s) Nebulizer every 6 hours  buDESOnide   0.5 milliGRAM(s) Respule 0.5 milliGRAM(s) Inhalation every 12 hours      Anti-microbials:      Cardiovascular:  furosemide   Injectable 40 milliGRAM(s) IV Push daily  metoprolol 12.5 milliGRAM(s) Oral two times a day      Other:  ascorbic acid 500 milliGRAM(s) Oral daily  atorvastatin 10 milliGRAM(s) Oral at bedtime  clopidogrel Tablet 75 milliGRAM(s) Oral daily  docusate sodium 100 milliGRAM(s) Oral three times a day  ferrous    sulfate 325 milliGRAM(s) Oral daily  influenza   Vaccine 0.5 milliLiter(s) IntraMuscular once  Nephrocaps 1 Capsule(s) Oral daily  polyethylene glycol 3350 17 Gram(s) Oral daily  QUEtiapine 12.5 milliGRAM(s) Oral <User Schedule>  senna 2 Tablet(s) Oral at bedtime        OBJECTIVE:        I&O's Detail    27 Oct 2017 07:01  -  28 Oct 2017 07:00  --------------------------------------------------------  IN:    Oral Fluid: 820 mL  Total IN: 820 mL    OUT:    Voided: 1725 mL  Total OUT: 1725 mL    Total NET: -905 mL      28 Oct 2017 07:01  -  28 Oct 2017 13:58  --------------------------------------------------------  IN:    Oral Fluid: 360 mL  Total IN: 360 mL    OUT:  Total OUT: 0 mL    Total NET: 360 mL      Daily Weight in k.9 (28 Oct 2017 08:24)    PHYSICAL EXAM:       ICU Vital Signs Last 24 Hrs  T(C): 36.9 (28 Oct 2017 13:32), Max: 36.9 (28 Oct 2017 13:32)  T(F): 98.4 (28 Oct 2017 13:32), Max: 98.4 (28 Oct 2017 13:32)  HR: 68 (28 Oct 2017 13:32) (68 - 94)  BP: 118/73 (28 Oct 2017 13:32) (118/73 - 144/75)  BP(mean): --  ABP: --  ABP(mean): --  RR: 20 (28 Oct 2017 13:32) (18 - 20)  SpO2: 99% (28 Oct 2017 13:32) (96% - 100%) on 2lpm    General: Awake, alert, confused. Cooperative. No distress. Appears stated age 	  HEENT:   Atraumatic. Normocephalic. Anicteric. Normal oral mucosa, PERRL, EOMI. Left eye subconjunctival hemorrhage resolving  Neck: Supple. Trachea midline. Thyroid without enlargement/tenderness/nodules. No carotid bruit. No JVD	  Cardiovascular: Irregularly irregular rate and rhythm. S1 S2 normal. II/VI systolic murmur  Respiratory: Respirations unlabored. Decreased breath sounds at bases with dullness ~ 1/4 up. Kyphoscoliosis  Abdomen: Soft. Non-tender. Non-distended. No organomegaly. No masses. Normal bowel sounds	  Extremities: Warm to touch. No clubbing or cyanosis. Mild to moderate lower extremity edema  Pulses: 2+ peripheral pulses all extremities	  Skin: Normal skin color. No rashes or lesions. No ecchymoses, No cyanosis. Warm to touch  Lymph Nodes: Cervical, supraclavicular and axillary nodes normal  Neurological: Awake and alert. Moving all extremities. A and O x 3  Psychiatry: Calm      LABS:                        7.7    6.63  )-----------( 237      ( 28 Oct 2017 08:34 )             24.8     10-28    141  |  104  |  46<H>  ----------------------------<  90  4.0   |  27  |  1.56<H>    10    140  |  103  |  46<H>  ----------------------------<  97  5.0   |  25  |  1.78<H>    Ca      8.6      10-28    Ca      8.7      10-27    Phos    3.5     10-27    Phos    2.9     10-24      Mg       1.7     10-28    Mg       2.2     10-24      PT/INR - ( 28 Oct 2017 08:34 )   PT: 31.6 sec;   INR: 2.74 ratio      ABG - ( 26 Oct 2017 11:29 )  pH: 7.38  /  pCO2: 47    /  pO2: 95    / HCO3: 27    / Base Excess: 1.8   /  SaO2: 98        ABG - ( 25 Oct 2017 06:51 )  pH: 7.34  /  pCO2: 50    /  pO2: 91    / HCO3: 26    / Base Excess: .8    /  SaO2: 98        ABG - ( 23 Oct 2017 17:58 )  pH: 7.34  /  pCO2: 49    /  pO2: 106   / HCO3: 26    / Base Excess: .5    /  SaO2: 99        ABG - ( 20 Oct 2017 06:32 )  pH: 7.28  /  pCO2: 59    /  pO2: 198   / HCO3: 27    / Base Excess: .4    /  SaO2: 100       ABG - ( 19 Oct 2017 07:20 )  pH: 7.25  /  pCO2: 60    /  pO2: 151   / HCO3: 26    / Base Excess: -1.4  /  SaO2: 99        ABG - ( 18 Oct 2017 14:35 )  pH: 7.30  /  pCO2: 58    /  pO2: 143   / HCO3: 28    / Base Excess: 1.3   /  SaO2: 100       < from: Transthoracic Echocardiogram w/Doppler (12 @ 13:31) >    Patient name: OSCAR LOPEZ  YOB: 1929   Age: 82 (F)   MR#: [id number]  Study Date: 3/14/2012  [id number]: Jada Lagunas RDCS  Study quality: Technically fair  Referring Physician: Pro Tracy MD  Blood Pressure: 124/51 mmHg  Height: 5ft 3in  Weight: 164 lb  BSA: 1.8 m2  ------------------------------------------------------------------------  PROCEDURE: Transthoracic echocardiogram with 2-D, M-Mode  and complete spectral and color flow Doppler.  INDICATION: Aortic Stenosis (424.1), Atrial Fibrillation  (427.31)  ------------------------------------------------------------------------  Dimensions:    Normal Values:  LA:     3.1    2.0 - 4.0 cm  Ao:     3.1    2.0 - 3.8 cm  SEPTUM: 1.2    0.6 - 1.2cm  PWT:    1.3    0.6 - 1.1 cm  LVIDd:  4.3    3.0 - 5.6 cm  LVIDs:  2.6    1.8 - 4.0 cm  Derived variables:  LVMI: 110 g/m2  RWT: 0.60  Fractional short: 40 %  Ejection Fraction: >70 %  Doppler Peak Velocity (m/sec): AoV=4.0  ------------------------------------------------------------------------  Observations:  Mitral Valve: Mitral annular calcification and calcified  mitral leaflets with normal diastolic opening. Minimal  mitral regurgitation.  Aortic Valve/Aorta: Heavily calcified trileaflet aortic  valve with decreased opening. Peak transaortic valve  gradient equals 62 mm Hg, mean transaortic valve gradient  equals 37 mm Hg, estimated aortic valve area equals 0.9  sqcm (by continuity equation), consistent with severe  aortic stenosis .Mild-moderate aortic regurgitation.  Pressure halft - time is 361 ms.  Normal aortic root.  Left Atrium: Severely dilated left atrium.  LA volume index  = 51 cc/m2.  Left Ventricle: Hyperdynamic left ventricle. Mild to  moderate concentric left ventricular hypertrophy. Atrial  fibrillation precludes a comprehensive diastolic  assessment. However, findings are suggestive of moderate  diastolic dysfunction.  Right Heart: Severe right atrial enlargement. RA volume  index = 53 cc/m2.  Normal right ventricular size and  function. Normal tricuspid valve. Mild-moderate tricuspid  regurgitation. Normal pulmonic valve. Minimal pulmonic  regurgitation.  Pericardium/Pleura: Normal pericardium with no pericardial  effusion.  Hemodynamic: Estimated right atrial pressure is 5 mm Hg.  Estimated right ventricular systolic pressure equals 46 mm  Hg, assuming right atrial pressure equals 5 mm Hg,  consistent with mild pulmonary hypertension.  ------------------------------------------------------------------------  Conclusions:  1. Mitral annular calcification and calcified mitral  leaflets with normal diastolic opening. Minimal mitral  regurgitation.  2. Heavily calcified trileaflet aortic valve with decreased  opening. Peak transaortic valve gradientequals 62 mm Hg,  mean transaortic valve gradient equals 37 mm Hg, estimated  aortic valve area equals 0.9 sqcm (by continuity equation),  consistent with severe aortic stenosis. Mild-moderate  aortic regurgitation. Pressure halft - time is 361 ms.  3. Severely dilated left atrium.  LA volume index = 51  cc/m2.  4. Mild to moderate concentric left ventricular  hypertrophy.  5. Hyperdynamic left ventricle.  6. Atrial fibrillation precludes a comprehensive diastolic  assessment. However, findings are suggestive of moderate  diastolic dysfunction.  7. Severe right atrial enlargement. RA volume index = 53  cc/m2.  8. Normal right ventricular size and function.  9. Estimated right ventricular systolic pressure equals 46  mm Hg, assuming right atrial pressure equals 5 mm Hg,  consistent with mild pulmonary hypertension.  10. Normal tricuspid valve. Mild-moderate tricuspid  regurgitation.  *** No previous Echo exam.  ------------------------------------------------------------------------  MICROBIOLOGY:     Color: Yellow / Appearance: SL Turbid / S.012 / pH: x  Gluc: x / Ketone: Negative  / Bili: Negative / Urobili: Negative   Blood: x / Protein: 30 mg/dL / Nitrite: Negative   Leuk Esterase: Large / RBC: 0-2 /HPF / WBC 26-50 /HPF   Sq Epi: x / Non Sq Epi: OCC /HPF / Bacteria: Few /HPF    Culture - Urine (10.18.17 @ 00:32)    Specimen Source: .Urine Catheterized    Culture Results:   >100,000 CFU/ml Klebsiella pneumoniae    Culture - Blood (10.17.17 @ 15:38)    Specimen Source: .Blood Blood-Peripheral    Culture Results:   No growth to date.    RADIOLOGY:  [x] Chest radiographs reviewed and interpreted by me    < from: CT Chest No Cont (10.24.17 @ 12:18) >    EXAM:  CT CHEST                            PROCEDURE DATE:  10/24/2017      INTERPRETATION:  CLINICAL INFORMATION: Acute renal failure due to   congestive heart failure. Chest congestion. Wheezing.    COMPARISON: CT chest from 10/17/2017.    PROCEDURE:   CT of the Chest was performed without intravenous contrast.  Sagittal and coronal reformats were performed.      FINDINGS:    CHEST:     LUNGS AND LARGE AIRWAYS: Patent central airways. Bilateral interlobular   septal thickening and bibasilar compressive atelectasis. Slight increased   aeration of the right lower lobe. 2 mm right upper lobe pulmonary nodule,   unchanged since .    PLEURA: Multiple loculated small to moderate right pleural effusion;   loculated pleural effusion along the fissure. Small left pleural effusion.    VESSELS: Atherosclerosis of thoracic aorta and coronary arteries.     HEART: Heart size is enlarged. Left atrial wall calcification. Mitral   valve annular calcification. Status post transcatheter aorticvalve   repair. No pericardial effusion.    MEDIASTINUM AND CANDI: No lymphadenopathy.    CHEST WALL AND LOWER NECK: Surgical clips on the right chest wall/axilla.   Extensive subcutaneous edema.    VISUALIZED UPPER ABDOMEN: Multiple bilateral renal cysts; interval growth   of left renal cysts compared to CT abdomen pelvis from 2012.   Descending colon diverticulosis.    BONES: Degenerative changes of spine.      IMPRESSION:     Multiloculated small to moderate right pleural effusions with right   basilar compressive atelectasis, with slight increased aeration of the   right lower lobe.  Left small pleural effusion with compressive atelectasis, grossly   unchanged.  Interlobular septal thickening consistent with pulmonary edema.  Cardiomegaly.      MIHIR SIBLEY M.D., RADIOLOGY RESIDENT  This document has been electronically signed.  BLANK WONG M.D. ATTENDING RADIOLOGIST  This document has been electronically signed. Oct 24 2017  3:23PM      < end of copied text >  ---------------------------------------------------------------------------------------------------------  < from: US Renal (10.20.17 @ 14:28) >    EXAM:  US KIDNEY(S)                            PROCEDURE DATE:  10/20/2017        INTERPRETATION:  CLINICAL INFORMATION: NANCY    COMPARISON: CT abdomen 2012    TECHNIQUE: Sonography of the kidneys and bladder.     FINDINGS:    Right kidney: 9.2 cm. echogenic cortex. No renal mass, hydronephrosis or   calculi. Multiple simple cysts the largest is at the upper pole and   measures 2.7 x 2.9 x 2.3 cm.    Left kidney:  10.1 cm. the genic cortex. No renal mass, hydronephrosis or   calculi. Multiple cysts. The largest at the upper pole measures 2.0 x 2.2   x 2.4 cm. This has some internal complexity not well evaluated. Recommend   follow-up in3- 6 months to assess for change.    Urinary bladder: Collapsed around a Gautam catheter limiting evaluation.    IMPRESSION:     Increased renal cortical echogenicity bilaterally, which can be seen in   medical renal disease.    Left slightly complex renal cyst not well evaluated on this exam.   Consider follow-up exam in 3-6 months to assess for change.    MADHU FAULKNER M.D., ATTENDING RADIOLOGIST  This document has been electronically signed. Oct 20 2017  2:01PM     < end of copied text >  ---------------------------------------------------------------------------------------------------------  < from: CT Chest No Cont (10.17.17 @ 21:38) >    EXAM:  CT CHEST                          PROCEDURE DATE:  10/17/2017      INTERPRETATION:  Clinical information: Evaluate for pneumonia. Exam is   compared to previous study of 4/3/2012.    CT scan of the chest was obtained without administration of intravenous   contrast.    Surgical clips are noted in the right axilla.    No hilar and/or mediastinal adenopathy is noted.     Heart is markedly enlarged in size. Calcification the coronary arteries   is noted. Patient is status post TAVR.No pericardial effusion is noted.   Main pulmonary artery is dilated and measures 3.6 cm.     No endobronchial lesions are noted. Compressive atelectasis is noted   involving portions of both lower lobes. This is secondary to small   bilateral pleural effusions, right more than left.    Below the diaphragm, visualized portions of the abdomen demonstrate   low-attenuation within both kidneys which are too small to be adequately   characterized on this exam.     Degenerative changes of the spine are noted. Subcutaneous edema is noted.    Impression: There is no pneumonia.    Bilateral pleural effusions, right more than left.    MAYI MORALES M.D., ATTENDING RADIOLOGIST  This document has been electronically signed. Oct 18 2017  9:24AM      < end of copied text >  ---------------------------------------------------------------------------------------------------------  < from: CT Head No Cont (10.17.17 @ 21:38) >    EXAM:  CT BRAIN                            PROCEDURE DATE:  10/17/2017        INTERPRETATION:  HISTORY: Uterine cancer. Altered mental status.    COMPARISON: MRI brain performed 2012.    TECHNIQUE: Axial noncontrast CT images from the skull base to the vertex   were obtained and submitted for interpretation. Coronal and sagittal   reformatted images were performed. Bone and soft tissue windows were   evaluated.    FINDINGS:     There is no acute intracranial mass-effect, hemorrhage, midline shift, or   abnormal extra-axial fluid collection.     Ventricles, sulci, and cisterns are normal in size for the patient's age   without hydrocephalus. The basal cisterns are patent.     Patchy and confluent regions of periventricular and deep cerebral white   matter hypoattenuation likely reflects chronic microangiopathic ischemic   changes. Atheromatous calcifications along the carotid siphons are   present.    Minimal right maxillary sinus mucosal thickening. No evidence for acute   paranasal sinus or mastoid inflammatory changes.. The calvarium is   intact.     IMPRESSION:     No acute intracranial bleeding, mass effect, or evidence of an acute   territorial infarct.    ADRIEN MORRIS M.D., RADIOLOGY RESIDENT  This document has been electronically signed.  VINI METZGER M.D., ATTENDING RADIOLOGIST  This document has been electronically signed. Oct 18 2017  9:43AM      < end of copied text >  ---------------------------------------------------------------------------------------------------------

## 2017-10-28 NOTE — PROGRESS NOTE ADULT - SUBJECTIVE AND OBJECTIVE BOX
OSCAR SANDERS:[id number],   87yFemale followed for:  digoxin (Rash; Drowsiness)  No Known Allergies    PAST MEDICAL & SURGICAL HISTORY:  Severe aortic stenosis  Uterine cancer  Arthritis  HTN (hypertension)  Breast cancer diagnosed in 2000: s/p right lumpectomy and right axillary lymph node removal, radiation  AF (atrial fibrillation): on coumadin and aspirin  Dyslipidemia  H/O: hysterectomy: 4/2012  s/p surgical excision of left eye Skin cancer: Bilateral lower eye lids  S/P D&C  S/P cholecystectomy    FAMILY HISTORY:  No pertinent family history in first degree relatives    MEDICATIONS  (STANDING):  ALBUTerol/ipratropium for Nebulization 3 milliLiter(s) Nebulizer every 6 hours  ascorbic acid 500 milliGRAM(s) Oral daily  atorvastatin 10 milliGRAM(s) Oral at bedtime  buDESOnide   0.5 milliGRAM(s) Respule 0.5 milliGRAM(s) Inhalation every 12 hours  clopidogrel Tablet 75 milliGRAM(s) Oral daily  docusate sodium 100 milliGRAM(s) Oral three times a day  ferrous    sulfate 325 milliGRAM(s) Oral daily  furosemide   Injectable 40 milliGRAM(s) IV Push daily  influenza   Vaccine 0.5 milliLiter(s) IntraMuscular once  metoprolol 12.5 milliGRAM(s) Oral two times a day  Nephrocaps 1 Capsule(s) Oral daily  polyethylene glycol 3350 17 Gram(s) Oral daily  QUEtiapine 12.5 milliGRAM(s) Oral <User Schedule>  senna 2 Tablet(s) Oral at bedtime    MEDICATIONS  (PRN):      Vital Signs Last 24 Hrs  T(C): 36.8 (28 Oct 2017 04:42), Max: 36.8 (28 Oct 2017 04:42)  T(F): 98.3 (28 Oct 2017 04:42), Max: 98.3 (28 Oct 2017 04:42)  HR: 75 (28 Oct 2017 04:42) (75 - 94)  BP: 128/99 (28 Oct 2017 04:42) (128/79 - 144/75)  BP(mean): --  RR: 19 (28 Oct 2017 04:42) (18 - 19)  SpO2: 96% (28 Oct 2017 05:36) (96% - 100%)  nc/at  s1s2  cta  soft, nt, nd no guarding or rebound  no c/c/e    CBC Full  -  ( 26 Oct 2017 12:35 )  WBC Count : 6.4 K/uL  Hemoglobin : 8.4 g/dL  Hematocrit : 26.4 %  Platelet Count - Automated : 204 K/uL  Mean Cell Volume : 96.4 fl  Mean Cell Hemoglobin : 30.4 pg  Mean Cell Hemoglobin Concentration : 31.6 gm/dL  Auto Neutrophil # : x  Auto Lymphocyte # : x  Auto Monocyte # : x  Auto Eosinophil # : x  Auto Basophil # : x  Auto Neutrophil % : x  Auto Lymphocyte % : x  Auto Monocyte % : x  Auto Eosinophil % : x  Auto Basophil % : x    10-28    141  |  104  |  46<H>  ----------------------------<  90  4.0   |  27  |  1.56<H>    Ca    8.6      28 Oct 2017 08:27  Phos  3.5     10-27  Mg     1.7     10-28      PT/INR - ( 28 Oct 2017 08:34 )   PT: 31.6 sec;   INR: 2.74 ratio

## 2017-10-28 NOTE — PROGRESS NOTE ADULT - SUBJECTIVE AND OBJECTIVE BOX
Patient is a 87y old  Female who presents with a chief complaint of "I have uterine cancer" (16 Oct 2017 12:15)      SUBJECTIVE / OVERNIGHT EVENTS: i feel much better. had BIPAP on overnight but presently sleeping comfortably on NC w no distress(6am)    MEDICATIONS  (STANDING):  ALBUTerol/ipratropium for Nebulization 3 milliLiter(s) Nebulizer every 6 hours  ascorbic acid 500 milliGRAM(s) Oral daily  atorvastatin 10 milliGRAM(s) Oral at bedtime  buDESOnide   0.5 milliGRAM(s) Respule 0.5 milliGRAM(s) Inhalation every 12 hours  clopidogrel Tablet 75 milliGRAM(s) Oral daily  docusate sodium 100 milliGRAM(s) Oral three times a day  ferrous    sulfate 325 milliGRAM(s) Oral daily  furosemide   Injectable 40 milliGRAM(s) IV Push daily  influenza   Vaccine 0.5 milliLiter(s) IntraMuscular once  metoprolol 12.5 milliGRAM(s) Oral two times a day  Nephrocaps 1 Capsule(s) Oral daily  polyethylene glycol 3350 17 Gram(s) Oral daily  QUEtiapine 12.5 milliGRAM(s) Oral <User Schedule>  senna 2 Tablet(s) Oral at bedtime    MEDICATIONS  (PRN):      Vital Signs Last 24 Hrs  T(F): 98.3 (10-28-17 @ 04:42), Max: 98.3 (10-28-17 @ 04:42)  HR: 75 (10-28-17 @ 04:42) (75 - 94)  BP: 128/99 (10-28-17 @ 04:42) (128/79 - 144/75)  RR: 19 (10-28-17 @ 04:42) (18 - 19)  SpO2: 96% (10-28-17 @ 05:36) (96% - 100%)  Telemetry:   CAPILLARY BLOOD GLUCOSE        I&O's Summary    27 Oct 2017 07:01  -  28 Oct 2017 07:00  --------------------------------------------------------  IN: 820 mL / OUT: 1725 mL / NET: -905 mL    28 Oct 2017 07:01  -  28 Oct 2017 09:04  --------------------------------------------------------  IN: 360 mL / OUT: 0 mL / NET: 360 mL        PHYSICAL EXAM:  GENERAL: NAD, well-developed  HEAD:  Atraumatic, Normocephalic  EYES: EOMI, PERRLA, conjunctiva and sclera clear  NECK: Supple, No JVD  CHEST/LUNG: Clear to auscultation bilaterally; No wheeze  HEART: Regular rate and rhythm; No murmurs, rubs, or gallops  ABDOMEN: Soft, Nontender, Nondistended; Bowel sounds present  EXTREMITIES:  2+ Peripheral Pulses, No clubbing, cyanosis, or edema  PSYCH: AAOx3  NEUROLOGY: non-focal  SKIN: No rashes or lesions    LABS:                        8.4    6.4   )-----------( 204      ( 26 Oct 2017 12:35 )             26.4     10-27    140  |  103  |  46<H>  ----------------------------<  97  5.0   |  25  |  1.78<H>    Ca    8.7      27 Oct 2017 07:32  Phos  3.5     10-27      PT/INR - ( 27 Oct 2017 07:41 )   PT: 40.2 sec;   INR: 3.47 ratio                   RADIOLOGY & ADDITIONAL TESTS:    Imaging Personally Reviewed:    Consultant(s) Notes Reviewed:      Care Discussed with Consultants/Other Providers: Patient is a 87y old  Female who presents with a chief complaint of "I have uterine cancer" (16 Oct 2017 12:15)      SUBJECTIVE / OVERNIGHT EVENTS: i feel much better. had BIPAP on overnight but presently sleeping comfortably on NC w no distress(6am). on tele: afib     MEDICATIONS  (STANDING):  ALBUTerol/ipratropium for Nebulization 3 milliLiter(s) Nebulizer every 6 hours  ascorbic acid 500 milliGRAM(s) Oral daily  atorvastatin 10 milliGRAM(s) Oral at bedtime  buDESOnide   0.5 milliGRAM(s) Respule 0.5 milliGRAM(s) Inhalation every 12 hours  clopidogrel Tablet 75 milliGRAM(s) Oral daily  docusate sodium 100 milliGRAM(s) Oral three times a day  ferrous    sulfate 325 milliGRAM(s) Oral daily  furosemide   Injectable 40 milliGRAM(s) IV Push daily  influenza   Vaccine 0.5 milliLiter(s) IntraMuscular once  metoprolol 12.5 milliGRAM(s) Oral two times a day  Nephrocaps 1 Capsule(s) Oral daily  polyethylene glycol 3350 17 Gram(s) Oral daily  QUEtiapine 12.5 milliGRAM(s) Oral <User Schedule>  senna 2 Tablet(s) Oral at bedtime    MEDICATIONS  (PRN):      Vital Signs Last 24 Hrs  T(F): 98.3 (10-28-17 @ 04:42), Max: 98.3 (10-28-17 @ 04:42)  HR: 75 (10-28-17 @ 04:42) (75 - 94)  BP: 128/99 (10-28-17 @ 04:42) (128/79 - 144/75)  RR: 19 (10-28-17 @ 04:42) (18 - 19)  SpO2: 96% (10-28-17 @ 05:36) (96% - 100%)  Telemetry:   CAPILLARY BLOOD GLUCOSE        I&O's Summary    27 Oct 2017 07:01  -  28 Oct 2017 07:00  --------------------------------------------------------  IN: 820 mL / OUT: 1725 mL / NET: -905 mL    28 Oct 2017 07:01  -  28 Oct 2017 09:04  --------------------------------------------------------  IN: 360 mL / OUT: 0 mL / NET: 360 mL        PHYSICAL EXAM:  GENERAL: NAD, well-developed  HEAD:  Atraumatic, Normocephalic  EYES: EOMI, PERRLA, conjunctiva and sclera clear  NECK: Supple, No JVD  CHEST/LUNG: Clear to auscultation bilaterally; No wheeze  HEART: Regular rate and rhythm; No murmurs, rubs, or gallops  ABDOMEN: Soft, Nontender, Nondistended; Bowel sounds present  EXTREMITIES:  2+ Peripheral Pulses, No clubbing, cyanosis, or edema  PSYCH: AAOx3  NEUROLOGY: non-focal  SKIN: No rashes or lesions    LABS:                        8.4    6.4   )-----------( 204      ( 26 Oct 2017 12:35 )             26.4     10-27    140  |  103  |  46<H>  ----------------------------<  97  5.0   |  25  |  1.78<H>    Ca    8.7      27 Oct 2017 07:32  Phos  3.5     10-27      PT/INR - ( 27 Oct 2017 07:41 )   PT: 40.2 sec;   INR: 3.47 ratio                   RADIOLOGY & ADDITIONAL TESTS:    Imaging Personally Reviewed:    Consultant(s) Notes Reviewed:      Care Discussed with Consultants/Other Providers:

## 2017-10-28 NOTE — PROGRESS NOTE ADULT - SUBJECTIVE AND OBJECTIVE BOX
Patient seen and examined sitting in chair on NC breathing evenly & non-labored.  NAD noted.  No new events.    REVIEW OF SYSTEMS:  As per HPI, otherwise 8 full 10 ROS were unremarkable    MEDICATIONS  (STANDING):  ALBUTerol/ipratropium for Nebulization 3 milliLiter(s) Nebulizer every 6 hours  ascorbic acid 500 milliGRAM(s) Oral daily  atorvastatin 10 milliGRAM(s) Oral at bedtime  buDESOnide   0.5 milliGRAM(s) Respule 0.5 milliGRAM(s) Inhalation every 12 hours  clopidogrel Tablet 75 milliGRAM(s) Oral daily  docusate sodium 100 milliGRAM(s) Oral three times a day  ferrous    sulfate 325 milliGRAM(s) Oral daily  furosemide   Injectable 40 milliGRAM(s) IV Push daily  influenza   Vaccine 0.5 milliLiter(s) IntraMuscular once  metoprolol 12.5 milliGRAM(s) Oral two times a day  Nephrocaps 1 Capsule(s) Oral daily  polyethylene glycol 3350 17 Gram(s) Oral daily  QUEtiapine 12.5 milliGRAM(s) Oral <User Schedule>  senna 2 Tablet(s) Oral at bedtime      VITAL:  T(C): , Max: 36.9 (10-28-17 @ 13:32)  T(F): , Max: 98.4 (10-28-17 @ 13:32)  HR: 68 (10-28-17 @ 13:32)  BP: 118/73 (10-28-17 @ 13:32)  BP(mean): --  RR: 20 (10-28-17 @ 13:32)  SpO2: 99% (10-28-17 @ 13:32)  Wt(kg): --    I and O's:    10-27 @ 07:01  -  10-28 @ 07:00  --------------------------------------------------------  IN: 820 mL / OUT: 1725 mL / NET: -905 mL    10-28 @ 07:01  -  10-28 @ 15:57  --------------------------------------------------------  IN: 600 mL / OUT: 500 mL / NET: 100 mL          PHYSICAL EXAM:    Constitutional: NAD  HEENT: PERRLA, EOMI,  MMM  Neck: No LAD, No JVD  Respiratory: CTAB  Cardiovascular: S1 and S2  Gastrointestinal: BS+, soft, NT/ND  Extremities: + L/E edema  Neurological: A/O x 3, no focal deficits  Psychiatric: Normal mood, normal affect  : No Gautam  Skin: No rashes  Access: Not applicable    LABS:                        7.7    6.63  )-----------( 237      ( 28 Oct 2017 08:34 )             24.8     10-28    141  |  104  |  46<H>  ----------------------------<  90  4.0   |  27  |  1.56<H>    Ca    8.6      28 Oct 2017 08:27  Phos  3.5     10-27  Mg     1.7     10-28    ASSESSMENT: 87F w/ past breast and uterine CA, HTN, AFib, and AS-TAVR, 10/16/17 a/w hypercapnic respiratory failure/AMS, as well as NANCY c/b a-fib with tachy-everett  - 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 Prerenal, +/- component of ischemic ATN + lasix. Renal function improving this AM  - Hyperkalemia - resolved  -  - left complex renal cyst - indicated for imaging f/u 3-6 months.   - Cardiology - tachy/everett a-fib - planned for PPM once INR at goal of 1.6 per cardiology  - Hypomagnesemia:  likely dt diuresis; mild    RECOMMEND:  1.  BMP daily  2.  Lasix 40mg IV daily  3.  Magnesium sulfate 2 g IV x1 dose now  4.  No need for K+ restrictive diet at this time; order if K+ >5  5.  TTE pending  6.  Plan for PPM possible Monday depending on INR  7.  Dose any medication for a CrCl ~ 25-30 cc/min  8.  Avoid NSAIDs and nephrotoxins as able

## 2017-10-28 NOTE — PROGRESS NOTE ADULT - SUBJECTIVE AND OBJECTIVE BOX
Patient is a 87y old  Female who presents with a chief complaint of "I have uterine cancer" (16 Oct 2017 12:15)      SUBJECTIVE / OVERNIGHT EVENTS:  THIS IS A NOTE FOR 10/27. FOR SOME REASON THE NOTE WAS WRITTEN BUT DIDNT GET SAVED  CC: PTN STILL CONSIDERING PPM, FEELS BETTER BUT STILL W SOME DYSPNEA. using BIPAP on and off and at qhs    MEDICATIONS  (STANDING):  ALBUTerol/ipratropium for Nebulization 3 milliLiter(s) Nebulizer every 6 hours  ascorbic acid 500 milliGRAM(s) Oral daily  atorvastatin 10 milliGRAM(s) Oral at bedtime  buDESOnide   0.5 milliGRAM(s) Respule 0.5 milliGRAM(s) Inhalation every 12 hours  clopidogrel Tablet 75 milliGRAM(s) Oral daily  docusate sodium 100 milliGRAM(s) Oral three times a day  ferrous    sulfate 325 milliGRAM(s) Oral daily  furosemide   Injectable 40 milliGRAM(s) IV Push daily  influenza   Vaccine 0.5 milliLiter(s) IntraMuscular once  metoprolol 12.5 milliGRAM(s) Oral two times a day  Nephrocaps 1 Capsule(s) Oral daily  polyethylene glycol 3350 17 Gram(s) Oral daily  QUEtiapine 12.5 milliGRAM(s) Oral <User Schedule>  senna 2 Tablet(s) Oral at bedtime    MEDICATIONS  (PRN):      Vital Signs Last 24 Hrs  T(F): 98.3 (10-28-17 @ 04:42), Max: 98.3 (10-28-17 @ 04:42)  HR: 75 (10-28-17 @ 04:42) (75 - 94)  BP: 128/99 (10-28-17 @ 04:42) (128/79 - 144/75)  RR: 19 (10-28-17 @ 04:42) (18 - 19)  SpO2: 96% (10-28-17 @ 05:36) (96% - 100%)  Telemetry:   CAPILLARY BLOOD GLUCOSE        I&O's Summary    27 Oct 2017 07:01  -  28 Oct 2017 07:00  --------------------------------------------------------  IN: 820 mL / OUT: 1725 mL / NET: -905 mL    28 Oct 2017 07:01  -  28 Oct 2017 09:00  --------------------------------------------------------  IN: 360 mL / OUT: 0 mL / NET: 360 mL        PHYSICAL EXAM:  GENERAL: NAD, well-developed  HEAD:  Atraumatic, Normocephalic  EYES: EOMI, PERRLA, conjunctiva and sclera clear  NECK: Supple, No JVD  CHEST/LUNG: Clear to auscultation bilaterally; No wheeze  HEART: Regular rate and rhythm; No murmurs, rubs, or gallops  ABDOMEN: Soft, Nontender, Nondistended; Bowel sounds present  EXTREMITIES:  2+ Peripheral Pulses, No clubbing, cyanosis, or edema  PSYCH: AAOx3  NEUROLOGY: non-focal  SKIN: No rashes or lesions    LABS:                        8.4    6.4   )-----------( 204      ( 26 Oct 2017 12:35 )             26.4     10-27    140  |  103  |  46<H>  ----------------------------<  97  5.0   |  25  |  1.78<H>    Ca    8.7      27 Oct 2017 07:32  Phos  3.5     10-27      PT/INR - ( 27 Oct 2017 07:41 )   PT: 40.2 sec;   INR: 3.47 ratio                   RADIOLOGY & ADDITIONAL TESTS:    Imaging Personally Reviewed:    Consultant(s) Notes Reviewed:      Care Discussed with Consultants/Other Providers:

## 2017-10-28 NOTE — PROGRESS NOTE ADULT - ASSESSMENT
Patient is a 87y old  Female who presents with a chief complaint of "I have uterine cancer" (16 Oct 2017 12:15)      HPI:  86 yo F with PMHx of right breast CA S/P lumpectomy in 2000, Uterine Ca S/P total hysterectomy in 2012, Skin CA S/P excision of bilateral lower eyelid skin CA, cholecystectomy, Osteoarthritis of bilateral knees, HTN, hyperlipidemia, paroxysmal Afib on coumadin, severe aortic stenosis now s/p TAVR, who presents with onset of generalized weakness x 2 weeks.  Patient reports that over the last two weeks she had noticed increased weakness. This was also associated with occasional intermittent episodes of shortness of breath that seemed to occur at rest that come and go for a few minutes at a time.  No chest pain. In addition she had noticed that she was going "into fibrillation" as patient reports that she can feel her episodic fibrillation.  No fevers, no chills, no nausea or vomiting. Patient reports that she has been very thirsty but her family told her not to drink too much water so she has been drinking much less liquids. Chronic R>L leg edema that is not changed (16 Oct 2017 05:43)      PAST MEDICAL & SURGICAL HISTORY:  Severe aortic stenosis  Uterine cancer  Arthritis  HTN (hypertension)  Breast cancer diagnosed in 2000: s/p right lumpectomy and right axillary lymph node removal, radiation  AF (atrial fibrillation): on coumadin and aspirin  Dyslipidemia  H/O: hysterectomy: 4/2012  s/p surgical excision of left eye Skin cancer: Bilateral lower eye lids  S/P D&C  S/P cholecystectomy      MEDICATIONS  (STANDING):  ALBUTerol/ipratropium for Nebulization 3 milliLiter(s) Nebulizer every 6 hours  ascorbic acid 500 milliGRAM(s) Oral daily  atorvastatin 10 milliGRAM(s) Oral at bedtime  buDESOnide   0.5 milliGRAM(s) Respule 0.5 milliGRAM(s) Inhalation every 12 hours  clopidogrel Tablet 75 milliGRAM(s) Oral daily  docusate sodium 100 milliGRAM(s) Oral three times a day  ferrous    sulfate 325 milliGRAM(s) Oral daily  furosemide   Injectable 40 milliGRAM(s) IV Push daily  influenza   Vaccine 0.5 milliLiter(s) IntraMuscular once  metoprolol 12.5 milliGRAM(s) Oral two times a day  Nephrocaps 1 Capsule(s) Oral daily  polyethylene glycol 3350 17 Gram(s) Oral daily  QUEtiapine 12.5 milliGRAM(s) Oral <User Schedule>  senna 2 Tablet(s) Oral at bedtime      Allergies    No Known Allergies    Intolerances    digoxin (Rash; Drowsiness)      SOCIAL HISTORY:  Denies ETOh,Smoking,     FAMILY HISTORY:  No pertinent family history in first degree relatives      REVIEW OF SYSTEMS:    CONSTITUTIONAL: No weakness, fevers or chills  EYES/ENT: No visual changes;  No vertigo or throat pain   NECK: No pain or stiffness  RESPIRATORY: No cough, wheezing, hemoptysis; No shortness of breath  CARDIOVASCULAR: No chest pain or palpitations  GASTROINTESTINAL: No abdominal or epigastric pain. No nausea, vomiting, or hematemesis; No diarrhea or constipation. No melena or hematochezia.  GENITOURINARY: No dysuria, frequency or hematuria  NEUROLOGICAL: No numbness or weakness  SKIN: No itching, burning, rashes, or lesions   All other review of systems is negative unless indicated above.    VITAL:  T(C): , Max: 36.8 (10-28-17 @ 04:42)  T(F): , Max: 98.3 (10-28-17 @ 04:42)  HR: 75 (10-28-17 @ 04:42)  BP: 128/99 (10-28-17 @ 04:42)  BP(mean): --  RR: 19 (10-28-17 @ 04:42)  SpO2: 96% (10-28-17 @ 05:36)  Wt(kg): --    I and O's:    10-26 @ 07:01  -  10-27 @ 07:00  --------------------------------------------------------  IN: 1200 mL / OUT: 1595 mL / NET: -395 mL    10-27 @ 07:01  -  10-28 @ 07:00  --------------------------------------------------------  IN: 820 mL / OUT: 1725 mL / NET: -905 mL    10-28 @ 07:01  -  10-28 @ 10:04  --------------------------------------------------------  IN: 360 mL / OUT: 0 mL / NET: 360 mL          PHYSICAL EXAM:    Constitutional: NAD  HEENT: PERRLA,   Neck: No JVD  Respiratory: CTA B/L  Cardiovascular: S1 and S2  Gastrointestinal: BS+, soft, NT/ND  Extremities: No peripheral edema  Neurological: A/O x 3, no focal deficits  Psychiatric: Normal mood, normal affect  : No Gautam  Skin: No rashes  Access: Not applicable  Back: No CVA tenderness    LABS:                        8.4    6.4   )-----------( 204      ( 26 Oct 2017 12:35 )             26.4     10-28    141  |  104  |  46<H>  ----------------------------<  90  4.0   |  27  |  1.56<H>    Ca    8.6      28 Oct 2017 08:27  Phos  3.5     10-27  Mg     1.7     10-28            RADIOLOGY & ADDITIONAL STUDIES:

## 2017-10-28 NOTE — PROGRESS NOTE ADULT - ASSESSMENT
ASSESSMENT    acute hypoxic and hypercapnic respiratory failure - multifactorial - resolved respiratory acidosis and improved oxygenation    chronic CHF in the setting of aortic stenosis s/p TAVR and diastolic CHF with moderate bilateral pleural effusions/atelectasis - minimal improvement on follow-up chest CT  restrictive lung disease due to respiratory muscle weakness, kyphoscoliosis and effusions  perhaps underlying senile emphysema  VTE disease seems unlikely in the setting of chronic A/C    NANCY due to recent diarrhea and increased diuretic dose with metabolic acidosis - resolved    atrial fibrillation with tachy-everett syndrome    hypernatremia - resolved    Klebsiella UTI s/p antibiotics    anemia    PLAN/RECOMMENDATIONS    continue NIV for sleep - oxygen supplementation to keep saturation greater than 92% via nasal canula during the day - follow-up ABG tomorrow  repeat chest CT reviewed  patient will benefit from a trilogy vent which I have arranged - when discharged to rehab, will need oxygen supplementation via a nasal canula @ 3lpm at rest and 4lpm with exertion and nocturnal BIPAP on the current settings  After reviewing the patient's current respiratory status, I believe that the patient would benefit from NIV. BIPAP has been unsuccessful in treating the patient's advanced disease processes. I am ordering NIV to be used for sleep, during daytime naps and as needed during the day for periods of shortness of breath and increased work of breathing. Without NIV, I am concerned that the patient will continue to experience further clinical deterioration resulting in possible medical harm and recurrent hospitalizations  albuterol/atrovent/pulmicort nebs  renal follow-up noted noted - oleary catheter discontinued - needs ongoing diuresis  cardiac meds: lipitor/lasix/plavix/cardizem CD discontinued due to bradycardia/metoprolol/coumadin for INR 2.5 - 3.5 - hold for PPM - off ACE inhibitors - cardiology follow-up noted  s/p course of ceftriaxone  bowel regimen  transfuse as needed  bedtime Seroquel may be contributing to confusion    Will follow with you; discussed plan of care with the dedicated floor NP and patient    Tommy Reyes MD, [facility] - [phone]  Pulmonary Medicine

## 2017-10-29 LAB
ANION GAP SERPL CALC-SCNC: 13 MMOL/L — SIGNIFICANT CHANGE UP (ref 5–17)
APTT BLD: 123.5 SEC — CRITICAL HIGH (ref 27.5–37.4)
APTT BLD: 36.5 SEC — SIGNIFICANT CHANGE UP (ref 27.5–37.4)
BUN SERPL-MCNC: 44 MG/DL — HIGH (ref 7–23)
CALCIUM SERPL-MCNC: 8.9 MG/DL — SIGNIFICANT CHANGE UP (ref 8.4–10.5)
CHLORIDE SERPL-SCNC: 100 MMOL/L — SIGNIFICANT CHANGE UP (ref 96–108)
CO2 SERPL-SCNC: 27 MMOL/L — SIGNIFICANT CHANGE UP (ref 22–31)
CREAT SERPL-MCNC: 1.54 MG/DL — HIGH (ref 0.5–1.3)
GLUCOSE SERPL-MCNC: 92 MG/DL — SIGNIFICANT CHANGE UP (ref 70–99)
HCT VFR BLD CALC: 27.3 % — LOW (ref 34.5–45)
HCT VFR BLD CALC: 28.1 % — LOW (ref 34.5–45)
HGB BLD-MCNC: 8.3 G/DL — LOW (ref 11.5–15.5)
HGB BLD-MCNC: 8.9 G/DL — LOW (ref 11.5–15.5)
INR BLD: 1.86 RATIO — HIGH (ref 0.88–1.16)
MCHC RBC-ENTMCNC: 28 PG — SIGNIFICANT CHANGE UP (ref 27–34)
MCHC RBC-ENTMCNC: 30 PG — SIGNIFICANT CHANGE UP (ref 27–34)
MCHC RBC-ENTMCNC: 30.4 GM/DL — LOW (ref 32–36)
MCHC RBC-ENTMCNC: 31.6 GM/DL — LOW (ref 32–36)
MCV RBC AUTO: 92.2 FL — SIGNIFICANT CHANGE UP (ref 80–100)
MCV RBC AUTO: 94.7 FL — SIGNIFICANT CHANGE UP (ref 80–100)
PLATELET # BLD AUTO: 258 K/UL — SIGNIFICANT CHANGE UP (ref 150–400)
PLATELET # BLD AUTO: 285 K/UL — SIGNIFICANT CHANGE UP (ref 150–400)
POTASSIUM SERPL-MCNC: 4.5 MMOL/L — SIGNIFICANT CHANGE UP (ref 3.5–5.3)
POTASSIUM SERPL-SCNC: 4.5 MMOL/L — SIGNIFICANT CHANGE UP (ref 3.5–5.3)
PROTHROM AB SERPL-ACNC: 21.3 SEC — HIGH (ref 10–13.1)
RBC # BLD: 2.96 M/UL — LOW (ref 3.8–5.2)
RBC # BLD: 2.97 M/UL — LOW (ref 3.8–5.2)
RBC # FLD: 16.1 % — HIGH (ref 10.3–14.5)
RBC # FLD: 18.4 % — HIGH (ref 10.3–14.5)
SODIUM SERPL-SCNC: 140 MMOL/L — SIGNIFICANT CHANGE UP (ref 135–145)
WBC # BLD: 7.7 K/UL — SIGNIFICANT CHANGE UP (ref 3.8–10.5)
WBC # BLD: 8.73 K/UL — SIGNIFICANT CHANGE UP (ref 3.8–10.5)
WBC # FLD AUTO: 7.7 K/UL — SIGNIFICANT CHANGE UP (ref 3.8–10.5)
WBC # FLD AUTO: 8.73 K/UL — SIGNIFICANT CHANGE UP (ref 3.8–10.5)

## 2017-10-29 PROCEDURE — 99232 SBSQ HOSP IP/OBS MODERATE 35: CPT | Mod: GC

## 2017-10-29 RX ORDER — HEPARIN SODIUM 5000 [USP'U]/ML
INJECTION INTRAVENOUS; SUBCUTANEOUS
Qty: 25000 | Refills: 0 | Status: DISCONTINUED | OUTPATIENT
Start: 2017-10-29 | End: 2017-10-30

## 2017-10-29 RX ORDER — HEPARIN SODIUM 5000 [USP'U]/ML
5000 INJECTION INTRAVENOUS; SUBCUTANEOUS EVERY 6 HOURS
Qty: 0 | Refills: 0 | Status: DISCONTINUED | OUTPATIENT
Start: 2017-10-29 | End: 2017-10-30

## 2017-10-29 RX ORDER — ACETAMINOPHEN 500 MG
650 TABLET ORAL ONCE
Qty: 0 | Refills: 0 | Status: COMPLETED | OUTPATIENT
Start: 2017-10-29 | End: 2017-10-29

## 2017-10-29 RX ORDER — HEPARIN SODIUM 5000 [USP'U]/ML
5000 INJECTION INTRAVENOUS; SUBCUTANEOUS ONCE
Qty: 0 | Refills: 0 | Status: COMPLETED | OUTPATIENT
Start: 2017-10-29 | End: 2017-10-29

## 2017-10-29 RX ORDER — HEPARIN SODIUM 5000 [USP'U]/ML
2500 INJECTION INTRAVENOUS; SUBCUTANEOUS EVERY 6 HOURS
Qty: 0 | Refills: 0 | Status: DISCONTINUED | OUTPATIENT
Start: 2017-10-29 | End: 2017-10-30

## 2017-10-29 RX ADMIN — Medication 1 CAPSULE(S): at 11:39

## 2017-10-29 RX ADMIN — Medication 500 MILLIGRAM(S): at 11:39

## 2017-10-29 RX ADMIN — SENNA PLUS 2 TABLET(S): 8.6 TABLET ORAL at 21:29

## 2017-10-29 RX ADMIN — QUETIAPINE FUMARATE 12.5 MILLIGRAM(S): 200 TABLET, FILM COATED ORAL at 21:29

## 2017-10-29 RX ADMIN — Medication 100 MILLIGRAM(S): at 21:29

## 2017-10-29 RX ADMIN — Medication 0.5 MILLIGRAM(S): at 05:58

## 2017-10-29 RX ADMIN — Medication 40 MILLIGRAM(S): at 05:56

## 2017-10-29 RX ADMIN — Medication 100 MILLIGRAM(S): at 05:56

## 2017-10-29 RX ADMIN — ATORVASTATIN CALCIUM 10 MILLIGRAM(S): 80 TABLET, FILM COATED ORAL at 21:29

## 2017-10-29 RX ADMIN — HEPARIN SODIUM 5000 UNIT(S): 5000 INJECTION INTRAVENOUS; SUBCUTANEOUS at 15:38

## 2017-10-29 RX ADMIN — POLYETHYLENE GLYCOL 3350 17 GRAM(S): 17 POWDER, FOR SOLUTION ORAL at 11:39

## 2017-10-29 RX ADMIN — Medication 650 MILLIGRAM(S): at 08:40

## 2017-10-29 RX ADMIN — Medication 12.5 MILLIGRAM(S): at 05:56

## 2017-10-29 RX ADMIN — Medication 3 MILLILITER(S): at 23:04

## 2017-10-29 RX ADMIN — Medication 3 MILLILITER(S): at 05:56

## 2017-10-29 RX ADMIN — Medication 0.5 MILLIGRAM(S): at 17:19

## 2017-10-29 RX ADMIN — Medication 12.5 MILLIGRAM(S): at 17:19

## 2017-10-29 RX ADMIN — Medication 325 MILLIGRAM(S): at 11:39

## 2017-10-29 RX ADMIN — Medication 3 MILLILITER(S): at 11:39

## 2017-10-29 RX ADMIN — HEPARIN SODIUM 1100 UNIT(S)/HR: 5000 INJECTION INTRAVENOUS; SUBCUTANEOUS at 15:38

## 2017-10-29 RX ADMIN — Medication 3 MILLILITER(S): at 17:19

## 2017-10-29 RX ADMIN — HEPARIN SODIUM 1000 UNIT(S)/HR: 5000 INJECTION INTRAVENOUS; SUBCUTANEOUS at 23:03

## 2017-10-29 RX ADMIN — CLOPIDOGREL BISULFATE 75 MILLIGRAM(S): 75 TABLET, FILM COATED ORAL at 11:39

## 2017-10-29 NOTE — PROGRESS NOTE ADULT - ASSESSMENT
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    1. tachy everett  -INR <2  -plan for ppm viky  -NPO from [place] tonight  -hold coumadin tonight, plan to restart after ppm placement    Semaj Mckenzie MD  [id number]

## 2017-10-29 NOTE — PROGRESS NOTE ADULT - SUBJECTIVE AND OBJECTIVE BOX
Patient is a 87y old  Female who presents with a chief complaint of "I have uterine cancer" (16 Oct 2017 12:15)      SUBJECTIVE / OVERNIGHT EVENTS: feeling better, on NC, cheerful, awaiting PPM in am, on IV Heparin 2/2 INR subtherapeutic in prep for PPM    MEDICATIONS  (STANDING):  ALBUTerol/ipratropium for Nebulization 3 milliLiter(s) Nebulizer every 6 hours  ascorbic acid 500 milliGRAM(s) Oral daily  atorvastatin 10 milliGRAM(s) Oral at bedtime  buDESOnide   0.5 milliGRAM(s) Respule 0.5 milliGRAM(s) Inhalation every 12 hours  clopidogrel Tablet 75 milliGRAM(s) Oral daily  docusate sodium 100 milliGRAM(s) Oral three times a day  ferrous    sulfate 325 milliGRAM(s) Oral daily  furosemide   Injectable 40 milliGRAM(s) IV Push daily  heparin  Infusion.  Unit(s)/Hr (11 mL/Hr) IV Continuous <Continuous>  influenza   Vaccine 0.5 milliLiter(s) IntraMuscular once  metoprolol 12.5 milliGRAM(s) Oral two times a day  Nephrocaps 1 Capsule(s) Oral daily  polyethylene glycol 3350 17 Gram(s) Oral daily  QUEtiapine 12.5 milliGRAM(s) Oral <User Schedule>  senna 2 Tablet(s) Oral at bedtime    MEDICATIONS  (PRN):  heparin  Injectable 5000 Unit(s) IV Push every 6 hours PRN For aPTT less than 40  heparin  Injectable 2500 Unit(s) IV Push every 6 hours PRN For aPTT between 40 - 57      Vital Signs Last 24 Hrs  T(F): 98.6 (10-29-17 @ 21:18), Max: 98.7 (10-29-17 @ 14:18)  HR: 109 (10-29-17 @ 21:18) (64 - 109)  BP: 112/64 (10-29-17 @ 21:18) (109/69 - 141/77)  RR: 18 (10-29-17 @ 21:18) (18 - 20)  SpO2: 98% (10-29-17 @ 21:18) (96% - 99%)  Telemetry:   CAPILLARY BLOOD GLUCOSE        I&O's Summary    28 Oct 2017 07:01  -  29 Oct 2017 07:00  --------------------------------------------------------  IN: 1010 mL / OUT: 1450 mL / NET: -440 mL    29 Oct 2017 07:01  -  29 Oct 2017 22:08  --------------------------------------------------------  IN: 1080 mL / OUT: 500 mL / NET: 580 mL        PHYSICAL EXAM:  GENERAL: NAD, well-developed  HEAD:  Atraumatic, Normocephalic  EYES: EOMI, PERRLA, conjunctiva and sclera clear  NECK: Supple, No JVD  CHEST/LUNG: Clear to auscultation bilaterally; No wheeze  HEART: Regular rate and rhythm; No murmurs, rubs, or gallops  ABDOMEN: Soft, Nontender, Nondistended; Bowel sounds present  EXTREMITIES:  2+ Peripheral Pulses, No clubbing, cyanosis, or edema  PSYCH: AAOx3  NEUROLOGY: non-focal  SKIN: No rashes or lesions    LABS:                        8.3    8.73  )-----------( 258      ( 29 Oct 2017 09:01 )             27.3     10-29    140  |  100  |  44<H>  ----------------------------<  92  4.5   |  27  |  1.54<H>    Ca    8.9      29 Oct 2017 09:05  Mg     1.7     10-28      PT/INR - ( 29 Oct 2017 09:08 )   PT: 21.3 sec;   INR: 1.86 ratio         PTT - ( 29 Oct 2017 14:16 )  PTT:36.5 sec          RADIOLOGY & ADDITIONAL TESTS:    Imaging Personally Reviewed:    Consultant(s) Notes Reviewed:      Care Discussed with Consultants/Other Providers:

## 2017-10-29 NOTE — PROGRESS NOTE ADULT - SUBJECTIVE AND OBJECTIVE BOX
Cardiology Progress Note      Patient is a 87y old  Female who presents with a chief complaint of  SOB" (16 Oct 2017 12:15)    HPI: Patient is a 87y Female with history of  TAVR, chronic A fib, SSS and everett tach syndrome awaiting rate responsive VVIR  pacer.   Pt is less SOB, on hold for pacer as INR too high now 1.86 down from 2.74 past 24 hrs otherwise is stable with no new complaints    Allergies,No Known Allergies    Intolerances    digoxin (Rash; Drowsiness)    MEDICATIONS  (STANDING):  ALBUTerol/ipratropium for Nebulization 3 milliLiter(s) Nebulizer every 6 hours  ascorbic acid 500 milliGRAM(s) Oral daily  atorvastatin 10 milliGRAM(s) Oral at bedtime  buDESOnide   0.5 milliGRAM(s) Respule 0.5 milliGRAM(s) Inhalation every 12 hours  clopidogrel Tablet 75 milliGRAM(s) Oral daily  docusate sodium 100 milliGRAM(s) Oral three times a day  ferrous    sulfate 325 milliGRAM(s) Oral daily  furosemide   Injectable 40 milliGRAM(s) IV Push daily  influenza   Vaccine 0.5 milliLiter(s) IntraMuscular once  metoprolol 12.5 milliGRAM(s) Oral two times a day  Nephrocaps 1 Capsule(s) Oral daily  polyethylene glycol 3350 17 Gram(s) Oral daily  QUEtiapine 12.5 milliGRAM(s) Oral <User Schedule>  senna 2 Tablet(s) Oral at bedtime        PHYSICAL EXAM:  Vital Signs Last 24 Hrs  T(C): 36.8 (29 Oct 2017 04:30), Max: 36.9 (28 Oct 2017 13:32)  T(F): 98.3 (29 Oct 2017 04:30), Max: 98.5 (28 Oct 2017 20:22)  HR: 81 (29 Oct 2017 06:08) (62 - 81)  BP: 109/69 (29 Oct 2017 04:30) (109/69 - 134/77)  BP(mean): --  RR: 18 (29 Oct 2017 04:30) (18 - 20)  SpO2: 97% (29 Oct 2017 06:08) (97% - 99%)  Daily     Daily Weight in k (29 Oct 2017 09:02)  I&O's Summary    28 Oct 2017 07:01  -  29 Oct 2017 07:00  --------------------------------------------------------  IN: 1010 mL / OUT: 1450 mL / NET: -440 mL    29 Oct 2017 07:01  -  29 Oct 2017 12:08  --------------------------------------------------------  IN: 480 mL / OUT: 300 mL / NET: 180 mL    General Appearance: 	 Alert, cooperative, no distress  HEENT: normocephalic, atraumatic, PERRLA, EOMI, conjunctiva normal, sclera anicteric,   Neck: no JVD,  carotid 2+  bilaterally without bruits, thyroid normal to inspection and palpation, no adenopathy, trachea midline  Lungs:  clear to auscultation and percussion bilaterally  Cor:  pmi 5th ICS MCL, regular rate and rhythm, S1 normal intensity, S2 normal intensity, no gallops,Gr 2/6 john Ao area  murmurs or rubs  Abdomen Soft, non-tender; bowel sounds normal; no masses,  no organomegaly  Extremities: without cyanosis, clubbing 2 plus edema  Vasc: 2-+ PT and DP pulses; no varicosities  Neurologic: A&O x 3 (time, place, person). Symmetric strength; limited exam  Musculoskeletal: no kyphosis, scoliosis; normal gait, normal tone  Skin: no rashes; limited exam    EKG:  Telemetry: afib rates to 160 without sx.    Labs:  CBC Full  -  ( 29 Oct 2017 09:01 )  WBC Count : 8.73 K/uL  Hemoglobin : 8.3 g/dL  Hematocrit : 27.3 %  Platelet Count - Automated : 258 K/uL  Mean Cell Volume : 92.2 fl  Mean Cell Hemoglobin : 28.0 pg  Mean Cell Hemoglobin Concentration : 30.4 gm/dL  Auto Neutrophil # : x  Auto Lymphocyte # : x  Auto Monocyte # : x  Auto Eosinophil # : x  Auto Basophil # : x  Auto Neutrophil % : x  Auto Lymphocyte % : x  Auto Monocyte % : x  Auto Eosinophil % : x  Auto Basophil % : x          PT/INR - ( 29 Oct 2017 09:08 )   PT: 21.3 sec;   INR: 1.86 ratio       Impression/Plan:   86 yo s/p TAVR with SSS Everett-Tachy awaiting VVIR ppm  INR will likely be in range by tomorrow  SQ lovenox   Will hopefully proceed wit PPM tomorrow will D/W Dr. Beldner Joel Goldberg, MD, [facility]

## 2017-10-29 NOTE — PROGRESS NOTE ADULT - ASSESSMENT
ASSESSMENT    acute hypoxic and hypercapnic respiratory failure - multifactorial - resolved respiratory acidosis and improved oxygenation    chronic CHF in the setting of aortic stenosis s/p TAVR and diastolic CHF with moderate bilateral pleural effusions/atelectasis - minimal improvement on follow-up chest CT  restrictive lung disease due to respiratory muscle weakness, kyphoscoliosis and effusions  perhaps underlying senile emphysema  VTE disease seems unlikely in the setting of chronic A/C    NANCY due to recent diarrhea and increased diuretic dose with metabolic acidosis - resolved    atrial fibrillation with tachy-everett syndrome    hypernatremia - resolved    Klebsiella UTI s/p antibiotics    anemia    PLAN/RECOMMENDATIONS    continue NIV for sleep - oxygen supplementation to keep saturation greater than 92% via nasal canula during the day - follow-up ABG tomorrow  repeat chest CT reviewed  patient will benefit from a trilogy vent which I have arranged - when discharged to rehab, will need oxygen supplementation via a nasal canula @ 3lpm at rest and 4lpm with exertion and nocturnal BIPAP on the current settings  After reviewing the patient's current respiratory status, I believe that the patient would benefit from NIV. BIPAP has been unsuccessful in treating the patient's advanced disease processes. I am ordering NIV to be used for sleep, during daytime naps and as needed during the day for periods of shortness of breath and increased work of breathing. Without NIV, I am concerned that the patient will continue to experience further clinical deterioration resulting in possible medical harm and recurrent hospitalizations  albuterol/atrovent/pulmicort nebs  renal follow-up noted noted - oleary catheter discontinued - needs ongoing diuresis  cardiac meds: lipitor/lasix/plavix/cardizem CD discontinued due to bradycardia/metoprolol/coumadin for INR 2.5 - 3.5 - hold for PPM (on heparin gtt for now) - off ACE inhibitors - cardiology follow-up noted  s/p course of ceftriaxone  bowel regimen  transfuse as needed  bedtime Seroquel may be contributing to confusion    Will follow with you; discussed plan of care with the dedicated floor NP and patient    Tommy Reyes MD, [facility] - [phone]  Pulmonary Medicine

## 2017-10-29 NOTE — PROGRESS NOTE ADULT - SUBJECTIVE AND OBJECTIVE BOX
[facility] - [facility]     PROGRESS NOTE    CHIEF COMPLAINT: ARF; COPD/emphysema; chronic CHF; CKD/NANCY    INTERVAL HISTORY: seems confused; no episodes of bradycardia but occasional tachycardia overnight - scheduled for pacemaker placement on Monday; awake and alert after a night on BIPAP now with resolved respiratory acidosis; sitting in chair eating lunch; no cough, sputum production, chest congestion or wheeze; no fevers, chills or sweats; no chest pain/pressure or palpitations; s/p antibiotics for UTI; renal function improved/stable with resolved hypernatremia; glucose well controlled; leg swelling    REVIEW OF SYSTEMS:  Constitutional: As per interval history  HEENT: Within normal limits  CV: As per interval history  Resp: As per interval history  GI: Within normal limits   : Within normal limits  Musculoskeletal: Within normal limits  Skin: Within normal limits  Neurological: Confused  Psychiatric: Within normal limits  Endocrine: Within normal limits  Hematologic/Lymphatic: Within normal limits  Allergic/Immunologic: Within normal limits      MEDICATIONS:     Pulmonary "  ALBUTerol/ipratropium for Nebulization 3 milliLiter(s) Nebulizer every 6 hours  buDESOnide   0.5 milliGRAM(s) Respule 0.5 milliGRAM(s) Inhalation every 12 hours      Anti-microbials:      Cardiovascular:  furosemide   Injectable 40 milliGRAM(s) IV Push daily  metoprolol 12.5 milliGRAM(s) Oral two times a day      Other:  ascorbic acid 500 milliGRAM(s) Oral daily  atorvastatin 10 milliGRAM(s) Oral at bedtime  clopidogrel Tablet 75 milliGRAM(s) Oral daily  docusate sodium 100 milliGRAM(s) Oral three times a day  ferrous    sulfate 325 milliGRAM(s) Oral daily  heparin  Infusion.  Unit(s)/Hr IV Continuous <Continuous>  heparin  Injectable 5000 Unit(s) IV Push once  heparin  Injectable 5000 Unit(s) IV Push every 6 hours PRN  heparin  Injectable 2500 Unit(s) IV Push every 6 hours PRN  influenza   Vaccine 0.5 milliLiter(s) IntraMuscular once  Nephrocaps 1 Capsule(s) Oral daily  polyethylene glycol 3350 17 Gram(s) Oral daily  QUEtiapine 12.5 milliGRAM(s) Oral <User Schedule>  senna 2 Tablet(s) Oral at bedtime        OBJECTIVE:        I&O's Detail    28 Oct 2017 07:01  -  29 Oct 2017 07:00  --------------------------------------------------------  IN:    Oral Fluid: 1010 mL  Total IN: 1010 mL    OUT:    Voided: 1450 mL  Total OUT: 1450 mL    Total NET: -440 mL      29 Oct 2017 07:01  -  29 Oct 2017 15:09  --------------------------------------------------------  IN:    Oral Fluid: 480 mL  Total IN: 480 mL    OUT:    Voided: 300 mL  Total OUT: 300 mL    Total NET: 180 mL    Daily Weight in k (29 Oct 2017 09:02)    PHYSICAL EXAM:       ICU Vital Signs Last 24 Hrs  T(C): 37.1 (29 Oct 2017 14:18), Max: 37.1 (29 Oct 2017 14:18)  T(F): 98.7 (29 Oct 2017 14:18), Max: 98.7 (29 Oct 2017 14:18)  HR: 64 (29 Oct 2017 14:18) (62 - 81)  BP: 141/77 (29 Oct 2017 14:18) (109/69 - 141/77)  BP(mean): --  ABP: --  ABP(mean): --  RR: 20 (29 Oct 2017 14:18) (18 - 20)  SpO2: 96% (29 Oct 2017 14:18) (96% - 99%) on 2lpm      General: Awake, alert, confused. Cooperative. No distress. Appears stated age 	  HEENT:   Atraumatic. Normocephalic. Anicteric. Normal oral mucosa, PERRL, EOMI. Left eye subconjunctival hemorrhage resolving  Neck: Supple. Trachea midline. Thyroid without enlargement/tenderness/nodules. No carotid bruit. No JVD	  Cardiovascular: Irregularly irregular rate and rhythm. S1 S2 normal. II/VI systolic murmur  Respiratory: Respirations unlabored. Decreased breath sounds at bases with dullness ~ 1/4 up. Kyphoscoliosis  Abdomen: Soft. Non-tender. Non-distended. No organomegaly. No masses. Normal bowel sounds	  Extremities: Warm to touch. No clubbing or cyanosis. Moderate lower extremity edema  Pulses: 2+ peripheral pulses all extremities	  Skin: Normal skin color. No rashes or lesions. No ecchymoses, No cyanosis. Warm to touch  Lymph Nodes: Cervical, supraclavicular and axillary nodes normal  Neurological: Awake and alert. Moving all extremities. A and O x 3  Psychiatry: Calm      LABS:                        8.3    8.73  )-----------( 258      ( 29 Oct 2017 09:01 )             27.3                         7.7    6.63  )-----------( 237      ( 28 Oct 2017 08:34 )             24.8     10-29    140  |  100  |  44<H>  ----------------------------<  92  4.5   |  27  |  1.54<H>    10-28    141  |  104  |  46<H>  ----------------------------<  90  4.0   |  27  |  1.56<H>    Ca      8.9      10-29    Ca      8.6      10-28    Phos    3.5     10-      Mg       1.7     10-28      PT/INR - ( 29 Oct 2017 09:08 )   PT: 21.3 sec;   INR: 1.86 ratio      PTT - ( 29 Oct 2017 14:16 )  PTT:36.5 sec    ABG - ( 26 Oct 2017 11:29 )  pH: 7.38  /  pCO2: 47    /  pO2: 95    / HCO3: 27    / Base Excess: 1.8   /  SaO2: 98        ABG - ( 25 Oct 2017 06:51 )  pH: 7.34  /  pCO2: 50    /  pO2: 91    / HCO3: 26    / Base Excess: .8    /  SaO2: 98        ABG - ( 23 Oct 2017 17:58 )  pH: 7.34  /  pCO2: 49    /  pO2: 106   / HCO3: 26    / Base Excess: .5    /  SaO2: 99        ABG - ( 20 Oct 2017 06:32 )  pH: 7.28  /  pCO2: 59    /  pO2: 198   / HCO3: 27    / Base Excess: .4    /  SaO2: 100       ABG - ( 19 Oct 2017 07:20 )  pH: 7.25  /  pCO2: 60    /  pO2: 151   / HCO3: 26    / Base Excess: -1.4  /  SaO2: 99        ABG - ( 18 Oct 2017 14:35 )  pH: 7.30  /  pCO2: 58    /  pO2: 143   / HCO3: 28    / Base Excess: 1.3   /  SaO2: 100       < from: Transthoracic Echocardiogram w/Doppler (12 @ 13:31) >    Patient name: OSCAR LOPEZ  YOB: 1929   Age: 82 (F)   MR#: [id number]  Study Date: 3/14/2012  [id number]: Jada Lagunas [facility]  Study quality: Technically fair  Referring Physician: Pro Tracy MD  Blood Pressure: 124/51 mmHg  Height: 5ft 3in  Weight: 164 lb  BSA: 1.8 m2  ------------------------------------------------------------------------  PROCEDURE: Transthoracic echocardiogram with 2-D, M-Mode  and complete spectral and color flow Doppler.  INDICATION: Aortic Stenosis (424.1), Atrial Fibrillation  (427.31)  ------------------------------------------------------------------------  Dimensions:    Normal Values:  LA:     3.1    2.0 - 4.0 cm  Ao:     3.1    2.0 - 3.8 cm  SEPTUM: 1.2    0.6 - 1.2cm  PWT:    1.3    0.6 - 1.1 cm  LVIDd:  4.3    3.0 - 5.6 cm  LVIDs:  2.6    1.8 - 4.0 cm  Derived variables:  LVMI: 110 g/m2  RWT: 0.60  Fractional short: 40 %  Ejection Fraction: >70 %  Doppler Peak Velocity (m/sec): AoV=4.0  ------------------------------------------------------------------------  Observations:  Mitral Valve: Mitral annular calcification and calcified  mitral leaflets with normal diastolic opening. Minimal  mitral regurgitation.  Aortic Valve/Aorta: Heavily calcified trileaflet aortic  valve with decreased opening. Peak transaortic valve  gradient equals 62 mm Hg, mean transaortic valve gradient  equals 37 mm Hg, estimated aortic valve area equals 0.9  sqcm (by continuity equation), consistent with severe  aortic stenosis .Mild-moderate aortic regurgitation.  Pressure halft - time is 361 ms.  Normal aortic root.  Left Atrium: Severely dilated left atrium.  LA volume index  = 51 cc/m2.  Left Ventricle: Hyperdynamic left ventricle. Mild to  moderate concentric left ventricular hypertrophy. Atrial  fibrillation precludes a comprehensive diastolic  assessment. However, findings are suggestive of moderate  diastolic dysfunction.  Right Heart: Severe right atrial enlargement. RA volume  index = 53 cc/m2.  Normal right ventricular size and  function. Normal tricuspid valve. Mild-moderate tricuspid  regurgitation. Normal pulmonic valve. Minimal pulmonic  regurgitation.  Pericardium/Pleura: Normal pericardium with no pericardial  effusion.  Hemodynamic: Estimated right atrial pressure is 5 mm Hg.  Estimated right ventricular systolic pressure equals 46 mm  Hg, assuming right atrial pressure equals 5 mm Hg,  consistent with mild pulmonary hypertension.  ------------------------------------------------------------------------  Conclusions:  1. Mitral annular calcification and calcified mitral  leaflets with normal diastolic opening. Minimal mitral  regurgitation.  2. Heavily calcified trileaflet aortic valve with decreased  opening. Peak transaortic valve gradientequals 62 mm Hg,  mean transaortic valve gradient equals 37 mm Hg, estimated  aortic valve area equals 0.9 sqcm (by continuity equation),  consistent with severe aortic stenosis. Mild-moderate  aortic regurgitation. Pressure halft - time is 361 ms.  3. Severely dilated left atrium.  LA volume index = 51  cc/m2.  4. Mild to moderate concentric left ventricular  hypertrophy.  5. Hyperdynamic left ventricle.  6. Atrial fibrillation precludes a comprehensive diastolic  assessment. However, findings are suggestive of moderate  diastolic dysfunction.  7. Severe right atrial enlargement. RA volume index = 53  cc/m2.  8. Normal right ventricular size and function.  9. Estimated right ventricular systolic pressure equals 46  mm Hg, assuming right atrial pressure equals 5 mm Hg,  consistent with mild pulmonary hypertension.  10. Normal tricuspid valve. Mild-moderate tricuspid  regurgitation.  *** No previous Echo exam.  -----------------------------------------------------------------------  MICROBIOLOGY:     Color: Yellow / Appearance: SL Turbid / S.012 / pH: x  Gluc: x / Ketone: Negative  / Bili: Negative / Urobili: Negative   Blood: x / Protein: 30 mg/dL / Nitrite: Negative   Leuk Esterase: Large / RBC: 0-2 /HPF / WBC 26-50 /HPF   Sq Epi: x / Non Sq Epi: OCC /HPF / Bacteria: Few /HPF    Culture - Urine (10.18.17 @ 00:32)    Specimen Source: .Urine Catheterized    Culture Results:   >100,000 CFU/ml Klebsiella pneumoniae    Culture - Blood (10.17.17 @ 15:38)    Specimen Source: .Blood Blood-Peripheral    Culture Results:   No growth to date.    RADIOLOGY:  [x] Chest radiographs reviewed and interpreted by me    < from: CT Chest No Cont (10.24.17 @ 12:18) >    EXAM:  CT CHEST                            PROCEDURE DATE:  10/24/2017      INTERPRETATION:  CLINICAL INFORMATION: Acute renal failure due to   congestive heart failure. Chest congestion. Wheezing.    COMPARISON: CT chest from 10/17/2017.    PROCEDURE:   CT of the Chest was performed without intravenous contrast.  Sagittal and coronal reformats were performed.      FINDINGS:    CHEST:     LUNGS AND LARGE AIRWAYS: Patent central airways. Bilateral interlobular   septal thickening and bibasilar compressive atelectasis. Slight increased   aeration of the right lower lobe. 2 mm right upper lobe pulmonary nodule,   unchanged since .    PLEURA: Multiple loculated small to moderate right pleural effusion;   loculated pleural effusion along the fissure. Small left pleural effusion.    VESSELS: Atherosclerosis of thoracic aorta and coronary arteries.     HEART: Heart size is enlarged. Left atrial wall calcification. Mitral   valve annular calcification. Status post transcatheter aorticvalve   repair. No pericardial effusion.    MEDIASTINUM AND CANDI: No lymphadenopathy.    CHEST WALL AND LOWER NECK: Surgical clips on the right chest wall/axilla.   Extensive subcutaneous edema.    VISUALIZED UPPER ABDOMEN: Multiple bilateral renal cysts; interval growth   of left renal cysts compared to CT abdomen pelvis from 2012.   Descending colon diverticulosis.    BONES: Degenerative changes of spine.      IMPRESSION:     Multiloculated small to moderate right pleural effusions with right   basilar compressive atelectasis, with slight increased aeration of the   right lower lobe.  Left small pleural effusion with compressive atelectasis, grossly   unchanged.  Interlobular septal thickening consistent with pulmonary edema.  Cardiomegaly.      MIHIR SIBLEY M.D., RADIOLOGY RESIDENT  This document has been electronically signed.  BLANK WONG M.D. ATTENDING RADIOLOGIST  This document has been electronically signed. Oct 24 2017  3:23PM      < end of copied text >  ---------------------------------------------------------------------------------------------------------  < from: US Renal (10..17 @ 14:28) >    EXAM:  US KIDNEY(S)                            PROCEDURE DATE:  10/20/2017        INTERPRETATION:  CLINICAL INFORMATION: NANCY    COMPARISON: CT abdomen 2012    TECHNIQUE: Sonography of the kidneys and bladder.     FINDINGS:    Right kidney: 9.2 cm. echogenic cortex. No renal mass, hydronephrosis or   calculi. Multiple simple cysts the largest is at the upper pole and   measures 2.7 x 2.9 x 2.3 cm.    Left kidney:  10.1 cm. the genic cortex. No renal mass, hydronephrosis or   calculi. Multiple cysts. The largest at the upper pole measures 2.0 x 2.2   x 2.4 cm. This has some internal complexity not well evaluated. Recommend   follow-up in3- 6 months to assess for change.    Urinary bladder: Collapsed around a Gautam catheter limiting evaluation.    IMPRESSION:     Increased renal cortical echogenicity bilaterally, which can be seen in   medical renal disease.    Left slightly complex renal cyst not well evaluated on this exam.   Consider follow-up exam in 3-6 months to assess for change.    MADHU FAULKNER M.D., ATTENDING RADIOLOGIST  This document has been electronically signed. Oct 20 2017  2:01PM     < end of copied text >  ---------------------------------------------------------------------------------------------------------  < from: CT Chest No Cont (10.17.17 @ 21:38) >    EXAM:  CT CHEST                          PROCEDURE DATE:  10/17/2017      INTERPRETATION:  Clinical information: Evaluate for pneumonia. Exam is   compared to previous study of 4/3/2012.    CT scan of the chest was obtained without administration of intravenous   contrast.    Surgical clips are noted in the right axilla.    No hilar and/or mediastinal adenopathy is noted.     Heart is markedly enlarged in size. Calcification the coronary arteries   is noted. Patient is status post TAVR.No pericardial effusion is noted.   Main pulmonary artery is dilated and measures 3.6 cm.     No endobronchial lesions are noted. Compressive atelectasis is noted   involving portions of both lower lobes. This is secondary to small   bilateral pleural effusions, right more than left.    Below the diaphragm, visualized portions of the abdomen demonstrate   low-attenuation within both kidneys which are too small to be adequately   characterized on this exam.     Degenerative changes of the spine are noted. Subcutaneous edema is noted.    Impression: There is no pneumonia.    Bilateral pleural effusions, right more than left.    MAYI MORALES M.D., ATTENDING RADIOLOGIST  This document has been electronically signed. Oct 18 2017  9:24AM      < end of copied text >  ---------------------------------------------------------------------------------------------------------  < from: CT Head No Cont (10.17.17 @ 21:38) >    EXAM:  CT BRAIN                            PROCEDURE DATE:  10/17/2017        INTERPRETATION:  HISTORY: Uterine cancer. Altered mental status.    COMPARISON: MRI brain performed 2012.    TECHNIQUE: Axial noncontrast CT images from the skull base to the vertex   were obtained and submitted for interpretation. Coronal and sagittal   reformatted images were performed. Bone and soft tissue windows were   evaluated.    FINDINGS:     There is no acute intracranial mass-effect, hemorrhage, midline shift, or   abnormal extra-axial fluid collection.     Ventricles, sulci, and cisterns are normal in size for the patient's age   without hydrocephalus. The basal cisterns are patent.     Patchy and confluent regions of periventricular and deep cerebral white   matter hypoattenuation likely reflects chronic microangiopathic ischemic   changes. Atheromatous calcifications along the carotid siphons are   present.    Minimal right maxillary sinus mucosal thickening. No evidence for acute   paranasal sinus or mastoid inflammatory changes.. The calvarium is   intact.     IMPRESSION:     No acute intracranial bleeding, mass effect, or evidence of an acute   territorial infarct.    ADRIEN MORRIS M.D., RADIOLOGY RESIDENT  This document has been electronically signed.  VINI METZGER M.D., ATTENDING RADIOLOGIST  This document has been electronically signed. Oct 18 2017  9:43AM      < end of copied text >  ---------------------------------------------------------------------------------------------------------

## 2017-10-29 NOTE — PROGRESS NOTE ADULT - SUBJECTIVE AND OBJECTIVE BOX
OSCAR SANDERS:[id number],   87yFemale followed for:  digoxin (Rash; Drowsiness)  No Known Allergies    PAST MEDICAL & SURGICAL HISTORY:  Severe aortic stenosis  Uterine cancer  Arthritis  HTN (hypertension)  Breast cancer diagnosed in 2000: s/p right lumpectomy and right axillary lymph node removal, radiation  AF (atrial fibrillation): on coumadin and aspirin  Dyslipidemia  H/O: hysterectomy: 4/2012  s/p surgical excision of left eye Skin cancer: Bilateral lower eye lids  S/P D&C  S/P cholecystectomy    FAMILY HISTORY:  No pertinent family history in first degree relatives    MEDICATIONS  (STANDING):  ALBUTerol/ipratropium for Nebulization 3 milliLiter(s) Nebulizer every 6 hours  ascorbic acid 500 milliGRAM(s) Oral daily  atorvastatin 10 milliGRAM(s) Oral at bedtime  buDESOnide   0.5 milliGRAM(s) Respule 0.5 milliGRAM(s) Inhalation every 12 hours  clopidogrel Tablet 75 milliGRAM(s) Oral daily  docusate sodium 100 milliGRAM(s) Oral three times a day  ferrous    sulfate 325 milliGRAM(s) Oral daily  furosemide   Injectable 40 milliGRAM(s) IV Push daily  influenza   Vaccine 0.5 milliLiter(s) IntraMuscular once  metoprolol 12.5 milliGRAM(s) Oral two times a day  Nephrocaps 1 Capsule(s) Oral daily  polyethylene glycol 3350 17 Gram(s) Oral daily  QUEtiapine 12.5 milliGRAM(s) Oral <User Schedule>  senna 2 Tablet(s) Oral at bedtime    MEDICATIONS  (PRN):      Vital Signs Last 24 Hrs  T(C): 36.8 (29 Oct 2017 04:30), Max: 36.9 (28 Oct 2017 13:32)  T(F): 98.3 (29 Oct 2017 04:30), Max: 98.5 (28 Oct 2017 20:22)  HR: 81 (29 Oct 2017 06:08) (62 - 100)  BP: 109/69 (29 Oct 2017 04:30) (109/69 - 134/77)  BP(mean): --  RR: 18 (29 Oct 2017 04:30) (18 - 20)  SpO2: 97% (29 Oct 2017 06:08) (97% - 99%)  nc/at  s1s2  cta  soft, nt, nd no guarding or rebound  no c/c/e    CBC Full  -  ( 29 Oct 2017 09:01 )  WBC Count : 8.73 K/uL  Hemoglobin : 8.3 g/dL  Hematocrit : 27.3 %  Platelet Count - Automated : 258 K/uL  Mean Cell Volume : 92.2 fl  Mean Cell Hemoglobin : 28.0 pg  Mean Cell Hemoglobin Concentration : 30.4 gm/dL  Auto Neutrophil # : x  Auto Lymphocyte # : x  Auto Monocyte # : x  Auto Eosinophil # : x  Auto Basophil # : x  Auto Neutrophil % : x  Auto Lymphocyte % : x  Auto Monocyte % : x  Auto Eosinophil % : x  Auto Basophil % : x    10-29    140  |  100  |  44<H>  ----------------------------<  92  4.5   |  27  |  1.54<H>    Ca    8.9      29 Oct 2017 09:05  Mg     1.7     10-28      PT/INR - ( 29 Oct 2017 09:08 )   PT: 21.3 sec;   INR: 1.86 ratio

## 2017-10-29 NOTE — PROGRESS NOTE ADULT - SUBJECTIVE AND OBJECTIVE BOX
24H hour events:   -no acute voernight events  -pt without complaints, denies CP ,SOB, palpitations, dizziness, syncope  -tele: afibe 70-120s  -INR 1.86      MEDICATIONS:  clopidogrel Tablet 75 milliGRAM(s) Oral daily  furosemide   Injectable 40 milliGRAM(s) IV Push daily  metoprolol 12.5 milliGRAM(s) Oral two times a day      ALBUTerol/ipratropium for Nebulization 3 milliLiter(s) Nebulizer every 6 hours  buDESOnide   0.5 milliGRAM(s) Respule 0.5 milliGRAM(s) Inhalation every 12 hours    QUEtiapine 12.5 milliGRAM(s) Oral <User Schedule>    docusate sodium 100 milliGRAM(s) Oral three times a day  polyethylene glycol 3350 17 Gram(s) Oral daily  senna 2 Tablet(s) Oral at bedtime    atorvastatin 10 milliGRAM(s) Oral at bedtime    ascorbic acid 500 milliGRAM(s) Oral daily  ferrous    sulfate 325 milliGRAM(s) Oral daily  influenza   Vaccine 0.5 milliLiter(s) IntraMuscular once  Nephrocaps 1 Capsule(s) Oral daily      REVIEW OF SYSTEMS:  Complete 10point ROS negative.    PHYSICAL EXAM:  T(C): 36.8 (10-29-17 @ 04:30), Max: 36.9 (10-28-17 @ 13:32)  HR: 81 (10-29-17 @ 06:08) (62 - 81)  BP: 109/69 (10-29-17 @ 04:30) (109/69 - 134/77)  RR: 18 (10-29-17 @ 04:30) (18 - 20)  SpO2: 97% (10-29-17 @ 06:08) (97% - 99%)  Wt(kg): --  I&O's Summary    28 Oct 2017 07:01  -  29 Oct 2017 07:00  --------------------------------------------------------  IN: 1010 mL / OUT: 1450 mL / NET: -440 mL    29 Oct 2017 07:01  -  29 Oct 2017 12:17  --------------------------------------------------------  IN: 480 mL / OUT: 300 mL / NET: 180 mL        Appearance: Normal	  HEENT:   Normal oral mucosa, PERRL, EOMI	  Lymphatic: No lymphadenopathy  Cardiovascular: irregular, Normal S1 S2, No JVD, No murmurs, 3+ pitting b/l LE edema  Respiratory: coarse bs b/l   Psychiatry: A & O x 3, Mood & affect appropriate  Gastrointestinal:  Soft, Non-tender, + BS	  Skin: No rashes, No ecchymoses, No cyanosis	  Neurologic: Non-focal  Extremities: Normal range of motion, No clubbing, cyanosis   Vascular: Peripheral pulses palpable 2+ bilaterally        LABS:	 	    CBC Full  -  ( 29 Oct 2017 09:01 )  WBC Count : 8.73 K/uL  Hemoglobin : 8.3 g/dL  Hematocrit : 27.3 %  Platelet Count - Automated : 258 K/uL  Mean Cell Volume : 92.2 fl  Mean Cell Hemoglobin : 28.0 pg  Mean Cell Hemoglobin Concentration : 30.4 gm/dL  Auto Neutrophil # : x  Auto Lymphocyte # : x  Auto Monocyte # : x  Auto Eosinophil # : x  Auto Basophil # : x  Auto Neutrophil % : x  Auto Lymphocyte % : x  Auto Monocyte % : x  Auto Eosinophil % : x  Auto Basophil % : x    10-29    140  |  100  |  44<H>  ----------------------------<  92  4.5   |  27  |  1.54<H>  10-28    141  |  104  |  46<H>  ----------------------------<  90  4.0   |  27  |  1.56<H>    Ca    8.9      29 Oct 2017 09:05  Ca    8.6      28 Oct 2017 08:27  Mg     1.7     10-28        proBNP:   Lipid Profile:   HgA1c:   TSH:       CARDIAC MARKERS:            TELEMETRY: 	    ECG:  	  RADIOLOGY:  OTHER: 	    PREVIOUS DIAGNOSTIC TESTING:    [ ] Echocardiogram:  [ ]  Catheterization:  [ ] Stress Test:  	  	  ASSESSMENT/PLAN:

## 2017-10-30 LAB
ANION GAP SERPL CALC-SCNC: 7 MMOL/L — SIGNIFICANT CHANGE UP (ref 5–17)
APTT BLD: 31 SEC — SIGNIFICANT CHANGE UP (ref 27.5–37.4)
APTT BLD: 74.5 SEC — HIGH (ref 27.5–37.4)
BUN SERPL-MCNC: 42 MG/DL — HIGH (ref 7–23)
CALCIUM SERPL-MCNC: 8.6 MG/DL — SIGNIFICANT CHANGE UP (ref 8.4–10.5)
CHLORIDE SERPL-SCNC: 99 MMOL/L — SIGNIFICANT CHANGE UP (ref 96–108)
CO2 SERPL-SCNC: 31 MMOL/L — SIGNIFICANT CHANGE UP (ref 22–31)
CREAT SERPL-MCNC: 1.38 MG/DL — HIGH (ref 0.5–1.3)
GLUCOSE SERPL-MCNC: 105 MG/DL — HIGH (ref 70–99)
HCT VFR BLD CALC: 23.1 % — LOW (ref 34.5–45)
HCT VFR BLD CALC: 26.5 % — LOW (ref 34.5–45)
HGB BLD-MCNC: 7.2 G/DL — LOW (ref 11.5–15.5)
HGB BLD-MCNC: 8.1 G/DL — LOW (ref 11.5–15.5)
INR BLD: 1.64 RATIO — HIGH (ref 0.88–1.16)
MCHC RBC-ENTMCNC: 28.3 PG — SIGNIFICANT CHANGE UP (ref 27–34)
MCHC RBC-ENTMCNC: 29.2 PG — SIGNIFICANT CHANGE UP (ref 27–34)
MCHC RBC-ENTMCNC: 30.7 GM/DL — LOW (ref 32–36)
MCHC RBC-ENTMCNC: 31.2 GM/DL — LOW (ref 32–36)
MCV RBC AUTO: 90.9 FL — SIGNIFICANT CHANGE UP (ref 80–100)
MCV RBC AUTO: 95.2 FL — SIGNIFICANT CHANGE UP (ref 80–100)
PLATELET # BLD AUTO: 230 K/UL — SIGNIFICANT CHANGE UP (ref 150–400)
PLATELET # BLD AUTO: 236 K/UL — SIGNIFICANT CHANGE UP (ref 150–400)
POTASSIUM SERPL-MCNC: 4.5 MMOL/L — SIGNIFICANT CHANGE UP (ref 3.5–5.3)
POTASSIUM SERPL-SCNC: 4.5 MMOL/L — SIGNIFICANT CHANGE UP (ref 3.5–5.3)
PROTHROM AB SERPL-ACNC: 18.7 SEC — HIGH (ref 10–13.1)
RBC # BLD: 2.54 M/UL — LOW (ref 3.8–5.2)
RBC # BLD: 2.78 M/UL — LOW (ref 3.8–5.2)
RBC # FLD: 16 % — HIGH (ref 10.3–14.5)
RBC # FLD: 18.1 % — HIGH (ref 10.3–14.5)
SODIUM SERPL-SCNC: 137 MMOL/L — SIGNIFICANT CHANGE UP (ref 135–145)
WBC # BLD: 5.5 K/UL — SIGNIFICANT CHANGE UP (ref 3.8–10.5)
WBC # BLD: 6.4 K/UL — SIGNIFICANT CHANGE UP (ref 3.8–10.5)
WBC # FLD AUTO: 5.5 K/UL — SIGNIFICANT CHANGE UP (ref 3.8–10.5)
WBC # FLD AUTO: 6.4 K/UL — SIGNIFICANT CHANGE UP (ref 3.8–10.5)

## 2017-10-30 PROCEDURE — 33207 INSERT HEART PM VENTRICULAR: CPT

## 2017-10-30 PROCEDURE — 71010: CPT | Mod: 26

## 2017-10-30 PROCEDURE — 93010 ELECTROCARDIOGRAM REPORT: CPT | Mod: 76

## 2017-10-30 PROCEDURE — 93306 TTE W/DOPPLER COMPLETE: CPT | Mod: 26

## 2017-10-30 PROCEDURE — 99152 MOD SED SAME PHYS/QHP 5/>YRS: CPT

## 2017-10-30 RX ORDER — FUROSEMIDE 40 MG
40 TABLET ORAL DAILY
Qty: 0 | Refills: 0 | Status: DISCONTINUED | OUTPATIENT
Start: 2017-10-31 | End: 2017-11-01

## 2017-10-30 RX ORDER — VANCOMYCIN HCL 1 G
1000 VIAL (EA) INTRAVENOUS ONCE
Qty: 0 | Refills: 0 | Status: COMPLETED | OUTPATIENT
Start: 2017-10-31 | End: 2017-10-31

## 2017-10-30 RX ORDER — WARFARIN SODIUM 2.5 MG/1
2 TABLET ORAL ONCE
Qty: 0 | Refills: 0 | Status: COMPLETED | OUTPATIENT
Start: 2017-10-30 | End: 2017-10-30

## 2017-10-30 RX ORDER — METOPROLOL TARTRATE 50 MG
25 TABLET ORAL
Qty: 0 | Refills: 0 | Status: DISCONTINUED | OUTPATIENT
Start: 2017-10-30 | End: 2017-11-01

## 2017-10-30 RX ADMIN — HEPARIN SODIUM 1000 UNIT(S)/HR: 5000 INJECTION INTRAVENOUS; SUBCUTANEOUS at 05:33

## 2017-10-30 RX ADMIN — Medication 1 CAPSULE(S): at 11:02

## 2017-10-30 RX ADMIN — Medication 3 MILLILITER(S): at 05:11

## 2017-10-30 RX ADMIN — ATORVASTATIN CALCIUM 10 MILLIGRAM(S): 80 TABLET, FILM COATED ORAL at 21:38

## 2017-10-30 RX ADMIN — Medication 40 MILLIGRAM(S): at 05:11

## 2017-10-30 RX ADMIN — Medication 100 MILLIGRAM(S): at 21:38

## 2017-10-30 RX ADMIN — Medication 0.5 MILLIGRAM(S): at 05:11

## 2017-10-30 RX ADMIN — Medication 100 MILLIGRAM(S): at 05:11

## 2017-10-30 RX ADMIN — SENNA PLUS 2 TABLET(S): 8.6 TABLET ORAL at 21:38

## 2017-10-30 RX ADMIN — Medication 3 MILLILITER(S): at 11:02

## 2017-10-30 RX ADMIN — Medication 325 MILLIGRAM(S): at 11:02

## 2017-10-30 RX ADMIN — CLOPIDOGREL BISULFATE 75 MILLIGRAM(S): 75 TABLET, FILM COATED ORAL at 11:02

## 2017-10-30 RX ADMIN — Medication 25 MILLIGRAM(S): at 20:25

## 2017-10-30 RX ADMIN — Medication 500 MILLIGRAM(S): at 11:02

## 2017-10-30 RX ADMIN — WARFARIN SODIUM 2 MILLIGRAM(S): 2.5 TABLET ORAL at 21:39

## 2017-10-30 RX ADMIN — Medication 12.5 MILLIGRAM(S): at 05:11

## 2017-10-30 RX ADMIN — QUETIAPINE FUMARATE 12.5 MILLIGRAM(S): 200 TABLET, FILM COATED ORAL at 21:38

## 2017-10-30 NOTE — PROGRESS NOTE ADULT - SUBJECTIVE AND OBJECTIVE BOX
[facility] - [facility]     PROGRESS NOTE    CHIEF COMPLAINT: ARF; COPD/emphysema; chronic CHF; CKD/NANCY    INTERVAL HISTORY: sitting in chair; no episodes of bradycardia but occasional tachycardia overnight - scheduled for pacemaker placement today; awake and alert after a night on BIPAP now with resolved respiratory acidosis; no cough, sputum production, chest congestion or wheeze; no fevers, chills or sweats; no chest pain/pressure or palpitations; s/p antibiotics for UTI; renal function improved/stable with resolved hypernatremia; glucose well controlled; leg swelling persists    REVIEW OF SYSTEMS:  Constitutional: As per interval history  HEENT: Within normal limits  CV: As per interval history  Resp: As per interval history  GI: Within normal limits   : Within normal limits  Musculoskeletal: Within normal limits  Skin: Within normal limits  Neurological: Less confused  Psychiatric: Within normal limits  Endocrine: Within normal limits  Hematologic/Lymphatic: Within normal limits  Allergic/Immunologic: Within normal limits      MEDICATIONS:     Pulmonary "  ALBUTerol/ipratropium for Nebulization 3 milliLiter(s) Nebulizer every 6 hours  buDESOnide   0.5 milliGRAM(s) Respule 0.5 milliGRAM(s) Inhalation every 12 hours      Anti-microbials:      Cardiovascular:  furosemide   Injectable 40 milliGRAM(s) IV Push daily  metoprolol 12.5 milliGRAM(s) Oral two times a day      Other:  ascorbic acid 500 milliGRAM(s) Oral daily  atorvastatin 10 milliGRAM(s) Oral at bedtime  clopidogrel Tablet 75 milliGRAM(s) Oral daily  docusate sodium 100 milliGRAM(s) Oral three times a day  ferrous    sulfate 325 milliGRAM(s) Oral daily  heparin  Infusion.  Unit(s)/Hr IV Continuous <Continuous>  heparin  Injectable 5000 Unit(s) IV Push every 6 hours PRN  heparin  Injectable 2500 Unit(s) IV Push every 6 hours PRN  influenza   Vaccine 0.5 milliLiter(s) IntraMuscular once  Nephrocaps 1 Capsule(s) Oral daily  polyethylene glycol 3350 17 Gram(s) Oral daily  QUEtiapine 12.5 milliGRAM(s) Oral <User Schedule>  senna 2 Tablet(s) Oral at bedtime    OBJECTIVE:    I&O's Detail    29 Oct 2017 07:01  -  30 Oct 2017 07:00  --------------------------------------------------------  IN:    heparin  Infusion.: 114 mL    Oral Fluid: 1180 mL  Total IN: 1294 mL    OUT:    Voided: 1450 mL  Total OUT: 1450 mL    Total NET: -156 mL      30 Oct 2017 07:01  -  30 Oct 2017 12:33  --------------------------------------------------------  IN:  Total IN: 0 mL    OUT:    Voided: 300 mL  Total OUT: 300 mL    Total NET: -300 mL    Daily Weight in k.1 (30 Oct 2017 07:23)    PHYSICAL EXAM:       ICU Vital Signs Last 24 Hrs  T(C): 37.1 (30 Oct 2017 04:44), Max: 37.1 (29 Oct 2017 14:18)  T(F): 98.7 (30 Oct 2017 04:44), Max: 98.7 (29 Oct 2017 14:18)  HR: 88 (30 Oct 2017 10:06) (64 - 109)  BP: 135/83 (30 Oct 2017 04:44) (112/64 - 141/77)  BP(mean): --  ABP: --  ABP(mean): --  RR: 18 (30 Oct 2017 04:44) (18 - 20)  SpO2: 98% (30 Oct 2017 10:06) (93% - 98%) on 2lpm     General: Awake, alert, confused. Cooperative. No distress. Appears stated age 	  HEENT:   Atraumatic. Normocephalic. Anicteric. Normal oral mucosa, PERRL, EOMI. Left eye subconjunctival hemorrhage resolving  Neck: Supple. Trachea midline. Thyroid without enlargement/tenderness/nodules. No carotid bruit. No JVD	  Cardiovascular: Irregularly irregular rate and rhythm. S1 S2 normal. II/VI systolic murmur  Respiratory: Respirations unlabored. Decreased breath sounds at bases with dullness ~ 1/4 up. Kyphoscoliosis  Abdomen: Soft. Non-tender. Non-distended. No organomegaly. No masses. Normal bowel sounds	  Extremities: Warm to touch. No clubbing or cyanosis. Moderate lower extremity edema  Pulses: 2+ peripheral pulses all extremities	  Skin: Normal skin color. No rashes or lesions. No ecchymoses, No cyanosis. Warm to touch  Lymph Nodes: Cervical, supraclavicular and axillary nodes normal  Neurological: Awake and alert. Moving all extremities. A and O x 3  Psychiatry: Calm      LABS:                        7.2    5.50  )-----------( 230      ( 30 Oct 2017 07:06 )             23.1                         8.9    7.7   )-----------( 285      ( 29 Oct 2017 22:25 )             28.1     10-30    137  |  99  |  42<H>  ----------------------------<  105<H>  4.5   |  31  |  1.38<H>    10-29    140  |  100  |  44<H>  ----------------------------<  92  4.5   |  27  |  1.54<H>    Ca      8.6      10-30    Ca      8.9      10-29    Phos    3.5     10-      Mg       1.7     10-28      PT/INR - ( 30 Oct 2017 07:06 )   PT: 18.7 sec;   INR: 1.64 ratio       PTT - ( 30 Oct 2017 05:13 )  PTT:74.5 sec    ABG - ( 26 Oct 2017 11:29 )  pH: 7.38  /  pCO2: 47    /  pO2: 95    / HCO3: 27    / Base Excess: 1.8   /  SaO2: 98        ABG - ( 25 Oct 2017 06:51 )  pH: 7.34  /  pCO2: 50    /  pO2: 91    / HCO3: 26    / Base Excess: .8    /  SaO2: 98        ABG - ( 23 Oct 2017 17:58 )  pH: 7.34  /  pCO2: 49    /  pO2: 106   / HCO3: 26    / Base Excess: .5    /  SaO2: 99        ABG - ( 20 Oct 2017 06:32 )  pH: 7.28  /  pCO2: 59    /  pO2: 198   / HCO3: 27    / Base Excess: .4    /  SaO2: 100       ABG - ( 19 Oct 2017 07:20 )  pH: 7.25  /  pCO2: 60    /  pO2: 151   / HCO3: 26    / Base Excess: -1.4  /  SaO2: 99        ABG - ( 18 Oct 2017 14:35 )  pH: 7.30  /  pCO2: 58    /  pO2: 143   / HCO3: 28    / Base Excess: 1.3   /  SaO2: 100       < from: Transthoracic Echocardiogram w/Doppler (12 @ 13:31) >    Patient name: OSCAR LOPEZ  YOB: 1929   Age: 82 (F)   MR#: [id number]  Study Date: 3/14/2012  Location: [place] [id number]: [facility]  Study quality: Technically fair  Referring Physician: Pro Tracy MD  Blood Pressure: 124/51 mmHg  Height: 5ft 3in  Weight: 164 lb  BSA: 1.8 m2  ------------------------------------------------------------------------  PROCEDURE: Transthoracic echocardiogram with 2-D, M-Mode  and complete spectral and color flow Doppler.  INDICATION: Aortic Stenosis (424.1), Atrial Fibrillation  (427.31)  ------------------------------------------------------------------------  Dimensions:    Normal Values:  LA:     3.1    2.0 - 4.0 cm  Ao:     3.1    2.0 - 3.8 cm  SEPTUM: 1.2    0.6 - 1.2cm  PWT:    1.3    0.6 - 1.1 cm  LVIDd:  4.3    3.0 - 5.6 cm  LVIDs:  2.6    1.8 - 4.0 cm  Derived variables:  LVMI: 110 g/m2  RWT: 0.60  Fractional short: 40 %  Ejection Fraction: >70 %  Doppler Peak Velocity (m/sec): AoV=4.0  ------------------------------------------------------------------------  Observations:  Mitral Valve: Mitral annular calcification and calcified  mitral leaflets with normal diastolic opening. Minimal  mitral regurgitation.  Aortic Valve/Aorta: Heavily calcified trileaflet aortic  valve with decreased opening. Peak transaortic valve  gradient equals 62 mm Hg, mean transaortic valve gradient  equals 37 mm Hg, estimated aortic valve area equals 0.9  sqcm (by continuity equation), consistent with severe  aortic stenosis .Mild-moderate aortic regurgitation.  Pressure halft - time is 361 ms.  Normal aortic root.  Left Atrium: Severely dilated left atrium.  LA volume index  = 51 cc/m2.  Left Ventricle: Hyperdynamic left ventricle. Mild to  moderate concentric left ventricular hypertrophy. Atrial  fibrillation precludes a comprehensive diastolic  assessment. However, findings are suggestive of moderate  diastolic dysfunction.  Right Heart: Severe right atrial enlargement. RA volume  index = 53 cc/m2.  Normal right ventricular size and  function. Normal tricuspid valve. Mild-moderate tricuspid  regurgitation. Normal pulmonic valve. Minimal pulmonic  regurgitation.  Pericardium/Pleura: Normal pericardium with no pericardial  effusion.  Hemodynamic: Estimated right atrial pressure is 5 mm Hg.  Estimated right ventricular systolic pressure equals 46 mm  Hg, assuming right atrial pressure equals 5 mm Hg,  consistent with mild pulmonary hypertension.  ------------------------------------------------------------------------  Conclusions:  1. Mitral annular calcification and calcified mitral  leaflets with normal diastolic opening. Minimal mitral  regurgitation.  2. Heavily calcified trileaflet aortic valve with decreased  opening. Peak transaortic valve gradientequals 62 mm Hg,  mean transaortic valve gradient equals 37 mm Hg, estimated  aortic valve area equals 0.9 sqcm (by continuity equation),  consistent with severe aortic stenosis. Mild-moderate  aortic regurgitation. Pressure halft - time is 361 ms.  3. Severely dilated left atrium.  LA volume index = 51  cc/m2.  4. Mild to moderate concentric left ventricular  hypertrophy.  5. Hyperdynamic left ventricle.  6. Atrial fibrillation precludes a comprehensive diastolic  assessment. However, findings are suggestive of moderate  diastolic dysfunction.  7. Severe right atrial enlargement. RA volume index = 53  cc/m2.  8. Normal right ventricular size and function.  9. Estimated right ventricular systolic pressure equals 46  mm Hg, assuming right atrial pressure equals 5 mm Hg,  consistent with mild pulmonary hypertension.  10. Normal tricuspid valve. Mild-moderate tricuspid  regurgitation.  *** No previous Echo exam.  -----------------------------------------------------------------------  MICROBIOLOGY:     Color: Yellow / Appearance: SL Turbid / S.012 / pH: x  Gluc: x / Ketone: Negative  / Bili: Negative / Urobili: Negative   Blood: x / Protein: 30 mg/dL / Nitrite: Negative   Leuk Esterase: Large / RBC: 0-2 /HPF / WBC 26-50 /HPF   Sq Epi: x / Non Sq Epi: OCC /HPF / Bacteria: Few /HPF    Culture - Urine (10.18.17 @ 00:32)    Specimen Source: .Urine Catheterized    Culture Results:   >100,000 CFU/ml Klebsiella pneumoniae    Culture - Blood (10.17.17 @ 15:38)    Specimen Source: .Blood Blood-Peripheral    Culture Results:   No growth to date.      RADIOLOGY:  [x] Chest radiographs reviewed and interpreted by me    < from: CT Chest No Cont (10.24.17 @ 12:18) >    EXAM:  CT CHEST                          PROCEDURE DATE:  10/24/2017      INTERPRETATION:  CLINICAL INFORMATION: Acute renal failure due to   congestive heart failure. Chest congestion. Wheezing.    COMPARISON: CT chest from 10/17/2017.    PROCEDURE:   CT of the Chest was performed without intravenous contrast.  Sagittal and coronal reformats were performed.      FINDINGS:    CHEST:     LUNGS AND LARGE AIRWAYS: Patent central airways. Bilateral interlobular   septal thickening and bibasilar compressive atelectasis. Slight increased   aeration of the right lower lobe. 2 mm right upper lobe pulmonary nodule,   unchanged since .    PLEURA: Multiple loculated small to moderate right pleural effusion;   loculated pleural effusion along the fissure. Small left pleural effusion.    VESSELS: Atherosclerosis of thoracic aorta and coronary arteries.     HEART: Heart size is enlarged. Left atrial wall calcification. Mitral   valve annular calcification. Status post transcatheter aorticvalve   repair. No pericardial effusion.    MEDIASTINUM AND CANDI: No lymphadenopathy.    CHEST WALL AND LOWER NECK: Surgical clips on the right chest wall/axilla.   Extensive subcutaneous edema.    VISUALIZED UPPER ABDOMEN: Multiple bilateral renal cysts; interval growth   of left renal cysts compared to CT abdomen pelvis from 2012.   Descending colon diverticulosis.    BONES: Degenerative changes of spine.      IMPRESSION:     Multiloculated small to moderate right pleural effusions with right   basilar compressive atelectasis, with slight increased aeration of the   right lower lobe.  Left small pleural effusion with compressive atelectasis, grossly   unchanged.  Interlobular septal thickening consistent with pulmonary edema.  Cardiomegaly.      MIHIR SIBLEY M.D., RADIOLOGY RESIDENT  This document has been electronically signed.  BLANK WONG M.D. ATTENDING RADIOLOGIST  This document has been electronically signed. Oct 24 2017  3:23PM      < end of copied text >  ---------------------------------------------------------------------------------------------------------  < from: US Renal (10..17 @ 14:28) >    EXAM:  US KIDNEY(S)                          PROCEDURE DATE:  10/20/2017      INTERPRETATION:  CLINICAL INFORMATION: NANCY    COMPARISON: CT abdomen 2012    TECHNIQUE: Sonography of the kidneys and bladder.     FINDINGS:    Right kidney: 9.2 cm. echogenic cortex. No renal mass, hydronephrosis or   calculi. Multiple simple cysts the largest is at the upper pole and   measures 2.7 x 2.9 x 2.3 cm.    Left kidney:  10.1 cm. the genic cortex. No renal mass, hydronephrosis or   calculi. Multiple cysts. The largest at the upper pole measures 2.0 x 2.2   x 2.4 cm. This has some internal complexity not well evaluated. Recommend   follow-up in3- 6 months to assess for change.    Urinary bladder: Collapsed around a Gautam catheter limiting evaluation.    IMPRESSION:     Increased renal cortical echogenicity bilaterally, which can be seen in   medical renal disease.    Left slightly complex renal cyst not well evaluated on this exam.   Consider follow-up exam in 3-6 months to assess for change.    MADHU FAULKNER M.D., ATTENDING RADIOLOGIST  This document has been electronically signed. Oct 20 2017  2:01PM     < end of copied text >  ---------------------------------------------------------------------------------------------------------  < from: CT Chest No Cont (10.17.17 @ 21:38) >    EXAM:  CT CHEST                          PROCEDURE DATE:  10/17/2017      INTERPRETATION:  Clinical information: Evaluate for pneumonia. Exam is   compared to previous study of 4/3/2012.    CT scan of the chest was obtained without administration of intravenous   contrast.    Surgical clips are noted in the right axilla.    No hilar and/or mediastinal adenopathy is noted.     Heart is markedly enlarged in size. Calcification the coronary arteries   is noted. Patient is status post TAVR.No pericardial effusion is noted.   Main pulmonary artery is dilated and measures 3.6 cm.     No endobronchial lesions are noted. Compressive atelectasis is noted   involving portions of both lower lobes. This is secondary to small   bilateral pleural effusions, right more than left.    Below the diaphragm, visualized portions of the abdomen demonstrate   low-attenuation within both kidneys which are too small to be adequately   characterized on this exam.     Degenerative changes of the spine are noted. Subcutaneous edema is noted.    Impression: There is no pneumonia.    Bilateral pleural effusions, right more than left.    MAYI MORALES M.D., ATTENDING RADIOLOGIST  This document has been electronically signed. Oct 18 2017  9:24AM      < end of copied text >  ---------------------------------------------------------------------------------------------------------  < from: CT Head No Cont (10.17.17 @ 21:38) >    EXAM:  CT BRAIN                            PROCEDURE DATE:  10/17/2017        INTERPRETATION:  HISTORY: Uterine cancer. Altered mental status.    COMPARISON: MRI brain performed 2012.    TECHNIQUE: Axial noncontrast CT images from the skull base to the vertex   were obtained and submitted for interpretation. Coronal and sagittal   reformatted images were performed. Bone and soft tissue windows were   evaluated.    FINDINGS:     There is no acute intracranial mass-effect, hemorrhage, midline shift, or   abnormal extra-axial fluid collection.     Ventricles, sulci, and cisterns are normal in size for the patient's age   without hydrocephalus. The basal cisterns are patent.     Patchy and confluent regions of periventricular and deep cerebral white   matter hypoattenuation likely reflects chronic microangiopathic ischemic   changes. Atheromatous calcifications along the carotid siphons are   present.    Minimal right maxillary sinus mucosal thickening. No evidence for acute   paranasal sinus or mastoid inflammatory changes.. The calvarium is   intact.     IMPRESSION:     No acute intracranial bleeding, mass effect, or evidence of an acute   territorial infarct.    ADRIEN MORRIS M.D., RADIOLOGY RESIDENT  This document has been electronically signed.  VINI METZGER M.D., ATTENDING RADIOLOGIST  This document has been electronically signed. Oct 18 2017  9:43AM      < end of copied text >  ---------------------------------------------------------------------------------------------------------

## 2017-10-30 NOTE — PROGRESS NOTE ADULT - ASSESSMENT
87F with PMHx of right breast CA S/P lumpectomy in 2000, Uterine Ca S/P total hysterectomy in 2012, Skin CA S/P excision of bilateral lower eyelid skin CA, cholecystectomy, Osteoarthritis of bilateral knees, HTN, hyperlipidemia, paroxysmal Afib on coumadin, severe aortic stenosis now s/p TAVR 2 years ago, who presents with onset of generalized weakness x 2 weeks with hospital course c/b UTI and hypercarbic respiratory failure now with AFib with tachy-everett.    #AFib with tachy everett  -plan today  -post ppm CXR  -plan for coumadin to resume post PPM    Jhon Avila MD 87F with PMHx of right breast CA S/P lumpectomy in 2000, Uterine Ca S/P total hysterectomy in 2012, Skin CA S/P excision of bilateral lower eyelid skin CA, cholecystectomy, Osteoarthritis of bilateral knees, HTN, hyperlipidemia, paroxysmal Afib on coumadin, severe aortic stenosis now s/p TAVR 2 years ago, who presents with onset of generalized weakness x 2 weeks with hospital course c/b UTI and hypercarbic respiratory failure now with AFib with tachy-everett.    #AFib with tachy everett  -plan ppm today  -post ppm CXR  -plan for coumadin to resume post PPM    Jhon Avila MD 87F with PMHx of right breast CA S/P lumpectomy in 2000, Uterine Ca S/P total hysterectomy in 2012, Skin CA S/P excision of bilateral lower eyelid skin CA, cholecystectomy, Osteoarthritis of bilateral knees, HTN, hyperlipidemia, paroxysmal Afib on coumadin, severe aortic stenosis now s/p TAVR 2 years ago, who presents with onset of generalized weakness x 2 weeks with hospital course c/b UTI and hypercarbic respiratory failure now with AFib with tachy-everett.    #AFib with tachy everett  -TTE today  -plan ppm today  -post ppm CXR  -plan for coumadin to resume post PPM    Jhon Avila MD

## 2017-10-30 NOTE — PROVIDER CONTACT NOTE (CRITICAL VALUE NOTIFICATION) - ACTION/TREATMENT ORDERED:
Fingerstick 47. Repeat fingerstick 45.  D50 given IV, dextrose drip started.
Follow full anticoagulation nomogram. Decrease heparin gtt rate from 11ml/hr to 10ml/hr. Re-check aPTT in 5 1/2 hours. Continue to monitor for bleeding.
Repeat blood glucose POC and report result. Continue to monitor.

## 2017-10-30 NOTE — CHART NOTE - NSCHARTNOTEFT_GEN_A_CORE
Note from 10/29/2017  Spoke with EP Dr. Shelton regarding PPM on Monday. INR 1.86 ( 10/29 ). Hold off Coumadin, Start Heparin drip, Hold Heparin drip at 6 am, 10/30.    Sydnie Alfred NP-C  [id number]

## 2017-10-30 NOTE — PROGRESS NOTE ADULT - SUBJECTIVE AND OBJECTIVE BOX
OSCAR SANDERS:[id number],   87yFemale followed for:  digoxin (Rash; Drowsiness)  No Known Allergies    PAST MEDICAL & SURGICAL HISTORY:  Severe aortic stenosis  Uterine cancer  Arthritis  HTN (hypertension)  Breast cancer diagnosed in 2000: s/p right lumpectomy and right axillary lymph node removal, radiation  AF (atrial fibrillation): on coumadin and aspirin  Dyslipidemia  H/O: hysterectomy: 4/2012  s/p surgical excision of left eye Skin cancer: Bilateral lower eye lids  S/P D&C  S/P cholecystectomy    FAMILY HISTORY:  No pertinent family history in first degree relatives    MEDICATIONS  (STANDING):  ALBUTerol/ipratropium for Nebulization 3 milliLiter(s) Nebulizer every 6 hours  ascorbic acid 500 milliGRAM(s) Oral daily  atorvastatin 10 milliGRAM(s) Oral at bedtime  buDESOnide   0.5 milliGRAM(s) Respule 0.5 milliGRAM(s) Inhalation every 12 hours  clopidogrel Tablet 75 milliGRAM(s) Oral daily  docusate sodium 100 milliGRAM(s) Oral three times a day  ferrous    sulfate 325 milliGRAM(s) Oral daily  furosemide   Injectable 40 milliGRAM(s) IV Push daily  heparin  Infusion.  Unit(s)/Hr (11 mL/Hr) IV Continuous <Continuous>  influenza   Vaccine 0.5 milliLiter(s) IntraMuscular once  metoprolol 12.5 milliGRAM(s) Oral two times a day  Nephrocaps 1 Capsule(s) Oral daily  polyethylene glycol 3350 17 Gram(s) Oral daily  QUEtiapine 12.5 milliGRAM(s) Oral <User Schedule>  senna 2 Tablet(s) Oral at bedtime    MEDICATIONS  (PRN):  heparin  Injectable 5000 Unit(s) IV Push every 6 hours PRN For aPTT less than 40  heparin  Injectable 2500 Unit(s) IV Push every 6 hours PRN For aPTT between 40 - 57      Vital Signs Last 24 Hrs  T(C): 37.1 (30 Oct 2017 04:44), Max: 37.1 (29 Oct 2017 14:18)  T(F): 98.7 (30 Oct 2017 04:44), Max: 98.7 (29 Oct 2017 14:18)  HR: 82 (30 Oct 2017 06:59) (64 - 109)  BP: 135/83 (30 Oct 2017 04:44) (112/64 - 141/77)  BP(mean): --  RR: 18 (30 Oct 2017 04:44) (18 - 20)  SpO2: 93% (30 Oct 2017 06:59) (93% - 98%)  nc/at  s1s2  cta  soft, nt, nd no guarding or rebound  no c/c/e    CBC Full  -  ( 29 Oct 2017 22:25 )  WBC Count : 7.7 K/uL  Hemoglobin : 8.9 g/dL  Hematocrit : 28.1 %  Platelet Count - Automated : 285 K/uL  Mean Cell Volume : 94.7 fl  Mean Cell Hemoglobin : 30.0 pg  Mean Cell Hemoglobin Concentration : 31.6 gm/dL  Auto Neutrophil # : x  Auto Lymphocyte # : x  Auto Monocyte # : x  Auto Eosinophil # : x  Auto Basophil # : x  Auto Neutrophil % : x  Auto Lymphocyte % : x  Auto Monocyte % : x  Auto Eosinophil % : x  Auto Basophil % : x    10-30    137  |  99  |  42<H>  ----------------------------<  105<H>  4.5   |  31  |  1.38<H>    Ca    8.6      30 Oct 2017 07:09      PT/INR - ( 30 Oct 2017 07:06 )   PT: 18.7 sec;   INR: 1.64 ratio         PTT - ( 30 Oct 2017 05:13 )  PTT:74.5 sec

## 2017-10-30 NOTE — PROVIDER CONTACT NOTE (CRITICAL VALUE NOTIFICATION) - RECOMMENDATIONS
Follow full anticoagulation nomogram. Decrease heparin gtt rate from 11ml/hr to 10ml/hr. Re-check aPTT in 5 1/2 hours. Continue to monitor for bleeding.

## 2017-10-30 NOTE — PROGRESS NOTE ADULT - SUBJECTIVE AND OBJECTIVE BOX
No pain, no shortness of breath      VITAL:  T(C): , Max: 37.1 (10-29-17 @ 14:18)  T(F): , Max: 98.7 (10-29-17 @ 14:18)  HR: 86 (10-30-17 @ 12:55)  BP: 111/62 (10-30-17 @ 12:55)  BP(mean): --  RR: 18 (10-30-17 @ 12:55)  SpO2: 99% (10-30-17 @ 12:55)  Wt(kg): --        PHYSICAL EXAM:  Constitutional: NAD, frail  HEENT: NCAT, DMM  Neck: Supple, No JVD  Respiratory: distant BS b/l  Cardiovascular: RRR s1s2, no m/r/g  Gastrointestinal: BS+, soft, NT/ND  Extremities: 2+ b/l LE edema  Neurological: no focal deficits; strength grossly intact  Psychiatric: Normal mood, normal affect  Back: no CVAT b/l  Skin: No rashes, no nevi      LABS:                        8.1    6.4   )-----------( 236      ( 30 Oct 2017 12:28 )             26.5     Na(137)/K(4.5)/Cl(99)/HCO3(31)/BUN(42)/Cr(1.38)Glu(105)/Ca(8.6)/Mg(--)/PO4(--)    10-30 @ 07:09  Na(140)/K(4.5)/Cl(100)/HCO3(27)/BUN(44)/Cr(1.54)Glu(92)/Ca(8.9)/Mg(--)/PO4(--)    10-29 @ 09:05  Na(141)/K(4.0)/Cl(104)/HCO3(27)/BUN(46)/Cr(1.56)Glu(90)/Ca(8.6)/Mg(1.7)/PO4(--)    10-28 @ 08:27    IMPRESSION: 87F w/ past breast and uterine CA, HTN, AFib, and AS-TAVR, 10/16/17 a/w hypercapnic respiratory failure/AMS, as well as NANCY     (1)Renal - CKD 2-3; resolved superimposed mild ischemic ATN.     (2)CV - chronically edematous/hypervolemic; difficult to optimize volume status as overdiuresis could lead to (and has recent led to) NANCY     (3) - left complex renal cyst - indicated for imaging f/u 3-6 months.       RECOMMEND:  (1)Change Lasix to PO - 40mg po qd  (2)Repeat renal US 3-6 months  (3)D/C planning per primary team; could f/u at my office in 2-4 weeks                Quinten Parker MD  [facility]  [phone] No pain, no shortness of breath      VITAL:  T(C): , Max: 37.1 (10-29-17 @ 14:18)  T(F): , Max: 98.7 (10-29-17 @ 14:18)  HR: 86 (10-30-17 @ 12:55)  BP: 111/62 (10-30-17 @ 12:55)  BP(mean): --  RR: 18 (10-30-17 @ 12:55)  SpO2: 99% (10-30-17 @ 12:55)  Wt(kg): --      PHYSICAL EXAM:  Constitutional: NAD, frail  HEENT: NCAT, DMM  Neck: Supple, No JVD  Respiratory: distant BS b/l  Cardiovascular: RRR s1s2, no m/r/g  Gastrointestinal: BS+, soft, NT/ND  Extremities: 2+ b/l LE edema  Neurological: no focal deficits; strength grossly intact  Psychiatric: Normal mood, normal affect  Back: no CVAT b/l  Skin: No rashes, no nevi      LABS:                        8.1    6.4   )-----------( 236      ( 30 Oct 2017 12:28 )             26.5     Na(137)/K(4.5)/Cl(99)/HCO3(31)/BUN(42)/Cr(1.38)Glu(105)/Ca(8.6)/Mg(--)/PO4(--)    10-30 @ 07:09  Na(140)/K(4.5)/Cl(100)/HCO3(27)/BUN(44)/Cr(1.54)Glu(92)/Ca(8.9)/Mg(--)/PO4(--)    10-29 @ 09:05  Na(141)/K(4.0)/Cl(104)/HCO3(27)/BUN(46)/Cr(1.56)Glu(90)/Ca(8.6)/Mg(1.7)/PO4(--)    10-28 @ 08:27    IMPRESSION: 87F w/ past breast and uterine CA, HTN, AFib, and AS-TAVR, 10/16/17 a/w hypercapnic respiratory failure/AMS, as well as NANCY     (1)Renal - CKD 2-3; resolved superimposed mild ischemic ATN.     (2)CV - chronically edematous/hypervolemic; difficult to optimize volume status as overdiuresis could lead to (and has recent led to) NANCY     (3) - left complex renal cyst - indicated for imaging f/u 3-6 months.       RECOMMEND:  (1)Change Lasix to PO - 40mg po qd  (2)Repeat renal US 3-6 months  (3)D/C planning per primary team; could f/u at my office in 2-4 weeks                Quinten Parker MD  [facility]  [phone]

## 2017-10-30 NOTE — PROVIDER CONTACT NOTE (OTHER) - SITUATION
Respiratory therapist unable to draw STAT ABG
Patient HR 31 on cardiac monitor
Pt HR 33 on tele monitor
Pt refused Bipap at night despite education from RN and Respiratory

## 2017-10-30 NOTE — PROVIDER CONTACT NOTE (OTHER) - ACTION/TREATMENT ORDERED:
NP Leilani Killian made aware at this time. Safety maintained. Will closely monitor patient.
none at this time: DAIN ordered for am
NP Leilani Killian aware at this time. Ordered to place R2 pads on patient. Call bell within easy reach. Safety maintained.
NP Tessa Yung aware at this time. No further interventions. Call bell within easy reach. Safety maintained.

## 2017-10-30 NOTE — PROGRESS NOTE ADULT - SUBJECTIVE AND OBJECTIVE BOX
Patient is a 87y old  Female who presents with a chief complaint of "I have uterine cancer" (16 Oct 2017 12:15)      SUBJECTIVE / OVERNIGHT EVENTS: no new c/o . awaiting PPM today    MEDICATIONS  (STANDING):  ALBUTerol/ipratropium for Nebulization 3 milliLiter(s) Nebulizer every 6 hours  ascorbic acid 500 milliGRAM(s) Oral daily  atorvastatin 10 milliGRAM(s) Oral at bedtime  buDESOnide   0.5 milliGRAM(s) Respule 0.5 milliGRAM(s) Inhalation every 12 hours  clopidogrel Tablet 75 milliGRAM(s) Oral daily  docusate sodium 100 milliGRAM(s) Oral three times a day  ferrous    sulfate 325 milliGRAM(s) Oral daily  influenza   Vaccine 0.5 milliLiter(s) IntraMuscular once  metoprolol     tartrate 25 milliGRAM(s) Oral two times a day  Nephrocaps 1 Capsule(s) Oral daily  polyethylene glycol 3350 17 Gram(s) Oral daily  QUEtiapine 12.5 milliGRAM(s) Oral <User Schedule>  senna 2 Tablet(s) Oral at bedtime  warfarin 2 milliGRAM(s) Oral once    MEDICATIONS  (PRN):      Vital Signs Last 24 Hrs  T(F): 98.4 (10-30-17 @ 19:55), Max: 98.7 (10-30-17 @ 04:44)  HR: 87 (10-30-17 @ 20:10) (80 - 109)  BP: 138/88 (10-30-17 @ 20:10) (111/62 - 159/84)  RR: 18 (10-30-17 @ 19:55) (18 - 18)  SpO2: 98% (10-30-17 @ 19:55) (93% - 99%)  Telemetry:   CAPILLARY BLOOD GLUCOSE        I&O's Summary    29 Oct 2017 07:01  -  30 Oct 2017 07:00  --------------------------------------------------------  IN: 1294 mL / OUT: 1450 mL / NET: -156 mL    30 Oct 2017 07:01  -  30 Oct 2017 20:27  --------------------------------------------------------  IN: 0 mL / OUT: 300 mL / NET: -300 mL        PHYSICAL EXAM:  GENERAL: NAD, well-developed  HEAD:  Atraumatic, Normocephalic  EYES: EOMI, PERRLA, conjunctiva and sclera clear  NECK: Supple, No JVD  CHEST/LUNG: Clear to auscultation bilaterally; No wheeze  HEART: Regular rate and rhythm; No murmurs, rubs, or gallops  ABDOMEN: Soft, Nontender, Nondistended; Bowel sounds present  EXTREMITIES:  2+ Peripheral Pulses, No clubbing, cyanosis, or edema  PSYCH: AAOx3  NEUROLOGY: non-focal  SKIN: No rashes or lesions    LABS:                        8.1    6.4   )-----------( 236      ( 30 Oct 2017 12:28 )             26.5     10-30    137  |  99  |  42<H>  ----------------------------<  105<H>  4.5   |  31  |  1.38<H>    Ca    8.6      30 Oct 2017 07:09      PT/INR - ( 30 Oct 2017 07:06 )   PT: 18.7 sec;   INR: 1.64 ratio         PTT - ( 30 Oct 2017 12:28 )  PTT:31.0 sec          RADIOLOGY & ADDITIONAL TESTS:    Imaging Personally Reviewed:    Consultant(s) Notes Reviewed:      Care Discussed with Consultants/Other Providers:

## 2017-10-30 NOTE — PROGRESS NOTE ADULT - ASSESSMENT
ASSESSMENT    acute hypoxic and hypercapnic respiratory failure - multifactorial - resolved respiratory acidosis and improved oxygenation    chronic CHF in the setting of aortic stenosis s/p TAVR and diastolic CHF with moderate bilateral pleural effusions/atelectasis - minimal improvement on follow-up chest CT  restrictive lung disease due to respiratory muscle weakness, kyphoscoliosis and effusions  perhaps underlying senile emphysema  VTE disease seems unlikely in the setting of chronic A/C    NANCY due to recent diarrhea and increased diuretic dose with metabolic acidosis - resolved    atrial fibrillation with tachy-everett syndrome    hypernatremia - resolved    Klebsiella UTI s/p antibiotics    anemia    PLAN/RECOMMENDATIONS    would discontinue NIV for sleep if ABG is without respiratory acidosis - oxygen supplementation to keep saturation greater than 92% via nasal canula during the day   repeat chest CT reviewed  patient will benefit from a trilogy vent which I have arranged - when discharged to rehab, will need oxygen supplementation via a nasal canula @ 3lpm at rest and 4lpm with exertion and may need nocturnal BIPAP on the current settings  After reviewing the patient's current respiratory status, I believe that the patient would benefit from NIV. BIPAP has been unsuccessful in treating the patient's advanced disease processes. I am ordering NIV to be used for sleep, during daytime naps and as needed during the day for periods of shortness of breath and increased work of breathing. Without NIV, I am concerned that the patient will continue to experience further clinical deterioration resulting in possible medical harm and recurrent hospitalizations  albuterol/atrovent/pulmicort nebs  renal follow-up noted noted - oleary catheter discontinued - needs ongoing diuresis  cardiac meds: lipitor/lasix/plavix/cardizem CD discontinued due to bradycardia/metoprolol/coumadin for INR 2.5 - 3.5 - hold for PPM (on heparin gtt for now) - off ACE inhibitors - cardiology follow-up noted  s/p course of ceftriaxone  bowel regimen  transfuse as needed  bedtime Seroquel may be contributing to confusion    Will follow with you; discussed plan of care with the dedicated floor NP, patient and son at bedside    Tommy Reyes MD, [facility] - [phone]  Pulmonary Medicine

## 2017-10-30 NOTE — PROVIDER CONTACT NOTE (OTHER) - ASSESSMENT
Pt A&Ox4, VSS. In no acute respiratory distress at this time on 3L NC. Respiratory therapist attempted ABG x3 times with no success. Respiratory spoke to primary RN and NP about unsuccessful attempts.
No s/s of respiratory distress noted
Patient A&Ox4, VSS at this time. /63, HR 52, 98% O2, 19 RR. Pt denies chest pain, pressure, or palpitations. Pt w/o SOB or distress. Pt was resting at time of incident.
Pt A&Ox4, VSS at this time. Pt denies chest pain, pressure or palpitations. Pt sleeping comfortably in bed during event. Once woken up returned to HR of 54 BPM. Pt with no complaints at this time.

## 2017-10-30 NOTE — PROGRESS NOTE ADULT - SUBJECTIVE AND OBJECTIVE BOX
Interval events:  No acute events overnight  AFib 90-100s with PVCs  INR downtrended, plan for PPM today    Review Of Systems:  Constitutional: [ ] Fever [ ] Chills [ ] Fatigue [ ] Weight change   HEENT: [ ] Blurred vision [ ] Eye Pain [ ] Headache [ ] Runny nose [ ] Sore Throat   Respiratory: [ ] Cough [ ] Wheezing [ ] Shortness of breath  Cardiovascular: [ ] Chest Pain [ ] Palpitations [ ] REYES [ ] PND [ ] Orthopnea  Gastrointestinal: [ ] Abdominal Pain [ ] Diarrhea [ ] Constipation [ ] Hemorrhoids [ ] Nausea [ ] Vomiting  Genitourinary: [ ] Nocturia [ ] Dysuria [ ] Incontinence  Extremities: [ ] Swelling [ ] Joint Pain  Neurologic: [ ] Focal deficit [ ] Paresthesias [ ] Syncope  Lymphatic: [ ] Swelling [ ] Lymphadenopathy   Skin: [ ] Rash [ ] Ecchymoses [ ] Wounds [ ] Lesions  Psychiatry: [ ] Depression [ ] Suicidal/Homicidal Ideation [ ] Anxiety [ ] Sleep Disturbances  [x] 10 point review of systems is otherwise negative except as mentioned above            [ ]Unable to obtain    Medications:  ALBUTerol/ipratropium for Nebulization 3 milliLiter(s) Nebulizer every 6 hours  ascorbic acid 500 milliGRAM(s) Oral daily  atorvastatin 10 milliGRAM(s) Oral at bedtime  buDESOnide   0.5 milliGRAM(s) Respule 0.5 milliGRAM(s) Inhalation every 12 hours  clopidogrel Tablet 75 milliGRAM(s) Oral daily  docusate sodium 100 milliGRAM(s) Oral three times a day  ferrous    sulfate 325 milliGRAM(s) Oral daily  furosemide   Injectable 40 milliGRAM(s) IV Push daily  heparin  Infusion.  Unit(s)/Hr IV Continuous <Continuous>  heparin  Injectable 5000 Unit(s) IV Push every 6 hours PRN  heparin  Injectable 2500 Unit(s) IV Push every 6 hours PRN  influenza   Vaccine 0.5 milliLiter(s) IntraMuscular once  metoprolol 12.5 milliGRAM(s) Oral two times a day  Nephrocaps 1 Capsule(s) Oral daily  polyethylene glycol 3350 17 Gram(s) Oral daily  QUEtiapine 12.5 milliGRAM(s) Oral <User Schedule>  senna 2 Tablet(s) Oral at bedtime    PMH/PSH/FH/SH: [ ] Unchanged  Vitals:  T(C): 37.1 (10-30-17 @ 04:44), Max: 37.1 (10-29-17 @ 14:18)  HR: 88 (10-30-17 @ 10:06) (64 - 109)  BP: 135/83 (10-30-17 @ 04:44) (112/64 - 141/77)  BP(mean): --  RR: 18 (10-30-17 @ 04:44) (18 - 20)  SpO2: 98% (10-30-17 @ 10:06) (93% - 98%)  Wt(kg): --  Daily     Daily Weight in k.1 (30 Oct 2017 07:23)  I&O's Summary    29 Oct 2017 07:  -  30 Oct 2017 07:00  --------------------------------------------------------  IN: 1294 mL / OUT: 1450 mL / NET: -156 mL    30 Oct 2017 07:01  -  30 Oct 2017 10:30  --------------------------------------------------------  IN: 0 mL / OUT: 100 mL / NET: -100 mL        Physical Exam:  Appearance:  Normal, NAD  Eyes: PERRL, EOMI  HENT: Normal oral [place] NC/AT  Cardiovascular: S1, S2, RRR, No m/r/g appreciated, No edema, no elevation in JVP  Respiratory: Clear to auscultation bilaterally  Gastrointestinal: Soft, Non-tender, Non-distended, BS+  Musculoskeletal:  No clubbing, No joint deformity   Neurologic: Non-focal  Lymphatic: No lymphadenopathy  Psychiatry: AAOx3, Mood & affect appropriate  Skin: No rashes, No ecchymoses, No cyanosis    Labs:                        7.2    5.50  )-----------( 230      ( 30 Oct 2017 07:06 )             23.1     10    137  |  99  |  42<H>  ----------------------------<  105<H>  4.5   |  31  |  1.38<H>    Ca    8.6      30 Oct 2017 07:09      PT/INR - ( 30 Oct 2017 07:06 )   PT: 18.7 sec;   INR: 1.64 ratio         PTT - ( 30 Oct 2017 05:13 )  PTT:74.5 sec    Interpretation of Telemetry: afib 90-100s, PVCs

## 2017-10-30 NOTE — PROVIDER CONTACT NOTE (OTHER) - REASON
Patient HR 31 on cardiac monitor
Pt HR 33 on tele monitor
Pt refused Bipap
Respiratory therapist unable to draw STAT ABG

## 2017-10-30 NOTE — PROVIDER CONTACT NOTE (CRITICAL VALUE NOTIFICATION) - ASSESSMENT
Pt resting comfortably in bed. Pt shows no sign of bleeding or AMS, /64, , t 98.6, RR 18, o2 98% on 3L NC. Pt on heparin gtt for AF.

## 2017-10-31 LAB
ANION GAP SERPL CALC-SCNC: 13 MMOL/L — SIGNIFICANT CHANGE UP (ref 5–17)
BASE EXCESS BLDA CALC-SCNC: 6.8 MMOL/L — HIGH (ref -2–2)
BLD GP AB SCN SERPL QL: NEGATIVE — SIGNIFICANT CHANGE UP
BUN SERPL-MCNC: 39 MG/DL — HIGH (ref 7–23)
CALCIUM SERPL-MCNC: 8.4 MG/DL — SIGNIFICANT CHANGE UP (ref 8.4–10.5)
CHLORIDE SERPL-SCNC: 99 MMOL/L — SIGNIFICANT CHANGE UP (ref 96–108)
CO2 BLDA-SCNC: 34 MMOL/L — HIGH (ref 22–30)
CO2 SERPL-SCNC: 27 MMOL/L — SIGNIFICANT CHANGE UP (ref 22–31)
CREAT SERPL-MCNC: 1.39 MG/DL — HIGH (ref 0.5–1.3)
GAS PNL BLDA: SIGNIFICANT CHANGE UP
GLUCOSE SERPL-MCNC: 168 MG/DL — HIGH (ref 70–99)
HCO3 BLDA-SCNC: 32 MMOL/L — HIGH (ref 21–29)
HCT VFR BLD CALC: 23.9 % — LOW (ref 34.5–45)
HCT VFR BLD CALC: 24 % — LOW (ref 34.5–45)
HGB BLD-MCNC: 7.4 G/DL — LOW (ref 11.5–15.5)
HGB BLD-MCNC: 7.5 G/DL — LOW (ref 11.5–15.5)
INR BLD: 1.41 RATIO — HIGH (ref 0.88–1.16)
MAGNESIUM SERPL-MCNC: 2 MG/DL — SIGNIFICANT CHANGE UP (ref 1.6–2.6)
MCHC RBC-ENTMCNC: 28.4 PG — SIGNIFICANT CHANGE UP (ref 27–34)
MCHC RBC-ENTMCNC: 29.8 PG — SIGNIFICANT CHANGE UP (ref 27–34)
MCHC RBC-ENTMCNC: 30.8 GM/DL — LOW (ref 32–36)
MCHC RBC-ENTMCNC: 31.4 GM/DL — LOW (ref 32–36)
MCV RBC AUTO: 92 FL — SIGNIFICANT CHANGE UP (ref 80–100)
MCV RBC AUTO: 94.9 FL — SIGNIFICANT CHANGE UP (ref 80–100)
PCO2 BLDA: 55 MMHG — HIGH (ref 32–46)
PH BLDA: 7.39 — SIGNIFICANT CHANGE UP (ref 7.35–7.45)
PHOSPHATE SERPL-MCNC: 3 MG/DL — SIGNIFICANT CHANGE UP (ref 2.5–4.5)
PLATELET # BLD AUTO: 179 K/UL — SIGNIFICANT CHANGE UP (ref 150–400)
PLATELET # BLD AUTO: 247 K/UL — SIGNIFICANT CHANGE UP (ref 150–400)
PO2 BLDA: 89 MMHG — SIGNIFICANT CHANGE UP (ref 74–108)
POTASSIUM SERPL-MCNC: 4.6 MMOL/L — SIGNIFICANT CHANGE UP (ref 3.5–5.3)
POTASSIUM SERPL-SCNC: 4.6 MMOL/L — SIGNIFICANT CHANGE UP (ref 3.5–5.3)
PROTHROM AB SERPL-ACNC: 16.1 SEC — HIGH (ref 10–13.1)
RBC # BLD: 2.52 M/UL — LOW (ref 3.8–5.2)
RBC # BLD: 2.61 M/UL — LOW (ref 3.8–5.2)
RBC # FLD: 16 % — HIGH (ref 10.3–14.5)
RBC # FLD: 18.4 % — HIGH (ref 10.3–14.5)
RH IG SCN BLD-IMP: POSITIVE — SIGNIFICANT CHANGE UP
SAO2 % BLDA: 97 % — HIGH (ref 92–96)
SODIUM SERPL-SCNC: 139 MMOL/L — SIGNIFICANT CHANGE UP (ref 135–145)
WBC # BLD: 7 K/UL — SIGNIFICANT CHANGE UP (ref 3.8–10.5)
WBC # BLD: 8.39 K/UL — SIGNIFICANT CHANGE UP (ref 3.8–10.5)
WBC # FLD AUTO: 7 K/UL — SIGNIFICANT CHANGE UP (ref 3.8–10.5)
WBC # FLD AUTO: 8.39 K/UL — SIGNIFICANT CHANGE UP (ref 3.8–10.5)

## 2017-10-31 PROCEDURE — 93010 ELECTROCARDIOGRAM REPORT: CPT

## 2017-10-31 RX ORDER — FUROSEMIDE 40 MG
40 TABLET ORAL ONCE
Qty: 0 | Refills: 0 | Status: COMPLETED | OUTPATIENT
Start: 2017-10-31 | End: 2017-10-31

## 2017-10-31 RX ORDER — HEPARIN SODIUM 5000 [USP'U]/ML
5000 INJECTION INTRAVENOUS; SUBCUTANEOUS EVERY 6 HOURS
Qty: 0 | Refills: 0 | Status: DISCONTINUED | OUTPATIENT
Start: 2017-10-31 | End: 2017-11-01

## 2017-10-31 RX ORDER — WARFARIN SODIUM 2.5 MG/1
4 TABLET ORAL ONCE
Qty: 0 | Refills: 0 | Status: COMPLETED | OUTPATIENT
Start: 2017-10-31 | End: 2017-10-31

## 2017-10-31 RX ORDER — HEPARIN SODIUM 5000 [USP'U]/ML
1000 INJECTION INTRAVENOUS; SUBCUTANEOUS
Qty: 25000 | Refills: 0 | Status: DISCONTINUED | OUTPATIENT
Start: 2017-10-31 | End: 2017-11-01

## 2017-10-31 RX ORDER — ACETAMINOPHEN 500 MG
650 TABLET ORAL EVERY 6 HOURS
Qty: 0 | Refills: 0 | Status: DISCONTINUED | OUTPATIENT
Start: 2017-10-31 | End: 2017-11-01

## 2017-10-31 RX ORDER — DILTIAZEM HCL 120 MG
120 CAPSULE, EXT RELEASE 24 HR ORAL DAILY
Qty: 0 | Refills: 0 | Status: DISCONTINUED | OUTPATIENT
Start: 2017-10-31 | End: 2017-11-01

## 2017-10-31 RX ORDER — HEPARIN SODIUM 5000 [USP'U]/ML
2500 INJECTION INTRAVENOUS; SUBCUTANEOUS EVERY 6 HOURS
Qty: 0 | Refills: 0 | Status: DISCONTINUED | OUTPATIENT
Start: 2017-10-31 | End: 2017-11-01

## 2017-10-31 RX ORDER — OXYCODONE HYDROCHLORIDE 5 MG/1
5 TABLET ORAL EVERY 6 HOURS
Qty: 0 | Refills: 0 | Status: DISCONTINUED | OUTPATIENT
Start: 2017-10-31 | End: 2017-11-01

## 2017-10-31 RX ADMIN — Medication 25 MILLIGRAM(S): at 06:12

## 2017-10-31 RX ADMIN — Medication 3 MILLILITER(S): at 17:19

## 2017-10-31 RX ADMIN — ATORVASTATIN CALCIUM 10 MILLIGRAM(S): 80 TABLET, FILM COATED ORAL at 21:55

## 2017-10-31 RX ADMIN — Medication 40 MILLIGRAM(S): at 23:47

## 2017-10-31 RX ADMIN — Medication 3 MILLILITER(S): at 13:07

## 2017-10-31 RX ADMIN — Medication 1 CAPSULE(S): at 13:07

## 2017-10-31 RX ADMIN — Medication 100 MILLIGRAM(S): at 13:06

## 2017-10-31 RX ADMIN — Medication 250 MILLIGRAM(S): at 06:12

## 2017-10-31 RX ADMIN — Medication 0.5 MILLIGRAM(S): at 06:12

## 2017-10-31 RX ADMIN — SENNA PLUS 2 TABLET(S): 8.6 TABLET ORAL at 21:56

## 2017-10-31 RX ADMIN — HEPARIN SODIUM 1000 UNIT(S)/HR: 5000 INJECTION INTRAVENOUS; SUBCUTANEOUS at 19:55

## 2017-10-31 RX ADMIN — WARFARIN SODIUM 4 MILLIGRAM(S): 2.5 TABLET ORAL at 21:57

## 2017-10-31 RX ADMIN — Medication 25 MILLIGRAM(S): at 17:19

## 2017-10-31 RX ADMIN — Medication 100 MILLIGRAM(S): at 06:12

## 2017-10-31 RX ADMIN — Medication 0.5 MILLIGRAM(S): at 17:19

## 2017-10-31 RX ADMIN — QUETIAPINE FUMARATE 12.5 MILLIGRAM(S): 200 TABLET, FILM COATED ORAL at 21:55

## 2017-10-31 RX ADMIN — Medication 3 MILLILITER(S): at 23:24

## 2017-10-31 RX ADMIN — Medication 40 MILLIGRAM(S): at 06:12

## 2017-10-31 RX ADMIN — Medication 100 MILLIGRAM(S): at 21:56

## 2017-10-31 RX ADMIN — Medication 500 MILLIGRAM(S): at 13:07

## 2017-10-31 RX ADMIN — POLYETHYLENE GLYCOL 3350 17 GRAM(S): 17 POWDER, FOR SOLUTION ORAL at 13:07

## 2017-10-31 RX ADMIN — CLOPIDOGREL BISULFATE 75 MILLIGRAM(S): 75 TABLET, FILM COATED ORAL at 13:06

## 2017-10-31 RX ADMIN — Medication 3 MILLILITER(S): at 06:12

## 2017-10-31 RX ADMIN — Medication 325 MILLIGRAM(S): at 13:07

## 2017-10-31 NOTE — PROGRESS NOTE ADULT - ASSESSMENT
ASSESSMENT    acute hypoxic and hypercapnic respiratory failure - multifactorial - resolved respiratory acidosis and improved oxygenation    chronic CHF in the setting of aortic stenosis s/p TAVR, MR/MS and systolic and diastolic CHF with moderate bilateral pleural effusions/atelectasis - minimal improvement on follow-up chest CT  restrictive lung disease due to respiratory muscle weakness, kyphoscoliosis and effusions  perhaps underlying senile emphysema  VTE disease seems unlikely in the setting of chronic A/C    cor pulmonale due to left sided heart disease and chronic hypoxemia resulting in chronic pulmonary artery hypoxic vasoconstriction    NANCY due to recent diarrhea and increased diuretic dose with metabolic acidosis - resolved    atrial fibrillation with tachy-everett syndrome s/p pacemaker placement    hypernatremia - resolved    Klebsiella UTI s/p antibiotics    anemia    PLAN/RECOMMENDATIONS    would continue NIV for sleep qw ABG still has a significant respiratory acidosis - oxygen supplementation to keep saturation greater than 92% via nasal canula during the day   repeat chest CT reviewed  patient will benefit from a trilogy vent which I have arranged - when discharged to rehab, will need oxygen supplementation via a nasal canula @ 3lpm at rest and 4lpm with exertion and may need nocturnal BIPAP on the current settings  After reviewing the patient's current respiratory status, I believe that the patient would benefit from NIV. BIPAP has been unsuccessful in treating the patient's advanced disease processes. I am ordering NIV to be used for sleep, during daytime naps and as needed during the day for periods of shortness of breath and increased work of breathing. Without NIV, I am concerned that the patient will continue to experience further clinical deterioration resulting in possible medical harm and recurrent hospitalizations  albuterol/atrovent/pulmicort nebs  renal follow-up noted noted - oleary catheter discontinued - needs ongoing diuresis  cardiac meds: lipitor/lasix/plavix/cardizem CD restarted/metoprolol/coumadin for INR 2.5 - 3.5 - off ACE inhibitors - cardiology follow-up noted  s/p course of ceftriaxone  bowel regimen  transfuse as needed  bedtime Seroquel may be contributing to confusion    Will follow with you; discussed plan of care with the dedicated floor NP, patient and son at bedside    Tommy Reyes MD, [facility] - [phone]  Pulmonary Medicine

## 2017-10-31 NOTE — PROGRESS NOTE ADULT - ASSESSMENT
This is an 86 y/o female with PMHx of right breast CA S/P lumpectomy in 2000, Uterine Ca S/P total hysterectomy in 2012, Skin CA S/P excision of bilateral lower eyelid skin CA, cholecystectomy, Osteoarthritis of bilateral knees, HTN, hyperlipidemia, paroxysmal Afib on coumadin, severe aortic stenosis s/p TAVR 2 years ago, who presents with onset of generalized weakness x 2 weeks with hospital course c/b UTI and hypercarbic respiratory failure now with AFib with tachy-everett. Patient underwent single chamber PPM implant ([employer]tronic [id number]) on 10/30/17 without complications.

## 2017-10-31 NOTE — PROGRESS NOTE ADULT - SUBJECTIVE AND OBJECTIVE BOX
No pain, no shortness of breath      VITAL:  T(C): , Max: 36.9 (10-30-17 @ 19:55)  T(F): , Max: 98.4 (10-30-17 @ 19:55)  HR: 63 (10-31-17 @ 09:15)  BP: 132/71 (10-31-17 @ 04:45)  BP(mean): --  RR: 18 (10-31-17 @ 04:45)  SpO2: 99% (10-31-17 @ 09:15)      PHYSICAL EXAM:  Constitutional: NAD, frail  HEENT: NCAT, DMM  Neck: Supple, No JVD  Respiratory: distant BS b/l  Cardiovascular: RRR s1s2, no m/r/g  Gastrointestinal: BS+, soft, NT/ND  Extremities: 2+ b/l LE edema  Neurological: no focal deficits; strength grossly intact  Psychiatric: Normal mood, normal affect  Back: no CVAT b/l  Skin: No rashes, no nevi      LABS:                        7.4    8.39  )-----------( 247      ( 31 Oct 2017 07:46 )             24.0     Na(139)/K(4.6)/Cl(99)/HCO3(27)/BUN(39)/Cr(1.39)Glu(168)/Ca(8.4)/Mg(2.0)/PO4(3.0)    10-31 @ 07:46  Na(137)/K(4.5)/Cl(99)/HCO3(31)/BUN(42)/Cr(1.38)Glu(105)/Ca(8.6)/Mg(--)/PO4(--)    10-30 @ 07:09  Na(140)/K(4.5)/Cl(100)/HCO3(27)/BUN(44)/Cr(1.54)Glu(92)/Ca(8.9)/Mg(--)/PO4(--)    10-29 @ 09:05    IMAGING:    < from: Transthoracic Echocardiogram (10.30.17 @ 13:44) >  Conclusions:  1. Mitral annular calcification and calcified mitral  leaflets with decreased diastolic opening. Mild-moderate  mitral regurgitation. Mean transmitral valve gradient  equals 5 mm Hg, consistent with mild -moderate mitral  stenosis.  2. Transcatheter aortic valve replacement is not well  visualized. Peak transaortic valve gradient equals 16 mm  Hg, mean transaortic valve gradient equals 7 mm Hg, which  is probably normal in the presence of a transcatheter  aortic valvereplacement. Mild paravalvular aortic  regurgitation.  3. Grossly mild global left ventricular systolic function.  Patient in atrial fibrillation; LV ejection fraction varies  with R-R interval. Septal motion consistent with conduction  defect.  4. Right ventricular enlargement with decreased right  ventricular systolic function.  5. Normal tricuspid valve. Moderate tricuspid  regurgitation.      IMPRESSION: 87F w/ past breast and uterine CA, HTN, AFib, and AS-TAVR, 10/16/17 a/w hypercapnic respiratory failure/AMS, as well as NANCY     (1)Renal - CKD 2-3; resolved superimposed mild ischemic ATN.     (2)CV - chronically edematous/hypervolemic; difficult to optimize volume status as overdiuresis could lead to (and has recent led to) NANCY     (3) - left complex renal cyst - indicated for imaging f/u 3-6 months.       RECOMMEND:  (1)Lasix 40mg po qd as ordered  (2)Repeat renal US 3-6 months  (3)D/C planning per primary team; could f/u at my office in 2-4 weeks              Quinten Parker MD  [facility]  [phone] No pain, no shortness of breath      VITAL:  T(C): , Max: 36.9 (10-30-17 @ 19:55)  T(F): , Max: 98.4 (10-30-17 @ 19:55)  HR: 63 (10-31-17 @ 09:15)  BP: 132/71 (10-31-17 @ 04:45)  BP(mean): --  RR: 18 (10-31-17 @ 04:45)  SpO2: 99% (10-31-17 @ 09:15)      PHYSICAL EXAM:  Constitutional: NAD, frail  HEENT: NCAT, DMM  Neck: Supple, No JVD  Respiratory: distant BS b/l  Cardiovascular: RRR s1s2, no m/r/g  Gastrointestinal: BS+, soft, NT/ND  Extremities: 2+ b/l LE edema  Neurological: no focal deficits; strength grossly intact  Psychiatric: Normal mood, normal affect  Back: no CVAT b/l  Skin: L upper chest PPM incision site clean-appearing      LABS:                        7.4    8.39  )-----------( 247      ( 31 Oct 2017 07:46 )             24.0     Na(139)/K(4.6)/Cl(99)/HCO3(27)/BUN(39)/Cr(1.39)Glu(168)/Ca(8.4)/Mg(2.0)/PO4(3.0)    10-31 @ 07:46  Na(137)/K(4.5)/Cl(99)/HCO3(31)/BUN(42)/Cr(1.38)Glu(105)/Ca(8.6)/Mg(--)/PO4(--)    10-30 @ 07:09  Na(140)/K(4.5)/Cl(100)/HCO3(27)/BUN(44)/Cr(1.54)Glu(92)/Ca(8.9)/Mg(--)/PO4(--)    10-29 @ 09:05    IMAGING:    < from: Transthoracic Echocardiogram (10.30.17 @ 13:44) >  Conclusions:  1. Mitral annular calcification and calcified mitral  leaflets with decreased diastolic opening. Mild-moderate  mitral regurgitation. Mean transmitral valve gradient  equals 5 mm Hg, consistent with mild -moderate mitral  stenosis.  2. Transcatheter aortic valve replacement is not well  visualized. Peak transaortic valve gradient equals 16 mm  Hg, mean transaortic valve gradient equals 7 mm Hg, which  is probably normal in the presence of a transcatheter  aortic valvereplacement. Mild paravalvular aortic  regurgitation.  3. Grossly mild global left ventricular systolic function.  Patient in atrial fibrillation; LV ejection fraction varies  with R-R interval. Septal motion consistent with conduction  defect.  4. Right ventricular enlargement with decreased right  ventricular systolic function.  5. Normal tricuspid valve. Moderate tricuspid  regurgitation.      IMPRESSION: 87F w/ past breast and uterine CA, HTN, AFib, and AS-TAVR, 10/16/17 a/w hypercapnic respiratory failure/AMS, as well as NANCY     (1)Renal - CKD 2-3; resolved superimposed mild ischemic ATN.     (2)CV - chronically edematous/hypervolemic; difficult to optimize volume status as overdiuresis could lead to (and has recent led to) NANCY     (3)EP - s/p PPM placement yesterday    (4) - left complex renal cyst - indicated for imaging f/u 3-6 months.       RECOMMEND:  (1)Lasix 40mg po qd as ordered  (2)Repeat renal US 3-6 months  (3)D/C planning per primary team; could f/u at my office in 2-4 weeks              Quinten Parker MD  [facility]  [phone]

## 2017-10-31 NOTE — PROGRESS NOTE ADULT - SUBJECTIVE AND OBJECTIVE BOX
[facility] - [facility]     PROGRESS NOTE    CHIEF COMPLAINT: ARF; COPD/emphysema; chronic CHF; CKD/NANCY    INTERVAL HISTORY: sitting in chair; s/p placement of pacemaker for tachy-everett syndrome - remains in atrial fibrillation with occasional PVCs; awake and alert after a night on BIPAP now with resolved respiratory acidosis; no cough, sputum production, chest congestion or wheeze; no fevers, chills or sweats; no chest pain/pressure or palpitations; s/p antibiotics for UTI; renal function improved/stable with resolved hypernatremia; glucose well controlled; leg swelling persists    REVIEW OF SYSTEMS:  Constitutional: As per interval history  HEENT: Within normal limits  CV: As per interval history  Resp: As per interval history  GI: Within normal limits   : Within normal limits  Musculoskeletal: Within normal limits  Skin: Within normal limits  Neurological: Less confused  Psychiatric: Within normal limits  Endocrine: Within normal limits  Hematologic/Lymphatic: Within normal limits  Allergic/Immunologic: Within normal limits      MEDICATIONS:     Pulmonary "  ALBUTerol/ipratropium for Nebulization 3 milliLiter(s) Nebulizer every 6 hours  buDESOnide   0.5 milliGRAM(s) Respule 0.5 milliGRAM(s) Inhalation every 12 hours      Anti-microbials:      Cardiovascular:  diltiazem    milliGRAM(s) Oral daily  furosemide    Tablet 40 milliGRAM(s) Oral daily  furosemide   Injectable 40 milliGRAM(s) IV Push once  metoprolol     tartrate 25 milliGRAM(s) Oral two times a day      Other:  ascorbic acid 500 milliGRAM(s) Oral daily  atorvastatin 10 milliGRAM(s) Oral at bedtime  clopidogrel Tablet 75 milliGRAM(s) Oral daily  docusate sodium 100 milliGRAM(s) Oral three times a day  ferrous    sulfate 325 milliGRAM(s) Oral daily  influenza   Vaccine 0.5 milliLiter(s) IntraMuscular once  Nephrocaps 1 Capsule(s) Oral daily  polyethylene glycol 3350 17 Gram(s) Oral daily  QUEtiapine 12.5 milliGRAM(s) Oral <User Schedule>  senna 2 Tablet(s) Oral at bedtime        OBJECTIVE:      I&O's Detail    30 Oct 2017 07:01  -  31 Oct 2017 07:00  --------------------------------------------------------  IN:    Oral Fluid: 480 mL    Solution: 250 mL  Total IN: 730 mL    OUT:    Voided: 900 mL  Total OUT: 900 mL    Total NET: -170 mL      31 Oct 2017 07:01  -  31 Oct 2017 15:59  --------------------------------------------------------  IN:    Oral Fluid: 1080 mL  Total IN: 1080 mL    OUT:    Voided: 550 mL  Total OUT: 550 mL    Total NET: 530 mL    Daily Weight in k.6 (31 Oct 2017 07:22)      PHYSICAL EXAM:       ICU Vital Signs Last 24 Hrs  T(C): 36.4 (31 Oct 2017 14:02), Max: 36.9 (30 Oct 2017 19:55)  T(F): 97.5 (31 Oct 2017 14:02), Max: 98.4 (30 Oct 2017 19:55)  HR: 78 (31 Oct 2017 14:03) (57 - 106)  BP: 135/75 (31 Oct 2017 14:03) (114/80 - 159/84)  BP(mean): --  ABP: --  ABP(mean): --  RR: 18 (31 Oct 2017 14:03) (18 - 18)  SpO2: 99% (31 Oct 2017 14:03) (98% - 100%) on 2lpm     General: Awake and alert. Cooperative. No distress. Appears stated age 	  HEENT:   Atraumatic. Normocephalic. Anicteric. Normal oral mucosa, PERRL, EOMI. Left eye subconjunctival hemorrhage resolving  Neck: Supple. Trachea midline. Thyroid without enlargement/tenderness/nodules. No carotid bruit. No JVD	  Cardiovascular: Irregularly irregular rate and rhythm. S1 S2 normal. II/VI systolic murmur  Respiratory: Respirations unlabored. Decreased breath sounds at bases with dullness ~ 1/4 up. Kyphoscoliosis  Abdomen: Soft. Non-tender. Non-distended. No organomegaly. No masses. Normal bowel sounds	  Extremities: Warm to touch. No clubbing or cyanosis. Moderate lower extremity edema  Pulses: 2+ peripheral pulses all extremities	  Skin: Left upper chest wall pacemaker generator surgical incision C/D/I  Lymph Nodes: Cervical, supraclavicular and axillary nodes normal  Neurological: Awake and alert. Moving all extremities. A and O x 3  Psychiatry: Calm      LABS:                        7.5    7.0   )-----------( 179      ( 31 Oct 2017 12:10 )             23.9                         7.4    8.39  )-----------( 247      ( 31 Oct 2017 07:46 )             24.0     10-    139  |  99  |  39<H>  ----------------------------<  168<H>  4.6   |  27  |  1.39<H>    10-30    137  |  99  |  42<H>  ----------------------------<  105<H>  4.5   |  31  |  1.38<H>    Ca      8.4      10-    Ca      8.6      10-30    Phos    3.0     10-31    Phos    3.5     10      Mg       2.0     10    Mg       1.7     10      PT/INR - ( 31 Oct 2017 07:46 )   PT: 16.1 sec;   INR: 1.41 ratio       PTT - ( 30 Oct 2017 12:28 )  PTT:31.0 sec    ABG - ( 31 Oct 2017 12:02 )  pH: 7.39  /  pCO2: 55    /  pO2: 89    / HCO3: 32    / Base Excess: 6.8   /  SaO2: 97        ABG - ( 25 Oct 2017 06:51 )  pH: 7.34  /  pCO2: 50    /  pO2: 91    / HCO3: 26    / Base Excess: .8    /  SaO2: 98        ABG - ( 23 Oct 2017 17:58 )  pH: 7.34  /  pCO2: 49    /  pO2: 106   / HCO3: 26    / Base Excess: .5    /  SaO2: 99        ABG - ( 20 Oct 2017 06:32 )  pH: 7.28  /  pCO2: 59    /  pO2: 198   / HCO3: 27    / Base Excess: .4    /  SaO2: 100       ABG - ( 19 Oct 2017 07:20 )  pH: 7.25  /  pCO2: 60    /  pO2: 151   / HCO3: 26    / Base Excess: -1.4  /  SaO2: 99        ABG - ( 18 Oct 2017 14:35 )  pH: 7.30  /  pCO2: 58    /  pO2: 143   / HCO3: 28    / Base Excess: 1.3   /  SaO2: 100       < from: Transthoracic Echocardiogram (10.30.17 @ 13:44) >    Patient name: OSCAR LOPEZ  YOB: 1929   Age: 87 (F)   MR#: [id number]  Study Date: 10/30/2017  [facility]  Study quality: Technically fair  Referring Physician: Ronda Hilliard MD  Blood Pressure: 111/62 mmHg  Height: 160 cm  Weight: 63 kg  BSA: 1.7 m2  ------------------------------------------------------------------------  PROCEDURE: Transthoracic echocardiogram with 2-D, M-Mode  and complete spectral and color flow Doppler.  INDICATION: Unspecified atrial fibrillation (I48.91),  Shortness of breath (R06.02)  ------------------------------------------------------------------------  Dimensions:    Normal Values:  LA:     6.4    2.0 - 4.0 cm  Ao:     3.6    2.0 - 3.8 cm  SEPTUM: 1.4    0.6 - 1.2 cm  PWT:    1.2    0.6 - 1.1 cm  LVIDd:  4.0    3.0 - 5.6 cm  LVIDs:  2.7    1.8 - 4.0 cm  Derived variables:  LVMI: 112 g/m2  RWT: 0.60  Fractional short: 33 %  EF (Visual Estimate): 50 %  Doppler Peak Velocity (m/sec): AoV=2.0  ------------------------------------------------------------------------  Observations:  Mitral Valve: Mitral annular calcification and calcified  mitral leaflets with decreased diastolic opening.  Mild-moderate mitral regurgitation. Mean transmitral valve  gradient equals 5 mm Hg, consistent with mild -moderate  mitral stenosis.  Aortic Valve/Aorta: Transcatheter aortic valve replacement  is not well visualized. Peak transaortic valve gradient  equals 16 mm Hg, mean transaortic valve gradient equals 7  mm Hg, which is probably normal in the presence of a  transcatheter aortic valve replacement. Mild paravalvular  aortic regurgitation.  Peak left ventricular outflow tract  gradient equals 3 mm Hg, mean gradient is equal to 2 mm Hg,  LVOT velocity time integral equals 18 cm.  Aortic Root: 3.6 cm.  Left Atrium: Severely dilated left atrium.  LA volume index  = 70 cc/m2.  Left Ventricle: Grossly mild global left ventricular  systolic function. Patient in atrial fibrillation; LV  ejection fraction varies with R-R interval. Septal motion  consistent with conduction defect. Moderate concentric left  ventricular hypertrophy.  Right Heart: Severe right atrial enlargement. Right  ventricular enlargement with decreased right ventricular  systolic function. Normal tricuspid valve. Moderate  tricuspid regurgitation. Pulmonic valve not well  visualized, probably normal. Minimal pulmonic  regurgitation.  Pericardium/Pleura: Normal pericardium with no pericardial  effusion.  Hemodynamic: Estimated right atrial pressure is 8 mm Hg.  Estimated right ventricular systolic pressure equals 53 mm  Hg, assuming right atrial pressure equals 8 mm Hg,  consistent with moderate pulmonary hypertension.  ------------------------------------------------------------------------  Conclusions:  1. Mitral annular calcification and calcified mitral  leaflets with decreased diastolic opening. Mild-moderate  mitral regurgitation. Mean transmitral valve gradient  equals 5 mm Hg, consistent with mild -moderate mitral  stenosis.  2. Transcatheter aortic valve replacement is not well  visualized. Peak transaortic valve gradient equals 16 mm  Hg, mean transaortic valve gradient equals 7 mm Hg, which  is probably normal in the presence of a transcatheter  aortic valve replacement. Mild paravalvular aortic  regurgitation.  3. Grossly mild global left ventricular systolic function.  Patient in atrial fibrillation; LV ejection fraction varies  with R-R interval. Septal motion consistent with conduction  defect.  4. Right ventricular enlargement with decreased right  ventricular systolic function.  5. Normal tricuspid valve. Moderate tricuspid  regurgitation.  ------------------------------------------------------------------------  Confirmed on  10/30/2017 - 15:48:14 by Tony Zamora M.D.  ------------------------------------------------------------------------    < end of copied text >  ---------------------------------------------------------------------------------------------------------  MICROBIOLOGY:     Color: Yellow / Appearance: SL Turbid / S.012 / pH: x  Gluc: x / Ketone: Negative  / Bili: Negative / Urobili: Negative   Blood: x / Protein: 30 mg/dL / Nitrite: Negative   Leuk Esterase: Large / RBC: 0-2 /HPF / WBC 26-50 /HPF   Sq Epi: x / Non Sq Epi: OCC /HPF / Bacteria: Few /HPF    Culture - Urine (10.18.17 @ 00:32)    Specimen Source: .Urine Catheterized    Culture Results:   >100,000 CFU/ml Klebsiella pneumoniae    Culture - Blood (10.17.17 @ 15:38)    Specimen Source: .Blood Blood-Peripheral    Culture Results:   No growth to date.      RADIOLOGY:  [x] Chest radiographs reviewed and interpreted by me    < from: Xray Chest 1 View AP- PORTABLE-Urgent (10.30.17 @ 20:16) >    EXAM:  CHEST-PORTABLE URGENT                          PROCEDURE DATE:  10/30/2017      INTERPRETATION:  CLINICAL INDICATION: Status post pacemaker placement    TECHNIQUE: Frontal view of the chest dated 10/30/2017    COMPARISON: X-Ray of the chest dated 10/27/2017    FINDINGS:  Right pleural effusion, unchanged from prior study. No pneumothorax   noted. Cardiomegaly. Left-sided cardiac device. Status post TAVR.   Degenerative changes of the thoracic spine.    IMPRESSION:  Right pleural effusion, unchanged from prior study.      SYED BUTLER M.D., RADIOLOGY RESIDENT  This document has been electronically signed.  CORRINE ESCOBAR M.D., ATTENDING RADIOLOGIST  This document has been electronically signed. Oct 31 2017  2:00PM     < end of copied text >  ---------------------------------------------------------------------------------------------------------  < from: CT Chest No Cont (10.24.17 @ 12:18) >    EXAM:  CT CHEST                          PROCEDURE DATE:  10/24/2017      INTERPRETATION:  CLINICAL INFORMATION: Acute renal failure due to   congestive heart failure. Chest congestion. Wheezing.    COMPARISON: CT chest from 10/17/2017.    PROCEDURE:   CT of the Chest was performed without intravenous contrast.  Sagittal and coronal reformats were performed.      FINDINGS:    CHEST:     LUNGS AND LARGE AIRWAYS: Patent central airways. Bilateral interlobular   septal thickening and bibasilar compressive atelectasis. Slight increased   aeration of the right lower lobe. 2 mm right upper lobe pulmonary nodule,   unchanged since .    PLEURA: Multiple loculated small to moderate right pleural effusion;   loculated pleural effusion along the fissure. Small left pleural effusion.    VESSELS: Atherosclerosis of thoracic aorta and coronary arteries.     HEART: Heart size is enlarged. Left atrial wall calcification. Mitral   valve annular calcification. Status post transcatheter aorticvalve   repair. No pericardial effusion.    MEDIASTINUM AND CANDI: No lymphadenopathy.    CHEST WALL AND LOWER NECK: Surgical clips on the right chest wall/axilla.   Extensive subcutaneous edema.    VISUALIZED UPPER ABDOMEN: Multiple bilateral renal cysts; interval growth   of left renal cysts compared to CT abdomen pelvis from 2012.   Descending colon diverticulosis.    BONES: Degenerative changes of spine.      IMPRESSION:     Multiloculated small to moderate right pleural effusions with right   basilar compressive atelectasis, with slight increased aeration of the   right lower lobe.  Left small pleural effusion with compressive atelectasis, grossly   unchanged.  Interlobular septal thickening consistent with pulmonary edema.  Cardiomegaly.      MIHIR SIBLEY M.D., RADIOLOGY RESIDENT  This document has been electronically signed.  BLANK WONG M.D. ATTENDING RADIOLOGIST  This document has been electronically signed. Oct 24 2017  3:23PM      < end of copied text >  ---------------------------------------------------------------------------------------------------------  < from: US Renal (10..17 @ 14:28) >    EXAM:  US KIDNEY(S)                          PROCEDURE DATE:  10/20/2017      INTERPRETATION:  CLINICAL INFORMATION: NANCY    COMPARISON: CT abdomen 2012    TECHNIQUE: Sonography of the kidneys and bladder.     FINDINGS:    Right kidney: 9.2 cm. echogenic cortex. No renal mass, hydronephrosis or   calculi. Multiple simple cysts the largest is at the upper pole and   measures 2.7 x 2.9 x 2.3 cm.    Left kidney:  10.1 cm. the genic cortex. No renal mass, hydronephrosis or   calculi. Multiple cysts. The largest at the upper pole measures 2.0 x 2.2   x 2.4 cm. This has some internal complexity not well evaluated. Recommend   follow-up in3- 6 months to assess for change.    Urinary bladder: Collapsed around a Gautam catheter limiting evaluation.    IMPRESSION:     Increased renal cortical echogenicity bilaterally, which can be seen in   medical renal disease.    Left slightly complex renal cyst not well evaluated on this exam.   Consider follow-up exam in 3-6 months to assess for change.    MADHU FAULKNER M.D., ATTENDING RADIOLOGIST  This document has been electronically signed. Oct 20 2017  2:01PM     < end of copied text >  ---------------------------------------------------------------------------------------------------------  < from: CT Chest No Cont (10.17.17 @ 21:38) >    EXAM:  CT CHEST                          PROCEDURE DATE:  10/17/2017      INTERPRETATION:  Clinical information: Evaluate for pneumonia. Exam is   compared to previous study of 4/3/2012.    CT scan of the chest was obtained without administration of intravenous   contrast.    Surgical clips are noted in the right axilla.    No hilar and/or mediastinal adenopathy is noted.     Heart is markedly enlarged in size. Calcification the coronary arteries   is noted. Patient is status post TAVR.No pericardial effusion is noted.   Main pulmonary artery is dilated and measures 3.6 cm.     No endobronchial lesions are noted. Compressive atelectasis is noted   involving portions of both lower lobes. This is secondary to small   bilateral pleural effusions, right more than left.    Below the diaphragm, visualized portions of the abdomen demonstrate   low-attenuation within both kidneys which are too small to be adequately   characterized on this exam.     Degenerative changes of the spine are noted. Subcutaneous edema is noted.    Impression: There is no pneumonia.    Bilateral pleural effusions, right more than left.    MAYI MORALES M.D., ATTENDING RADIOLOGIST  This document has been electronically signed. Oct 18 2017  9:24AM      < end of copied text >  ---------------------------------------------------------------------------------------------------------  < from: CT Head No Cont (10.17.17 @ 21:38) >    EXAM:  CT BRAIN                          PROCEDURE DATE:  10/17/2017      INTERPRETATION:  HISTORY: Uterine cancer. Altered mental status.    COMPARISON: MRI brain performed 2012.    IMPRESSION:     No acute intracranial bleeding, mass effect, or evidence of an acute   territorial infarct.    ADRIEN MORRIS M.D., RADIOLOGY RESIDENT  This document has been electronically signed.  VINI METZGER M.D., ATTENDING RADIOLOGIST  This document has been electronically signed. Oct 18 2017  9:43AM      < end of copied text >  ---------------------------------------------------------------------------------------------------------

## 2017-10-31 NOTE — PROGRESS NOTE ADULT - SUBJECTIVE AND OBJECTIVE BOX
Patient is a 87y old  Female who presents with a chief complaint of "I have uterine cancer" (16 Oct 2017 12:15)      SUBJECTIVE / OVERNIGHT EVENTS: no new events. awaiting 1U PRBC, INR subtherapeutic. POD 1,  s/p PPM    MEDICATIONS  (STANDING):  ALBUTerol/ipratropium for Nebulization 3 milliLiter(s) Nebulizer every 6 hours  ascorbic acid 500 milliGRAM(s) Oral daily  atorvastatin 10 milliGRAM(s) Oral at bedtime  buDESOnide   0.5 milliGRAM(s) Respule 0.5 milliGRAM(s) Inhalation every 12 hours  clopidogrel Tablet 75 milliGRAM(s) Oral daily  diltiazem    milliGRAM(s) Oral daily  docusate sodium 100 milliGRAM(s) Oral three times a day  ferrous    sulfate 325 milliGRAM(s) Oral daily  furosemide    Tablet 40 milliGRAM(s) Oral daily  furosemide   Injectable 40 milliGRAM(s) IV Push once  influenza   Vaccine 0.5 milliLiter(s) IntraMuscular once  metoprolol     tartrate 25 milliGRAM(s) Oral two times a day  Nephrocaps 1 Capsule(s) Oral daily  polyethylene glycol 3350 17 Gram(s) Oral daily  QUEtiapine 12.5 milliGRAM(s) Oral <User Schedule>  senna 2 Tablet(s) Oral at bedtime    MEDICATIONS  (PRN):      Vital Signs Last 24 Hrs  T(F): 97.5 (10-31-17 @ 14:02), Max: 98.4 (10-30-17 @ 19:55)  HR: 78 (10-31-17 @ 14:03) (57 - 106)  BP: 135/75 (10-31-17 @ 14:03) (114/80 - 159/84)  RR: 18 (10-31-17 @ 14:03) (18 - 18)  SpO2: 99% (10-31-17 @ 14:03) (98% - 100%)  Telemetry:   CAPILLARY BLOOD GLUCOSE        I&O's Summary    30 Oct 2017 07:01  -  31 Oct 2017 07:00  --------------------------------------------------------  IN: 730 mL / OUT: 900 mL / NET: -170 mL    31 Oct 2017 07:01  -  31 Oct 2017 16:51  --------------------------------------------------------  IN: 1080 mL / OUT: 750 mL / NET: 330 mL        PHYSICAL EXAM:  GENERAL: NAD, well-developed  HEAD:  Atraumatic, Normocephalic  EYES: EOMI, PERRLA, conjunctiva and sclera clear  NECK: Supple, No JVD  CHEST/LUNG: Clear to auscultation bilaterally; No wheeze  HEART: Regular rate and rhythm; No murmurs, rubs, or gallops  ABDOMEN: Soft, Nontender, Nondistended; Bowel sounds present  EXTREMITIES:  2+ Peripheral Pulses, No clubbing, cyanosis, or edema  PSYCH: AAOx3  NEUROLOGY: non-focal  SKIN: No rashes or lesions    LABS:                        7.5    7.0   )-----------( 179      ( 31 Oct 2017 12:10 )             23.9     10-31    139  |  99  |  39<H>  ----------------------------<  168<H>  4.6   |  27  |  1.39<H>    Ca    8.4      31 Oct 2017 07:46  Phos  3.0     10-31  Mg     2.0     10-31      PT/INR - ( 31 Oct 2017 07:46 )   PT: 16.1 sec;   INR: 1.41 ratio         PTT - ( 30 Oct 2017 12:28 )  PTT:31.0 sec          RADIOLOGY & ADDITIONAL TESTS:    Imaging Personally Reviewed:    Consultant(s) Notes Reviewed:      Care Discussed with Consultants/Other Providers:

## 2017-10-31 NOTE — PROGRESS NOTE ADULT - SUBJECTIVE AND OBJECTIVE BOX
INTERVAL HPI/OVERNIGHT EVENTS:  Sitting up in bed, about to eat lunch    MEDICATIONS  (STANDING):  ALBUTerol/ipratropium for Nebulization 3 milliLiter(s) Nebulizer every 6 hours  ascorbic acid 500 milliGRAM(s) Oral daily  atorvastatin 10 milliGRAM(s) Oral at bedtime  buDESOnide   0.5 milliGRAM(s) Respule 0.5 milliGRAM(s) Inhalation every 12 hours  clopidogrel Tablet 75 milliGRAM(s) Oral daily  diltiazem    milliGRAM(s) Oral daily  docusate sodium 100 milliGRAM(s) Oral three times a day  ferrous    sulfate 325 milliGRAM(s) Oral daily  furosemide    Tablet 40 milliGRAM(s) Oral daily  influenza   Vaccine 0.5 milliLiter(s) IntraMuscular once  metoprolol     tartrate 25 milliGRAM(s) Oral two times a day  Nephrocaps 1 Capsule(s) Oral daily  polyethylene glycol 3350 17 Gram(s) Oral daily  QUEtiapine 12.5 milliGRAM(s) Oral <User Schedule>  senna 2 Tablet(s) Oral at bedtime    MEDICATIONS  (PRN):      Allergies:  No Known Allergies    Intolerances:  digoxin (Rash; Drowsiness)    ROS:  General: Pt denies any complaints  Neurological: denies lightheadedness or dizziness  HEENT: denies visual changes  Cardiovascular: denies chest pain, palpitations  Respiratory and Thorax: denies SOB  Gastrointestinal: denies abdominal pain, nausea, vomiting  Genitourinary: denies dysuria  Musculoskeletal: denies joint pain   Skin: denies rashes/sores      Vital Signs Last 24 Hrs  T(C): 36.8 (31 Oct 2017 04:45), Max: 36.9 (30 Oct 2017 19:55)  T(F): 98.2 (31 Oct 2017 04:45), Max: 98.4 (30 Oct 2017 19:55)  HR: 63 (31 Oct 2017 09:15) (60 - 106)  BP: 132/71 (31 Oct 2017 04:45) (111/62 - 159/84)  BP(mean): --  RR: 18 (31 Oct 2017 04:45) (18 - 18)  SpO2: 99% (31 Oct 2017 09:15) (98% - 100%)  Physical Exam:  Vital Signs : /71   HR63   RR18    Constitutional:  no acute distress  Neurological: Alert & Oriented x 3, [place], no focal deficits  HEENT:  PERRLA, Neck supple.  Respiratory: CTA B/L  Cardiovascular: (+) S1 & S2, irregular  Gastrointestinal: soft, NT, nondistended, (+) BS  Genitourinary: non distended bladder, voiding freely  Extremities: No pedal edema, No clubbing, No cyanosis  Skin:  no skin lesions. Left infraclavicular pacemaker wound site flat, no hematoma or bleeding      LABS:                        7.5    7.0   )-----------( 179      ( 31 Oct 2017 12:10 )             23.9     10-31    139  |  99  |  39<H>  ----------------------------<  168<H>  4.6   |  27  |  1.39<H>    Ca    8.4      31 Oct 2017 07:46  Phos  3.0     10-31  Mg     2.0     10-31      PT/INR - ( 31 Oct 2017 07:46 )   PT: 16.1 sec;   INR: 1.41 ratio         PTT - ( 30 Oct 2017 12:28 )  PTT:31.0 sec      RADIOLOGY & ADDITIONAL TESTS:    TELE: Atrial Fibrillation, occ Ventricular pacing, PVC, 's    EKG: Atrial Fibrillation at 88 bpm    CXR: no pneumothorax, good lead placement

## 2017-10-31 NOTE — CHART NOTE - NSCHARTNOTEFT_GEN_A_CORE
88 Y/O female with PMH of right breast CA S/P lumpectomy in 2000, Uterine Ca S/P total hysterectomy in 2012, Skin CA S/P excision of bilateral lower eyelid skin CA, cholecystectomy, Osteoarthritis of bilateral knees, HTN, hyperlipidemia, paroxysmal Afib on coumadin, severe aortic stenosis S/P TAVR,  S/P PPM 10/30/17.    Source: Patient [X]    Family [ ]     other [X]: RN, Medical Chart; no family present at bedside    Diet : DASH/TLC      Patient reports [ ] nausea  [ ] vomiting [ ] diarrhea [ ] constipation  [ ]chewing problems [ ] swallowing issues  [ ] other: Pt reports no GI distress at this time. Last BM 10/30 per pt.     PO intake:  < 50% [ ] 50-75% [ ]   % [ ]  other : Pt with 50% po intake of meals due to dislike of food. Pt with no food preferences. Pt willing to try vanilla health shakes to increase po intake.     Source for PO intake [X] Patient [ ] family [ ] chart [ ] staff [ ] other     Enteral /Parenteral Nutrition: N/A      Current Weight: Weight (kg): (10/16) 57.9kg, (10/31) 63.6kg; wt gain likely due to fluid shifts throughout admission. Note pt with 2+ edema jillian. ankles.   % Weight Change    Pertinent Medications: MEDICATIONS  (STANDING):  ALBUTerol/ipratropium for Nebulization 3 milliLiter(s) Nebulizer every 6 hours  ascorbic acid 500 milliGRAM(s) Oral daily  atorvastatin 10 milliGRAM(s) Oral at bedtime  buDESOnide   0.5 milliGRAM(s) Respule 0.5 milliGRAM(s) Inhalation every 12 hours  clopidogrel Tablet 75 milliGRAM(s) Oral daily  docusate sodium 100 milliGRAM(s) Oral three times a day  ferrous    sulfate 325 milliGRAM(s) Oral daily  furosemide    Tablet 40 milliGRAM(s) Oral daily  influenza   Vaccine 0.5 milliLiter(s) IntraMuscular once  metoprolol     tartrate 25 milliGRAM(s) Oral two times a day  Nephrocaps 1 Capsule(s) Oral daily  polyethylene glycol 3350 17 Gram(s) Oral daily  QUEtiapine 12.5 milliGRAM(s) Oral <User Schedule>  senna 2 Tablet(s) Oral at bedtime    MEDICATIONS  (PRN):    Pertinent Labs:  10-31 Na139 mmol/L Glu 168 mg/dL<H> K+ 4.6 mmol/L Cr  1.39 mg/dL<H> BUN 39 mg/dL<H> Phos 3.0 mg/dL Alb n/a   PAB n/a         Skin: no pressure injuries    Estimated Needs:   [X] no change since previous assessment  [ ] recalculated:       Previous Nutrition Diagnosis: [X] no active nutrition diagnosis at this time    [ ] Inadequate Energy Intake [ ]Inadequate Oral Intake [ ] Excessive Energy Intake     [ ] Underweight [ ] Increased Nutrient Needs [ ] Overweight/Obesity     [ ] Altered GI Function [ ] Unintended Weight Loss [ ] Food & Nutrition Related Knowledge Deficit [ ] Malnutrition          Nutrition Diagnosis is [ ] ongoing  [ ] resolved [X] not applicable          New Nutrition Diagnosis: [ ] not applicable    [ ] Inadequate Protein Energy Intake [ ]Inadequate Oral Intake [ ] Excessive Energy Intake     [ ] Underweight [ ] Increased Nutrient Needs [ ] Overweight/Obesity     [ ] Altered GI Function [ ] Unintended Weight Loss [ X Food & Nutrition Related Knowledge Deficit[ ] Limited Adherence to nutrition related recommendations [ ] Malnutrition  [ ] other: Free text       Related to: limited prior exposure to nutrition education     As evidenced by: pt on coumadin and unable to recall nutrition education points of drug-nutrient interaction      Interventions: Provided pt with coumadin booklet. Discussed drug-nutrient interaction of vitamin K and coumadin, the importance of consuming a consistent amount of vitamin K throughout the day, foods low in vitamin K, foods high in vitamin K. Pt verbalized understanding of nutrition education. RD remains available to education review/reinforcement.     Recommend: Continue DASH/TLC diet as medically feasible. RD to provide 2 vanilla health shakes.    [ ] Change Diet To:    [ ] Nutrition Supplement    [ ] Nutrition Support    [ ] Other:        Monitoring and Evaluation:     [X] PO intake [X] Tolerance to diet prescription [X] weights [X] follow up per protocol    [X] other: Belén Verma, MS, RDN, CDN Pager # [phone]

## 2017-10-31 NOTE — PROGRESS NOTE ADULT - SUBJECTIVE AND OBJECTIVE BOX
INTERVAL HPI/OVERNIGHT EVENTS: doing well, no bleeding or GI complaints    MEDICATIONS  (STANDING):  ALBUTerol/ipratropium for Nebulization 3 milliLiter(s) Nebulizer every 6 hours  ascorbic acid 500 milliGRAM(s) Oral daily  atorvastatin 10 milliGRAM(s) Oral at bedtime  buDESOnide   0.5 milliGRAM(s) Respule 0.5 milliGRAM(s) Inhalation every 12 hours  clopidogrel Tablet 75 milliGRAM(s) Oral daily  docusate sodium 100 milliGRAM(s) Oral three times a day  ferrous    sulfate 325 milliGRAM(s) Oral daily  furosemide    Tablet 40 milliGRAM(s) Oral daily  influenza   Vaccine 0.5 milliLiter(s) IntraMuscular once  metoprolol     tartrate 25 milliGRAM(s) Oral two times a day  Nephrocaps 1 Capsule(s) Oral daily  polyethylene glycol 3350 17 Gram(s) Oral daily  QUEtiapine 12.5 milliGRAM(s) Oral <User Schedule>  senna 2 Tablet(s) Oral at bedtime    MEDICATIONS  (PRN):      Allergies    No Known Allergies    Intolerances    digoxin (Rash; Drowsiness)          PHYSICAL EXAM:   Vital Signs:  Vital Signs Last 24 Hrs  T(C): 36.8 (31 Oct 2017 04:45), Max: 36.9 (30 Oct 2017 19:55)  T(F): 98.2 (31 Oct 2017 04:45), Max: 98.4 (30 Oct 2017 19:55)  HR: 71 (31 Oct 2017 04:45) (60 - 106)  BP: 132/71 (31 Oct 2017 04:45) (111/62 - 159/84)  BP(mean): --  RR: 18 (31 Oct 2017 04:45) (18 - 18)  SpO2: 100% (31 Oct 2017 04:45) (98% - 100%)  Daily     Daily Weight in k.6 (31 Oct 2017 07:22)    GENERAL:  no distress  HEENT:  NC/AT,  anicteric  CHEST:   no increased effort, breath sounds clear  HEART:  Regular rhythm  ABDOMEN:  Soft, non-tender, non-distended, normoactive bowel sounds,  no masses ,no hepato-splenomegaly, no signs of chronic liver disease  EXTEREMITIES:  no cyanosis      LABS:                        8.1    6.4   )-----------( 236      ( 30 Oct 2017 12:28 )             26.5     10    137  |  99  |  42<H>  ----------------------------<  105<H>  4.5   |  31  |  1.38<H>    Ca    8.6      30 Oct 2017 07:09      PT/INR - ( 31 Oct 2017 07:46 )   PT: 16.1 sec;   INR: 1.41 ratio         PTT - ( 30 Oct 2017 12:28 )  PTT:31.0 sec      RADIOLOGY & ADDITIONAL TESTS:

## 2017-10-31 NOTE — PROGRESS NOTE ADULT - SUBJECTIVE AND OBJECTIVE BOX
OSCAR LOPEZ    Looks comfortable at rest.  s/p Pacer yesterday without complication.  Breathing stable.  AF 70s-140s overnight.      Allergies    No Known Allergies    Intolerances    digoxin (Rash; Drowsiness)    MEDICATIONS  (STANDING):  ALBUTerol/ipratropium for Nebulization 3 milliLiter(s) Nebulizer every 6 hours  ascorbic acid 500 milliGRAM(s) Oral daily  atorvastatin 10 milliGRAM(s) Oral at bedtime  buDESOnide   0.5 milliGRAM(s) Respule 0.5 milliGRAM(s) Inhalation every 12 hours  clopidogrel Tablet 75 milliGRAM(s) Oral daily  docusate sodium 100 milliGRAM(s) Oral three times a day  ferrous    sulfate 325 milliGRAM(s) Oral daily  furosemide    Tablet 40 milliGRAM(s) Oral daily  influenza   Vaccine 0.5 milliLiter(s) IntraMuscular once  metoprolol     tartrate 25 milliGRAM(s) Oral two times a day  Nephrocaps 1 Capsule(s) Oral daily  polyethylene glycol 3350 17 Gram(s) Oral daily  QUEtiapine 12.5 milliGRAM(s) Oral <User Schedule>  senna 2 Tablet(s) Oral at bedtime    PHYSICAL EXAM:  Vital Signs Last 24 Hrs  T(C): 36.8 (31 Oct 2017 04:45), Max: 36.9 (30 Oct 2017 19:55)  T(F): 98.2 (31 Oct 2017 04:45), Max: 98.4 (30 Oct 2017 19:55)  HR: 63 (31 Oct 2017 09:15) (60 - 106)  BP: 132/71 (31 Oct 2017 04:45) (111/62 - 159/84)  BP(mean): --  RR: 18 (31 Oct 2017 04:45) (18 - 18)  SpO2: 99% (31 Oct 2017 09:15) (98% - 100%)  Daily     Daily Weight in k.6 (31 Oct 2017 07:22)  I&O's Summary    30 Oct 2017 07:01  -  31 Oct 2017 07:00  --------------------------------------------------------  IN: 730 mL / OUT: 900 mL / NET: -170 mL    31 Oct 2017 07:01  -  31 Oct 2017 11:13  --------------------------------------------------------  IN: 720 mL / OUT: 0 mL / NET: 720 mL    General Appearance: 	 Alert, cooperative, no distress  Neck: JVP 10-12 cm  Lungs:  clear to auscultation and percussion bilaterally  Cor:  pmi 5th ICS MCL, Irregular rate and rhythm, S1 normal intensity, S2 normal intensity, GHAZALA, pacer site intact, no hematoma  Abdomen:	 soft, non-tender; bowel sounds normal; no masses,  no organomegaly  Extremities: 2+ bilateral edema    Labs:  CBC Full  -  ( 31 Oct 2017 07:46 )  WBC Count : 8.39 K/uL  Hemoglobin : 7.4 g/dL  Hematocrit : 24.0 %  Platelet Count - Automated : 247 K/uL  Mean Cell Volume : 92.0 fl  Mean Cell Hemoglobin : 28.4 pg  Mean Cell Hemoglobin Concentration : 30.8 gm/dL  Auto Neutrophil # : x  Auto Lymphocyte # : x  Auto Monocyte # : x  Auto Eosinophil # : x  Auto Basophil # : x  Auto Neutrophil % : x  Auto Lymphocyte % : x  Auto Monocyte % : x  Auto Eosinophil % : x  Auto Basophil % : x    Basic Metabolic Panel (10.31. @ 07:46)    Sodium, Serum: 139 mmol/L    Potassium, Serum: 4.6 mmol/L    Chloride, Serum: 99 mmol/L    Carbon Dioxide, Serum: 27 mmol/L    Anion Gap, Serum: 13 mmol/L    Blood Urea Nitrogen, Serum: 39 mg/dL    Creatinine, Serum: 1.39 mg/dL    Glucose, Serum: 168 mg/dL    Calcium, Total Serum: 8.4 mg/dL    eGFR if Non : 34: Interpretative comment  The units for eGFR are ml/min/1.73m2 (normalized body surface area). The  eGFR is calculated from a serum creatinine using the CKD-EPI equation.  Other variables required for calculation are race, age and sex. Among  patients with chronic kidney disease (CKD), the eGFR is useful in  determining the stage of disease according to KDOQI CKD classification.  All eGFR results are reported numerically with the following  interpretation.          GFR                    With                 Without     (ml/min/1.73 m2)    Kidney Damage       Kidney Damage        >= 90                    Stage 1                     Normal        60-89                    Stage 2                     Decreased GFR        30-59     Stage 3                     Stage 3        15-29                    Stage 4                     Stage 4        < 15                      Stage 5                     Stage 5  Each stage of CKD assumes that the associated GFR level has been in  effect for at least 3 months. Determination of stages one and two (with  eGFR > 59 ml/min/m2) requires estimation of kidney damage for at least 3  months as defined by structural or functional abnormalities.  Limitations: All estimates of GFR will be less accurate for patients at  extremes of muscle mass (including but not limited to frail elderly,  critically ill, or cancer patients), those with unusual diets, and those  with conditions associated with reduced secretion or extrarenal  elimination of creatinine. The eGFR equation is not recommended for use  in patients with unstable creatinine levels. mL/min/1.73M2    eGFR if African American: 39 mL/min/1.73M2        PT/INR - ( 31 Oct 2017 07:46 )   PT: 16.1 sec;   INR: 1.41 ratio         PTT - ( 30 Oct 2017 12:28 )  PTT:31.0 sec    Impression/Plan:  Acute renal failure, resolved  Improving respiratory acidosis  Chronic AF with tachy everett syndrome, restart cardizem  now that she has pacer  Discharge planning, will likely need rehab stay/PT followup    Leopoldo Funes MD, FACC  [facility]

## 2017-11-01 ENCOUNTER — TRANSCRIPTION ENCOUNTER (OUTPATIENT)
Age: 82
End: 2017-11-01

## 2017-11-01 VITALS
OXYGEN SATURATION: 98 % | HEART RATE: 114 BPM | DIASTOLIC BLOOD PRESSURE: 55 MMHG | TEMPERATURE: 98 F | SYSTOLIC BLOOD PRESSURE: 135 MMHG | RESPIRATION RATE: 18 BRPM

## 2017-11-01 LAB
ANION GAP SERPL CALC-SCNC: 19 MMOL/L — HIGH (ref 5–17)
APTT BLD: 106.8 SEC — HIGH (ref 27.5–37.4)
APTT BLD: 37.5 SEC — HIGH (ref 27.5–37.4)
BUN SERPL-MCNC: 35 MG/DL — HIGH (ref 7–23)
CALCIUM SERPL-MCNC: 8.9 MG/DL — SIGNIFICANT CHANGE UP (ref 8.4–10.5)
CHLORIDE SERPL-SCNC: 97 MMOL/L — SIGNIFICANT CHANGE UP (ref 96–108)
CO2 SERPL-SCNC: 24 MMOL/L — SIGNIFICANT CHANGE UP (ref 22–31)
CREAT SERPL-MCNC: 1.48 MG/DL — HIGH (ref 0.5–1.3)
GLUCOSE SERPL-MCNC: 233 MG/DL — HIGH (ref 70–99)
HCT VFR BLD CALC: 26.1 % — LOW (ref 34.5–45)
HCT VFR BLD CALC: 30 % — LOW (ref 34.5–45)
HGB BLD-MCNC: 8.4 G/DL — LOW (ref 11.5–15.5)
HGB BLD-MCNC: 9.3 G/DL — LOW (ref 11.5–15.5)
INR BLD: 1.73 RATIO — HIGH (ref 0.88–1.16)
MAGNESIUM SERPL-MCNC: 2 MG/DL — SIGNIFICANT CHANGE UP (ref 1.6–2.6)
MCHC RBC-ENTMCNC: 28.3 PG — SIGNIFICANT CHANGE UP (ref 27–34)
MCHC RBC-ENTMCNC: 29.8 PG — SIGNIFICANT CHANGE UP (ref 27–34)
MCHC RBC-ENTMCNC: 31 GM/DL — LOW (ref 32–36)
MCHC RBC-ENTMCNC: 32.1 GM/DL — SIGNIFICANT CHANGE UP (ref 32–36)
MCV RBC AUTO: 91.2 FL — SIGNIFICANT CHANGE UP (ref 80–100)
MCV RBC AUTO: 93 FL — SIGNIFICANT CHANGE UP (ref 80–100)
PHOSPHATE SERPL-MCNC: 2.8 MG/DL — SIGNIFICANT CHANGE UP (ref 2.5–4.5)
PLATELET # BLD AUTO: 232 K/UL — SIGNIFICANT CHANGE UP (ref 150–400)
PLATELET # BLD AUTO: 259 K/UL — SIGNIFICANT CHANGE UP (ref 150–400)
POTASSIUM SERPL-MCNC: 4.5 MMOL/L — SIGNIFICANT CHANGE UP (ref 3.5–5.3)
POTASSIUM SERPL-SCNC: 4.5 MMOL/L — SIGNIFICANT CHANGE UP (ref 3.5–5.3)
PROTHROM AB SERPL-ACNC: 19.8 SEC — HIGH (ref 10–13.1)
RBC # BLD: 2.8 M/UL — LOW (ref 3.8–5.2)
RBC # BLD: 3.29 M/UL — LOW (ref 3.8–5.2)
RBC # FLD: 17.4 % — HIGH (ref 10.3–14.5)
RBC # FLD: 20.4 % — HIGH (ref 10.3–14.5)
SODIUM SERPL-SCNC: 140 MMOL/L — SIGNIFICANT CHANGE UP (ref 135–145)
WBC # BLD: 10 K/UL — SIGNIFICANT CHANGE UP (ref 3.8–10.5)
WBC # BLD: 6.7 K/UL — SIGNIFICANT CHANGE UP (ref 3.8–10.5)
WBC # FLD AUTO: 10 K/UL — SIGNIFICANT CHANGE UP (ref 3.8–10.5)
WBC # FLD AUTO: 6.7 K/UL — SIGNIFICANT CHANGE UP (ref 3.8–10.5)

## 2017-11-01 RX ORDER — DOCUSATE SODIUM 100 MG
1 CAPSULE ORAL
Qty: 0 | Refills: 0 | COMMUNITY
Start: 2017-11-01

## 2017-11-01 RX ORDER — QUETIAPINE FUMARATE 200 MG/1
12.5 TABLET, FILM COATED ORAL
Qty: 0 | Refills: 0 | COMMUNITY
Start: 2017-11-01

## 2017-11-01 RX ORDER — DILTIAZEM HCL 120 MG
1 CAPSULE, EXT RELEASE 24 HR ORAL
Qty: 0 | Refills: 0 | COMMUNITY
Start: 2017-11-01

## 2017-11-01 RX ORDER — BUDESONIDE, MICRONIZED 100 %
1 POWDER (GRAM) MISCELLANEOUS
Qty: 0 | Refills: 0 | COMMUNITY
Start: 2017-11-01

## 2017-11-01 RX ORDER — POLYETHYLENE GLYCOL 3350 17 G/17G
17 POWDER, FOR SOLUTION ORAL
Qty: 0 | Refills: 0 | COMMUNITY
Start: 2017-11-01

## 2017-11-01 RX ORDER — FERROUS SULFATE 325(65) MG
2 TABLET ORAL
Qty: 0 | Refills: 0 | COMMUNITY

## 2017-11-01 RX ORDER — METOPROLOL TARTRATE 50 MG
0.5 TABLET ORAL
Qty: 0 | Refills: 0 | COMMUNITY

## 2017-11-01 RX ORDER — HYDRALAZINE HCL 50 MG
1 TABLET ORAL
Qty: 0 | Refills: 0 | COMMUNITY

## 2017-11-01 RX ORDER — ACETAMINOPHEN 500 MG
2 TABLET ORAL
Qty: 0 | Refills: 0 | COMMUNITY
Start: 2017-11-01

## 2017-11-01 RX ORDER — FERROUS SULFATE 325(65) MG
1 TABLET ORAL
Qty: 0 | Refills: 0 | COMMUNITY
Start: 2017-11-01

## 2017-11-01 RX ORDER — WARFARIN SODIUM 2.5 MG/1
1 TABLET ORAL
Qty: 0 | Refills: 0 | COMMUNITY

## 2017-11-01 RX ORDER — FUROSEMIDE 40 MG
1 TABLET ORAL
Qty: 0 | Refills: 0 | COMMUNITY
Start: 2017-11-01

## 2017-11-01 RX ORDER — METOPROLOL TARTRATE 50 MG
1 TABLET ORAL
Qty: 0 | Refills: 0 | COMMUNITY
Start: 2017-11-01

## 2017-11-01 RX ORDER — POTASSIUM CHLORIDE 20 MEQ
1 PACKET (EA) ORAL
Qty: 0 | Refills: 0 | COMMUNITY

## 2017-11-01 RX ORDER — WARFARIN SODIUM 2.5 MG/1
1.5 TABLET ORAL
Qty: 0 | Refills: 0 | COMMUNITY

## 2017-11-01 RX ORDER — IPRATROPIUM/ALBUTEROL SULFATE 18-103MCG
3 AEROSOL WITH ADAPTER (GRAM) INHALATION
Qty: 0 | Refills: 0 | COMMUNITY
Start: 2017-11-01

## 2017-11-01 RX ORDER — SENNA PLUS 8.6 MG/1
2 TABLET ORAL
Qty: 0 | Refills: 0 | COMMUNITY
Start: 2017-11-01

## 2017-11-01 RX ADMIN — Medication 25 MILLIGRAM(S): at 05:22

## 2017-11-01 RX ADMIN — POLYETHYLENE GLYCOL 3350 17 GRAM(S): 17 POWDER, FOR SOLUTION ORAL at 11:22

## 2017-11-01 RX ADMIN — CLOPIDOGREL BISULFATE 75 MILLIGRAM(S): 75 TABLET, FILM COATED ORAL at 11:21

## 2017-11-01 RX ADMIN — Medication 3 MILLILITER(S): at 05:20

## 2017-11-01 RX ADMIN — HEPARIN SODIUM 1200 UNIT(S)/HR: 5000 INJECTION INTRAVENOUS; SUBCUTANEOUS at 11:16

## 2017-11-01 RX ADMIN — HEPARIN SODIUM 1300 UNIT(S)/HR: 5000 INJECTION INTRAVENOUS; SUBCUTANEOUS at 03:01

## 2017-11-01 RX ADMIN — Medication 100 MILLIGRAM(S): at 05:21

## 2017-11-01 RX ADMIN — Medication 325 MILLIGRAM(S): at 11:23

## 2017-11-01 RX ADMIN — Medication 500 MILLIGRAM(S): at 11:21

## 2017-11-01 RX ADMIN — Medication 0.5 MILLIGRAM(S): at 05:20

## 2017-11-01 RX ADMIN — Medication 3 MILLILITER(S): at 16:54

## 2017-11-01 RX ADMIN — Medication 120 MILLIGRAM(S): at 05:25

## 2017-11-01 RX ADMIN — Medication 3 MILLILITER(S): at 11:21

## 2017-11-01 RX ADMIN — Medication 1 CAPSULE(S): at 11:22

## 2017-11-01 RX ADMIN — Medication 0.5 MILLIGRAM(S): at 16:54

## 2017-11-01 RX ADMIN — Medication 25 MILLIGRAM(S): at 16:55

## 2017-11-01 RX ADMIN — HEPARIN SODIUM 5000 UNIT(S): 5000 INJECTION INTRAVENOUS; SUBCUTANEOUS at 03:03

## 2017-11-01 RX ADMIN — Medication 40 MILLIGRAM(S): at 05:22

## 2017-11-01 NOTE — PROGRESS NOTE ADULT - PROBLEM SELECTOR PLAN 9
ptn is working on her Living will. full code. goals of care d/w ptn and her sons.

## 2017-11-01 NOTE — PROGRESS NOTE ADULT - PROBLEM SELECTOR PROBLEM 8
Swelling of lower extremity

## 2017-11-01 NOTE — PROGRESS NOTE ADULT - PROBLEM SELECTOR PROBLEM 2
NANCY (acute kidney injury)
R/O NANCY (acute kidney injury)
NANCY (acute kidney injury)
R/O Acute respiratory failure with hypoxia and hypercapnia
R/O NANCY (acute kidney injury)
NANCY (acute kidney injury)
R/O NANCY (acute kidney injury)

## 2017-11-01 NOTE — PROGRESS NOTE ADULT - PROVIDER SPECIALTY LIST ADULT
Cardiology
Electrophysiology
Gastroenterology
Infectious Disease
Internal Medicine
Nephrology
Pulmonology
Cardiology
Cardiology
Infectious Disease
Pulmonology
Pulmonology
Internal Medicine
Internal Medicine
Nephrology
Infectious Disease
Internal Medicine

## 2017-11-01 NOTE — PROGRESS NOTE ADULT - SUBJECTIVE AND OBJECTIVE BOX
Patient is a 87y old  Female who presents with a chief complaint of tachy everett syndrome requiring PPM on 10/30, supratherapeutic INR on admission, NANCY w/ CKD w/ baseline creatinine 1.5, UTI treated w/ ABX, respiratory muscle weakness requiring bipap ATC & now QHS, acute on chronic diastolic CHF w/ EF 55%, L hand growth requiring outpatient Moh's procedure (01 Nov 2017 15:34)      SUBJECTIVE / OVERNIGHT EVENTS: no new events, comfortable on NC, POD2 post PPM    MEDICATIONS  (STANDING):  ALBUTerol/ipratropium for Nebulization 3 milliLiter(s) Nebulizer every 6 hours  ascorbic acid 500 milliGRAM(s) Oral daily  atorvastatin 10 milliGRAM(s) Oral at bedtime  buDESOnide   0.5 milliGRAM(s) Respule 0.5 milliGRAM(s) Inhalation every 12 hours  clopidogrel Tablet 75 milliGRAM(s) Oral daily  diltiazem    milliGRAM(s) Oral daily  docusate sodium 100 milliGRAM(s) Oral three times a day  ferrous    sulfate 325 milliGRAM(s) Oral daily  furosemide    Tablet 40 milliGRAM(s) Oral daily  influenza   Vaccine 0.5 milliLiter(s) IntraMuscular once  metoprolol     tartrate 25 milliGRAM(s) Oral two times a day  Nephrocaps 1 Capsule(s) Oral daily  polyethylene glycol 3350 17 Gram(s) Oral daily  QUEtiapine 12.5 milliGRAM(s) Oral <User Schedule>  senna 2 Tablet(s) Oral at bedtime    MEDICATIONS  (PRN):  acetaminophen   Tablet. 650 milliGRAM(s) Oral every 6 hours PRN Mild Pain (1 - 3)  oxyCODONE    IR 5 milliGRAM(s) Oral every 6 hours PRN Moderate Pain (4 - 6)      Vital Signs Last 24 Hrs  T(F): 97.6 (11-01-17 @ 16:59), Max: 98.6 (10-31-17 @ 21:05)  HR: 114 (11-01-17 @ 16:59) (64 - 114)  BP: 135/55 (11-01-17 @ 16:59) (119/73 - 148/76)  RR: 18 (11-01-17 @ 16:59) (17 - 18)  SpO2: 98% (11-01-17 @ 16:59) (96% - 100%)  Telemetry:   CAPILLARY BLOOD GLUCOSE        I&O's Summary    31 Oct 2017 07:01  -  01 Nov 2017 07:00  --------------------------------------------------------  IN: 1440 mL / OUT: 1950 mL / NET: -510 mL    01 Nov 2017 07:01  -  01 Nov 2017 20:16  --------------------------------------------------------  IN: 960 mL / OUT: 450 mL / NET: 510 mL        PHYSICAL EXAM:  GENERAL: NAD, well-developed  HEAD:  Atraumatic, Normocephalic  EYES: EOMI, PERRLA, conjunctiva and sclera clear  NECK: Supple, No JVD  CHEST/LUNG: Clear to auscultation bilaterally; No wheeze  HEART: Regular rate and rhythm; No murmurs, rubs, or gallops  ABDOMEN: Soft, Nontender, Nondistended; Bowel sounds present  EXTREMITIES:  2+ Peripheral Pulses, No clubbing, cyanosis, or edema  PSYCH: AAOx3  NEUROLOGY: non-focal  SKIN: No rashes or lesions    LABS:                        9.3    10.00 )-----------( 259      ( 01 Nov 2017 12:52 )             30.0     11-01    140  |  97  |  35<H>  ----------------------------<  233<H>  4.5   |  24  |  1.48<H>    Ca    8.9      01 Nov 2017 12:57  Phos  2.8     11-01  Mg     2.0     11-01      PT/INR - ( 01 Nov 2017 12:52 )   PT: 19.8 sec;   INR: 1.73 ratio         PTT - ( 01 Nov 2017 10:22 )  PTT:106.8 sec          RADIOLOGY & ADDITIONAL TESTS:    Imaging Personally Reviewed:    Consultant(s) Notes Reviewed:      Care Discussed with Consultants/Other Providers:

## 2017-11-01 NOTE — DISCHARGE NOTE ADULT - HOSPITAL COURSE
88 yo F with PMHx of right breast CA S/P lumpectomy in 2000, Uterine Ca S/P total hysterectomy in 2012, Skin CA S/P excision of bilateral lower eyelid skin CA, cholecystectomy, Osteoarthritis of bilateral knees, HTN, hyperlipidemia, paroxysmal Afib on coumadin, severe aortic stenosis now s/p TAVR, who presents with onset of generalized weakness x 2 weeks and palpitations     Problem/Plan - 1:  ·  Problem: R/O Acute respiratory failure with hypoxia and hypercapnia.  Plan: now doing well off continuous Bipap, on 3LO2NC, cont prn BIPAP. and continuous at hs. awake, alert. as per pulmonary awaiting treatment w NIV instead of BIPAP. CT chest w pulm edema, po lasix resumed. Lungs are now clear. NIV as per pulmonary. ABG stable.      Problem/Plan - 2:  ·  Problem: R/O NANCY (acute kidney injury).  Plan: good po intake. off IVF. cr improving , now 1.39.      Problem/Plan - 3:  ·  Problem: R/O Paroxysmal atrial fibrillation.  Plan: AFIB is chronic. cont BB.  Cardizem resumed 2/2 episodes of RVR. no bradycardia on minimal amount of cardizem,. s/p PPM. BP stable.      Problem/Plan - 4:  ·  Problem: R/O Urinary tract infection without hematuria, site unspecified.  Plan: cx grew Klebsiella, sensitive to CTX. completed course of ABx.      Problem/Plan - 5:  ·  Problem: Bradycardia.  Plan: s/p PPM, POD 1.      Problem/Plan - 6:  Problem: Weakness. Plan: s/p hypercapneic hypoxic respiratory failure and ARF.  resolved resp acidosis,  creatinine improving.  PT eval.     Problem/Plan - 7:  ·  Problem: Essential hypertension.  Plan: stable.      Problem/Plan - 8:  ·  Problem: Swelling of lower extremity.  Plan: chronic. doubt DVT in setting of chronic AC.      Problem/Plan - 9:  ·  Problem: Advanced care planning/counseling discussion.  Plan: ptn is working on her Living will. full code. goals of care d/w ptn and her sons.

## 2017-11-01 NOTE — PROGRESS NOTE ADULT - PROBLEM SELECTOR PROBLEM 6
Swelling of lower extremity
Weakness
Swelling of lower extremity
Weakness
Swelling of lower extremity
Weakness

## 2017-11-01 NOTE — PROGRESS NOTE ADULT - SUBJECTIVE AND OBJECTIVE BOX
OSCAR SANDERS:[id number],   87yFemale followed for:  digoxin (Rash; Drowsiness)  No Known Allergies    PAST MEDICAL & SURGICAL HISTORY:  Severe aortic stenosis  Uterine cancer  Arthritis  HTN (hypertension)  Breast cancer diagnosed in 2000: s/p right lumpectomy and right axillary lymph node removal, radiation  AF (atrial fibrillation): on coumadin and aspirin  Dyslipidemia  H/O: hysterectomy: 4/2012  s/p surgical excision of left eye Skin cancer: Bilateral lower eye lids  S/P D&C  S/P cholecystectomy    FAMILY HISTORY:  No pertinent family history in first degree relatives    MEDICATIONS  (STANDING):  ALBUTerol/ipratropium for Nebulization 3 milliLiter(s) Nebulizer every 6 hours  ascorbic acid 500 milliGRAM(s) Oral daily  atorvastatin 10 milliGRAM(s) Oral at bedtime  buDESOnide   0.5 milliGRAM(s) Respule 0.5 milliGRAM(s) Inhalation every 12 hours  clopidogrel Tablet 75 milliGRAM(s) Oral daily  diltiazem    milliGRAM(s) Oral daily  docusate sodium 100 milliGRAM(s) Oral three times a day  ferrous    sulfate 325 milliGRAM(s) Oral daily  furosemide    Tablet 40 milliGRAM(s) Oral daily  heparin  Infusion. 1000 Unit(s)/Hr (10 mL/Hr) IV Continuous <Continuous>  influenza   Vaccine 0.5 milliLiter(s) IntraMuscular once  metoprolol     tartrate 25 milliGRAM(s) Oral two times a day  Nephrocaps 1 Capsule(s) Oral daily  polyethylene glycol 3350 17 Gram(s) Oral daily  QUEtiapine 12.5 milliGRAM(s) Oral <User Schedule>  senna 2 Tablet(s) Oral at bedtime    MEDICATIONS  (PRN):  acetaminophen   Tablet. 650 milliGRAM(s) Oral every 6 hours PRN Mild Pain (1 - 3)  heparin  Injectable 5000 Unit(s) IV Push every 6 hours PRN For aPTT less than 40  heparin  Injectable 2500 Unit(s) IV Push every 6 hours PRN For aPTT between 40 - 57  oxyCODONE    IR 5 milliGRAM(s) Oral every 6 hours PRN Moderate Pain (4 - 6)      Vital Signs Last 24 Hrs  T(C): 36.5 (01 Nov 2017 04:41), Max: 37 (31 Oct 2017 21:05)  T(F): 97.7 (01 Nov 2017 04:41), Max: 98.6 (31 Oct 2017 21:05)  HR: 64 (01 Nov 2017 10:25) (57 - 94)  BP: 148/76 (01 Nov 2017 04:41) (121/67 - 148/76)  BP(mean): --  RR: 18 (01 Nov 2017 04:41) (18 - 18)  SpO2: 100% (01 Nov 2017 10:25) (97% - 100%)  nc/at  s1s2  cta  soft, nt, nd no guarding or rebound  no c/c/e    CBC Full  -  ( 01 Nov 2017 01:55 )  WBC Count : 6.7 K/uL  Hemoglobin : 8.4 g/dL  Hematocrit : 26.1 %  Platelet Count - Automated : 232 K/uL  Mean Cell Volume : 93.0 fl  Mean Cell Hemoglobin : 29.8 pg  Mean Cell Hemoglobin Concentration : 32.1 gm/dL  Auto Neutrophil # : x  Auto Lymphocyte # : x  Auto Monocyte # : x  Auto Eosinophil # : x  Auto Basophil # : x  Auto Neutrophil % : x  Auto Lymphocyte % : x  Auto Monocyte % : x  Auto Eosinophil % : x  Auto Basophil % : x    10-31    139  |  99  |  39<H>  ----------------------------<  168<H>  4.6   |  27  |  1.39<H>    Ca    8.4      31 Oct 2017 07:46  Phos  3.0     10-31  Mg     2.0     10-31      PT/INR - ( 31 Oct 2017 07:46 )   PT: 16.1 sec;   INR: 1.41 ratio         PTT - ( 01 Nov 2017 10:22 )  PTT:106.8 sec

## 2017-11-01 NOTE — DISCHARGE NOTE ADULT - REASON FOR ADMISSION
tachy everett syndrome requiring PPM on 10/30, supratherapeutic INR on admission, NANCY w/ CKD w/ baseline creatinine 1.5, UTI treated w/ ABX, respiratory muscle weakness requiring bipap ATC & now QHS, acute on chronic diastolic CHF w/ EF 55%, L hand growth requiring outpatient Moh's procedure

## 2017-11-01 NOTE — PROGRESS NOTE ADULT - PROBLEM SELECTOR PROBLEM 9
Advanced care planning/counseling discussion

## 2017-11-01 NOTE — PROGRESS NOTE ADULT - PROBLEM SELECTOR PLAN 2
appears dry clinically. on gentle IVF, GFR still quite low. Renal consult called. place Gautam for better Is and Os. if bladder residual is above 250 would increase hydration to 1/2 @ 75/hr. renal sono pending
good po intake when BIPAP is off. off IVF. cr improving now down to 1.71.
Improved. Per pulmonary.
appears dry clinically. on gentle IVF, GFR still quite low. Renal consult appreciated. on NS @ 50cc/hr.
good po intake. off IVF. cr improving , now 1.38. keep off Lasix
good po intake. off IVF. cr improving , now 1.48.
good po intake. off IVF. cr improving , now 1.57. Lasix resumed
good po intake. off IVF. cr improving , now 1.67. keep off Lasix
good po intake. off IVF. cr improving , now 1.67. keep off Lasix
appears dry clinically. on gentle IVF, GFR still quite low. Renal consult appreciated. on NS @ 50cc/hr.
good po intake. off IVF. cr improving , now 1.39.
good po intake when BIPAP is off. off IVF. cr improving now down to 1.71.
good po intake. off IVF. cr improving , now 1.38.

## 2017-11-01 NOTE — DISCHARGE NOTE ADULT - MEDICATION SUMMARY - MEDICATIONS TO CHANGE
I will SWITCH the dose or number of times a day I take the medications listed below when I get home from the hospital:    diltiazem 360 mg/24 hours oral capsule, extended release  -- 1 cap(s) by mouth once a day    docusate sodium 100 mg oral capsule  -- 1 cap(s) by mouth once a day, As needed, Constipation    metoprolol tartrate 25 mg oral tablet  -- 0.5 tab(s) by mouth 2 times a day    furosemide 40 mg oral tablet  -- 1.5 tabs daily except 2 tabs daily on saturday and sunday    Coumadin 3 mg oral tablet  -- 1 tab(s) by mouth once a day M-F, 1/2 tablet on sat and sun    warfarin 3 mg oral tablet  -- 1 tablet by mouth every other night alternating with 1/2 tablet.  **See internal arnold**    ferrous sulfate 324 mg (65 mg elemental iron) oral tablet  -- 2 tab(s) by mouth once a day

## 2017-11-01 NOTE — PROGRESS NOTE ADULT - PROBLEM SELECTOR PLAN 5
now recurring 2/2 Cardizem added post RVR afib.. everett resolved on just BB. EPS called for Tachy/Everett. awaiting PPM today
s/p PPM, POD 1
stable
resolved off Cardizem. now on low dose BB. . chronic AFib, BP stable. INR is now high therapeutic
now recurring 2/2 Cardizem added post RVR afib.. everett resolved on just BB. EPS called for Tachy/Everett. may need PPM
now recurring 2/2 Cardizem added post RVR afib.. everett resolved on just BB. EPS called for Tachy/Everett. may need PPM
now recurring 2/2 Cardizem added post RVR afib.. everett resolved on just BB. EPS called for Tachy/Everett. may need PPM scheduled for 10/30, trending INR, if still supra therapeutic on 10/28, consider a small dose vit K (2.5) to prep for 10/30
resolved off Cardizem. now on low dose BB. . chronic AFib, BP stable.
resolved. tolerating BB and Cardizem. . chronic AFib, BP stable.
s/p PPM, POD 2
stable
resolved off Cardizem. now on low dose BB. . chronic AFib, BP stable.
stable
resolved off Cardizem. now on low dose BB. . chronic AFib, BP stable. INR is now high therapeutic

## 2017-11-01 NOTE — PROGRESS NOTE ADULT - SUBJECTIVE AND OBJECTIVE BOX
Nephrology Progress Note    No acute events overnight  patient looks comfortable and not in distress  Denies any chest pain or sob  Has some bleeding from the phlebotomy site which improved with some pressure      VITAL:  Vital reviewed, afebrile, stable hemodynamics, saturating well on RA      PHYSICAL EXAM:  Constitutional: NAD, frail  HEENT: NCAT, DMM  Neck: Supple, No JVD  Respiratory: distant BS b/l  Cardiovascular: RRR s1s2, no m/r/g  Gastrointestinal: BS+, soft, NT/ND  Extremities: 2+ b/l LE edema  Neurological: no focal deficits; strength grossly intact  Psychiatric: Normal mood, normal affect  Back: no CVAT b/l  Skin: L upper chest PPM incision site clean-appearing      LABS:  Reviewed: noted anemia with hgb of 8.4 creatinine 1.39. Noted low iron stores    IMAGING:    < from: Transthoracic Echocardiogram (10.30.17 @ 13:44) >  Conclusions:  1. Mitral annular calcification and calcified mitral  leaflets with decreased diastolic opening. Mild-moderate  mitral regurgitation. Mean transmitral valve gradient  equals 5 mm Hg, consistent with mild -moderate mitral  stenosis.  2. Transcatheter aortic valve replacement is not well  visualized. Peak transaortic valve gradient equals 16 mm  Hg, mean transaortic valve gradient equals 7 mm Hg, which  is probably normal in the presence of a transcatheter  aortic valvereplacement. Mild paravalvular aortic  regurgitation.  3. Grossly mild global left ventricular systolic function.  Patient in atrial fibrillation; LV ejection fraction varies  with R-R interval. Septal motion consistent with conduction  defect.  4. Right ventricular enlargement with decreased right  ventricular systolic function.  5. Normal tricuspid valve. Moderate tricuspid  regurgitation.      IMPRESSION: 87F w/ past breast and uterine CA, HTN, AFib, and AS-TAVR, 10/16/17 a/w hypercapnic respiratory failure/AMS, as well as NANCY     1. NANCY on CKD 2-3; likely ATN, CRS - improving  2. Chronically edematous/hypervolemic; difficult to optimize volume status as overdiuresis could lead to (and has recent led to) NANCY - noted low albumin, ? venous stasis  3. EP - s/p PPM placement this admission, followed by cardiology  4.  - left complex renal cyst - indicated for imaging f/u 3-6 months.   5. Anemia: noted low iron stores: start iron supplement and vitamin C upon discharge, further workup as outpatient  6. Hypoalbuminemia/malnutrition: increase protein intake      RECOMMEND:  - continue Lasix 40mg po qd  - Repeat renal US 3-6 months  - start iron supplement  - Iron deficiency anemia workup as outpatient  - increase protein intake  - D/C planning per primary team; could f/u at my office in 2-4 weeks    Ani Parker MD  [facility]  [phone]

## 2017-11-01 NOTE — DISCHARGE NOTE ADULT - CARE PLAN
Principal Discharge DX:	Acute respiratory failure with hypoxia and hypercapnia  Goal:	optimal pulmonary management  Instructions for follow-up, activity and diet:	Bipap 5/18/30% QHS & NC oxygen @ 3L/min during day  respiratory treatments as directed  Secondary Diagnosis:	AF (atrial fibrillation)  Instructions for follow-up, activity and diet:	Atrial fibrillation is the most common heart rhythm problem & has the risk of stroke & heart attack  It helps if you control your blood pressure, not drink more than 1-2 alcohol drinks per day, cut down on caffeine, getting treatment for over active thyroid gland, & getting exercise  Call your doctor if you feel your heart racing or beating unusually, chest tightness or pain, lightheaded, faint, shortness of breath especially with exercise  It is important to take your heart medication as prescribed  You are on Coumadin - you will need INR done this Friday 11/3  Secondary Diagnosis:	Acute on chronic kidney failure  Instructions for follow-up, activity and diet:	Avoid taking (NSAIDs) - (ex: Ibuprofen, Advil, Celebrex, Naprosyn)  Avoid taking any nephrotoxic agents (can harm kidneys) - Intravenous contrast for diagnostic testing, combination cold medications.  Have all medications adjusted for your renal function by your Health Care Provider.  Blood pressure control is important.  Take all medication as prescribed.  follow up with nephrology  Secondary Diagnosis:	UTI (urinary tract infection)  Instructions for follow-up, activity and diet:	resolved and finished antibiotics  You had a bladder &/or kidney infection  Call your doctor with pain or burning with urination, frequent urination, feeling to urinate suddenly, blood in urine, fever, back pain, nausea or vomiting  You need to take and finish your antibiotic as prescribed so that the infection does not reoccur  Drink adequate fluids - there is no harm to try cranberry juice  Females need to urinate right after sex  Secondary Diagnosis:	Tachy-everett syndrome  Instructions for follow-up, activity and diet:	PPM inserted 10/30  your pacemaker can send an electrical signal to your heart when it is necessary  call your doctor if you feel dizzy, lightheaded, shortness of breath, confused, more tired, faint  you will follow up with your pacemaker doctor regularly to check your pacemaker  your pacemaker battery usually will last 5 - 8 years  avoid certain electric or magnetic equipment  if you cannot walk through metal detector, ask for hand security search  most of the time you will not be able to have MRI  follow directions  that you received about arm use and mobility, driving, sex & sling use  you make want to get a emergency medical bracelet telling people you have a pacemaker  tell any health care provider that you have a pacemaker  follow up with EP for wound check as directed  Secondary Diagnosis:	Acute on chronic diastolic CHF (congestive heart failure)  Instructions for follow-up, activity and diet:	Weigh yourself daily.  If you gain 3lbs in 3 days, or 5lbs in a week call your Health Care Provider.  Do not eat or drink foods containing more than 2000mg of salt (sodium) in your diet every day.  Call your Health Care Provider if you have any swelling or increased swelling in your feet, ankles, and/or stomach.  Take all of your medication as directed.  If you become dizzy call your Health Care Provider.  follow up with cardiology/PCP within one week after discharge

## 2017-11-01 NOTE — PROGRESS NOTE ADULT - PROBLEM SELECTOR PROBLEM 1
Bradycardia
R/O Acute respiratory failure with hypoxia and hypercapnia
Bradycardia
R/O Acute respiratory failure with hypoxia and hypercapnia
R/O Paroxysmal atrial fibrillation
R/O Paroxysmal atrial fibrillation
R/O Acute respiratory failure with hypoxia and hypercapnia
Bradycardia
R/O Acute respiratory failure with hypoxia and hypercapnia

## 2017-11-01 NOTE — PROGRESS NOTE ADULT - PROBLEM SELECTOR PLAN 4
cx grew Klebsiella, sensitive to CTX. completed course of ABx
AFIB is chronic. cont BB. resume Cardizem as per cardiology. BP stable. rate 
cx growing Klebsiella, on CTX. sensitivities are pending
AFIB is chronic. cont BB. resume Cardizem as per cardiology. BP stable. rate 
cx grew Klebsiella, sensitive to CTX. completed course of ABx
cx growing Klebsiella, sensitive to CTX. completed course of ABx
cx growing Klebsiella, sensitive to CTX. martita last day ABx.
cx growing Klebsiella, sensitive to CTX. completed course of ABx
AFIB is chronic. cont BB. resume Cardizem as per cardiology. BP stable. rate 
cx growing Klebsiella, on CTX. sensitivities are pending
cx grew Klebsiella, sensitive to CTX. completed course of ABx

## 2017-11-01 NOTE — PROGRESS NOTE ADULT - PROBLEM SELECTOR PROBLEM 5
Bradycardia
Essential hypertension
Bradycardia
Essential hypertension
Bradycardia
Essential hypertension
Bradycardia
Bradycardia

## 2017-11-01 NOTE — DISCHARGE NOTE ADULT - MEDICATION SUMMARY - MEDICATIONS TO TAKE
I will START or STAY ON the medications listed below when I get home from the hospital:    budesonide 0.5 mg/2 mL inhalation suspension  -- 1 dose(s) inhaled 2 times a day  -- Indication: For hypercarbic respiratory failure    acetaminophen 325 mg oral tablet  -- 2 tab(s) by mouth every 6 hours, As needed, Mild Pain (1 - 3)  -- Indication: For Pain management    dilTIAZem 120 mg/24 hours oral capsule, extended release  -- 1 cap(s) by mouth once a day  -- Indication: For Atrial fibrillation    Coumadin 3 mg oral tablet  -- 1 tab(s) by mouth once a day in evening - start 11/3  -- Indication: For Atrial fibrillation    Coumadin  -- 1.5 milligram(s) by mouth once a day (at bedtime) starting tomorrow 11/2  -- Indication: For Atrial fibrillation    atorvastatin 10 mg oral tablet  -- 1 tab(s) by mouth once a day (at bedtime)  -- Indication: For hyperlipidemia    Plavix 75 mg oral tablet  -- 1 tab(s) by mouth once a day  -- Indication: For coronary artery disease    QUEtiapine  -- 12.5 milligram(s) by mouth once a day at 2000  -- Indication: For Anxiety management    metoprolol tartrate 25 mg oral tablet  -- 1 tab(s) by mouth 2 times a day  -- Indication: For Atrial fibrillation    ipratropium-albuterol 0.5 mg-2.5 mg/3 mLinhalation solution  -- 3 milliliter(s) inhaled every 6 hours  -- Indication: For Bronchodilator    furosemide 40 mg oral tablet  -- 1 tab(s) by mouth once a day  -- Indication: For diuretic    ferrous sulfate 325 mg (65 mg elemental iron) oral tablet  -- 1 tab(s) by mouth once a day with breakfast  -- Indication: For iron supplement    senna oral tablet  -- 2 tab(s) by mouth once a day (at bedtime)  -- Indication: For constipation    docusate sodium 100 mg oral capsule  -- 1 cap(s) by mouth 3 times a day  -- Indication: For constipation    polyethylene glycol 3350 oral powder for reconstitution  -- 17 gram(s) by mouth once a day  -- Indication: For constipation    Multiple Vitamins oral tablet  -- 1 tab(s) by mouth once a day  -- Indication: For vitamin supplement    folic acid 1 mg oral tablet  -- 1 tab(s) by mouth once a day  -- Indication: For Supplement    Vitamin C 500 mg oral tablet  -- 1 tab(s) by mouth once a day  -- Indication: For vitamn C supplement I will START or STAY ON the medications listed below when I get home from the hospital:    budesonide 0.5 mg/2 mL inhalation suspension  -- 1 dose(s) inhaled 2 times a day  -- Indication: For hypercarbic respiratory failure    acetaminophen 325 mg oral tablet  -- 2 tab(s) by mouth every 6 hours, As needed, Mild Pain (1 - 3)  -- Indication: For Pain management    dilTIAZem 120 mg/24 hours oral capsule, extended release  -- 1 cap(s) by mouth once a day  -- Indication: For Atrial fibrillation    Coumadin  -- 1.5 milligram(s) by mouth every other day in evening starting tonight 11/1  -- Indication: For Atrial fibrillation & stroke prevention    Coumadin 3 mg oral tablet  -- 1 tab(s) by mouth every other day in evening - start 11/2  -- Indication: For Atrial fibrillation & stroke prevention    atorvastatin 10 mg oral tablet  -- 1 tab(s) by mouth once a day (at bedtime)  -- Indication: For hyperlipidemia    Plavix 75 mg oral tablet  -- 1 tab(s) by mouth once a day  -- Indication: For coronary artery disease    QUEtiapine  -- 12.5 milligram(s) by mouth once a day at 2000  -- Indication: For Anxiety management    metoprolol tartrate 25 mg oral tablet  -- 1 tab(s) by mouth 2 times a day  -- Indication: For Atrial fibrillation    ipratropium-albuterol 0.5 mg-2.5 mg/3 mLinhalation solution  -- 3 milliliter(s) inhaled every 6 hours  -- Indication: For Bronchodilator    furosemide 40 mg oral tablet  -- 1 tab(s) by mouth once a day  -- Indication: For diuretic    ferrous sulfate 325 mg (65 mg elemental iron) oral tablet  -- 1 tab(s) by mouth once a day with breakfast  -- Indication: For iron supplement    senna oral tablet  -- 2 tab(s) by mouth once a day (at bedtime)  -- Indication: For constipation    docusate sodium 100 mg oral capsule  -- 1 cap(s) by mouth 3 times a day  -- Indication: For constipation    polyethylene glycol 3350 oral powder for reconstitution  -- 17 gram(s) by mouth once a day  -- Indication: For constipation    Multiple Vitamins oral tablet  -- 1 tab(s) by mouth once a day  -- Indication: For vitamin supplement    folic acid 1 mg oral tablet  -- 1 tab(s) by mouth once a day  -- Indication: For Supplement    Vitamin C 500 mg oral tablet  -- 1 tab(s) by mouth once a day  -- Indication: For vitamn C supplement

## 2017-11-01 NOTE — DISCHARGE NOTE ADULT - CARE PROVIDER_API CALL
Geronimo Mcleod), Plastic Surgery  [address]  [place] [address]  Phone: [phone]  Fax: [phone]    Tommy Reyes), Internal Medicine; Pulmonary Disease  [address]  [place] [address]  Phone: [phone]  Fax: [phone]    Ludwig Orellana), Cardiac Electrophysiology; Cardiology  [address] [place] [address]  Phone: [phone]  Fax: [phone]    Quinten Parker), Internal Medicine; Nephrology  [address]  [place] [address]  Phone: [phone]  Fax: [phone]

## 2017-11-01 NOTE — DISCHARGE NOTE ADULT - PATIENT PORTAL LINK FT
“You can access the Follow[facility] Patient Portal, offered by [facility], by registering with the following website: [email or web address]”

## 2017-11-01 NOTE — DISCHARGE NOTE ADULT - PLAN OF CARE
optimal pulmonary management Bipap 5/18/30% QHS & NC oxygen @ 3L/min during day  respiratory treatments as directed Atrial fibrillation is the most common heart rhythm problem & has the risk of stroke & heart attack  It helps if you control your blood pressure, not drink more than 1-2 alcohol drinks per day, cut down on caffeine, getting treatment for over active thyroid gland, & getting exercise  Call your doctor if you feel your heart racing or beating unusually, chest tightness or pain, lightheaded, faint, shortness of breath especially with exercise  It is important to take your heart medication as prescribed  You are on Coumadin - you will need INR done this Friday 11/3 Avoid taking (NSAIDs) - (ex: Ibuprofen, Advil, Celebrex, Naprosyn)  Avoid taking any nephrotoxic agents (can harm kidneys) - Intravenous contrast for diagnostic testing, combination cold medications.  Have all medications adjusted for your renal function by your Health Care Provider.  Blood pressure control is important.  Take all medication as prescribed.  follow up with nephrology resolved and finished antibiotics  You had a bladder &/or kidney infection  Call your doctor with pain or burning with urination, frequent urination, feeling to urinate suddenly, blood in urine, fever, back pain, nausea or vomiting  You need to take and finish your antibiotic as prescribed so that the infection does not reoccur  Drink adequate fluids - there is no harm to try cranberry juice  Females need to urinate right after sex PPM inserted 10/30  your pacemaker can send an electrical signal to your heart when it is necessary  call your doctor if you feel dizzy, lightheaded, shortness of breath, confused, more tired, faint  you will follow up with your pacemaker doctor regularly to check your pacemaker  your pacemaker battery usually will last 5 - 8 years  avoid certain electric or magnetic equipment  if you cannot walk through metal detector, ask for hand security search  most of the time you will not be able to have MRI  follow directions  that you received about arm use and mobility, driving, sex & sling use  you make want to get a emergency medical bracelet telling people you have a pacemaker  tell any health care provider that you have a pacemaker  follow up with EP for wound check as directed Weigh yourself daily.  If you gain 3lbs in 3 days, or 5lbs in a week call your Health Care Provider.  Do not eat or drink foods containing more than 2000mg of salt (sodium) in your diet every day.  Call your Health Care Provider if you have any swelling or increased swelling in your feet, ankles, and/or stomach.  Take all of your medication as directed.  If you become dizzy call your Health Care Provider.  follow up with cardiology/PCP within one week after discharge

## 2017-11-01 NOTE — PROGRESS NOTE ADULT - PROBLEM SELECTOR PROBLEM 3
Weakness
R/O Paroxysmal atrial fibrillation
R/O NANCY (acute kidney injury)
R/O Paroxysmal atrial fibrillation
Weakness
R/O Paroxysmal atrial fibrillation
Weakness
R/O Paroxysmal atrial fibrillation

## 2017-11-01 NOTE — PROGRESS NOTE ADULT - ASSESSMENT
ASSESSMENT    acute hypoxic and hypercapnic respiratory failure - multifactorial - resolved respiratory acidosis and improved oxygenation    chronic CHF in the setting of aortic stenosis s/p TAVR, MR/MS and systolic and diastolic CHF with moderate bilateral pleural effusions/atelectasis - minimal improvement on follow-up chest CT  restrictive lung disease due to respiratory muscle weakness, kyphoscoliosis and effusions  perhaps underlying senile emphysema  VTE disease seems unlikely in the setting of chronic A/C    cor pulmonale due to left sided heart disease and chronic hypoxemia resulting in chronic pulmonary artery hypoxic vasoconstriction    NANCY due to recent diarrhea and increased diuretic dose with metabolic acidosis - resolved    atrial fibrillation with tachy-everett syndrome s/p pacemaker placement    hypernatremia - resolved    Klebsiella UTI s/p antibiotics    anemia    PLAN/RECOMMENDATIONS    would continue NIV for sleep as ABG still has a significant respiratory acidosis - oxygen supplementation to keep saturation greater than 92% via nasal canula during the day - suspect that patient can be weaned off daytime oxygen supplementation completely  repeat chest CT reviewed  patient will benefit from a trilogy vent which I have arranged - when discharged to rehab, will need oxygen supplementation via a nasal canula @ 3lpm at rest and 4lpm with exertion and may need nocturnal BIPAP on the current settings  After reviewing the patient's current respiratory status, I believe that the patient would benefit from NIV. BIPAP has been unsuccessful in treating the patient's advanced disease processes. I am ordering NIV to be used for sleep, during daytime naps and as needed during the day for periods of shortness of breath and increased work of breathing. Without NIV, I am concerned that the patient will continue to experience further clinical deterioration resulting in possible medical harm and recurrent hospitalizations  albuterol/atrovent/pulmicort nebs  renal follow-up noted noted - oleary catheter discontinued - needs ongoing diuresis  cardiac meds: lipitor/lasix/plavix/cardizem CD/metoprolol/coumadin for INR 2.5 - 3.5 - off ACE inhibitors - cardiology follow-up noted  s/p course of ceftriaxone  bowel regimen  transfuse as needed  bedtime Seroquel     Will follow with you; discussed plan of care with the dedicated floor NP, patient and son at bedside.  No pulmonary objection to discharge    Tommy Reyes MD, [facility] - [phone]  Pulmonary Medicine

## 2017-11-01 NOTE — PROGRESS NOTE ADULT - PROBLEM SELECTOR PLAN 1
resolved off Cardizem. now on low dose BB. . chronic AFib, BP stable. INR still supratherapeutic
2 hrs on/off BIPAP and cont at hs. goal is to achieve ph 7.4, prob somewhat hypercapneic at baseline. supplement w NCO2 when Bipap is off.
Rate stable, INR therapeutic. Keep INR 2-3.
now doing well off Bipap, on 3LO2NC, prn BIPAP. awake, alert
now doing well off continuous Bipap, on 3LO2NC, cont prn BIPAP. and continuous at hs. awake, alert. as per pulmonary awaiting treatment w NIV instead of BIPAP. CT chest w pulm edema, but clinically stable.
now doing well off continuous Bipap, on 3LO2NC, cont prn BIPAP. and continuous at hs. awake, alert. as per pulmonary awaiting treatment w NIV instead of BIPAP. CT chest w pulm edema, lasix resumed
now doing well off continuous Bipap, on 3LO2NC, cont prn BIPAP. and continuous at hs. awake, alert. as per pulmonary awaiting treatment w NIV instead of BIPAP. CT chest w pulm edema, lasix resumed
now doing well off continuous Bipap, on 3LO2NC, cont prn BIPAP. and continuous at hs. awake, alert. as per pulmonary awaiting treatment w NIV instead of BIPAP. CT chest w pulm edema, po lasix resumed. Lungs are now clear. NIV as per pulmonary. ABG stable
now doing well off continuous Bipap, on 3LO2NC, cont prn BIPAP. and continuous at hs. awake, alert. as per pulmonary awaiting treatment w NIV instead of BIPAP. CT chest w pulm edema, po lasix resumed. On exam has rales, will give a dose of IV Lasix today(10/27)
now doing well off continuous Bipap, on 3LO2NC, cont prn BIPAP. and continuous at hs. awake, alert. as per pulmonary awaiting treatment w NIV instead of BIPAP. CT chest w pulm edema, po lasix resumed.Lungs are now clear, post IV lasix yesterday. NIV as per pulmonary. Ph on 10/26 7.38. pco2 47
now doing well off continuous Bipap, on 3LO2NC, cont prn BIPAP. and continuous at hs. awake, alert. as per pulmonary awaiting treatment w NIV instead of BIPAP. CT chest w pulm edema, po lasix resumed.Lungs are now clear, post IV lasix yesterday. NIV as per pulmonary. Ph on 10/26 7.38. pco2 47
resolved off Cardizem. now on low dose BB. . chronic AFib, BP stable. INR is now high therapeutic
s/p PPM 10/30/17  Post procedure pacemaker teachings done with patient  Monitor pacemaker wound site due to anticoagulation  ID, booklet, discharge instructions given  Follow up wound check on 11/22/17 at 9:25am
now doing well off continuous Bipap, on 3LO2NC, cont prn BIPAP. awake, alert
resolved off Cardizem. now on low dose BB. . chronic AFib, BP stable. INR is now high therapeutic
now doing well off continuous Bipap, on 3LO2NC, cont prn BIPAP. and continuous at hs. awake, alert. as per pulmonary awaiting treatment w NIV instead of BIPAP. CT chest w pulm edema, po lasix resumed. Lungs are now clear. NIV as per pulmonary. ABG stable
2 hrs on/off BIPAP and cont at hs. goal is to achieve ph 7.4, prob somewhat hypercapneic at baseline. supplement w NCO2 when Bipap is off.
now doing well off continuous Bipap, on 3LO2NC, cont prn BIPAP. and continuous at hs. awake, alert. as per pulmonary awaiting treatment w NIV instead of BIPAP. CT chest w pulm edema, po lasix resumed.Lungs are now clear. NIV as per pulmonary. Ph on 10/26 7.38. pco2 47

## 2017-11-01 NOTE — DISCHARGE NOTE ADULT - ADDITIONAL INSTRUCTIONS
follow up care as per subacute rehab facility protocol  follow up with primary care physician/cardiology Dr. Joel Goldberg within one week after discharge  follow up with nephrology Dr. Luis E Parker within 1-2 weeks after discharge  follow up with plastic surgery Dr. Tierney for L hand growth evaluation and biopsy  follow up with pulmonary Dr. Reyes or associates within 2 weeks after discharge  follow up with EP Dr. Orellana for pacemaker wound check as directed

## 2017-11-01 NOTE — PROGRESS NOTE ADULT - PROBLEM SELECTOR PLAN 8
chronic. doubt DVT in setting of chronic AC

## 2017-11-01 NOTE — PROGRESS NOTE ADULT - PROBLEM SELECTOR PLAN 7
ptn is working on her Living will. full code. goals of care d/w ptn and her sons.
stable
ptn has 3 sons, no designated HCP. the son at the bedside states ptn has a full active independent life. he wants everything done for his mother and ptn is a full code.
stable
ptn is working on her Living will. full code. goals of care d/w ptn and her sons.
stable

## 2017-11-01 NOTE — PROGRESS NOTE ADULT - PROBLEM SELECTOR PLAN 6
chronic. doubt DVT in setting of chronic AC
with hypercapneic hypoxic respiratory failure.  on BIPAP most of the day. resp acidosis is improving. Pulm on board.
s/p hypercapneic hypoxic respiratory failure and ARF.  resolved resp acidosis,  creatinine improving.  PT eval
s/p hypercapneic hypoxic respiratory failure and ARF.  resolved resp acidosis,  creatinine improving.  PT eval
chronic. doubt DVT in setting of chronic AC
with hypercapneic hypoxic respiratory failure.  on BIPAP most of the day. resp acidosis is improving. Pulm on board.
chronic. doubt DVT in setting of chronic AC
s/p hypercapneic hypoxic respiratory failure and ARF.  resolved resp acidosis,  creatinine improving.  PT eval
s/p hypercapneic hypoxic respiratory failure and ARF.  resolving resp acidosis and improving creatinine.  PT eval
s/p hypercapneic hypoxic respiratory failure and ARF.  resolving resp acidosis,  creatinine plateaued at 1.5-1.6.  PT eval
s/p hypercapneic hypoxic respiratory failure.  resolved resp acidosis. PT eval
s/p hypercapneic hypoxic respiratory failure.  resolved resp acidosis. PT eval

## 2017-11-01 NOTE — PROGRESS NOTE ADULT - PROBLEM SELECTOR PROBLEM 7
Advanced care planning/counseling discussion
Essential hypertension
Advanced care planning/counseling discussion
Essential hypertension
Advanced care planning/counseling discussion
Essential hypertension

## 2017-11-01 NOTE — PROGRESS NOTE ADULT - PROBLEM SELECTOR PROBLEM 4
R/O Urinary tract infection without hematuria, site unspecified
Paroxysmal atrial fibrillation
R/O Urinary tract infection without hematuria, site unspecified
Paroxysmal atrial fibrillation
R/O Urinary tract infection without hematuria, site unspecified
Paroxysmal atrial fibrillation
R/O Urinary tract infection without hematuria, site unspecified
R/O Urinary tract infection without hematuria, site unspecified

## 2017-11-01 NOTE — PROGRESS NOTE ADULT - SUBJECTIVE AND OBJECTIVE BOX
[facility] - [facility]     PROGRESS NOTE    CHIEF COMPLAINT: ARF/CRF; COPD/emphysema; chronic CHF; CKD/NANCY    INTERVAL HISTORY: looks great sitting in chair; s/p placement of pacemaker for tachy-everett syndrome - remains in atrial fibrillation with occasional PVCs; awake and alert after a night on BIPAP now with resolved acute on chronic respiratory acidosis; no cough, sputum production, chest congestion or wheeze; no fevers, chills or sweats; no chest pain/pressure or palpitations; s/p antibiotics for UTI; renal function improved/stable with resolved hypernatremia; glucose well controlled; leg swelling persists    REVIEW OF SYSTEMS:  Constitutional: As per interval history  HEENT: Within normal limits  CV: As per interval history  Resp: As per interval history  GI: Within normal limits   : Within normal limits  Musculoskeletal: As per interval history  Skin: Within normal limits  Neurological: Not confused  Psychiatric: Within normal limits  Endocrine: Within normal limits  Hematologic/Lymphatic: Within normal limits  Allergic/Immunologic: Within normal limits    MEDICATIONS:     Pulmonary "  ALBUTerol/ipratropium for Nebulization 3 milliLiter(s) Nebulizer every 6 hours  buDESOnide   0.5 milliGRAM(s) Respule 0.5 milliGRAM(s) Inhalation every 12 hours      Anti-microbials:      Cardiovascular:  diltiazem    milliGRAM(s) Oral daily  furosemide    Tablet 40 milliGRAM(s) Oral daily  metoprolol     tartrate 25 milliGRAM(s) Oral two times a day      Other:  acetaminophen   Tablet. 650 milliGRAM(s) Oral every 6 hours PRN  ascorbic acid 500 milliGRAM(s) Oral daily  atorvastatin 10 milliGRAM(s) Oral at bedtime  clopidogrel Tablet 75 milliGRAM(s) Oral daily  docusate sodium 100 milliGRAM(s) Oral three times a day  ferrous    sulfate 325 milliGRAM(s) Oral daily  influenza   Vaccine 0.5 milliLiter(s) IntraMuscular once  Nephrocaps 1 Capsule(s) Oral daily  oxyCODONE    IR 5 milliGRAM(s) Oral every 6 hours PRN  polyethylene glycol 3350 17 Gram(s) Oral daily  QUEtiapine 12.5 milliGRAM(s) Oral <User Schedule>  senna 2 Tablet(s) Oral at bedtime        OBJECTIVE:        I&O's Detail    31 Oct 2017 07:01  -  2017 07:00  --------------------------------------------------------  IN:    Oral Fluid: 1440 mL  Total IN: 1440 mL    OUT:    Voided: 1950 mL  Total OUT: 1950 mL    Total NET: -510 mL      2017 07:01  -  2017 15:20  --------------------------------------------------------  IN:    Oral Fluid: 720 mL  Total IN: 720 mL    OUT:    Voided: 450 mL  Total OUT: 450 mL    Total NET: 270 mL       Daily Weight in k.1 (2017 08:05)    PHYSICAL EXAM:       ICU Vital Signs Last 24 Hrs  T(C): 36.6 (2017 14:47), Max: 37 (31 Oct 2017 21:05)  T(F): 97.9 (2017 14:47), Max: 98.6 (31 Oct 2017 21:05)  HR: 94 (2017 14:47) (64 - 94)  BP: 119/73 (2017 14:47) (119/73 - 148/76)  BP(mean): --  ABP: --  ABP(mean): --  RR: 17 (2017 14:47) (17 - 18)  SpO2: 96% (2017 14:47) (96% - 100%) on 4lpm    General: Awake and alert. Cooperative. No distress. Appears stated age 	  HEENT:   Atraumatic. Normocephalic. Anicteric. Normal oral mucosa, PERRL, EOMI. Left eye subconjunctival hemorrhage resolving  Neck: Supple. Trachea midline. Thyroid without enlargement/tenderness/nodules. No carotid bruit. No JVD	  Cardiovascular: Irregularly irregular rate and rhythm. S1 S2 normal. II/VI systolic murmur  Respiratory: Respirations unlabored. Decreased breath sounds at bases with dullness. Kyphoscoliosis  Abdomen: Soft. Non-tender. Non-distended. No organomegaly. No masses. Normal bowel sounds	  Extremities: Warm to touch. No clubbing or cyanosis. Moderate lower extremity edema  Pulses: 2+ peripheral pulses all extremities	  Skin: Left upper chest wall pacemaker generator surgical incision C/D/I  Lymph Nodes: Cervical, supraclavicular and axillary nodes normal  Neurological: Awake and alert. Moving all extremities. A and O x 3  Psychiatry: Calm      LABS:                        9.3    10.00 )-----------( 259      ( 2017 12:52 )             30.0                         8.4    6.7   )-----------( 232      ( 2017 01:55 )             26.1     10-31    139  |  99  |  39<H>  ----------------------------<  168<H>  4.6   |  27  |  1.39<H>    10-30    137  |  99  |  42<H>  ----------------------------<  105<H>  4.5   |  31  |  1.38<H>    Ca      8.4      10-31    Ca      8.6      10-30    Phos    3.0     10-31      Mg       2.0     10-31    Mg       1.7     10-28      PT/INR - ( 2017 12:52 )   PT: 19.8 sec;   INR: 1.73 ratio       PTT - ( 2017 10:22 )  PTT:106.8 sec    ABG - ( 31 Oct 2017 12:02 )  pH: 7.39  /  pCO2: 55    /  pO2: 89    / HCO3: 32    / Base Excess: 6.8   /  SaO2: 97        ABG - ( 25 Oct 2017 06:51 )  pH: 7.34  /  pCO2: 50    /  pO2: 91    / HCO3: 26    / Base Excess: .8    /  SaO2: 98        ABG - ( 23 Oct 2017 17:58 )  pH: 7.34  /  pCO2: 49    /  pO2: 106   / HCO3: 26    / Base Excess: .5    /  SaO2: 99        ABG - ( 20 Oct 2017 06:32 )  pH: 7.28  /  pCO2: 59    /  pO2: 198   / HCO3: 27    / Base Excess: .4    /  SaO2: 100       ABG - ( 19 Oct 2017 07:20 )  pH: 7.25  /  pCO2: 60    /  pO2: 151   / HCO3: 26    / Base Excess: -1.4  /  SaO2: 99        ABG - ( 18 Oct 2017 14:35 )  pH: 7.30  /  pCO2: 58    /  pO2: 143   / HCO3: 28    / Base Excess: 1.3   /  SaO2: 100       < from: Transthoracic Echocardiogram (10.30.17 @ 13:44) >    Patient name: OSCAR LOPEZ  YOB: 1929   Age: 87 (F)   MR#: [id number]  Study Date: 10/30/2017  [facility]  Study quality: Technically fair  Referring Physician: Ronda Hilliard MD  Blood Pressure: 111/62 mmHg  Height: 160 cm  Weight: 63 kg  BSA: 1.7 m2  ------------------------------------------------------------------------  PROCEDURE: Transthoracic echocardiogram with 2-D, M-Mode  and complete spectral and color flow Doppler.  INDICATION: Unspecified atrial fibrillation (I48.91),  Shortness of breath (R06.02)  ------------------------------------------------------------------------  Dimensions:    Normal Values:  LA:     6.4    2.0 - 4.0 cm  Ao:     3.6    2.0 - 3.8 cm  SEPTUM: 1.4    0.6 - 1.2 cm  PWT:    1.2    0.6 - 1.1 cm  LVIDd:  4.0    3.0 - 5.6 cm  LVIDs:  2.7    1.8 - 4.0 cm  Derived variables:  LVMI: 112 g/m2  RWT: 0.60  Fractional short: 33 %  EF (Visual Estimate): 50 %  Doppler Peak Velocity (m/sec): AoV=2.0  ------------------------------------------------------------------------  Observations:  Mitral Valve: Mitral annular calcification and calcified  mitral leaflets with decreased diastolic opening.  Mild-moderate mitral regurgitation. Mean transmitral valve  gradient equals 5 mm Hg, consistent with mild -moderate  mitral stenosis.  Aortic Valve/Aorta: Transcatheter aortic valve replacement  is not well visualized. Peak transaortic valve gradient  equals 16 mm Hg, mean transaortic valve gradient equals 7  mm Hg, which is probably normal in the presence of a  transcatheter aortic valve replacement. Mild paravalvular  aortic regurgitation.  Peak left ventricular outflow tract  gradient equals 3 mm Hg, mean gradient is equal to 2 mm Hg,  LVOT velocity time integral equals 18 cm.  Aortic Root: 3.6 cm.  Left Atrium: Severely dilated left atrium.  LA volume index  = 70 cc/m2.  Left Ventricle: Grossly mild global left ventricular  systolic function. Patient in atrial fibrillation; LV  ejection fraction varies with R-R interval. Septal motion  consistent with conduction defect. Moderate concentric left  ventricular hypertrophy.  Right Heart: Severe right atrial enlargement. Right  ventricular enlargement with decreased right ventricular  systolic function. Normal tricuspid valve. Moderate  tricuspid regurgitation. Pulmonic valve not well  visualized, probably normal. Minimal pulmonic  regurgitation.  Pericardium/Pleura: Normal pericardium with no pericardial  effusion.  Hemodynamic: Estimated right atrial pressure is 8 mm Hg.  Estimated right ventricular systolic pressure equals 53 mm  Hg, assuming right atrial pressure equals 8 mm Hg,  consistent with moderate pulmonary hypertension.  ------------------------------------------------------------------------  Conclusions:  1. Mitral annular calcification and calcified mitral  leaflets with decreased diastolic opening. Mild-moderate  mitral regurgitation. Mean transmitral valve gradient  equals 5 mm Hg, consistent with mild -moderate mitral  stenosis.  2. Transcatheter aortic valve replacement is not well  visualized. Peak transaortic valve gradient equals 16 mm  Hg, mean transaortic valve gradient equals 7 mm Hg, which  is probably normal in the presence of a transcatheter  aortic valve replacement. Mild paravalvular aortic  regurgitation.  3. Grossly mild global left ventricular systolic function.  Patient in atrial fibrillation; LV ejection fraction varies  with R-R interval. Septal motion consistent with conduction  defect.  4. Right ventricular enlargement with decreased right  ventricular systolic function.  5. Normal tricuspid valve. Moderate tricuspid  regurgitation.  ------------------------------------------------------------------------  Confirmed on  10/30/2017 - 15:48:14 by Tony Zamora M.D.  ------------------------------------------------------------------------    < end of copied text >  ---------------------------------------------------------------------------------------------------------  MICROBIOLOGY:     Color: Yellow / Appearance: SL Turbid / S.012 / pH: x  Gluc: x / Ketone: Negative  / Bili: Negative / Urobili: Negative   Blood: x / Protein: 30 mg/dL / Nitrite: Negative   Leuk Esterase: Large / RBC: 0-2 /HPF / WBC 26-50 /HPF   Sq Epi: x / Non Sq Epi: OCC /HPF / Bacteria: Few /HPF    Culture - Urine (10.18.17 @ 00:32)    Specimen Source: .Urine Catheterized    Culture Results:   >100,000 CFU/ml Klebsiella pneumoniae    Culture - Blood (10.17.17 @ 15:38)    Specimen Source: .Blood Blood-Peripheral    Culture Results:   No growth to date.      RADIOLOGY:  [x] Chest radiographs reviewed and interpreted by me    < from: Xray Chest 1 View AP- PORTABLE-Urgent (10.30.17 @ 20:16) >    EXAM:  CHEST-PORTABLE URGENT                          PROCEDURE DATE:  10/30/2017      INTERPRETATION:  CLINICAL INDICATION: Status post pacemaker placement    TECHNIQUE: Frontal view of the chest dated 10/30/2017    COMPARISON: X-Ray of the chest dated 10/27/2017    FINDINGS:  Right pleural effusion, unchanged from prior study. No pneumothorax   noted. Cardiomegaly. Left-sided cardiac device. Status post TAVR.   Degenerative changes of the thoracic spine.    IMPRESSION:  Right pleural effusion, unchanged from prior study.      SYED BUTLER M.D., RADIOLOGY RESIDENT  This document has been electronically signed.  CORRINE ESCOBAR M.D., ATTENDING RADIOLOGIST  This document has been electronically signed. Oct 31 2017  2:00PM     < end of copied text >  ---------------------------------------------------------------------------------------------------------  < from: CT Chest No Cont (10.24.17 @ 12:18) >    EXAM:  CT CHEST                          PROCEDURE DATE:  10/24/2017      INTERPRETATION:  CLINICAL INFORMATION: Acute renal failure due to   congestive heart failure. Chest congestion. Wheezing.    COMPARISON: CT chest from 10/17/2017.    PROCEDURE:   CT of the Chest was performed without intravenous contrast.  Sagittal and coronal reformats were performed.      FINDINGS:    CHEST:     LUNGS AND LARGE AIRWAYS: Patent central airways. Bilateral interlobular   septal thickening and bibasilar compressive atelectasis. Slight increased   aeration of the right lower lobe. 2 mm right upper lobe pulmonary nodule,   unchanged since .    PLEURA: Multiple loculated small to moderate right pleural effusion;   loculated pleural effusion along the fissure. Small left pleural effusion.    VESSELS: Atherosclerosis of thoracic aorta and coronary arteries.     HEART: Heart size is enlarged. Left atrial wall calcification. Mitral   valve annular calcification. Status post transcatheter aorticvalve   repair. No pericardial effusion.    MEDIASTINUM AND CANDI: No lymphadenopathy.    CHEST WALL AND LOWER NECK: Surgical clips on the right chest wall/axilla.   Extensive subcutaneous edema.    VISUALIZED UPPER ABDOMEN: Multiple bilateral renal cysts; interval growth   of left renal cysts compared to CT abdomen pelvis from 2012.   Descending colon diverticulosis.    BONES: Degenerative changes of spine.      IMPRESSION:     Multiloculated small to moderate right pleural effusions with right   basilar compressive atelectasis, with slight increased aeration of the   right lower lobe.  Left small pleural effusion with compressive atelectasis, grossly   unchanged.  Interlobular septal thickening consistent with pulmonary edema.  Cardiomegaly.      MIHIR SIBLEY M.D., RADIOLOGY RESIDENT  This document has been electronically signed.  BLANK WONG M.D. ATTENDING RADIOLOGIST  This document has been electronically signed. Oct 24 2017  3:23PM      < end of copied text >  ---------------------------------------------------------------------------------------------------------  < from: US Renal (10.20.17 @ 14:28) >    EXAM:  US KIDNEY(S)                          PROCEDURE DATE:  10/20/2017      INTERPRETATION:  CLINICAL INFORMATION: NANCY    COMPARISON: CT abdomen 2012    TECHNIQUE: Sonography of the kidneys and bladder.     FINDINGS:    Right kidney: 9.2 cm. echogenic cortex. No renal mass, hydronephrosis or   calculi. Multiple simple cysts the largest is at the upper pole and   measures 2.7 x 2.9 x 2.3 cm.    Left kidney:  10.1 cm. the genic cortex. No renal mass, hydronephrosis or   calculi. Multiple cysts. The largest at the upper pole measures 2.0 x 2.2   x 2.4 cm. This has some internal complexity not well evaluated. Recommend   follow-up in3- 6 months to assess for change.    Urinary bladder: Collapsed around a Gautam catheter limiting evaluation.    IMPRESSION:     Increased renal cortical echogenicity bilaterally, which can be seen in   medical renal disease.    Left slightly complex renal cyst not well evaluated on this exam.   Consider follow-up exam in 3-6 months to assess for change.    MADHU FAULKNER M.D., ATTENDING RADIOLOGIST  This document has been electronically signed. Oct 20 2017  2:01PM     < end of copied text >  ---------------------------------------------------------------------------------------------------------  < from: CT Chest No Cont (10.17.17 @ 21:38) >    EXAM:  CT CHEST                          PROCEDURE DATE:  10/17/2017      INTERPRETATION:  Clinical information: Evaluate for pneumonia. Exam is   compared to previous study of 4/3/2012.    CT scan of the chest was obtained without administration of intravenous   contrast.    Surgical clips are noted in the right axilla.    No hilar and/or mediastinal adenopathy is noted.     Heart is markedly enlarged in size. Calcification the coronary arteries   is noted. Patient is status post TAVR.No pericardial effusion is noted.   Main pulmonary artery is dilated and measures 3.6 cm.     No endobronchial lesions are noted. Compressive atelectasis is noted   involving portions of both lower lobes. This is secondary to small   bilateral pleural effusions, right more than left.    Below the diaphragm, visualized portions of the abdomen demonstrate   low-attenuation within both kidneys which are too small to be adequately   characterized on this exam.     Degenerative changes of the spine are noted. Subcutaneous edema is noted.    Impression: There is no pneumonia.    Bilateral pleural effusions, right more than left.    MAYI MORALES M.D., ATTENDING RADIOLOGIST  This document has been electronically signed. Oct 18 2017  9:24AM      < end of copied text >  ---------------------------------------------------------------------------------------------------------  < from: CT Head No Cont (10.17.17 @ 21:38) >    EXAM:  CT BRAIN                          PROCEDURE DATE:  10/17/2017      INTERPRETATION:  HISTORY: Uterine cancer. Altered mental status.    COMPARISON: MRI brain performed 2012.    IMPRESSION:     No acute intracranial bleeding, mass effect, or evidence of an acute   territorial infarct.    ADRIEN MORRIS M.D., RADIOLOGY RESIDENT  This document has been electronically signed.  VINI METZGER M.D., ATTENDING RADIOLOGIST  This document has been electronically signed. Oct 18 2017  9:43AM      < end of copied text >  ---------------------------------------------------------------------------------------------------------

## 2017-11-01 NOTE — DISCHARGE NOTE ADULT - SECONDARY DIAGNOSIS.
AF (atrial fibrillation) Acute on chronic kidney failure UTI (urinary tract infection) Tachy-everett syndrome Acute on chronic diastolic CHF (congestive heart failure)

## 2017-11-22 ENCOUNTER — APPOINTMENT (OUTPATIENT)
Dept: ELECTROPHYSIOLOGY | Facility: CLINIC | Age: 82
End: 2017-11-22

## 2017-12-19 PROCEDURE — 83550 IRON BINDING TEST: CPT

## 2017-12-19 PROCEDURE — 87186 SC STD MICRODIL/AGAR DIL: CPT

## 2017-12-19 PROCEDURE — 84484 ASSAY OF TROPONIN QUANT: CPT

## 2017-12-19 PROCEDURE — 97530 THERAPEUTIC ACTIVITIES: CPT

## 2017-12-19 PROCEDURE — 97161 PT EVAL LOW COMPLEX 20 MIN: CPT

## 2017-12-19 PROCEDURE — 76775 US EXAM ABDO BACK WALL LIM: CPT

## 2017-12-19 PROCEDURE — 87040 BLOOD CULTURE FOR BACTERIA: CPT

## 2017-12-19 PROCEDURE — 82746 ASSAY OF FOLIC ACID SERUM: CPT

## 2017-12-19 PROCEDURE — 93306 TTE W/DOPPLER COMPLETE: CPT

## 2017-12-19 PROCEDURE — 83735 ASSAY OF MAGNESIUM: CPT

## 2017-12-19 PROCEDURE — 84300 ASSAY OF URINE SODIUM: CPT

## 2017-12-19 PROCEDURE — 82728 ASSAY OF FERRITIN: CPT

## 2017-12-19 PROCEDURE — 97116 GAIT TRAINING THERAPY: CPT

## 2017-12-19 PROCEDURE — P9040: CPT

## 2017-12-19 PROCEDURE — 82962 GLUCOSE BLOOD TEST: CPT

## 2017-12-19 PROCEDURE — 85014 HEMATOCRIT: CPT

## 2017-12-19 PROCEDURE — 82435 ASSAY OF BLOOD CHLORIDE: CPT

## 2017-12-19 PROCEDURE — 86850 RBC ANTIBODY SCREEN: CPT

## 2017-12-19 PROCEDURE — 86334 IMMUNOFIX E-PHORESIS SERUM: CPT

## 2017-12-19 PROCEDURE — 36430 TRANSFUSION BLD/BLD COMPNT: CPT

## 2017-12-19 PROCEDURE — C1786: CPT

## 2017-12-19 PROCEDURE — 84155 ASSAY OF PROTEIN SERUM: CPT

## 2017-12-19 PROCEDURE — 71250 CT THORAX DX C-: CPT

## 2017-12-19 PROCEDURE — 71046 X-RAY EXAM CHEST 2 VIEWS: CPT

## 2017-12-19 PROCEDURE — 82553 CREATINE MB FRACTION: CPT

## 2017-12-19 PROCEDURE — 99285 EMERGENCY DEPT VISIT HI MDM: CPT | Mod: 25

## 2017-12-19 PROCEDURE — 99153 MOD SED SAME PHYS/QHP EA: CPT

## 2017-12-19 PROCEDURE — 82947 ASSAY GLUCOSE BLOOD QUANT: CPT

## 2017-12-19 PROCEDURE — 86923 COMPATIBILITY TEST ELECTRIC: CPT

## 2017-12-19 PROCEDURE — 94660 CPAP INITIATION&MGMT: CPT

## 2017-12-19 PROCEDURE — 84100 ASSAY OF PHOSPHORUS: CPT

## 2017-12-19 PROCEDURE — 82550 ASSAY OF CK (CPK): CPT

## 2017-12-19 PROCEDURE — C1898: CPT

## 2017-12-19 PROCEDURE — 97110 THERAPEUTIC EXERCISES: CPT

## 2017-12-19 PROCEDURE — 82436 ASSAY OF URINE CHLORIDE: CPT

## 2017-12-19 PROCEDURE — 33208 INSRT HEART PM ATRIAL & VENT: CPT

## 2017-12-19 PROCEDURE — 70450 CT HEAD/BRAIN W/O DYE: CPT

## 2017-12-19 PROCEDURE — 80053 COMPREHEN METABOLIC PANEL: CPT

## 2017-12-19 PROCEDURE — 93971 EXTREMITY STUDY: CPT

## 2017-12-19 PROCEDURE — 84295 ASSAY OF SERUM SODIUM: CPT

## 2017-12-19 PROCEDURE — 84443 ASSAY THYROID STIM HORMONE: CPT

## 2017-12-19 PROCEDURE — 85027 COMPLETE CBC AUTOMATED: CPT

## 2017-12-19 PROCEDURE — 87205 SMEAR GRAM STAIN: CPT

## 2017-12-19 PROCEDURE — 82570 ASSAY OF URINE CREATININE: CPT

## 2017-12-19 PROCEDURE — 85610 PROTHROMBIN TIME: CPT

## 2017-12-19 PROCEDURE — C1769: CPT

## 2017-12-19 PROCEDURE — 85045 AUTOMATED RETICULOCYTE COUNT: CPT

## 2017-12-19 PROCEDURE — 86900 BLOOD TYPING SEROLOGIC ABO: CPT

## 2017-12-19 PROCEDURE — 36600 WITHDRAWAL OF ARTERIAL BLOOD: CPT

## 2017-12-19 PROCEDURE — 82803 BLOOD GASES ANY COMBINATION: CPT

## 2017-12-19 PROCEDURE — 85730 THROMBOPLASTIN TIME PARTIAL: CPT

## 2017-12-19 PROCEDURE — 84165 PROTEIN E-PHORESIS SERUM: CPT

## 2017-12-19 PROCEDURE — C1892: CPT

## 2017-12-19 PROCEDURE — 93005 ELECTROCARDIOGRAM TRACING: CPT

## 2017-12-19 PROCEDURE — 80048 BASIC METABOLIC PNL TOTAL CA: CPT

## 2017-12-19 PROCEDURE — 83605 ASSAY OF LACTIC ACID: CPT

## 2017-12-19 PROCEDURE — 71045 X-RAY EXAM CHEST 1 VIEW: CPT

## 2017-12-19 PROCEDURE — 86901 BLOOD TYPING SEROLOGIC RH(D): CPT

## 2017-12-19 PROCEDURE — 81001 URINALYSIS AUTO W/SCOPE: CPT

## 2017-12-19 PROCEDURE — 84132 ASSAY OF SERUM POTASSIUM: CPT

## 2017-12-19 PROCEDURE — 87086 URINE CULTURE/COLONY COUNT: CPT

## 2017-12-19 PROCEDURE — 82330 ASSAY OF CALCIUM: CPT

## 2017-12-19 PROCEDURE — 99152 MOD SED SAME PHYS/QHP 5/>YRS: CPT

## 2017-12-19 PROCEDURE — 82272 OCCULT BLD FECES 1-3 TESTS: CPT

## 2017-12-19 PROCEDURE — 82607 VITAMIN B-12: CPT

## 2017-12-19 PROCEDURE — 94640 AIRWAY INHALATION TREATMENT: CPT

## 2017-12-19 PROCEDURE — 84133 ASSAY OF URINE POTASSIUM: CPT

## 2017-12-22 ENCOUNTER — INPATIENT (INPATIENT)
Facility: HOSPITAL | Age: 82
LOS: 13 days | Discharge: HOME HEALTH | DRG: 291 | End: 2018-01-05
Attending: INTERNAL MEDICINE | Admitting: INTERNAL MEDICINE
Payer: MEDICARE

## 2017-12-22 VITALS — DIASTOLIC BLOOD PRESSURE: 80 MMHG | HEART RATE: 89 BPM | SYSTOLIC BLOOD PRESSURE: 140 MMHG | RESPIRATION RATE: 24 BRPM

## 2017-12-22 DIAGNOSIS — G93.40 ENCEPHALOPATHY, UNSPECIFIED: ICD-10-CM

## 2017-12-22 DIAGNOSIS — R79.1 ABNORMAL COAGULATION PROFILE: ICD-10-CM

## 2017-12-22 DIAGNOSIS — I50.33 ACUTE ON CHRONIC DIASTOLIC (CONGESTIVE) HEART FAILURE: ICD-10-CM

## 2017-12-22 DIAGNOSIS — I48.91 UNSPECIFIED ATRIAL FIBRILLATION: ICD-10-CM

## 2017-12-22 DIAGNOSIS — R06.00 DYSPNEA, UNSPECIFIED: ICD-10-CM

## 2017-12-22 DIAGNOSIS — J96.92 RESPIRATORY FAILURE, UNSPECIFIED WITH HYPERCAPNIA: ICD-10-CM

## 2017-12-22 DIAGNOSIS — R79.89 OTHER SPECIFIED ABNORMAL FINDINGS OF BLOOD CHEMISTRY: ICD-10-CM

## 2017-12-22 DIAGNOSIS — N17.9 ACUTE KIDNEY FAILURE, UNSPECIFIED: ICD-10-CM

## 2017-12-22 LAB
ALBUMIN SERPL ELPH-MCNC: 3.1 G/DL — LOW (ref 3.3–5)
ALP SERPL-CCNC: 117 U/L — SIGNIFICANT CHANGE UP (ref 40–120)
ALT FLD-CCNC: 418 U/L RC — HIGH (ref 10–45)
ANION GAP SERPL CALC-SCNC: 13 MMOL/L — SIGNIFICANT CHANGE UP (ref 5–17)
APPEARANCE UR: ABNORMAL
APTT BLD: 34.9 SEC — SIGNIFICANT CHANGE UP (ref 27.5–37.4)
AST SERPL-CCNC: 312 U/L — HIGH (ref 10–40)
BACTERIA # UR AUTO: ABNORMAL /HPF
BASE EXCESS BLDA CALC-SCNC: 4.5 MMOL/L — HIGH (ref -2–2)
BASE EXCESS BLDV CALC-SCNC: 4.9 MMOL/L — HIGH (ref -2–2)
BASOPHILS # BLD AUTO: 0 K/UL — SIGNIFICANT CHANGE UP (ref 0–0.2)
BASOPHILS NFR BLD AUTO: 0.1 % — SIGNIFICANT CHANGE UP (ref 0–2)
BILIRUB SERPL-MCNC: 0.9 MG/DL — SIGNIFICANT CHANGE UP (ref 0.2–1.2)
BILIRUB UR-MCNC: ABNORMAL
BUN SERPL-MCNC: 73 MG/DL — HIGH (ref 7–23)
CA-I SERPL-SCNC: 1.19 MMOL/L — SIGNIFICANT CHANGE UP (ref 1.12–1.3)
CALCIUM SERPL-MCNC: 9 MG/DL — SIGNIFICANT CHANGE UP (ref 8.4–10.5)
CHLORIDE BLDV-SCNC: 98 MMOL/L — SIGNIFICANT CHANGE UP (ref 96–108)
CHLORIDE SERPL-SCNC: 94 MMOL/L — LOW (ref 96–108)
CK MB BLD-MCNC: 7.7 % — HIGH (ref 0–3.5)
CK MB CFR SERPL CALC: 9.2 NG/ML — HIGH (ref 0–3.8)
CK SERPL-CCNC: 119 U/L — SIGNIFICANT CHANGE UP (ref 25–170)
CO2 BLDA-SCNC: 32 MMOL/L — HIGH (ref 22–30)
CO2 BLDV-SCNC: 37 MMOL/L — HIGH (ref 22–30)
CO2 SERPL-SCNC: 31 MMOL/L — SIGNIFICANT CHANGE UP (ref 22–31)
COLOR SPEC: YELLOW — SIGNIFICANT CHANGE UP
CREAT SERPL-MCNC: 2.89 MG/DL — HIGH (ref 0.5–1.3)
DIFF PNL FLD: NEGATIVE — SIGNIFICANT CHANGE UP
EOSINOPHIL # BLD AUTO: 0 K/UL — SIGNIFICANT CHANGE UP (ref 0–0.5)
EOSINOPHIL NFR BLD AUTO: 0.1 % — SIGNIFICANT CHANGE UP (ref 0–6)
EPI CELLS # UR: ABNORMAL /HPF
GAS PNL BLDA: SIGNIFICANT CHANGE UP
GAS PNL BLDV: 137 MMOL/L — SIGNIFICANT CHANGE UP (ref 136–145)
GAS PNL BLDV: SIGNIFICANT CHANGE UP
GLUCOSE BLDV-MCNC: 128 MG/DL — HIGH (ref 70–99)
GLUCOSE SERPL-MCNC: 132 MG/DL — HIGH (ref 70–99)
GLUCOSE UR QL: NEGATIVE — SIGNIFICANT CHANGE UP
HCO3 BLDA-SCNC: 30 MMOL/L — HIGH (ref 21–29)
HCO3 BLDV-SCNC: 34 MMOL/L — HIGH (ref 21–29)
HCT VFR BLD CALC: 33.4 % — LOW (ref 34.5–45)
HCT VFR BLDA CALC: 31 % — LOW (ref 39–50)
HGB BLD CALC-MCNC: 10 G/DL — LOW (ref 11.5–15.5)
HGB BLD-MCNC: 10.3 G/DL — LOW (ref 11.5–15.5)
HYALINE CASTS # UR AUTO: ABNORMAL
INR BLD: 4.55 RATIO — HIGH (ref 0.88–1.16)
KETONES UR-MCNC: NEGATIVE — SIGNIFICANT CHANGE UP
LACTATE BLDV-MCNC: 2 MMOL/L — SIGNIFICANT CHANGE UP (ref 0.7–2)
LEUKOCYTE ESTERASE UR-ACNC: ABNORMAL
LYMPHOCYTES # BLD AUTO: 0.8 K/UL — LOW (ref 1–3.3)
LYMPHOCYTES # BLD AUTO: 6.9 % — LOW (ref 13–44)
MCHC RBC-ENTMCNC: 30.8 GM/DL — LOW (ref 32–36)
MCHC RBC-ENTMCNC: 31.1 PG — SIGNIFICANT CHANGE UP (ref 27–34)
MCV RBC AUTO: 101 FL — HIGH (ref 80–100)
MONOCYTES # BLD AUTO: 0.8 K/UL — SIGNIFICANT CHANGE UP (ref 0–0.9)
MONOCYTES NFR BLD AUTO: 7.2 % — SIGNIFICANT CHANGE UP (ref 2–14)
NEUTROPHILS # BLD AUTO: 9.8 K/UL — HIGH (ref 1.8–7.4)
NEUTROPHILS NFR BLD AUTO: 85.6 % — HIGH (ref 43–77)
NITRITE UR-MCNC: NEGATIVE — SIGNIFICANT CHANGE UP
NT-PROBNP SERPL-SCNC: HIGH PG/ML (ref 0–300)
PCO2 BLDA: 57 MMHG — HIGH (ref 32–46)
PCO2 BLDV: 88 MMHG — HIGH (ref 35–50)
PH BLDA: 7.35 — SIGNIFICANT CHANGE UP (ref 7.35–7.45)
PH BLDV: 7.22 — LOW (ref 7.35–7.45)
PH UR: 5.5 — SIGNIFICANT CHANGE UP (ref 5–8)
PLATELET # BLD AUTO: 251 K/UL — SIGNIFICANT CHANGE UP (ref 150–400)
PO2 BLDA: 186 MMHG — HIGH (ref 74–108)
PO2 BLDV: 34 MMHG — SIGNIFICANT CHANGE UP (ref 25–45)
POTASSIUM BLDV-SCNC: 5.1 MMOL/L — HIGH (ref 3.5–5)
POTASSIUM SERPL-MCNC: 5.4 MMOL/L — HIGH (ref 3.5–5.3)
POTASSIUM SERPL-SCNC: 5.4 MMOL/L — HIGH (ref 3.5–5.3)
PROT SERPL-MCNC: 6.4 G/DL — SIGNIFICANT CHANGE UP (ref 6–8.3)
PROT UR-MCNC: 30 MG/DL
PROTHROM AB SERPL-ACNC: 51.1 SEC — HIGH (ref 9.8–12.7)
RAPID RVP RESULT: SIGNIFICANT CHANGE UP
RBC # BLD: 3.3 M/UL — LOW (ref 3.8–5.2)
RBC # FLD: 18.2 % — HIGH (ref 10.3–14.5)
RBC CASTS # UR COMP ASSIST: ABNORMAL /HPF (ref 0–2)
SAO2 % BLDA: 100 % — HIGH (ref 92–96)
SAO2 % BLDV: 46 % — LOW (ref 67–88)
SODIUM SERPL-SCNC: 138 MMOL/L — SIGNIFICANT CHANGE UP (ref 135–145)
SP GR SPEC: 1.02 — SIGNIFICANT CHANGE UP (ref 1.01–1.02)
TROPONIN T SERPL-MCNC: 0.08 NG/ML — HIGH (ref 0–0.06)
TROPONIN T SERPL-MCNC: 0.09 NG/ML — HIGH (ref 0–0.06)
UROBILINOGEN FLD QL: 2
WBC # BLD: 11.4 K/UL — HIGH (ref 3.8–10.5)
WBC # FLD AUTO: 11.4 K/UL — HIGH (ref 3.8–10.5)
WBC UR QL: ABNORMAL /HPF (ref 0–5)

## 2017-12-22 PROCEDURE — 99285 EMERGENCY DEPT VISIT HI MDM: CPT | Mod: 25,GC

## 2017-12-22 PROCEDURE — 71010: CPT | Mod: 26

## 2017-12-22 PROCEDURE — 99223 1ST HOSP IP/OBS HIGH 75: CPT

## 2017-12-22 PROCEDURE — 93010 ELECTROCARDIOGRAM REPORT: CPT

## 2017-12-22 PROCEDURE — 99357: CPT

## 2017-12-22 PROCEDURE — 99356: CPT

## 2017-12-22 RX ORDER — TIOTROPIUM BROMIDE 18 UG/1
1 CAPSULE ORAL; RESPIRATORY (INHALATION) DAILY
Qty: 0 | Refills: 0 | Status: DISCONTINUED | OUTPATIENT
Start: 2017-12-22 | End: 2017-12-26

## 2017-12-22 RX ORDER — ALBUTEROL 90 UG/1
2 AEROSOL, METERED ORAL EVERY 6 HOURS
Qty: 0 | Refills: 0 | Status: DISCONTINUED | OUTPATIENT
Start: 2017-12-22 | End: 2017-12-26

## 2017-12-22 RX ORDER — VANCOMYCIN HCL 1 G
1000 VIAL (EA) INTRAVENOUS ONCE
Qty: 0 | Refills: 0 | Status: COMPLETED | OUTPATIENT
Start: 2017-12-22 | End: 2017-12-22

## 2017-12-22 RX ORDER — METOPROLOL TARTRATE 50 MG
25 TABLET ORAL
Qty: 0 | Refills: 0 | Status: DISCONTINUED | OUTPATIENT
Start: 2017-12-22 | End: 2018-01-03

## 2017-12-22 RX ORDER — AZITHROMYCIN 500 MG/1
500 TABLET, FILM COATED ORAL ONCE
Qty: 0 | Refills: 0 | Status: COMPLETED | OUTPATIENT
Start: 2017-12-22 | End: 2017-12-22

## 2017-12-22 RX ORDER — POLYETHYLENE GLYCOL 3350 17 G/17G
17 POWDER, FOR SOLUTION ORAL DAILY
Qty: 0 | Refills: 0 | Status: DISCONTINUED | OUTPATIENT
Start: 2017-12-22 | End: 2018-01-03

## 2017-12-22 RX ORDER — WARFARIN SODIUM 2.5 MG/1
1 TABLET ORAL
Qty: 0 | Refills: 0 | COMMUNITY

## 2017-12-22 RX ORDER — SODIUM CHLORIDE 9 MG/ML
3 INJECTION INTRAMUSCULAR; INTRAVENOUS; SUBCUTANEOUS ONCE
Qty: 0 | Refills: 0 | Status: COMPLETED | OUTPATIENT
Start: 2017-12-22 | End: 2017-12-22

## 2017-12-22 RX ORDER — SODIUM CHLORIDE 9 MG/ML
1000 INJECTION INTRAMUSCULAR; INTRAVENOUS; SUBCUTANEOUS ONCE
Qty: 0 | Refills: 0 | Status: COMPLETED | OUTPATIENT
Start: 2017-12-22 | End: 2017-12-22

## 2017-12-22 RX ORDER — DILTIAZEM HCL 120 MG
120 CAPSULE, EXT RELEASE 24 HR ORAL DAILY
Qty: 0 | Refills: 0 | Status: DISCONTINUED | OUTPATIENT
Start: 2017-12-22 | End: 2018-01-03

## 2017-12-22 RX ORDER — IPRATROPIUM/ALBUTEROL SULFATE 18-103MCG
3 AEROSOL WITH ADAPTER (GRAM) INHALATION ONCE
Qty: 0 | Refills: 0 | Status: COMPLETED | OUTPATIENT
Start: 2017-12-22 | End: 2017-12-22

## 2017-12-22 RX ORDER — DOCUSATE SODIUM 100 MG
100 CAPSULE ORAL THREE TIMES A DAY
Qty: 0 | Refills: 0 | Status: DISCONTINUED | OUTPATIENT
Start: 2017-12-22 | End: 2018-01-03

## 2017-12-22 RX ORDER — IPRATROPIUM/ALBUTEROL SULFATE 18-103MCG
3 AEROSOL WITH ADAPTER (GRAM) INHALATION ONCE
Qty: 0 | Refills: 0 | Status: DISCONTINUED | OUTPATIENT
Start: 2017-12-22 | End: 2017-12-22

## 2017-12-22 RX ORDER — PIPERACILLIN AND TAZOBACTAM 4; .5 G/20ML; G/20ML
3.38 INJECTION, POWDER, LYOPHILIZED, FOR SOLUTION INTRAVENOUS ONCE
Qty: 0 | Refills: 0 | Status: COMPLETED | OUTPATIENT
Start: 2017-12-22 | End: 2017-12-22

## 2017-12-22 RX ORDER — ATORVASTATIN CALCIUM 80 MG/1
10 TABLET, FILM COATED ORAL AT BEDTIME
Qty: 0 | Refills: 0 | Status: DISCONTINUED | OUTPATIENT
Start: 2017-12-22 | End: 2018-01-03

## 2017-12-22 RX ORDER — BUDESONIDE, MICRONIZED 100 %
0.5 POWDER (GRAM) MISCELLANEOUS
Qty: 0 | Refills: 0 | Status: DISCONTINUED | OUTPATIENT
Start: 2017-12-22 | End: 2017-12-24

## 2017-12-22 RX ORDER — SENNA PLUS 8.6 MG/1
2 TABLET ORAL AT BEDTIME
Qty: 0 | Refills: 0 | Status: DISCONTINUED | OUTPATIENT
Start: 2017-12-22 | End: 2018-01-03

## 2017-12-22 RX ORDER — CLOPIDOGREL BISULFATE 75 MG/1
75 TABLET, FILM COATED ORAL DAILY
Qty: 0 | Refills: 0 | Status: DISCONTINUED | OUTPATIENT
Start: 2017-12-22 | End: 2017-12-29

## 2017-12-22 RX ORDER — SODIUM CHLORIDE 9 MG/ML
1000 INJECTION INTRAMUSCULAR; INTRAVENOUS; SUBCUTANEOUS
Qty: 0 | Refills: 0 | Status: DISCONTINUED | OUTPATIENT
Start: 2017-12-22 | End: 2017-12-23

## 2017-12-22 RX ADMIN — AZITHROMYCIN 250 MILLIGRAM(S): 500 TABLET, FILM COATED ORAL at 16:54

## 2017-12-22 RX ADMIN — Medication 3 MILLILITER(S): at 14:58

## 2017-12-22 RX ADMIN — SODIUM CHLORIDE 2000 MILLILITER(S): 9 INJECTION INTRAMUSCULAR; INTRAVENOUS; SUBCUTANEOUS at 17:10

## 2017-12-22 RX ADMIN — SODIUM CHLORIDE 3 MILLILITER(S): 9 INJECTION INTRAMUSCULAR; INTRAVENOUS; SUBCUTANEOUS at 14:57

## 2017-12-22 RX ADMIN — PIPERACILLIN AND TAZOBACTAM 200 GRAM(S): 4; .5 INJECTION, POWDER, LYOPHILIZED, FOR SOLUTION INTRAVENOUS at 23:56

## 2017-12-22 RX ADMIN — Medication 0.5 MILLIGRAM(S): at 22:23

## 2017-12-22 RX ADMIN — Medication 250 MILLIGRAM(S): at 22:20

## 2017-12-22 NOTE — H&P ADULT - HISTORY OF PRESENT ILLNESS
Ms Dutta is a 89 yo woman with PMHx of right breast CA S/P lumpectomy in 2000, Uterine Ca S/P total hysterectomy in 2012, Skin CA S/P excision of bilateral lower eyelid skin CA, cholecystectomy, Osteoarthritis of bilateral knees, HTN, hyperlipidemia, paroxysmal Afib on coumadin s/p PPM for slow ventricular response, severe aortic stenosis now s/p TAVR April 2015 , who presents with SOB x 1 day. Noted to have dry cough yesterday and productive cough today. Confusion and lethargy over the past few days. Seen by VNS today who noted reduced BS and "water in the lungs" with SOB who advised sending her to ED.     Patient was recently admitted 10/15-11/2 for AMS, NANCY on CKD thought to be pre-renal 2/2 overdiuresis and diarrhea, hypercapneic/hypoxemic respiratory failure 2/2 emphysema? respiratory muscle weakness/kyphoscoliosiss/restrictive lung disease with moderate b/l pleural effusions,  improved with BIPAP, acute on chronic diastolic CHF w/ EF 55%. She was discharged to rehab and then home 12/1.   Since being home she has not returned to her previous independent lifestyle, requires 24/7 care from HHA and family, has had difficulty completing daily tasks, ambulating due to lethargy and SOB. OP providers have increased lasix to current 60mg daily, 80 twice/week but this has not produced an improvement in her REYES/chronic orthopnea which has good and bad days but no clear trajectory.    ROS negative for fever, chills, chest pain, nausea, vomiting, diarrhea, dysuria. ROS + for RUQ discomfort. No melena, hematochezia    HR: 95   BP: 140/80  RR: 24  SpO2: difficult to obtain    CXR with  Small bilateral pleural effusions with adjacent atelectasis.   VBG 7.22/88/2               10.3   11.4  )-----------( 251                 33.4     138  |  94<L>  |  73<H>  ----------------------------<  132<H>  5.4<H>   |  31  |  2.89<H>    AST  312<H>  /  ALT  418<H>          INR: 4.55     BNP 15K    CARDIAC MARKERS ( 22 Dec 2017 19:56 )  x     / 0.08 ng/mL    Given 1L NS, 1g vancomycin, zosyn and admitted to medicine

## 2017-12-22 NOTE — ED ADULT NURSE NOTE - OBJECTIVE STATEMENT
88y female arrived to ED complaining of difficulty breathing, AMS and lethargy x3 days. Patient came from home, A&Ox3 at baseline. 88y female arrived to ED complaining of difficulty breathing, AMS, cough and lethargy x3 days. Patient came from home, A&Ox3 at baseline. Family endorses decreased PO intake yesterday. Patient on BiPAP/CPAP in ED. PMHx COPD (on bipap at home at night), Afib, pacemaker, MVR, HTN. Patient PMHX breast CA s/p lumpectomy.

## 2017-12-22 NOTE — H&P ADULT - PROBLEM SELECTOR PLAN 5
-most likely pre-renal as appears dry, 2/2 aggressive diruesis similar to last admission  -hold lasix, 1L NS today, IVF 100cc/hour over 10 hours per renal consult

## 2017-12-22 NOTE — H&P ADULT - PROBLEM SELECTOR PLAN 2
-multifactorial from HfPEF? restrictive lung disease and emphysema  -on BIPAP now, no improvement after many hours 12/5, increased settings, f.u ABG  -duonebs  -DNR/DNI -multifactorial from HfPEF? restrictive lung disease and emphysema  -on BIPAP now, no improvement after many hours 12/5, ABG at 9pm improved 7.35/57 after 20/8, will reduced to 18/5.  -duonebs  -DNR/DNI

## 2017-12-22 NOTE — CHART NOTE - NSCHARTNOTEFT_GEN_A_CORE
Pt was seen and examined.  Briefly this is a elderly female c hx of HTN mild to moderate MS HTN CKD stage 3 COPD pw likely UTI/Pna and NANCY with respiratory acidosis  Hepatitis    Suggest  -SP 1 liter of NS;  NS 50cc/hr for 10 hours  -IV abx  -Bipap  Full DNR        Sayed Joao  [facility]  [phone]

## 2017-12-22 NOTE — H&P ADULT - ASSESSMENT
Ms Dutta is a 87 yo woman with PMHx of right breast CA S/P lumpectomy in 2000, Uterine Ca S/P total hysterectomy in 2012, Skin CA S/P excision of bilateral lower eyelid skin CA, cholecystectomy, Osteoarthritis of bilateral knees, HTN, hyperlipidemia, paroxysmal Afib on coumadin s/p PPM for slow ventricular response, severe aortic stenosis now s/p TAVR April 2015 , who presents with SOB x 1 day, confusion. Found to have NANCY on CKD, hypercapneic respiratory failure, elevated LFT and INR. Infection is less likely as no clear source.

## 2017-12-22 NOTE — H&P ADULT - NSHPLABSRESULTS_GEN_ALL_CORE
I personally reviewed and interpreted laboratory values  I personally reviewed and interpreted radiology  I personally reviewed and interpreted EKG                            10.3   11.4  )-----------( 251      ( 22 Dec 2017 15:08 )             33.4       12    138  |  94<L>  |  73<H>  ----------------------------<  132<H>  5.4<H>   |  31  |  2.89<H>    Ca    9.0      22 Dec 2017 15:08    TPro  6.4  /  Alb  3.1<L>  /  TBili  0.9  /  DBili  x   /  AST  312<H>  /  ALT  418<H>  /  AlkPhos  117  12          CAPILLARY BLOOD GLUCOSE          PT/INR - ( 22 Dec 2017 15:08 )   PT: 51.1 sec;   INR: 4.55 ratio         PTT - ( 22 Dec 2017 15:08 )  PTT:34.9 sec    Lactate Trend      CARDIAC MARKERS ( 22 Dec 2017 19:56 )  x     / 0.08 ng/mL / x     / x     / x      CARDIAC MARKERS ( 22 Dec 2017 15:08 )  x     / 0.09 ng/mL / 119 U/L / x     / 9.2 ng/mL            Urinalysis Basic - ( 22 Dec 2017 15:08 )    Color: Yellow / Appearance: SL Turbid / S.025 / pH: x  Gluc: x / Ketone: Negative  / Bili: Small / Urobili: 2   Blood: x / Protein: 30 mg/dL / Nitrite: Negative   Leuk Esterase: Large / RBC: 2-5 /HPF / WBC 10-25 /HPF   Sq Epi: x / Non Sq Epi: Many /HPF / Bacteria: Few /HPF

## 2017-12-22 NOTE — ED PROVIDER NOTE - PHYSICAL EXAMINATION
Attending note.  Patient is currently on BiPAP. Patient was slightly lethargic. Skin is now. There is decreased breath sounds bilaterally. There's no wheezes rales or rhonchi. Heart regular and rhythm without murmur.  Abdomen is soft and nontender without guarding or rebound. He denies any edema of the ankles bilaterally. There is no CVA tenderness. Patient is moving all extremities. Neurologic exam is limited.

## 2017-12-22 NOTE — ED PROVIDER NOTE - OBJECTIVE STATEMENT
88y Female PMH COPD, Afib with pacemaker, MVR, HTN, hx of breast ca s/p lumopectomy (not on chemotherapy recently) uses BiPAP at night. complaining of difficulty breathing, lethargy, AMS x 2-3 days. Normally A&Ox3, lives at home, has a visiting nurse. Given 1 combi treatment by EMS, on O2 at home. Capnography, 60. Afib with LBBB on EKG. Here recently for similar symptoms recently. Uses BiPAP at home, was able to get 8hours last night, although she does not tolerate it. No fevers. Decreased appetite yesterday, did not want to eat.    PCP: Dr. Joel Goldberg  Pulmonologist: Dr. Molly Soto / Tommy Reyes 88y Female PMH COPD, Afib with pacemaker, MVR, HTN, hx of breast ca s/p lumopectomy (not on chemotherapy recently) uses BiPAP at night. complaining of difficulty breathing, lethargy, AMS x 2-3 days. Normally A&Ox3, lives at home, has a visiting nurse. Given 1 combi treatment by EMS, on O2 at home. Capnography, 60. Afib with LBBB on EKG. Here recently for similar symptoms recently. Uses BiPAP at home, was able to get 8hours last night, although she does not tolerate it. No fevers. Decreased appetite yesterday, did not want to eat. Associated with cough, unclear whether this is related to taking ACE inhibitor or not.    PCP: Dr. Joel Goldberg  Pulmonologist: Dr. Molly Soto / Tommy Reyes 88y Female PMH COPD, Afib with pacemaker, MVR, HTN, hx of breast ca s/p lumopectomy (not on chemotherapy recently) uses BiPAP at night. complaining of difficulty breathing, lethargy, AMS x 2-3 days. Normally A&Ox3, lives at home, has a visiting nurse. Given 1 combi treatment by EMS, on O2 at home. Capnography, 60. Afib with LBBB on EKG. Here recently for similar symptoms recently. Uses BiPAP at home, was able to get 8hours last night, although she does not tolerate it. No fevers. Decreased appetite yesterday, did not want to eat. Associated with cough, unclear whether this is related to taking ACE inhibitor or not.      PCP: Dr. Joel Goldberg  Pulmonologist: Dr. Molly Soto / Tommy Reyes  Attending note. Patient was seen in critical care room A.  agree with the above. Patient is not able to provide history. Patient is currently on BiPAP. Patient change in mental status across into the son yesterday. Patient did not eat today. Patient is single episode recently which required admission. Patient was recently put on BiPAP. 88y Female PMH COPD, Afib with pacemaker, MVR, HTN, hx of breast ca s/p lumpectomy (not on chemotherapy recently) uses BiPAP at night. complaining of difficulty breathing, lethargy, AMS x 2-3 days. Normally A&Ox3, lives at home, has a visiting nurse. Given 1 combi treatment by EMS, on O2 at home, Afib with LBBB on EKG by EMS. Recently admitted for similar symptoms, continued with BiPAP. Uses BiPAP at home, was able to get 8hours last night, although she does not tolerate it. No fevers. Decreased appetite yesterday, did not want to eat. Associated with cough, unclear whether this is related to taking ACE inhibitor or not.      PCP: Dr. Joel Goldberg  Pulmonologist: Dr. Molly Soto / Tommy Reyes  Attending note. Patient was seen in critical care room A.  agree with the above. Patient is not able to provide history. Patient is currently on BiPAP. Patient change in mental status across into the son yesterday. Patient did not eat today. Patient is single episode recently which required admission. Patient was recently put on BiPAP.

## 2017-12-22 NOTE — ED PROVIDER NOTE - MEDICAL DECISION MAKING DETAILS
Attending note-respiratory is distress and change in mental status. Rule out hypercarbia. I, rule out concurrent infection. Chest x-ray. Steroids and DuoNeb

## 2017-12-22 NOTE — H&P ADULT - NSHPREVIEWOFSYSTEMS_GEN_ALL_CORE
REVIEW OF SYSTEMS:    CONSTITUTIONAL: + weakness, no fevers or chills.  No travel or sick contacts  HEENT: no blurry vision, sore throat  NECK: No pain or stiffness  RESPIRATORY: +cough,shortness of breath  CARDIOVASCULAR: No chest pain or palpitations, chronic orthopnea, +REYES  GASTROINTESTINAL: +RUQ discomfort. No nausea, vomiting, or hematemesis; No diarrhea or constipation. No melena or hematochezia.  GENITOURINARY: No dysuria, frequency or hematuria  NEUROLOGICAL: No numbness or weakness  SKIN: No  rashes  PSYCH: No depression

## 2017-12-22 NOTE — H&P ADULT - ATTENDING COMMENTS
>150 minutes spent speaking to family,  titrating BIPAP, obtaining ABG with venous ultrasound, GOC discussions with family.

## 2017-12-22 NOTE — H&P ADULT - NSHPPHYSICALEXAM_GEN_ALL_CORE
PHYSICAL EXAM:  Vital Signs Last 24 Hrs  T(C): --  T(F): --  HR: 95 (12-22-17 @ 20:12) (89 - 101)  BP: 140/80 (12-22-17 @ 14:17)  BP(mean): --  RR: 24 (12-22-17 @ 14:17) (24 - 24)  SpO2: 100% (12-22-17 @ 17:02) (100% - 100%)  Wt(kg): --    Constitutional: chronically ill appearing, wearing BIPAP, lethargic.   no respiratory distress with BIPAP but moderate respiratory distress when bipap removed  HEENT: dry mucus membranes, anicteric, PERRL, no oral ulcers  Respiratory: inspiratory wheeze, reduced at bases,   Cardiovascular: S1 and S2, irregular rate and rhythm, no ,urmurs, gallops or rubs.   No JVD. 2+ LE edema  Gastrointestinal:  Soft, tender in RUQ without rebound, nondistended, no guarding, no rebound  Extremities: Warm, well perfused, no cyanosis or clubbing  Neurological: A/O to person and year, not place, unable to answer questions appropriately,  , CN 3-12 intact  Musculoskeletal: Joints without erythema, swelling, tendernss  Skin multiple echymoses over arms

## 2017-12-23 DIAGNOSIS — I50.32 CHRONIC DIASTOLIC (CONGESTIVE) HEART FAILURE: ICD-10-CM

## 2017-12-23 LAB
-  COAGULASE NEGATIVE STAPHYLOCOCCUS: SIGNIFICANT CHANGE UP
ALBUMIN SERPL ELPH-MCNC: 2.7 G/DL — LOW (ref 3.3–5)
ALP SERPL-CCNC: 103 U/L — SIGNIFICANT CHANGE UP (ref 40–120)
ALT FLD-CCNC: 323 U/L RC — HIGH (ref 10–45)
ANION GAP SERPL CALC-SCNC: 11 MMOL/L — SIGNIFICANT CHANGE UP (ref 5–17)
ANION GAP SERPL CALC-SCNC: 11 MMOL/L — SIGNIFICANT CHANGE UP (ref 5–17)
ANION GAP SERPL CALC-SCNC: 15 MMOL/L — SIGNIFICANT CHANGE UP (ref 5–17)
APTT BLD: 40.4 SEC — HIGH (ref 27.5–37.4)
AST SERPL-CCNC: 189 U/L — HIGH (ref 10–40)
BASE EXCESS BLDA CALC-SCNC: 5.6 MMOL/L — HIGH (ref -2–2)
BASOPHILS # BLD AUTO: 0 K/UL — SIGNIFICANT CHANGE UP (ref 0–0.2)
BASOPHILS NFR BLD AUTO: 0.3 % — SIGNIFICANT CHANGE UP (ref 0–2)
BILIRUB SERPL-MCNC: 0.7 MG/DL — SIGNIFICANT CHANGE UP (ref 0.2–1.2)
BUN SERPL-MCNC: 78 MG/DL — HIGH (ref 7–23)
BUN SERPL-MCNC: 78 MG/DL — HIGH (ref 7–23)
BUN SERPL-MCNC: 80 MG/DL — HIGH (ref 7–23)
CALCIUM SERPL-MCNC: 8.5 MG/DL — SIGNIFICANT CHANGE UP (ref 8.4–10.5)
CALCIUM SERPL-MCNC: 8.6 MG/DL — SIGNIFICANT CHANGE UP (ref 8.4–10.5)
CALCIUM SERPL-MCNC: 8.7 MG/DL — SIGNIFICANT CHANGE UP (ref 8.4–10.5)
CHLORIDE SERPL-SCNC: 96 MMOL/L — SIGNIFICANT CHANGE UP (ref 96–108)
CHLORIDE SERPL-SCNC: 96 MMOL/L — SIGNIFICANT CHANGE UP (ref 96–108)
CHLORIDE SERPL-SCNC: 97 MMOL/L — SIGNIFICANT CHANGE UP (ref 96–108)
CO2 BLDA-SCNC: 33 MMOL/L — HIGH (ref 22–30)
CO2 SERPL-SCNC: 25 MMOL/L — SIGNIFICANT CHANGE UP (ref 22–31)
CO2 SERPL-SCNC: 30 MMOL/L — SIGNIFICANT CHANGE UP (ref 22–31)
CO2 SERPL-SCNC: 34 MMOL/L — HIGH (ref 22–31)
CREAT SERPL-MCNC: 3.13 MG/DL — HIGH (ref 0.5–1.3)
CREAT SERPL-MCNC: 3.14 MG/DL — HIGH (ref 0.5–1.3)
CREAT SERPL-MCNC: 3.32 MG/DL — HIGH (ref 0.5–1.3)
CULTURE RESULTS: NO GROWTH — SIGNIFICANT CHANGE UP
EOSINOPHIL # BLD AUTO: 0.1 K/UL — SIGNIFICANT CHANGE UP (ref 0–0.5)
EOSINOPHIL NFR BLD AUTO: 0.6 % — SIGNIFICANT CHANGE UP (ref 0–6)
GAS PNL BLDA: SIGNIFICANT CHANGE UP
GLUCOSE SERPL-MCNC: 104 MG/DL — HIGH (ref 70–99)
GLUCOSE SERPL-MCNC: 110 MG/DL — HIGH (ref 70–99)
GLUCOSE SERPL-MCNC: 113 MG/DL — HIGH (ref 70–99)
GRAM STN FLD: SIGNIFICANT CHANGE UP
GRAM STN FLD: SIGNIFICANT CHANGE UP
HAV IGM SER-ACNC: SIGNIFICANT CHANGE UP
HBV CORE IGM SER-ACNC: SIGNIFICANT CHANGE UP
HBV SURFACE AG SER-ACNC: SIGNIFICANT CHANGE UP
HCO3 BLDA-SCNC: 32 MMOL/L — HIGH (ref 21–29)
HCT VFR BLD CALC: 29.9 % — LOW (ref 34.5–45)
HCV AB S/CO SERPL IA: 0.09 S/CO — SIGNIFICANT CHANGE UP
HCV AB SERPL-IMP: SIGNIFICANT CHANGE UP
HGB BLD-MCNC: 9.4 G/DL — LOW (ref 11.5–15.5)
HOROWITZ INDEX BLDA+IHG-RTO: 50 — SIGNIFICANT CHANGE UP
INR BLD: 5.99 RATIO — CRITICAL HIGH (ref 0.88–1.16)
LYMPHOCYTES # BLD AUTO: 0.7 K/UL — LOW (ref 1–3.3)
LYMPHOCYTES # BLD AUTO: 6.2 % — LOW (ref 13–44)
MAGNESIUM SERPL-MCNC: 2.5 MG/DL — SIGNIFICANT CHANGE UP (ref 1.6–2.6)
MCHC RBC-ENTMCNC: 31.4 GM/DL — LOW (ref 32–36)
MCHC RBC-ENTMCNC: 31.4 PG — SIGNIFICANT CHANGE UP (ref 27–34)
MCV RBC AUTO: 99.9 FL — SIGNIFICANT CHANGE UP (ref 80–100)
METHOD TYPE: SIGNIFICANT CHANGE UP
MONOCYTES # BLD AUTO: 1 K/UL — HIGH (ref 0–0.9)
MONOCYTES NFR BLD AUTO: 9 % — SIGNIFICANT CHANGE UP (ref 2–14)
NEUTROPHILS # BLD AUTO: 9.1 K/UL — HIGH (ref 1.8–7.4)
NEUTROPHILS NFR BLD AUTO: 83.9 % — HIGH (ref 43–77)
PCO2 BLDA: 58 MMHG — HIGH (ref 32–46)
PH BLDA: 7.36 — SIGNIFICANT CHANGE UP (ref 7.35–7.45)
PHOSPHATE SERPL-MCNC: 5.2 MG/DL — HIGH (ref 2.5–4.5)
PLATELET # BLD AUTO: 232 K/UL — SIGNIFICANT CHANGE UP (ref 150–400)
PO2 BLDA: 170 MMHG — HIGH (ref 74–108)
POTASSIUM SERPL-MCNC: 5.2 MMOL/L — SIGNIFICANT CHANGE UP (ref 3.5–5.3)
POTASSIUM SERPL-MCNC: 6.1 MMOL/L — HIGH (ref 3.5–5.3)
POTASSIUM SERPL-MCNC: 6.3 MMOL/L — CRITICAL HIGH (ref 3.5–5.3)
POTASSIUM SERPL-SCNC: 5.2 MMOL/L — SIGNIFICANT CHANGE UP (ref 3.5–5.3)
POTASSIUM SERPL-SCNC: 6.1 MMOL/L — HIGH (ref 3.5–5.3)
POTASSIUM SERPL-SCNC: 6.3 MMOL/L — CRITICAL HIGH (ref 3.5–5.3)
PROT SERPL-MCNC: 6 G/DL — SIGNIFICANT CHANGE UP (ref 6–8.3)
PROTHROM AB SERPL-ACNC: 67.1 SEC — HIGH (ref 9.8–12.7)
RBC # BLD: 2.99 M/UL — LOW (ref 3.8–5.2)
RBC # FLD: 18.8 % — HIGH (ref 10.3–14.5)
SAO2 % BLDA: 100 % — HIGH (ref 92–96)
SODIUM SERPL-SCNC: 137 MMOL/L — SIGNIFICANT CHANGE UP (ref 135–145)
SODIUM SERPL-SCNC: 137 MMOL/L — SIGNIFICANT CHANGE UP (ref 135–145)
SODIUM SERPL-SCNC: 141 MMOL/L — SIGNIFICANT CHANGE UP (ref 135–145)
SPECIMEN SOURCE: SIGNIFICANT CHANGE UP
SPECIMEN SOURCE: SIGNIFICANT CHANGE UP
WBC # BLD: 10.9 K/UL — HIGH (ref 3.8–10.5)
WBC # FLD AUTO: 10.9 K/UL — HIGH (ref 3.8–10.5)

## 2017-12-23 RX ORDER — METOPROLOL TARTRATE 50 MG
5 TABLET ORAL ONCE
Qty: 0 | Refills: 0 | Status: COMPLETED | OUTPATIENT
Start: 2017-12-23 | End: 2017-12-23

## 2017-12-23 RX ORDER — PIPERACILLIN AND TAZOBACTAM 4; .5 G/20ML; G/20ML
3.38 INJECTION, POWDER, LYOPHILIZED, FOR SOLUTION INTRAVENOUS EVERY 8 HOURS
Qty: 0 | Refills: 0 | Status: DISCONTINUED | OUTPATIENT
Start: 2017-12-23 | End: 2017-12-23

## 2017-12-23 RX ORDER — PIPERACILLIN AND TAZOBACTAM 4; .5 G/20ML; G/20ML
3.38 INJECTION, POWDER, LYOPHILIZED, FOR SOLUTION INTRAVENOUS EVERY 12 HOURS
Qty: 0 | Refills: 0 | Status: DISCONTINUED | OUTPATIENT
Start: 2017-12-23 | End: 2017-12-27

## 2017-12-23 RX ADMIN — Medication 5 MILLIGRAM(S): at 10:24

## 2017-12-23 RX ADMIN — Medication 0.5 MILLIGRAM(S): at 06:09

## 2017-12-23 RX ADMIN — Medication 0.5 MILLIGRAM(S): at 17:23

## 2017-12-23 RX ADMIN — Medication 120 MILLIGRAM(S): at 17:22

## 2017-12-23 RX ADMIN — PIPERACILLIN AND TAZOBACTAM 25 GRAM(S): 4; .5 INJECTION, POWDER, LYOPHILIZED, FOR SOLUTION INTRAVENOUS at 12:13

## 2017-12-23 RX ADMIN — ALBUTEROL 2 PUFF(S): 90 AEROSOL, METERED ORAL at 17:46

## 2017-12-23 RX ADMIN — CLOPIDOGREL BISULFATE 75 MILLIGRAM(S): 75 TABLET, FILM COATED ORAL at 17:22

## 2017-12-23 RX ADMIN — SODIUM CHLORIDE 50 MILLILITER(S): 9 INJECTION INTRAMUSCULAR; INTRAVENOUS; SUBCUTANEOUS at 00:36

## 2017-12-23 RX ADMIN — Medication 25 MILLIGRAM(S): at 17:23

## 2017-12-23 NOTE — CONSULT NOTE ADULT - SUBJECTIVE AND OBJECTIVE BOX
HPI:  Ms Dutta is a 89 yo woman with PMHx of right breast CA S/P lumpectomy in , Uterine Ca S/P total hysterectomy in , Skin CA S/P excision of bilateral lower eyelid skin CA, cholecystectomy, Osteoarthritis of bilateral knees, HTN, hyperlipidemia, paroxysmal Afib on coumadin s/p PPM for slow ventricular response, severe aortic stenosis now s/p TAVR 2015 , who presents with SOB x 1 day. Noted to have dry cough yesterday and productive cough today. Confusion and lethargy over the past few days. Seen by VNS today who noted reduced BS and "water in the lungs" with SOB who advised sending her to ED.     Patient was recently admitted 10/15- for AMS, NANCY on CKD thought to be pre-renal 2/2 overdiuresis and diarrhea, hypercapneic/hypoxemic respiratory failure 2/2 emphysema? respiratory muscle weakness/kyphoscoliosiss/restrictive lung disease with moderate b/l pleural effusions,  improved with BIPAP, acute on chronic diastolic CHF w/ EF 55%. She was discharged to rehab and then home .   Since being home she has not returned to her previous independent lifestyle, requires 24/7 care from HHA and family, has had difficulty completing daily tasks, ambulating due to lethargy and SOB. OP providers have increased lasix to current 60mg daily, 80 twice/week but this has not produced an improvement in her REYES/chronic orthopnea which has good and bad days but no clear trajectory.    ROS negative for fever, chills, chest pain, nausea, vomiting, diarrhea, dysuria. ROS + for RUQ discomfort. No melena, hematochezia    HR: 95   BP: 140/80  RR: 24  SpO2: difficult to obtain    CXR with  Small bilateral pleural effusions with adjacent atelectasis.   VBG 7.22/88/2               10.3   11.4  )-----------( 251                 33.4     138  |  94<L>  |  73<H>  ----------------------------<  132<H>  5.4<H>   |  31  |  2.89<H>    AST  312<H>  /  ALT  418<H>          INR: 4.55     BNP 15K    CARDIAC MARKERS ( 22 Dec 2017 19:56 )  x     / 0.08 ng/mL    Given 1L NS, 1g vancomycin, zosyn and admitted to medicine (22 Dec 2017 21:26)      PAST MEDICAL & SURGICAL HISTORY:  Severe aortic stenosis  Uterine cancer  Arthritis  HTN (hypertension)  Breast cancer diagnosed in : s/p right lumpectomy and right axillary lymph node removal, radiation  AF (atrial fibrillation): on coumadin and aspirin  Dyslipidemia  H/O: hysterectomy: 2012  s/p surgical excision of left eye Skin cancer: Bilateral lower eye lids  S/P D&C  S/P cholecystectomy      FAMILY HISTORY:  No pertinent family history in first degree relatives      Social History:    Marital Status:  (   )    (   ) Single    (   )    (  )   Occupation:   Lives with: (  ) alone  (  ) children   (  ) spouse   (  ) parents  (  ) other    Substance Use (street drugs): (  ) never used  (  ) other:  Tobacco Usage:  (   ) never smoked   (   ) former smoker   (   ) current smoker  (     ) pack year  (        ) last cigarette date  Alcohol Usage:      Allergies    No Known Allergies    Intolerances    digoxin (Rash; Drowsiness)      MEDICATIONS  (STANDING):  ALBUTerol    90 MICROgram(s) HFA Inhaler 2 Puff(s) Inhalation every 6 hours  atorvastatin 10 milliGRAM(s) Oral at bedtime  buDESOnide   0.5 milliGRAM(s) Respule 0.5 milliGRAM(s) Inhalation two times a day  clopidogrel Tablet 75 milliGRAM(s) Oral daily  diltiazem    milliGRAM(s) Oral daily  docusate sodium 100 milliGRAM(s) Oral three times a day  metoprolol     tartrate 25 milliGRAM(s) Oral two times a day  piperacillin/tazobactam IVPB. 3.375 Gram(s) IV Intermittent every 12 hours  polyethylene glycol 3350 17 Gram(s) Oral daily  senna 2 Tablet(s) Oral at bedtime  tiotropium 18 MICROgram(s) Capsule 1 Capsule(s) Inhalation daily    MEDICATIONS  (PRN):        LABS:    CBC Full  -  ( 23 Dec 2017 08:37 )  WBC Count : 10.9 K/uL  Hemoglobin : 9.4 g/dL  Hematocrit : 29.9 %  Platelet Count - Automated : 232 K/uL  Mean Cell Volume : 99.9 fl  Mean Cell Hemoglobin : 31.4 pg  Mean Cell Hemoglobin Concentration : 31.4 gm/dL  Auto Neutrophil # : 9.1 K/uL  Auto Lymphocyte # : 0.7 K/uL  Auto Monocyte # : 1.0 K/uL  Auto Eosinophil # : 0.1 K/uL  Auto Basophil # : 0.0 K/uL  Auto Neutrophil % : 83.9 %  Auto Lymphocyte % : 6.2 %  Auto Monocyte % : 9.0 %  Auto Eosinophil % : 0.6 %  Auto Basophil % : 0.3 %        141  |  96  |  78<H>  ----------------------------<  110<H>  5.2   |  34<H>  |  3.32<H>    Ca    8.7      23 Dec 2017 14:33  Phos  5.2       Mg     2.5         TPro  6.0  /  Alb  2.7<L>  /  TBili  0.7  /  DBili  x   /  AST  189<H>  /  ALT  323<H>  /  AlkPhos  103      PT/INR - ( 23 Dec 2017 14:33 )   PT: 67.1 sec;   INR: 5.99 ratio         PTT - ( 23 Dec 2017 14:33 )  PTT:40.4 sec  ABG - ( 23 Dec 2017 05:20 )  pH: 7.36  /  pCO2: 58    /  pO2: 170   / HCO3: 32    / Base Excess: 5.6   /  SaO2: 100               Urinalysis Basic - ( 22 Dec 2017 15:08 )    Color: Yellow / Appearance: SL Turbid / S.025 / pH: x  Gluc: x / Ketone: Negative  / Bili: Small / Urobili: 2   Blood: x / Protein: 30 mg/dL / Nitrite: Negative   Leuk Esterase: Large / RBC: 2-5 /HPF / WBC 10-25 /HPF   Sq Epi: x / Non Sq Epi: Many /HPF / Bacteria: Few /HPF      Blood Gas Venous - Lactate: 2.0 mmoL/L ( @ 19:55)  Blood Gas Venous - Lactate: 2.0 mmoL/L ( @ 15:08)              Cultures:  RECENT CULTURES:   @ 18:04 .Blood Blood-Peripheral   PCR    Growth in aerobic bottle: Gram Positive Cocci in Clusters    Blood Culture PCR  Blood Culture PCR     Growth in aerobic bottle: Gram Positive Cocci in Clusters  ***Blood Panel PCR results on this specimen are available  approximately 3 hours after the Gram stain result.***  Gram stain, PCR, and/or culture results may not always  correspond due to difference in methodologies.  ************************************************************  This PCR assay was performed using [employer].  The following targets are tested for: Enterococcus,  vancomycin resistant enterococci, Listeria monocytogenes,  coagulase negative staphylococci, S. aureus,  methicillin resistant S. aureus, Streptococcus agalactiae  (Group B), S. pneumoniae, S. pyogenes (Group A),  Acinetobacter baumannii, Enterobacter cloacae, E. coli,  Klebsiella oxytoca, K. pneumoniae, Proteus sp.,  Serratia marcescens, Haemophilus influenzae,  Neisseria meningitidis, Pseudomonas aeruginosa, Candida  albicans, C. glabrata, C krusei, C parapsilosis,  C. tropicalis and the KPC resistance gene.            Imaging Studies:    Vital Signs:   Vital Signs Last 24 Hrs  T(C): 36.6 (-17 @ 13:20), Max: 36.9 (-17 @ 08:36)  T(F): 97.9 (- @ 13:20), Max: 98.4 (-17 @ 08:36)  HR: 107 (17 @ 17:22) (73 - 110)  BP: 134/73 (- @ 17:22) (120/77 - 160/82)  BP(mean): --  RR: 18 (-17 @ 17:22) (16 - 18)  SpO2: 92% (-17 @ 18:20) (91% - 100%) HPI:  87 y/o female with PMHx of right breast CA S/P lumpectomy in , Uterine Ca S/P total hysterectomy in , Skin CA S/P excision of bilateral lower eyelid skin CA, cholecystectomy, Osteoarthritis of bilateral knees, HTN, hyperlipidemia, paroxysmal Afib on coumadin s/p PPM for slow ventricular response, severe aortic stenosis now s/p TAVR 2015 , who presents with SOB, cough x 1 day,  confusion and lethargy over the past few days.  Patient was recently admitted 10/15- for AMS, NANCY on CKD thought to be pre-renal 2/2 overdiuresis and diarrhea, hypercapneic/hypoxemic respiratory failure 2/2 emphysema? respiratory muscle weakness/kyphoscoliosiss/restrictive lung disease with moderate b/l pleural effusions,  improved with BIPAP, acute on chronic diastolic CHF w/ EF 55%. She was discharged to rehab and then home .   Since being home she has not returned to her previous independent lifestyle, requires 24/7 care from HHA and family, has had difficulty completing daily tasks, ambulating due to lethargy and SOB. OP providers have increased lasix to current 60mg daily, 80 twice/week without improvement.   ROS negative for fever, chills, chest pain, nausea, vomiting, diarrhea, dysuria.   CXR with  Small bilateral pleural effusions with adjacent atelectasis.   Pt is given Zosyn, vancomycin.  BC  with CNS.    PAST MEDICAL & SURGICAL HISTORY:  Severe aortic stenosis  Uterine cancer  Arthritis  HTN (hypertension)  Breast cancer diagnosed in : s/p right lumpectomy and right axillary lymph node removal, radiation  AF (atrial fibrillation): on coumadin and aspirin  Dyslipidemia  H/O: hysterectomy: 2012  s/p surgical excision of left eye Skin cancer: Bilateral lower eye lids  S/P D&C  S/P cholecystectomy  Family Hx: Non-contributory review     Social Hx: No smoking, no ETOH, no IVDU     Allergies    No Known Allergies    Intolerances    digoxin (Rash; Drowsiness)      MEDICATIONS  (STANDING):  ALBUTerol    90 MICROgram(s) HFA Inhaler 2 Puff(s) Inhalation every 6 hours  atorvastatin 10 milliGRAM(s) Oral at bedtime  buDESOnide   0.5 milliGRAM(s) Respule 0.5 milliGRAM(s) Inhalation two times a day  clopidogrel Tablet 75 milliGRAM(s) Oral daily  diltiazem    milliGRAM(s) Oral daily  docusate sodium 100 milliGRAM(s) Oral three times a day  metoprolol     tartrate 25 milliGRAM(s) Oral two times a day  piperacillin/tazobactam IVPB. 3.375 Gram(s) IV Intermittent every 12 hours  polyethylene glycol 3350 17 Gram(s) Oral daily  senna 2 Tablet(s) Oral at bedtime  tiotropium 18 MICROgram(s) Capsule 1 Capsule(s) Inhalation daily      LABS:    CBC Full  -  ( 23 Dec 2017 08:37 )  WBC Count : 10.9 K/uL  Hemoglobin : 9.4 g/dL  Hematocrit : 29.9 %  Platelet Count - Automated : 232 K/uL  Mean Cell Volume : 99.9 fl  Mean Cell Hemoglobin : 31.4 pg  Mean Cell Hemoglobin Concentration : 31.4 gm/dL  Auto Neutrophil # : 9.1 K/uL  Auto Lymphocyte # : 0.7 K/uL  Auto Monocyte # : 1.0 K/uL  Auto Eosinophil # : 0.1 K/uL  Auto Basophil # : 0.0 K/uL  Auto Neutrophil % : 83.9 %  Auto Lymphocyte % : 6.2 %  Auto Monocyte % : 9.0 %  Auto Eosinophil % : 0.6 %  Auto Basophil % : 0.3 %        141  |  96  |  78<H>  ----------------------------<  110<H>  5.2   |  34<H>  |  3.32<H>    Ca    8.7      23 Dec 2017 14:33  Phos  5.2       Mg     2.5         TPro  6.0  /  Alb  2.7<L>  /  TBili  0.7  /  DBili  x   /  AST  189<H>  /  ALT  323<H>  /  AlkPhos  103      PT/INR - ( 23 Dec 2017 14:33 )   PT: 67.1 sec;   INR: 5.99 ratio         PTT - ( 23 Dec 2017 14:33 )  PTT:40.4 sec  ABG - ( 23 Dec 2017 05:20 )  pH: 7.36  /  pCO2: 58    /  pO2: 170   / HCO3: 32    / Base Excess: 5.6   /  SaO2: 100               Urinalysis Basic - ( 22 Dec 2017 15:08 )    Color: Yellow / Appearance: SL Turbid / S.025 / pH: x  Gluc: x / Ketone: Negative  / Bili: Small / Urobili: 2   Blood: x / Protein: 30 mg/dL / Nitrite: Negative   Leuk Esterase: Large / RBC: 2-5 /HPF / WBC 10-25 /HPF   Sq Epi: x / Non Sq Epi: Many /HPF / Bacteria: Few /HPF      Blood Gas Venous - Lactate: 2.0 mmoL/L ( @ 19:55)  Blood Gas Venous - Lactate: 2.0 mmoL/L ( @ 15:08)      Cultures:  RECENT CULTURES:   @ 18:04 .Blood Blood-Peripheral   PCR CNS   @ 17:42 .Urine Clean Catch (Midstream)   No growth      Imaging Studies: CXR: Small bilateral pleural effusions with associated atelectasis.     Vital Signs:   Vital Signs Last 24 Hrs  T(C): 36.3 (--17 @ 21:23), Max: 36.9 (--17 @ 08:36)  T(F): 97.4 (--17 @ 21:23), Max: 98.4 (--17 @ 08:36)  HR: 90 (--17 @ 21:23) (73 - 110)  BP: 116/75 (-23-17 @ 21:23) (116/75 - 160/82)  BP(mean): 89 (-23-17 @ 21:23) (89 - 89)  RR: 18 (-23-17 @ 21:23) (16 - 18)  SpO2: 100% (-23-17 @ 21:23) (91% - 100%)

## 2017-12-23 NOTE — CONSULT NOTE ADULT - SUBJECTIVE AND OBJECTIVE BOX
OSCAR LOPEZ  [id number]  17 @ 13:21  -------------------------------------------------------------------------------  [facility] CONSULT NOTE    HPI:  Asked to see pt for hypercap resp failure and sob.    Lifelong nonsmoker but some second hand exposure.  No history of nebulizer or steroids until recently.    Recently hospitalized with resp failure and response to diuretics and BIPAP.  Went to rehab with inconsistent use of BIPAP.  Readmitted with lethargy, confusion and sob.    She is presently comfortable and arousable, just taken off of bipap.  Denies pain, cough, sputum.  Son at bedside notes recent decline and was improving at rehab.  -------------------------------------------------------------------------------  PMHX:  Severe aortic stenosis  Uterine cancer  Arthritis  HTN (hypertension)  Breast cancer diagnosed in   AF (atrial fibrillation)  H/O: hysterectomy  Dyslipidemia    PSHX:  H/O: hysterectomy  s/p surgical excision of left eye Skin cancer  S/P D&C  S/P cholecystectomy    FAMILY HISTORY:  No pertinent family history in first degree relatives    SOCIAL HISTORY:    Pulmonary Medications:   ALBUTerol    90 MICROgram(s) HFA Inhaler 2 Puff(s) Inhalation every 6 hours  buDESOnide   0.5 milliGRAM(s) Respule 0.5 milliGRAM(s) Inhalation two times a day  tiotropium 18 MICROgram(s) Capsule 1 Capsule(s) Inhalation daily    Antimicrobials:  piperacillin/tazobactam IVPB. 3.375 Gram(s) IV Intermittent every 12 hours    Cardiology:  diltiazem    milliGRAM(s) Oral daily  metoprolol     tartrate 25 milliGRAM(s) Oral two times a day    Other:  atorvastatin 10 milliGRAM(s) Oral at bedtime  clopidogrel Tablet 75 milliGRAM(s) Oral daily  docusate sodium 100 milliGRAM(s) Oral three times a day  polyethylene glycol 3350 17 Gram(s) Oral daily  senna 2 Tablet(s) Oral at bedtime    Allergies    No Known Allergies    Intolerances    digoxin (Rash; Drowsiness)    HOME MEDICATIONS: see  H & P    REVIEW OF SYSTEMS:  Constitutional: As per HPI  HEENT: Within normal limits  CV: As per HPI  Resp: As per HPI  GI: Within normal limits   : Within normal limits  Musculoskeletal: Within normal limits  Skin: Within normal limits  Neurological: Within normal limits  Psychiatric: Within normal limits  Endocrine: Within normal limits  Hematologic/Lymphatic: Within normal limits  Allergic/Immunologic: Within normal limits    [ ] All other systems negative  ________________________________________________________________________  OBJECTIVE:    I&O's Detail    23 Dec 2017 07:01  -  23 Dec 2017 13:21  --------------------------------------------------------  IN:    sodium chloride 0.9%: 100 mL    Solution: 100 mL  Total IN: 200 mL    OUT:  Total OUT: 0 mL    Total NET: 200 mL    CAPILLARY BLOOD GLUCOSE  _______________________________________________________________________  PHYSICAL EXAM:  ICU Vital Signs Last 24 Hrs  T(C): 36.9 (23 Dec 2017 08:36), Max: 36.9 (23 Dec 2017 08:36)  T(F): 98.4 (23 Dec 2017 08:36), Max: 98.4 (23 Dec 2017 08:36)  HR: 97 (23 Dec 2017 13:10) (85 - 110)  BP: 153/75 (23 Dec 2017 10:21) (120/77 - 160/82)  BP(mean): --  ABP: --  ABP(mean): --  RR: 17 (23 Dec 2017 10:21) (16 - 24)  SpO2: 100% (23 Dec 2017 13:10) (91% - 100%)    Relatively comfortable, arousable  HEENT: unremarkable.  Mallampati class: 4  Neck 3+ JVD, no stridor, adenopathy or thyromegaly  Oropharynx is mallampati class 4  Cardiac regular, with moderate GHAZALA  Lungs with coarse BS, decreased at bases with mild rales  Abdomen is soft and nontender, nondistended  Extrems are warm, with 1-2+ bilateral pitting edema  Neurological is grossly intact, lethargic  _____________________________________________________________________  LABS:                        9.4    10.9  )-----------( 232      ( 23 Dec 2017 08:37 )             29.9     12    137  |  97  |  80<H>  ----------------------------<  104<H>  6.1<H>   |  25  |  3.14<H>  12    137  |  96  |  78<H>  ----------------------------<  113<H>  6.3<HH>   |  30  |  3.13<H>    Ca      8.5      12    Ca      8.6      12    Phos    5.2     12      Mg       2.5     12    TPro  6.0  /  Alb  2.7<L>  /  TBili  0.7  /  DBili  x   /  AST  189<H>  /  ALT  323<H>  /  AlkPhos  103  12  TPro  6.4  /  Alb  3.1<L>  /  TBili  0.9  /  DBili  x   /  AST  312<H>  /  ALT  418<H>  /  AlkPhos  117  12    PT/INR - ( 22 Dec 2017 15:08 )   PT: 51.1 sec;   INR: 4.55 ratio       PTT - ( 22 Dec 2017 15:08 )  PTT:34.9 sec  Urinalysis Basic - ( 22 Dec 2017 15:08 )    Color: Yellow / Appearance: SL Turbid / S.025 / pH: x  Gluc: x / Ketone: Negative  / Bili: Small / Urobili: 2   Blood: x / Protein: 30 mg/dL / Nitrite: Negative   Leuk Esterase: Large / RBC: 2-5 /HPF / WBC 10-25 /HPF   Sq Epi: x / Non Sq Epi: Many /HPF / Bacteria: Few /HPF    Venous Blood Gas:   @ 19:55  7.22/84/31/34/41  VBG Lactate: 2.0  Venous Blood Gas:   @ 15:08  7.22/88/34/34/46  VBG Lactate: 2.0    ABG - ( 23 Dec 2017 05:20 )  pH: 7.36  /  pCO2: 58    /  pO2: 170   / HCO3: 32    / Base Excess: 5.6   /  SaO2: 100       Serum Pro-Brain Natriuretic Peptide: 10037 pg/mL ( @ 15:08)    CARDIAC MARKERS ( 22 Dec 2017 19:56 )  x     / 0.08 ng/mL / x     / x     / x      CARDIAC MARKERS ( 22 Dec 2017 15:08 )  x     / 0.09 ng/mL / 119 U/L / x     / 9.2 ng/mL    MICROBIOLOGY:     RADIOLOGY:  I have reviewed her CXRs  _____________________________________________________________________    IMPRESSION AND RECOMMENDATIONS:    Hypercapnic Respiratory Failure in the setting of chronic R>L heart failure and volume overload with hx of CV disease and renal insufficiency, as well as kyphoscoliosis.  I doubt "COPD" is playing much of a role.  From a pulmonary perspective the most beneficial intervention is BIPAP with O2 and I have discussed using it during sleep and PRN throughout the day with brief rest periods with the nurse.  The device is set at 18/5 cm H2O with 40% and backup of 12 bpm which she seemed relatively comfortable with.  She also may have a UTI that triggered the exacerbation.    BIPAP/O2 during sleep and PRN.  Have a very low threshold for using.  PRN nebs.  I do not see a role for steroids.  Avoid all sedating agents.  ID recommendations  Cardiology, Renal recommendations    Thank you for the courtesy of this referral    Gene Norton MD, FCCP, FAASM  Pulmonary and Sleep Medicine  [phone]

## 2017-12-23 NOTE — PROGRESS NOTE ADULT - SUBJECTIVE AND OBJECTIVE BOX
Patient is a 88y old  Female who presents with a chief complaint of sob  History obtained from family due to confusion (22 Dec 2017 21:26)      SUBJECTIVE / OVERNIGHT EVENTS: non responsive, on BIPAP, was awake earlier today, recognized son and asked for water.     MEDICATIONS  (STANDING):  ALBUTerol    90 MICROgram(s) HFA Inhaler 2 Puff(s) Inhalation every 6 hours  atorvastatin 10 milliGRAM(s) Oral at bedtime  buDESOnide   0.5 milliGRAM(s) Respule 0.5 milliGRAM(s) Inhalation two times a day  clopidogrel Tablet 75 milliGRAM(s) Oral daily  diltiazem    milliGRAM(s) Oral daily  docusate sodium 100 milliGRAM(s) Oral three times a day  metoprolol     tartrate 25 milliGRAM(s) Oral two times a day  piperacillin/tazobactam IVPB. 3.375 Gram(s) IV Intermittent every 12 hours  polyethylene glycol 3350 17 Gram(s) Oral daily  senna 2 Tablet(s) Oral at bedtime  tiotropium 18 MICROgram(s) Capsule 1 Capsule(s) Inhalation daily    MEDICATIONS  (PRN):      Vital Signs Last 24 Hrs  T(F): 97.9 (17 @ 13:20), Max: 98.4 (17 @ 08:36)  HR: 73 (17 @ 13:20) (73 - 110)  BP: 123/68 (17 @ 13:20) (120/77 - 160/82)  RR: 18 (17 @ 13:20) (16 - 18)  SpO2: 98% (17 @ 13:20) (91% - 100%)  Telemetry:   CAPILLARY BLOOD GLUCOSE        I&O's Summary    23 Dec 2017 07:01  -  23 Dec 2017 15:22  --------------------------------------------------------  IN: 200 mL / OUT: 0 mL / NET: 200 mL        PHYSICAL EXAM:  GENERAL: NAD, well-developed  HEAD:  Atraumatic, Normocephalic  EYES: EOMI, PERRLA, conjunctiva and sclera clear  NECK: Supple, No JVD  CHEST/LUNG: Clear to auscultation bilaterally; No wheeze  HEART: Regular rate and rhythm; No murmurs, rubs, or gallops  ABDOMEN: Soft, Nontender, Nondistended; Bowel sounds present  EXTREMITIES:  2+ Peripheral Pulses, No clubbing, cyanosis, or edema  PSYCH: AAOx3  NEUROLOGY: non-focal  SKIN: No rashes or lesions    LABS:                        9.4    10.9  )-----------( 232      ( 23 Dec 2017 08:37 )             29.9         141  |  96  |  78<H>  ----------------------------<  110<H>  5.2   |  34<H>  |  3.32<H>    Ca    8.7      23 Dec 2017 14:33  Phos  5.2       Mg     2.5         TPro  6.0  /  Alb  2.7<L>  /  TBili  0.7  /  DBili  x   /  AST  189<H>  /  ALT  323<H>  /  AlkPhos  103      PT/INR - ( 23 Dec 2017 14:33 )   PT: 67.1 sec;   INR: 5.99 ratio         PTT - ( 23 Dec 2017 14:33 )  PTT:40.4 sec  CARDIAC MARKERS ( 22 Dec 2017 19:56 )  x     / 0.08 ng/mL / x     / x     / x      CARDIAC MARKERS ( 22 Dec 2017 15:08 )  x     / 0.09 ng/mL / 119 U/L / x     / 9.2 ng/mL      Urinalysis Basic - ( 22 Dec 2017 15:08 )    Color: Yellow / Appearance: SL Turbid / S.025 / pH: x  Gluc: x / Ketone: Negative  / Bili: Small / Urobili: 2   Blood: x / Protein: 30 mg/dL / Nitrite: Negative   Leuk Esterase: Large / RBC: 2-5 /HPF / WBC 10-25 /HPF   Sq Epi: x / Non Sq Epi: Many /HPF / Bacteria: Few /HPF        RADIOLOGY & ADDITIONAL TESTS:    Imaging Personally Reviewed:    Consultant(s) Notes Reviewed:      Care Discussed with Consultants/Other Providers:

## 2017-12-23 NOTE — PROGRESS NOTE ADULT - SUBJECTIVE AND OBJECTIVE BOX
[facility] [phone]        Patient seen and examined in bed.  She remains on bipap.  Was straight cath last night        MEDICATIONS  (STANDING):  ALBUTerol    90 MICROgram(s) HFA Inhaler 2 Puff(s) Inhalation every 6 hours  atorvastatin 10 milliGRAM(s) Oral at bedtime  buDESOnide   0.5 milliGRAM(s) Respule 0.5 milliGRAM(s) Inhalation two times a day  clopidogrel Tablet 75 milliGRAM(s) Oral daily  diltiazem    milliGRAM(s) Oral daily  docusate sodium 100 milliGRAM(s) Oral three times a day  metoprolol     tartrate 25 milliGRAM(s) Oral two times a day  polyethylene glycol 3350 17 Gram(s) Oral daily  senna 2 Tablet(s) Oral at bedtime  sodium chloride 0.9%. 1000 milliLiter(s) (50 mL/Hr) IV Continuous <Continuous>  tiotropium 18 MICROgram(s) Capsule 1 Capsule(s) Inhalation daily      VITAL:  T(C): , Max: 36.9 (17 @ 08:36)  T(F): , Max: 98.4 (17 @ 08:36)  HR: 110 (17 @ 08:36)  BP: 120/77 (17 @ 08:36)  BP(mean): --  RR: 17 (17 @ 08:36)  SpO2: 93% (17 @ 08:36)  Wt(kg): --    I and O's:      Weight (kg): 81.6 ( @ 21:55)    PHYSICAL EXAM:    Constitutional: NAD on bipap  HEENT: PERRLA    Neck:  No JVD  Respiratory: CTAB/L  Cardiovascular: S1 and S2  Gastrointestinal: BS+, soft, NT/ND  Extremities: + 3 peripheral edema  Neurological: A/O x 3, no focal deficits  Psychiatric: Normal mood, normal affect  : No Gautam  Skin: No rashes  Access: Not applicable    LABS:                        10.3   11.4  )-----------( 251      ( 22 Dec 2017 15:08 )             33.4     12-    138  |  94<L>  |  73<H>  ----------------------------<  132<H>  5.4<H>   |  31  |  2.89<H>    Ca    9.0      22 Dec 2017 15:08    TPro  6.4  /  Alb  3.1<L>  /  TBili  0.9  /  DBili  x   /  AST  312<H>  /  ALT  418<H>  /  AlkPhos  117  12-22          Urine Studies:  Urinalysis Basic - ( 22 Dec 2017 15:08 )    Color: Yellow / Appearance: SL Turbid / S.025 / pH: x  Gluc: x / Ketone: Negative  / Bili: Small / Urobili: 2   Blood: x / Protein: 30 mg/dL / Nitrite: Negative   Leuk Esterase: Large / RBC: 2-5 /HPF / WBC 10-25 /HPF   Sq Epi: x / Non Sq Epi: Many /HPF / Bacteria: Few /HPF            RADIOLOGY & ADDITIONAL STUDIES:

## 2017-12-23 NOTE — CONSULT NOTE ADULT - SUBJECTIVE AND OBJECTIVE BOX
Patient is a 88y old  Female who presents with a chief complaint of sob  History obtained from chart due to confusion (22 Dec 2017 21:26)      HPI:  Patient is a 88y Female with history of AF, AS S/P TAVR, CHF and pulmonary disease brought to ER with increased confusion and respiratory distress. Patient on BIPAP. Lethargic.   PAST MEDICAL & SURGICAL HISTORY:  Severe aortic stenosis  Uterine cancer  Arthritis  HTN (hypertension)  Breast cancer diagnosed in 2000: s/p right lumpectomy and right axillary lymph node removal, radiation  AF (atrial fibrillation): on coumadin and aspirin  Dyslipidemia  H/O: hysterectomy: 4/2012  s/p surgical excision of left eye Skin cancer: Bilateral lower eye lids  S/P D&C  S/P cholecystectomy    Allergies    No Known Allergies    Intolerances    digoxin (Rash; Drowsiness)    MEDICATIONS  (STANDING):  ALBUTerol    90 MICROgram(s) HFA Inhaler 2 Puff(s) Inhalation every 6 hours  atorvastatin 10 milliGRAM(s) Oral at bedtime  buDESOnide   0.5 milliGRAM(s) Respule 0.5 milliGRAM(s) Inhalation two times a day  clopidogrel Tablet 75 milliGRAM(s) Oral daily  diltiazem    milliGRAM(s) Oral daily  docusate sodium 100 milliGRAM(s) Oral three times a day  metoprolol     tartrate 25 milliGRAM(s) Oral two times a day  polyethylene glycol 3350 17 Gram(s) Oral daily  senna 2 Tablet(s) Oral at bedtime  sodium chloride 0.9%. 1000 milliLiter(s) (50 mL/Hr) IV Continuous <Continuous>  tiotropium 18 MICROgram(s) Capsule 1 Capsule(s) Inhalation daily    MEDICATIONS  (PRN):    FAMILY HISTORY:  No pertinent family history in first degree relatives      ROS: per consultants.    PHYSICAL EXAM:  Vital Signs Last 24 Hrs  T(C): 36.6 (23 Dec 2017 04:31), Max: 36.6 (23 Dec 2017 04:31)  T(F): 97.9 (23 Dec 2017 04:31), Max: 97.9 (23 Dec 2017 04:31)  HR: 93 (23 Dec 2017 04:57) (85 - 101)  BP: 157/80 (23 Dec 2017 04:31) (124/87 - 160/82)  BP(mean): --  RR: 16 (23 Dec 2017 04:31) (16 - 24)  SpO2: 95% (23 Dec 2017 04:57) (91% - 100%)  I&O's Summary      General Appearance: 	 BIPAP. lethargic  HEENT: normocephalic, atraumatic  Neck: no JVD,  carotid 2+  bilaterally without bruits  Lungs:  coarse BS bilaterally  Cor:  pmi 5th ICS MCL, regular rate and rhythm, S1 normal intensity, S2 normal intensity, Grade I/VI GHAZALA ULSB  Abdomen: soft, non-tender; bowel sounds normal; no masses,  no organomegaly  Extremities: without cyanosis, clubbing, 1+ LE edema  Vasc: 2-+ PT and DP pulses;     LABS:                        10.3   11.4  )-----------( 251      ( 22 Dec 2017 15:08 )             33.4     12-22    138  |  94<L>  |  < from: Xray Chest 1 View AP/PA (12.22.17 @ 15:23) >  ******PRELIMINARY REPORT******    ******PRELIMINARY REPORT******          EXAM:  CHEST SINGLE AP OR PA                            PROCEDURE DATE:  12/22/2017      ******PRELIMINARY REPORT******    ******PRELIMINARY REPORT******              INTERPRETATION:  Small bilateral pleural effusions with adjacent   atelectasis.    < end of copied text >  73<H>  ----------------------------<  132<H>  5.4<H>   |  31  |  2.89<H>    Ca    9.0      22 Dec 2017 15:08    TPro  6.4  /  Alb  3.1<L>  /  TBili  0.9  /  DBili  x   /  AST  312<H>  /  ALT  418<H>  /  AlkPhos  117  12-22      Ketone - Urine: Negative (12-22 @ 15:08)    CAPILLARY BLOOD GLUCOSE        PT/INR - ( 22 Dec 2017 15:08 )   PT: 51.1 sec;   INR: 4.55 ratio         PTT - ( 22 Dec 2017 15:08 )  PTT:34.9 sec      < from: Transthoracic Echocardiogram (10.30.17 @ 13:44) >  Patient name: OSCAR LOPEZ  YOB: 1929   Age: 87 (F)   MR#: [id number]  Study Date: 10/30/2017  [facility]  Study quality: Technically fair  Referring Physician: Ronda Hilliard MD  Blood Pressure: 111/62 mmHg  Height: 160 cm  Weight: 63 kg  BSA: 1.7 m2  ------------------------------------------------------------------------  PROCEDURE: Transthoracic echocardiogram with 2-D, M-Mode  and complete spectral and color flow Doppler.  INDICATION: Unspecified atrial fibrillation (I48.91),  Shortness of breath (R06.02)  ------------------------------------------------------------------------  Dimensions:    Normal Values:  LA:     6.4    2.0 - 4.0 cm  Ao:     3.6    2.0 - 3.8 cm  SEPTUM: 1.4    0.6 - 1.2 cm  PWT:    1.2    0.6 - 1.1 cm  LVIDd:  4.0    3.0 - 5.6 cm  LVIDs:  2.7    1.8 - 4.0 cm  Derived variables:  LVMI: 112 g/m2  RWT: 0.60  Fractional short: 33 %  EF (Visual Estimate): 50 %  Doppler Peak Velocity (m/sec): AoV=2.0  ------------------------------------------------------------------------  Observations:  Mitral Valve: Mitral annular calcification and calcified  mitral leaflets with decreased diastolic opening.  Mild-moderate mitral regurgitation. Mean transmitral valve  gradient equals 5 mm Hg, consistent with mild -moderate  mitral stenosis.  Aortic Valve/Aorta: Transcatheter aortic valve replacement  is not well visualized. Peak transaortic valve gradient  equals 16 mm Hg, mean transaortic valve gradient equals 7  mm Hg, which is probably normal in the presence of a  transcatheter aortic valve replacement. Mild paravalvular  aortic regurgitation.  Peak left ventricular outflow tract  gradient equals 3 mm Hg, mean gradient is equal to 2 mm Hg,  LVOT velocity time integral equals 18 cm.  Aortic Root: 3.6 cm.  Left Atrium: Severely dilated left atrium.  LA volume index  = 70 cc/m2.  Left Ventricle: Grossly mild global left ventricular  systolic function. Patient in atrial fibrillation; LV  ejection fraction varies with R-R interval. Septal motion  consistent with conduction defect. Moderate concentric left  ventricular hypertrophy.  Right Heart: Severe right atrial enlargement. Right  ventricular enlargement with decreased right ventricular  systolic function. Normal tricuspid valve. Moderate  tricuspid regurgitation. Pulmonic valve not well  visualized, probably normal. Minimal pulmonic  regurgitation.  Pericardium/Pleura: Normal pericardium with no pericardial  effusion.  Hemodynamic: Estimated right atrial pressure is 8 mm Hg.  Estimated right ventricular systolic pressure equals 53 mm  Hg, assuming right atrial pressure equals 8 mm Hg,  consistent with moderate pulmonary hypertension.  ------------------------------------------------------------------------  Conclusions:  1. Mitral annular calcification and calcified mitral  leaflets with decreased diastolic opening. Mild-moderate  mitral regurgitation. Mean transmitral valve gradient  equals 5 mm Hg, consistent with mild -moderate mitral  stenosis.  2. Transcatheter aortic valve replacement is not well  visualized. Peak transaortic valve gradient equals 16 mm  Hg, mean transaortic valve gradient equals 7 mm Hg, which  is probably normal in the presence of a transcatheter  aortic valvereplacement. Mild paravalvular aortic  regurgitation.  3. Grossly mild global left ventricular systolic function.  Patient in atrial fibrillation; LV ejection fraction varies  with R-R interval. Septal motion consistent with conduction  defect.  4. Right ventricular enlargement with decreased right  ventricular systolic function.  5. Normal tricuspid valve. Moderate tricuspid  regurgitation.  ------------------------------------------------------------------------  Confirmed on  10/30/2017 - 15:48:14 by Tony Zamora M.D.  ------------------------------------------------------------------------    < end of copied text >      Timothy Emmanuel MD [facility]  [phone]

## 2017-12-23 NOTE — PROGRESS NOTE ADULT - ASSESSMENT
Ms Dutta is a 89 yo woman with PMHx of right breast CA S/P lumpectomy in 2000, Uterine Ca S/P total hysterectomy in 2012, Skin CA S/P excision of bilateral lower eyelid skin CA, cholecystectomy, Osteoarthritis of bilateral knees, HTN, hyperlipidemia, paroxysmal Afib on coumadin s/p PPM for slow ventricular response, severe aortic stenosis now s/p TAVR April 2015 , who presents with SOB x 1 day, confusion. Found to have NANCY on CKD, hypercapneic respiratory failure, SIRS, elevated LFT and INR. Infection is less likely as no clear source.

## 2017-12-23 NOTE — PROGRESS NOTE ADULT - ASSESSMENT
The patient is an 88-year-old female, presents now with respiratory acidosis, acute kidney injury, and hepatitis.  The patient does have an elevated BNP and evidence of edema, but chest x-ray is clear.  Elevated BNP is likely from right ventricular dysfunction.  Unclear if degree of stenosis of the mitral valve and if that is leading to a degree of pulmonary hypertension.  In any event, I suspect she is intravascularly volume depleted(total body overloaded).      1.	Renal:  Will DC IVF now( 1 liter in ER followed by ns @50cc/hr).   Her edema in LE is likely from the right ventricular dysfunction.  2.	Infectious Disease:  Resume Zosyn till ID evaluates  3.	Miscellaneous:  The patient is a full do not resuscitate.  Further workup pending above.  4.	-Bladder scan with 290 cc of urine;  Gautam

## 2017-12-24 DIAGNOSIS — I48.2 CHRONIC ATRIAL FIBRILLATION: ICD-10-CM

## 2017-12-24 LAB
ANION GAP SERPL CALC-SCNC: 18 MMOL/L — HIGH (ref 5–17)
APTT BLD: 43 SEC — HIGH (ref 27.5–37.4)
BASE EXCESS BLDA CALC-SCNC: 4 MMOL/L — HIGH (ref -2–2)
BUN SERPL-MCNC: 79 MG/DL — HIGH (ref 7–23)
CALCIUM SERPL-MCNC: 8.9 MG/DL — SIGNIFICANT CHANGE UP (ref 8.4–10.5)
CHLORIDE SERPL-SCNC: 96 MMOL/L — SIGNIFICANT CHANGE UP (ref 96–108)
CO2 BLDA-SCNC: 33 MMOL/L — HIGH (ref 22–30)
CO2 SERPL-SCNC: 26 MMOL/L — SIGNIFICANT CHANGE UP (ref 22–31)
CREAT SERPL-MCNC: 3.5 MG/DL — HIGH (ref 0.5–1.3)
CULTURE RESULTS: SIGNIFICANT CHANGE UP
GAS PNL BLDA: SIGNIFICANT CHANGE UP
GLUCOSE SERPL-MCNC: 93 MG/DL — SIGNIFICANT CHANGE UP (ref 70–99)
HCO3 BLDA-SCNC: 31 MMOL/L — HIGH (ref 21–29)
HCT VFR BLD CALC: 30.3 % — LOW (ref 34.5–45)
HGB BLD-MCNC: 8.9 G/DL — LOW (ref 11.5–15.5)
HOROWITZ INDEX BLDA+IHG-RTO: 40 — SIGNIFICANT CHANGE UP
INR BLD: 5.19 RATIO — CRITICAL HIGH (ref 0.88–1.16)
MCHC RBC-ENTMCNC: 29.1 PG — SIGNIFICANT CHANGE UP (ref 27–34)
MCHC RBC-ENTMCNC: 29.4 GM/DL — LOW (ref 32–36)
MCV RBC AUTO: 99 FL — SIGNIFICANT CHANGE UP (ref 80–100)
ORGANISM # SPEC MICROSCOPIC CNT: SIGNIFICANT CHANGE UP
ORGANISM # SPEC MICROSCOPIC CNT: SIGNIFICANT CHANGE UP
PCO2 BLDA: 64 MMHG — HIGH (ref 32–46)
PH BLDA: 7.3 — LOW (ref 7.35–7.45)
PLATELET # BLD AUTO: 220 K/UL — SIGNIFICANT CHANGE UP (ref 150–400)
PO2 BLDA: 72 MMHG — LOW (ref 74–108)
POTASSIUM SERPL-MCNC: 5.6 MMOL/L — HIGH (ref 3.5–5.3)
POTASSIUM SERPL-SCNC: 5.6 MMOL/L — HIGH (ref 3.5–5.3)
PROTHROM AB SERPL-ACNC: 60.6 SEC — HIGH (ref 10–13.1)
RBC # BLD: 3.06 M/UL — LOW (ref 3.8–5.2)
RBC # FLD: 21 % — HIGH (ref 10.3–14.5)
SAO2 % BLDA: 94 % — SIGNIFICANT CHANGE UP (ref 92–96)
SODIUM SERPL-SCNC: 140 MMOL/L — SIGNIFICANT CHANGE UP (ref 135–145)
SPECIMEN SOURCE: SIGNIFICANT CHANGE UP
WBC # BLD: 9.52 K/UL — SIGNIFICANT CHANGE UP (ref 3.8–10.5)
WBC # FLD AUTO: 9.52 K/UL — SIGNIFICANT CHANGE UP (ref 3.8–10.5)

## 2017-12-24 PROCEDURE — 71250 CT THORAX DX C-: CPT | Mod: 26

## 2017-12-24 RX ORDER — FUROSEMIDE 40 MG
60 TABLET ORAL ONCE
Qty: 0 | Refills: 0 | Status: COMPLETED | OUTPATIENT
Start: 2017-12-24 | End: 2017-12-24

## 2017-12-24 RX ADMIN — ALBUTEROL 2 PUFF(S): 90 AEROSOL, METERED ORAL at 06:39

## 2017-12-24 RX ADMIN — Medication 25 MILLIGRAM(S): at 06:39

## 2017-12-24 RX ADMIN — ALBUTEROL 2 PUFF(S): 90 AEROSOL, METERED ORAL at 11:07

## 2017-12-24 RX ADMIN — SENNA PLUS 2 TABLET(S): 8.6 TABLET ORAL at 21:11

## 2017-12-24 RX ADMIN — Medication 60 MILLIGRAM(S): at 11:08

## 2017-12-24 RX ADMIN — Medication 120 MILLIGRAM(S): at 06:39

## 2017-12-24 RX ADMIN — ALBUTEROL 2 PUFF(S): 90 AEROSOL, METERED ORAL at 17:22

## 2017-12-24 RX ADMIN — Medication 100 MILLIGRAM(S): at 21:11

## 2017-12-24 RX ADMIN — PIPERACILLIN AND TAZOBACTAM 25 GRAM(S): 4; .5 INJECTION, POWDER, LYOPHILIZED, FOR SOLUTION INTRAVENOUS at 17:22

## 2017-12-24 RX ADMIN — PIPERACILLIN AND TAZOBACTAM 25 GRAM(S): 4; .5 INJECTION, POWDER, LYOPHILIZED, FOR SOLUTION INTRAVENOUS at 06:39

## 2017-12-24 RX ADMIN — ALBUTEROL 2 PUFF(S): 90 AEROSOL, METERED ORAL at 00:15

## 2017-12-24 RX ADMIN — Medication 100 MILLIGRAM(S): at 06:39

## 2017-12-24 RX ADMIN — Medication 0.5 MILLIGRAM(S): at 06:39

## 2017-12-24 RX ADMIN — Medication 25 MILLIGRAM(S): at 17:22

## 2017-12-24 RX ADMIN — ATORVASTATIN CALCIUM 10 MILLIGRAM(S): 80 TABLET, FILM COATED ORAL at 21:11

## 2017-12-24 RX ADMIN — ALBUTEROL 2 PUFF(S): 90 AEROSOL, METERED ORAL at 23:09

## 2017-12-24 RX ADMIN — POLYETHYLENE GLYCOL 3350 17 GRAM(S): 17 POWDER, FOR SOLUTION ORAL at 11:08

## 2017-12-24 RX ADMIN — TIOTROPIUM BROMIDE 1 CAPSULE(S): 18 CAPSULE ORAL; RESPIRATORY (INHALATION) at 11:07

## 2017-12-24 RX ADMIN — Medication 100 MILLIGRAM(S): at 11:08

## 2017-12-24 RX ADMIN — CLOPIDOGREL BISULFATE 75 MILLIGRAM(S): 75 TABLET, FILM COATED ORAL at 11:08

## 2017-12-24 NOTE — PROGRESS NOTE ADULT - SUBJECTIVE AND OBJECTIVE BOX
Afebrile. O2 sat 97% on 4 liters nasal O2.  In no respiratory discomfort this AM.   Lethargic, but easily arousable.  Was on BiPAP overnite.    Vital Signs Last 24 Hrs  T(C): 36.5 (24 Dec 2017 12:15), Max: 36.6 (23 Dec 2017 13:20)  T(F): 97.7 (24 Dec 2017 12:15), Max: 97.9 (23 Dec 2017 13:20)  HR: 75 (24 Dec 2017 12:15) (73 - 112)  BP: 127/85 (24 Dec 2017 12:15) (116/75 - 141/80)  BP(mean): 89 (23 Dec 2017 21:23) (89 - 89)  RR: 18 (24 Dec 2017 12:15) (18 - 20)  SpO2: 97% (24 Dec 2017 12:15) (92% - 100%)    Medications:  MEDICATIONS  (STANDING):  ALBUTerol    90 MICROgram(s) HFA Inhaler 2 Puff(s) Inhalation every 6 hours  atorvastatin 10 milliGRAM(s) Oral at bedtime  buDESOnide   0.5 milliGRAM(s) Respule 0.5 milliGRAM(s) Inhalation two times a day  clopidogrel Tablet 75 milliGRAM(s) Oral daily  diltiazem    milliGRAM(s) Oral daily  docusate sodium 100 milliGRAM(s) Oral three times a day  metoprolol     tartrate 25 milliGRAM(s) Oral two times a day  piperacillin/tazobactam IVPB. 3.375 Gram(s) IV Intermittent every 12 hours  polyethylene glycol 3350 17 Gram(s) Oral daily  senna 2 Tablet(s) Oral at bedtime  tiotropium 18 MICROgram(s) Capsule 1 Capsule(s) Inhalation daily    MEDICATIONS  (PRN):      Allergies    No Known Allergies    Intolerances    digoxin (Rash; Drowsiness)      Physical Examination:    Pleasant.  Lethargic, but easily arousable.  Neck: no JVD, LAD, accessory muscle use  PULM: Decreased breath sounds at both lung bases. No wheezes or rhonchi.  CVS: IRR  Extremities: + LE edema.    LAB:  WBC 9520  creatinine  3.5  Urine culture negative  Blood culture ---> coag neg Staph    Plan:  1. Continue Proventil HFA and Spiriva.  2. D/C Budesonide neb tx.  3. Check ABG on nasal O2.  4. Contiunue nocturnal BiPAP (+ PRN).  5. ? need for antibiotics-- discuss with ID.  6. Cardiology followup.  7. Avoid sedatives.  8. Renal consult.    POC: Tommy Hoang M.D.  [phone] Afebrile. O2 sat 97% on 4 liters nasal O2.  In no respiratory discomfort this AM.   Lethargic, but easily arousable.  Was on BiPAP overnite.    Vital Signs Last 24 Hrs  T(C): 36.5 (24 Dec 2017 12:15), Max: 36.6 (23 Dec 2017 13:20)  T(F): 97.7 (24 Dec 2017 12:15), Max: 97.9 (23 Dec 2017 13:20)  HR: 75 (24 Dec 2017 12:15) (73 - 112)  BP: 127/85 (24 Dec 2017 12:15) (116/75 - 141/80)  BP(mean): 89 (23 Dec 2017 21:23) (89 - 89)  RR: 18 (24 Dec 2017 12:15) (18 - 20)  SpO2: 97% (24 Dec 2017 12:15) (92% - 100%)    Medications:  MEDICATIONS  (STANDING):  ALBUTerol    90 MICROgram(s) HFA Inhaler 2 Puff(s) Inhalation every 6 hours  atorvastatin 10 milliGRAM(s) Oral at bedtime  buDESOnide   0.5 milliGRAM(s) Respule 0.5 milliGRAM(s) Inhalation two times a day  clopidogrel Tablet 75 milliGRAM(s) Oral daily  diltiazem    milliGRAM(s) Oral daily  docusate sodium 100 milliGRAM(s) Oral three times a day  metoprolol     tartrate 25 milliGRAM(s) Oral two times a day  piperacillin/tazobactam IVPB. 3.375 Gram(s) IV Intermittent every 12 hours  polyethylene glycol 3350 17 Gram(s) Oral daily  senna 2 Tablet(s) Oral at bedtime  tiotropium 18 MICROgram(s) Capsule 1 Capsule(s) Inhalation daily    MEDICATIONS  (PRN):      Allergies    No Known Allergies    Intolerances    digoxin (Rash; Drowsiness)      Physical Examination:    Pleasant.  Lethargic, but easily arousable.  Neck: no JVD, LAD, accessory muscle use  PULM: Decreased breath sounds at both lung bases. No wheezes or rhonchi.  CVS: IRR  Extremities: + LE edema.    LAB:  WBC 9520  creatinine  3.5  Urine culture negative  Blood culture ---> coag neg Staph    Plan:  1. Continue Proventil HFA and Spiriva.  2. D/C Budesonide neb tx.  3. Check ABG on nasal O2.  4. Contiunue nocturnal BiPAP (+ PRN).  5. ? need for antibiotics-- discuss with ID.  6. Cardiology followup.  7. Avoid sedatives.    POC: Tommy Hoang M.D.  [phone]

## 2017-12-24 NOTE — PROGRESS NOTE ADULT - SUBJECTIVE AND OBJECTIVE BOX
Patient is a 88y old  Female who presents with a chief complaint of sob  History obtained from family due to confusion (22 Dec 2017 21:26)      SUBJECTIVE / OVERNIGHT EVENTS: BIPAP over night, comfo on 4 L NC today, though lethargic    MEDICATIONS  (STANDING):  ALBUTerol    90 MICROgram(s) HFA Inhaler 2 Puff(s) Inhalation every 6 hours  atorvastatin 10 milliGRAM(s) Oral at bedtime  clopidogrel Tablet 75 milliGRAM(s) Oral daily  diltiazem    milliGRAM(s) Oral daily  docusate sodium 100 milliGRAM(s) Oral three times a day  metoprolol     tartrate 25 milliGRAM(s) Oral two times a day  piperacillin/tazobactam IVPB. 3.375 Gram(s) IV Intermittent every 12 hours  polyethylene glycol 3350 17 Gram(s) Oral daily  senna 2 Tablet(s) Oral at bedtime  tiotropium 18 MICROgram(s) Capsule 1 Capsule(s) Inhalation daily    MEDICATIONS  (PRN):      Vital Signs Last 24 Hrs  T(F): 97.7 (12-24-17 @ 12:15), Max: 97.7 (12-24-17 @ 12:15)  HR: 77 (12-24-17 @ 17:04) (75 - 112)  BP: 127/85 (12-24-17 @ 12:15) (116/75 - 141/80)  RR: 20 (12-24-17 @ 17:04) (18 - 20)  SpO2: 97% (12-24-17 @ 17:04) (97% - 100%)  Telemetry:   CAPILLARY BLOOD GLUCOSE        I&O's Summary    23 Dec 2017 07:01  -  24 Dec 2017 07:00  --------------------------------------------------------  IN: 200 mL / OUT: 510 mL / NET: -310 mL    24 Dec 2017 07:01  -  24 Dec 2017 19:10  --------------------------------------------------------  IN: 150 mL / OUT: 150 mL / NET: 0 mL        PHYSICAL EXAM:  GENERAL: NAD, well-developed  HEAD:  Atraumatic, Normocephalic  EYES: EOMI, PERRLA, conjunctiva and sclera clear  NECK: Supple, No JVD  CHEST/LUNG: Clear to auscultation bilaterally; No wheeze  HEART: Regular rate and rhythm; No murmurs, rubs, or gallops  ABDOMEN: Soft, Nontender, Nondistended; Bowel sounds present  EXTREMITIES:  2+ Peripheral Pulses, No clubbing, cyanosis, or edema  PSYCH: AAOx3  NEUROLOGY: non-focal  SKIN: No rashes or lesions    LABS:                        8.9    9.52  )-----------( 220      ( 24 Dec 2017 12:12 )             30.3     12-24    140  |  96  |  79<H>  ----------------------------<  93  5.6<H>   |  26  |  3.50<H>    Ca    8.9      24 Dec 2017 12:12  Phos  5.2     12-23  Mg     2.5     12-23    TPro  6.0  /  Alb  2.7<L>  /  TBili  0.7  /  DBili  x   /  AST  189<H>  /  ALT  323<H>  /  AlkPhos  103  12-23    PT/INR - ( 24 Dec 2017 12:12 )   PT: 60.6 sec;   INR: 5.19 ratio         PTT - ( 24 Dec 2017 12:12 )  PTT:43.0 sec  CARDIAC MARKERS ( 22 Dec 2017 19:56 )  x     / 0.08 ng/mL / x     / x     / x              RADIOLOGY & ADDITIONAL TESTS:    Imaging Personally Reviewed:    Consultant(s) Notes Reviewed:      Care Discussed with Consultants/Other Providers:

## 2017-12-24 NOTE — PROGRESS NOTE ADULT - SUBJECTIVE AND OBJECTIVE BOX
Allergies    No Known Allergies    Intolerances    digoxin (Rash; Drowsiness)      MEDICATIONS  (STANDING):  ALBUTerol    90 MICROgram(s) HFA Inhaler 2 Puff(s) Inhalation every 6 hours  atorvastatin 10 milliGRAM(s) Oral at bedtime  buDESOnide   0.5 milliGRAM(s) Respule 0.5 milliGRAM(s) Inhalation two times a day  clopidogrel Tablet 75 milliGRAM(s) Oral daily  diltiazem    milliGRAM(s) Oral daily  docusate sodium 100 milliGRAM(s) Oral three times a day  metoprolol     tartrate 25 milliGRAM(s) Oral two times a day  piperacillin/tazobactam IVPB. 3.375 Gram(s) IV Intermittent every 12 hours  polyethylene glycol 3350 17 Gram(s) Oral daily  senna 2 Tablet(s) Oral at bedtime  tiotropium 18 MICROgram(s) Capsule 1 Capsule(s) Inhalation daily    MEDICATIONS  (PRN):          Antimicrobials:  piperacillin/tazobactam IVPB. 3.375 Gram(s) IV Intermittent every 12 hours        LABS:  CBC Full  -  ( 24 Dec 2017 12:12 )  WBC Count : 9.52 K/uL  Hemoglobin : 8.9 g/dL  Hematocrit : 30.3 %  Platelet Count - Automated : 220 K/uL  Mean Cell Volume : 99.0 fl  Mean Cell Hemoglobin : 29.1 pg  Mean Cell Hemoglobin Concentration : 29.4 gm/dL  Auto Neutrophil # : x  Auto Lymphocyte # : x  Auto Monocyte # : x  Auto Eosinophil # : x  Auto Basophil # : x  Auto Neutrophil % : x  Auto Lymphocyte % : x  Auto Monocyte % : x  Auto Eosinophil % : x  Auto Basophil % : x    12-    140  |  96  |  79<H>  ----------------------------<  93  5.6<H>   |  26  |  3.50<H>    Ca    8.9      24 Dec 2017 12:12  Phos  5.2     12-  Mg     2.5         TPro  6.0  /  Alb  2.7<L>  /  TBili  0.7  /  DBili  x   /  AST  189<H>  /  ALT  323<H>  /  AlkPhos  103  -    PT/INR - ( 23 Dec 2017 14:33 )   PT: 67.1 sec;   INR: 5.99 ratio         PTT - ( 23 Dec 2017 14:33 )  PTT:40.4 sec  ABG - ( 23 Dec 2017 05:20 )  pH: 7.36  /  pCO2: 58    /  pO2: 170   / HCO3: 32    / Base Excess: 5.6   /  SaO2: 100               Urinalysis Basic - ( 22 Dec 2017 15:08 )    Color: Yellow / Appearance: SL Turbid / S.025 / pH: x  Gluc: x / Ketone: Negative  / Bili: Small / Urobili: 2   Blood: x / Protein: 30 mg/dL / Nitrite: Negative   Leuk Esterase: Large / RBC: 2-5 /HPF / WBC 10-25 /HPF   Sq Epi: x / Non Sq Epi: Many /HPF / Bacteria: Few /HPF      Blood Gas Venous - Lactate: 2.0 mmoL/L ( @ 19:55)  Blood Gas Venous - Lactate: 2.0 mmoL/L ( @ 15:08)            Cultures:  RECENT CULTURES:   @ 18:04 .Blood Blood-Peripheral   PCR    Growth in aerobic bottle: Gram Positive Cocci in Clusters  Growth in anaerobic bottle: Gram Positive Cocci in Clusters    Blood Culture PCR  Blood Culture PCR     Growth in aerobic bottle: Gram Positive Cocci in Clusters  Growth in anaerobic bottle: Gram Positive Cocci in Clusters  ***Blood Panel PCR results on this specimen are available  approximately 3 hours after the Gram stain result.***  Gram stain, PCR, and/or culture results may not always  correspond due to difference in methodologies.  ************************************************************  This PCR assay was performed using [employer].  The following targets are tested for: Enterococcus,  vancomycin resistant enterococci, Listeria monocytogenes,  coagulase negative staphylococci, S. aureus,  methicillin resistant S. aureus, Streptococcus agalactiae  (Group B), S. pneumoniae, S. pyogenes (Group A),  Acinetobacter baumannii, Enterobacter cloacae, E. coli,  Klebsiella oxytoca, K. pneumoniae, Proteus sp.,  Serratia marcescens, Haemophilus influenzae,  Neisseria meningitidis, Pseudomonas aeruginosa, Candida  albicans, C. glabrata, C krusei, C parapsilosis,  C. tropicalis and the KPC resistance gene.    12-22 @ 17:42 .Urine Clean Catch (Midstream)                No growth              Imaging Studies:    Vital Signs:    Vital Signs Last 24 Hrs  T(C): 36.5 (24 Dec 2017 12:15), Max: 36.6 (23 Dec 2017 13:20)  T(F): 97.7 (24 Dec 2017 12:15), Max: 97.9 (23 Dec 2017 13:20)  HR: 75 (24 Dec 2017 12:15) (73 - 112)  BP: 127/85 (24 Dec 2017 12:15) (116/75 - 141/80)  BP(mean): 89 (23 Dec 2017 21:23) (89 - 89)  RR: 18 (24 Dec 2017 12:15) (18 - 20)  SpO2: 97% (24 Dec 2017 12:15) (92% - 100%) Afebrile. No leukocytosis.  Lethargic.     Allergies    No Known Allergies    Intolerances    digoxin (Rash; Drowsiness)      MEDICATIONS  (STANDING):  ALBUTerol    90 MICROgram(s) HFA Inhaler 2 Puff(s) Inhalation every 6 hours  atorvastatin 10 milliGRAM(s) Oral at bedtime  buDESOnide   0.5 milliGRAM(s) Respule 0.5 milliGRAM(s) Inhalation two times a day  clopidogrel Tablet 75 milliGRAM(s) Oral daily  diltiazem    milliGRAM(s) Oral daily  docusate sodium 100 milliGRAM(s) Oral three times a day  metoprolol     tartrate 25 milliGRAM(s) Oral two times a day  piperacillin/tazobactam IVPB. 3.375 Gram(s) IV Intermittent every 12 hours  polyethylene glycol 3350 17 Gram(s) Oral daily  senna 2 Tablet(s) Oral at bedtime  tiotropium 18 MICROgram(s) Capsule 1 Capsule(s) Inhalation daily      Antimicrobials:  piperacillin/tazobactam IVPB. 3.375 Gram(s) IV Intermittent every 12 hours        LABS:  CBC Full  -  ( 24 Dec 2017 12:12 )  WBC Count : 9.52 K/uL  Hemoglobin : 8.9 g/dL  Hematocrit : 30.3 %  Platelet Count - Automated : 220 K/uL  Mean Cell Volume : 99.0 fl  Mean Cell Hemoglobin : 29.1 pg  Mean Cell Hemoglobin Concentration : 29.4 gm/dL  Auto Neutrophil # : x  Auto Lymphocyte # : x  Auto Monocyte # : x  Auto Eosinophil # : x  Auto Basophil # : x  Auto Neutrophil % : x  Auto Lymphocyte % : x  Auto Monocyte % : x  Auto Eosinophil % : x  Auto Basophil % : x        140  |  96  |  79<H>  ----------------------------<  93  5.6<H>   |  26  |  3.50<H>    Ca    8.9      24 Dec 2017 12:12  Phos  5.2       Mg     2.5         TPro  6.0  /  Alb  2.7<L>  /  TBili  0.7  /  DBili  x   /  AST  189<H>  /  ALT  323<H>  /  AlkPhos  103      PT/INR - ( 23 Dec 2017 14:33 )   PT: 67.1 sec;   INR: 5.99 ratio         PTT - ( 23 Dec 2017 14:33 )  PTT:40.4 sec  ABG - ( 23 Dec 2017 05:20 )  pH: 7.36  /  pCO2: 58    /  pO2: 170   / HCO3: 32    / Base Excess: 5.6   /  SaO2: 100         Urinalysis Basic - ( 22 Dec 2017 15:08 )    Color: Yellow / Appearance: SL Turbid / S.025 / pH: x  Gluc: x / Ketone: Negative  / Bili: Small / Urobili: 2   Blood: x / Protein: 30 mg/dL / Nitrite: Negative   Leuk Esterase: Large / RBC: 2-5 /HPF / WBC 10-25 /HPF   Sq Epi: x / Non Sq Epi: Many /HPF / Bacteria: Few /HPF      Blood Gas Venous - Lactate: 2.0 mmoL/L ( @ 19:55)  Blood Gas Venous - Lactate: 2.0 mmoL/L ( @ 15:08)      Cultures:  RECENT CULTURES:   @ 18:04 .Blood Blood-Peripheral   PCR    Growth in aerobic bottle: CNS       @ 17:42 .Urine Clean Catch (Midstream)   No growth    Imaging Studies: CT chest: Moderate left and loculated right pleural effusions, with increase in   size of the left effusion since 10/24/2017. There is complete atelectasis   of the left lower lobe and partial atelectasis of the right lower lobe.    Debris in the trachea and bilateral mainstem bronchi with mucoid   impaction of bilateral lower lobe bronchi.    Vital Signs:    Vital Signs Last 24 Hrs  T(C): 36.5 (24 Dec 2017 12:15), Max: 36.6 (23 Dec 2017 13:20)  T(F): 97.7 (24 Dec 2017 12:15), Max: 97.9 (23 Dec 2017 13:20)  HR: 75 (24 Dec 2017 12:15) (73 - 112)  BP: 127/85 (24 Dec 2017 12:15) (116/75 - 141/80)  BP(mean): 89 (23 Dec 2017 21:23) (89 - 89)  RR: 18 (24 Dec 2017 12:15) (18 - 20)  SpO2: 97% (24 Dec 2017 12:15) (92% - 100%)

## 2017-12-24 NOTE — PROGRESS NOTE ADULT - ASSESSMENT
A/P; 87 y/o female with PMHx of right breast CA S/P lumpectomy in 2000, Uterine Ca S/P total hysterectomy in 2012, Skin CA S/P excision of bilateral lower eyelid skin CA, cholecystectomy, Osteoarthritis of bilateral knees, HTN, hyperlipidemia, paroxysmal Afib on coumadin s/p PPM for slow ventricular response, severe aortic stenosis now s/p TAVR April 2015 , who presents with SOB, cough x 1 day,  confusion and lethargy over the past few days.  Patient was recently admitted 10/15-11/2 for AMS, NANCY on CKD thought to be pre-renal 2/2 overdiuresis and diarrhea, hypercapneic/hypoxemic respiratory failure 2/2 emphysema? respiratory muscle weakness/kyphoscoliosiss/restrictive lung disease with moderate b/l pleural effusions,  improved with BIPAP, acute on chronic diastolic CHF w/ EF 55%. She was discharged to rehab and then home 12/1.   Since being home she has not returned to her previous independent lifestyle, requires 24/7 care from [facility] and family, has had difficulty completing daily tasks, ambulating due to lethargy and SOB. OP providers have increased lasix to current 60mg daily, 80 twice/week without improvement.   ROS negative for fever, chills, chest pain, nausea, vomiting, diarrhea, dysuria.     CXR with  Small bilateral pleural effusions with adjacent atelectasis.     CT chest: Moderate left and loculated right pleural effusions, with increase in   size of the left effusion since 10/24/2017. There is complete atelectasis   of the left lower lobe and partial atelectasis of the right lower lobe.  Debris in the trachea and bilateral mainstem bronchi with mucoid   impaction of bilateral lower lobe bronchi.    Pt is on Zosyn.  BC 1/4 with CNS, likely contamination.  UC negative.  Continue Zosyn.

## 2017-12-24 NOTE — PROGRESS NOTE ADULT - SUBJECTIVE AND OBJECTIVE BOX
Patient is a 88y old  Female who presents with a chief complaint of sob    She is alert this AM. She denies c/o chest pain, SOB or palpitations. Slight cough.     Allergies    No Known Allergies    Intolerances    digoxin (Rash; Drowsiness)    MEDICATIONS  (STANDING):  ALBUTerol    90 MICROgram(s) HFA Inhaler 2 Puff(s) Inhalation every 6 hours  atorvastatin 10 milliGRAM(s) Oral at bedtime  buDESOnide   0.5 milliGRAM(s) Respule 0.5 milliGRAM(s) Inhalation two times a day  clopidogrel Tablet 75 milliGRAM(s) Oral daily  diltiazem    milliGRAM(s) Oral daily  docusate sodium 100 milliGRAM(s) Oral three times a day  metoprolol     tartrate 25 milliGRAM(s) Oral two times a day  piperacillin/tazobactam IVPB. 3.375 Gram(s) IV Intermittent every 12 hours  polyethylene glycol 3350 17 Gram(s) Oral daily  senna 2 Tablet(s) Oral at bedtime  tiotropium 18 MICROgram(s) Capsule 1 Capsule(s) Inhalation daily    MEDICATIONS  (PRN):      PHYSICAL EXAM:  Vital Signs Last 24 Hrs  T(C): 36.3 (24 Dec 2017 04:34), Max: 36.9 (23 Dec 2017 08:36)  T(F): 97.4 (24 Dec 2017 04:34), Max: 98.4 (23 Dec 2017 08:36)  HR: 108 (24 Dec 2017 06:38) (73 - 112)  BP: 125/73 (24 Dec 2017 06:38) (116/75 - 153/75)  BP(mean): 89 (23 Dec 2017 21:23) (89 - 89)  RR: 18 (24 Dec 2017 04:34) (17 - 18)  SpO2: 97% (24 Dec 2017 05:03) (92% - 100%)  Daily     Daily   I&O's Summary    23 Dec 2017 07:01  -  24 Dec 2017 07:00  --------------------------------------------------------  IN: 200 mL / OUT: 510 mL / NET: -310 mL    General Appearance: alert  HEENT: normocephalic, atraumatic  Neck: no JVD,  carotid 2+  bilaterally without bruits  Lungs:  clear bilaterally  Cor:  pmi 5th ICS MCL, irregular rate and rhythm, S1 normal intensity, S2 normal intensity, Grade I/VI GHAZALA ULSB  Abdomen: soft, non-tender; bowel sounds normal; no masses,  no organomegaly  Extremities: without cyanosis, clubbing, 1+ LE edema  Vasc: 2-+ PT and DP pulses;       Labs:  CBC Full  -  ( 23 Dec 2017 08:37 )  WBC Count : 10.9 K/uL  Hemoglobin : 9.4 g/dL  Hematocrit : 29.9 %  Platelet Count - Automated : 232 K/uL  Mean Cell Volume : 99.9 fl  Mean Cell Hemoglobin : 31.4 pg  Mean Cell Hemoglobin Concentration : 31.4 gm/dL  Auto Neutrophil # : 9.1 K/uL  Auto Lymphocyte # : 0.7 K/uL  Auto Monocyte # : 1.0 K/uL  Auto Eosinophil # : 0.1 K/uL  Auto Basophil # : 0.0 K/uL  Auto Neutrophil % : 83.9 %  Auto Lymphocyte % : 6.2 %  Auto Monocyte % : 9.0 %  Auto Eosinophil % : 0.6 %  Auto Basophil % : 0.3 %        141  |  96  |  78<H>  ----------------------------<  110<H>  5.2   |  34<H>  |  3.32<H>    Ca    8.7      23 Dec 2017 14:33  Phos  5.2       Mg     2.5         TPro  6.0  /  Alb  2.7<L>  /  TBili  0.7  /  DBili  x   /  AST  189<H>  /  ALT  323<H>  /  AlkPhos  103  12    CARDIAC MARKERS ( 22 Dec 2017 19:56 )  x     / 0.08 ng/mL / x     / x     / x      CARDIAC MARKERS ( 22 Dec 2017 15:08 )  x     / 0.09 ng/mL / 119 U/L / x     / 9.2 ng/mL      PT/INR - ( 23 Dec 2017 14:33 )   PT: 67.1 sec;   INR: 5.99 ratio         PTT - ( 23 Dec 2017 14:33 )  PTT:40.4 sec  Urinalysis Basic - ( 22 Dec 2017 15:08 )    Color: Yellow / Appearance: SL Turbid / S.025 / pH: x  Gluc: x / Ketone: Negative  / Bili: Small / Urobili: 2   Blood: x / Protein: 30 mg/dL / Nitrite: Negative   Leuk Esterase: Large / RBC: 2-5 /HPF / WBC 10-25 /HPF   Sq Epi: x / Non Sq Epi: Many /HPF / Bacteria: Few /HPF                    Timothy Emmanuel MD [facility]  [phone]

## 2017-12-24 NOTE — PROGRESS NOTE ADULT - SUBJECTIVE AND OBJECTIVE BOX
[facility] [phone]        Patient seen and examined in bed.  Was off bipap this am        MEDICATIONS  (STANDING):  ALBUTerol    90 MICROgram(s) HFA Inhaler 2 Puff(s) Inhalation every 6 hours  atorvastatin 10 milliGRAM(s) Oral at bedtime  buDESOnide   0.5 milliGRAM(s) Respule 0.5 milliGRAM(s) Inhalation two times a day  clopidogrel Tablet 75 milliGRAM(s) Oral daily  diltiazem    milliGRAM(s) Oral daily  docusate sodium 100 milliGRAM(s) Oral three times a day  metoprolol     tartrate 25 milliGRAM(s) Oral two times a day  piperacillin/tazobactam IVPB. 3.375 Gram(s) IV Intermittent every 12 hours  polyethylene glycol 3350 17 Gram(s) Oral daily  senna 2 Tablet(s) Oral at bedtime  tiotropium 18 MICROgram(s) Capsule 1 Capsule(s) Inhalation daily      VITAL:  T(C): , Max: 36.6 (17 @ 13:20)  T(F): , Max: 97.9 (17 @ 13:20)  HR: 108 (17 @ 06:38)  BP: 125/73 (17 @ 06:38)  BP(mean): 89 (17 @ 21:23)  RR: 20 (17 @ 09:20)  SpO2: 98% (17 @ 09:20)  Wt(kg): --    I and O's:     @ 07:01  -   @ 07:00  --------------------------------------------------------  IN: 200 mL / OUT: 510 mL / NET: -310 mL          PHYSICAL EXAM:    Constitutional: NAD  HEENT: PERRLA    Neck: +  JVD  Respiratory: Course  Cardiovascular: S1 and S2 irreg  Gastrointestinal: BS+, soft, NT/ND  Extremities:+ 3 peripheral edema  Neurological:  no focal deficits  Psychiatric: unable   : +  Gautam  Skin: No rashes  Access: Not applicable    LABS:                        9.4    10.9  )-----------( 232      ( 23 Dec 2017 08:37 )             29.9         141  |  96  |  78<H>  ----------------------------<  110<H>  5.2   |  34<H>  |  3.32<H>    Ca    8.7      23 Dec 2017 14:33  Phos  5.2       Mg     2.5         TPro  6.0  /  Alb  2.7<L>  /  TBili  0.7  /  DBili  x   /  AST  189<H>  /  ALT  323<H>  /  AlkPhos  103            Urine Studies:  Urinalysis Basic - ( 22 Dec 2017 15:08 )    Color: Yellow / Appearance: SL Turbid / S.025 / pH: x  Gluc: x / Ketone: Negative  / Bili: Small / Urobili: 2   Blood: x / Protein: 30 mg/dL / Nitrite: Negative   Leuk Esterase: Large / RBC: 2-5 /HPF / WBC 10-25 /HPF   Sq Epi: x / Non Sq Epi: Many /HPF / Bacteria: Few /HPF            RADIOLOGY & ADDITIONAL STUDIES:

## 2017-12-24 NOTE — PROGRESS NOTE ADULT - ASSESSMENT
88y Female with history of chronic atrial fibirllation, AS S/P TAVR, chronic diastolic CHF and pulmonary disease brought to ER with increased confusion and respiratory distress. She is more alert this AM. Labs with further renal dysfunction and elevated INR. Mild edema on exam.

## 2017-12-24 NOTE — PROGRESS NOTE ADULT - ASSESSMENT
The patient is an 88-year-old female, presents now with respiratory acidosis, acute kidney injury, and hepatitis.  The patient does have an elevated BNP and evidence of edema, but chest x-ray is clear.  Elevated BNP is likely from right ventricular dysfunction.  Unclear if degree of stenosis of the mitral valve and if that is leading to a degree of pulmonary hypertension.    Hepatitis  NANCY-non oliguric but minimal output    1.	Renal:  Will try Lasix 60mg ivp x 1 to achieve more nonoliguria  2.	Infectious Disease:    Zosyn per ID eval  3.	Miscellaneous:  The patient is a full do not resuscitate.  Further workup pending above.  4.	- Cont the oleary for strict ins and outs

## 2017-12-25 LAB
ANION GAP SERPL CALC-SCNC: 14 MMOL/L — SIGNIFICANT CHANGE UP (ref 5–17)
APTT BLD: 46.4 SEC — HIGH (ref 27.5–37.4)
BUN SERPL-MCNC: 80 MG/DL — HIGH (ref 7–23)
CALCIUM SERPL-MCNC: 8.8 MG/DL — SIGNIFICANT CHANGE UP (ref 8.4–10.5)
CHLORIDE SERPL-SCNC: 97 MMOL/L — SIGNIFICANT CHANGE UP (ref 96–108)
CO2 SERPL-SCNC: 31 MMOL/L — SIGNIFICANT CHANGE UP (ref 22–31)
CREAT SERPL-MCNC: 3.87 MG/DL — HIGH (ref 0.5–1.3)
GLUCOSE SERPL-MCNC: 124 MG/DL — HIGH (ref 70–99)
HCT VFR BLD CALC: 27.8 % — LOW (ref 34.5–45)
HGB BLD-MCNC: 8.2 G/DL — LOW (ref 11.5–15.5)
INR BLD: 5.06 RATIO — CRITICAL HIGH (ref 0.88–1.16)
MCHC RBC-ENTMCNC: 28.9 PG — SIGNIFICANT CHANGE UP (ref 27–34)
MCHC RBC-ENTMCNC: 29.5 GM/DL — LOW (ref 32–36)
MCV RBC AUTO: 97.9 FL — SIGNIFICANT CHANGE UP (ref 80–100)
PLATELET # BLD AUTO: 186 K/UL — SIGNIFICANT CHANGE UP (ref 150–400)
POTASSIUM SERPL-MCNC: 5.5 MMOL/L — HIGH (ref 3.5–5.3)
POTASSIUM SERPL-SCNC: 5.5 MMOL/L — HIGH (ref 3.5–5.3)
PROTHROM AB SERPL-ACNC: 59.1 SEC — HIGH (ref 10–13.1)
RBC # BLD: 2.84 M/UL — LOW (ref 3.8–5.2)
RBC # FLD: 21.3 % — HIGH (ref 10.3–14.5)
SODIUM SERPL-SCNC: 142 MMOL/L — SIGNIFICANT CHANGE UP (ref 135–145)
WBC # BLD: 9.17 K/UL — SIGNIFICANT CHANGE UP (ref 3.8–10.5)
WBC # FLD AUTO: 9.17 K/UL — SIGNIFICANT CHANGE UP (ref 3.8–10.5)

## 2017-12-25 RX ORDER — FUROSEMIDE 40 MG
60 TABLET ORAL EVERY 12 HOURS
Qty: 0 | Refills: 0 | Status: DISCONTINUED | OUTPATIENT
Start: 2017-12-25 | End: 2017-12-26

## 2017-12-25 RX ADMIN — Medication 25 MILLIGRAM(S): at 18:08

## 2017-12-25 RX ADMIN — PIPERACILLIN AND TAZOBACTAM 25 GRAM(S): 4; .5 INJECTION, POWDER, LYOPHILIZED, FOR SOLUTION INTRAVENOUS at 18:07

## 2017-12-25 RX ADMIN — ALBUTEROL 2 PUFF(S): 90 AEROSOL, METERED ORAL at 23:49

## 2017-12-25 RX ADMIN — TIOTROPIUM BROMIDE 1 CAPSULE(S): 18 CAPSULE ORAL; RESPIRATORY (INHALATION) at 11:47

## 2017-12-25 RX ADMIN — PIPERACILLIN AND TAZOBACTAM 25 GRAM(S): 4; .5 INJECTION, POWDER, LYOPHILIZED, FOR SOLUTION INTRAVENOUS at 08:46

## 2017-12-25 RX ADMIN — ALBUTEROL 2 PUFF(S): 90 AEROSOL, METERED ORAL at 05:12

## 2017-12-25 RX ADMIN — Medication 100 MILLIGRAM(S): at 20:10

## 2017-12-25 RX ADMIN — CLOPIDOGREL BISULFATE 75 MILLIGRAM(S): 75 TABLET, FILM COATED ORAL at 11:47

## 2017-12-25 RX ADMIN — ALBUTEROL 2 PUFF(S): 90 AEROSOL, METERED ORAL at 11:47

## 2017-12-25 RX ADMIN — POLYETHYLENE GLYCOL 3350 17 GRAM(S): 17 POWDER, FOR SOLUTION ORAL at 11:47

## 2017-12-25 RX ADMIN — SENNA PLUS 2 TABLET(S): 8.6 TABLET ORAL at 20:10

## 2017-12-25 RX ADMIN — Medication 100 MILLIGRAM(S): at 11:47

## 2017-12-25 RX ADMIN — Medication 100 MILLIGRAM(S): at 05:12

## 2017-12-25 RX ADMIN — Medication 25 MILLIGRAM(S): at 05:12

## 2017-12-25 RX ADMIN — Medication 60 MILLIGRAM(S): at 18:17

## 2017-12-25 RX ADMIN — ATORVASTATIN CALCIUM 10 MILLIGRAM(S): 80 TABLET, FILM COATED ORAL at 20:10

## 2017-12-25 RX ADMIN — Medication 120 MILLIGRAM(S): at 05:12

## 2017-12-25 RX ADMIN — ALBUTEROL 2 PUFF(S): 90 AEROSOL, METERED ORAL at 18:07

## 2017-12-25 NOTE — PROGRESS NOTE ADULT - SUBJECTIVE AND OBJECTIVE BOX
Allergies    No Known Allergies    Intolerances    digoxin (Rash; Drowsiness)      MEDICATIONS  (STANDING):  ALBUTerol    90 MICROgram(s) HFA Inhaler 2 Puff(s) Inhalation every 6 hours  atorvastatin 10 milliGRAM(s) Oral at bedtime  clopidogrel Tablet 75 milliGRAM(s) Oral daily  diltiazem    milliGRAM(s) Oral daily  docusate sodium 100 milliGRAM(s) Oral three times a day  furosemide   Injectable 60 milliGRAM(s) IV Push every 12 hours  metoprolol     tartrate 25 milliGRAM(s) Oral two times a day  piperacillin/tazobactam IVPB. 3.375 Gram(s) IV Intermittent every 12 hours  polyethylene glycol 3350 17 Gram(s) Oral daily  senna 2 Tablet(s) Oral at bedtime  tiotropium 18 MICROgram(s) Capsule 1 Capsule(s) Inhalation daily    MEDICATIONS  (PRN):          Antimicrobials:  piperacillin/tazobactam IVPB. 3.375 Gram(s) IV Intermittent every 12 hours        LABS:  CBC Full  -  ( 25 Dec 2017 12:08 )  WBC Count : 9.17 K/uL  Hemoglobin : 8.2 g/dL  Hematocrit : 27.8 %  Platelet Count - Automated : 186 K/uL  Mean Cell Volume : 97.9 fl  Mean Cell Hemoglobin : 28.9 pg  Mean Cell Hemoglobin Concentration : 29.5 gm/dL  Auto Neutrophil # : x  Auto Lymphocyte # : x  Auto Monocyte # : x  Auto Eosinophil # : x  Auto Basophil # : x  Auto Neutrophil % : x  Auto Lymphocyte % : x  Auto Monocyte % : x  Auto Eosinophil % : x  Auto Basophil % : x    12-25    142  |  97  |  80<H>  ----------------------------<  124<H>  5.5<H>   |  31  |  3.87<H>    Ca    8.8      25 Dec 2017 12:08      PT/INR - ( 25 Dec 2017 12:08 )   PT: 59.1 sec;   INR: 5.06 ratio         PTT - ( 25 Dec 2017 12:08 )  PTT:46.4 sec  ABG - ( 24 Dec 2017 14:48 )  pH: 7.30  /  pCO2: 64    /  pO2: 72    / HCO3: 31    / Base Excess: 4.0   /  SaO2: 94                  Blood Gas Venous - Lactate: 2.0 mmoL/L (12-22 @ 19:55)  Blood Gas Venous - Lactate: 2.0 mmoL/L (12-22 @ 15:08)            Cultures:  RECENT CULTURES:  12-24 @ 01:06 .Blood Blood-Peripheral                No growth to date.    12-23 @ 21:37 .Blood Blood-Peripheral                No growth to date.    12-22 @ 18:04 .Blood Blood-Peripheral   PCR    Growth in aerobic bottle: Gram Positive Cocci in Clusters  Growth in anaerobic bottle: Gram Positive Cocci in Clusters    Blood Culture PCR  Blood Culture PCR     Growth in aerobic and anaerobic bottles: Coag Negative Staphylococcus  Single set isolate, possible contaminant. Contact  Microbiology if susceptibility testing clinically  indicated.  ***Blood Panel PCR results on this specimen are available  approximately 3 hours after the Gram stain result.***  Gram stain, PCR, and/or culture results may not always  correspond due to difference in methodologies.  ************************************************************  This PCR assay was performed using[employer].  The following targets are tested for: Enterococcus,  vancomycin resistant enterococci, Listeria monocytogenes,  coagulase negative staphylococci, S. aureus,  methicillin resistant S. aureus, Streptococcus agalactiae  (Group B), S.pneumoniae, S. pyogenes (Group A),  Acinetobacter baumannii, Enterobacter cloacae, E. coli,  Klebsiella oxytoca, K. pneumoniae, Proteus sp.,  Serratia marcescens, Haemophilus influenzae,  Neisseria meningitidis, Pseudomonas aeruginosa, Candida  albicans, C. glabrata, C krusei, C parapsilosis,  C. tropicalis and the KPC resistance gene.    12-22 @ 17:42 .Urine Clean Catch (Midstream)                No growth              Imaging Studies:    Vital Signs:    Vital Signs Last 24 Hrs  T(C): 36.6 (25 Dec 2017 12:33), Max: 36.7 (25 Dec 2017 04:14)  T(F): 97.9 (25 Dec 2017 12:33), Max: 98.1 (25 Dec 2017 04:14)  HR: 70 (25 Dec 2017 16:43) (62 - 90)  BP: 112/100 (25 Dec 2017 12:33) (112/100 - 124/69)  BP(mean): --  RR: 18 (25 Dec 2017 12:33) (18 - 20)  SpO2: 96% (25 Dec 2017 16:43) (94% - 98%) Afebrile. No leukocytosis.  On BiPAP      Allergies    No Known Allergies    Intolerances    digoxin (Rash; Drowsiness)      MEDICATIONS  (STANDING):  ALBUTerol    90 MICROgram(s) HFA Inhaler 2 Puff(s) Inhalation every 6 hours  atorvastatin 10 milliGRAM(s) Oral at bedtime  clopidogrel Tablet 75 milliGRAM(s) Oral daily  diltiazem    milliGRAM(s) Oral daily  docusate sodium 100 milliGRAM(s) Oral three times a day  furosemide   Injectable 60 milliGRAM(s) IV Push every 12 hours  metoprolol     tartrate 25 milliGRAM(s) Oral two times a day  piperacillin/tazobactam IVPB. 3.375 Gram(s) IV Intermittent every 12 hours  polyethylene glycol 3350 17 Gram(s) Oral daily  senna 2 Tablet(s) Oral at bedtime  tiotropium 18 MICROgram(s) Capsule 1 Capsule(s) Inhalation daily    MEDICATIONS  (PRN):          Antimicrobials:  piperacillin/tazobactam IVPB. 3.375 Gram(s) IV Intermittent every 12 hours        LABS:  CBC Full  -  ( 25 Dec 2017 12:08 )  WBC Count : 9.17 K/uL  Hemoglobin : 8.2 g/dL  Hematocrit : 27.8 %  Platelet Count - Automated : 186 K/uL  Mean Cell Volume : 97.9 fl  Mean Cell Hemoglobin : 28.9 pg  Mean Cell Hemoglobin Concentration : 29.5 gm/dL  Auto Neutrophil # : x  Auto Lymphocyte # : x  Auto Monocyte # : x  Auto Eosinophil # : x  Auto Basophil # : x  Auto Neutrophil % : x  Auto Lymphocyte % : x  Auto Monocyte % : x  Auto Eosinophil % : x  Auto Basophil % : x    12-25    142  |  97  |  80<H>  ----------------------------<  124<H>  5.5<H>   |  31  |  3.87<H>    Ca    8.8      25 Dec 2017 12:08      PT/INR - ( 25 Dec 2017 12:08 )   PT: 59.1 sec;   INR: 5.06 ratio         PTT - ( 25 Dec 2017 12:08 )  PTT:46.4 sec  ABG - ( 24 Dec 2017 14:48 )  pH: 7.30  /  pCO2: 64    /  pO2: 72    / HCO3: 31    / Base Excess: 4.0   /  SaO2: 94            Blood Gas Venous - Lactate: 2.0 mmoL/L (12-22 @ 19:55)  Blood Gas Venous - Lactate: 2.0 mmoL/L (12-22 @ 15:08)      Cultures:  RECENT CULTURES:  12-24 @ 01:06 .Blood Blood-Peripheral   No growth to date.    12-23 @ 21:37 .Blood Blood-Peripheral   No growth to date.    12-22 @ 18:04 .Blood Blood-Peripheral   CNS      Vital Signs:    Vital Signs Last 24 Hrs  T(C): 36.6 (25 Dec 2017 12:33), Max: 36.7 (25 Dec 2017 04:14)  T(F): 97.9 (25 Dec 2017 12:33), Max: 98.1 (25 Dec 2017 04:14)  HR: 70 (25 Dec 2017 16:43) (62 - 90)  BP: 112/100 (25 Dec 2017 12:33) (112/100 - 124/69)  RR: 18 (25 Dec 2017 12:33) (18 - 20)  SpO2: 96% (25 Dec 2017 16:43) (94% - 98%)

## 2017-12-25 NOTE — PROGRESS NOTE ADULT - RS GEN PE MLT RESP DETAILS PC
diminished breath sounds, L/diminished breath sounds, R/rhonchi
diminished breath sounds, R/diminished breath sounds, L/rhonchi

## 2017-12-25 NOTE — PROGRESS NOTE ADULT - SUBJECTIVE AND OBJECTIVE BOX
Cardiology Progress Note      Patient is a 88y old  Female who presents with a chief complaint of sob  History obtained from family due to confusion (22 Dec 2017 21:26)    HPI: Patient is a 88y Female with history of chronic a-fib, resp failure, diastolic chf and possible uti who is c/o being hungry thia AM.  She is alert and oriented. Using bipap and in no distress.    Allergies    No Known Allergies    Intolerances    digoxin (Rash; Drowsiness)    MEDICATIONS  (STANDING):  ALBUTerol    90 MICROgram(s) HFA Inhaler 2 Puff(s) Inhalation every 6 hours  atorvastatin 10 milliGRAM(s) Oral at bedtime  clopidogrel Tablet 75 milliGRAM(s) Oral daily  diltiazem    milliGRAM(s) Oral daily  docusate sodium 100 milliGRAM(s) Oral three times a day  furosemide   Injectable 60 milliGRAM(s) IV Push every 12 hours  metoprolol     tartrate 25 milliGRAM(s) Oral two times a day  piperacillin/tazobactam IVPB. 3.375 Gram(s) IV Intermittent every 12 hours  polyethylene glycol 3350 17 Gram(s) Oral daily  senna 2 Tablet(s) Oral at bedtime  tiotropium 18 MICROgram(s) Capsule 1 Capsule(s) Inhalation daily    MEDICATIONS  (PRN):      ROS:  Positive:  Hungry and thirsty.  General: Denies weight loss, fevers, rash, decreased hearing  Cardiac: Denies chest pain, SOB, REYES, orthopnea, PND, claudication, edema, snoring, daytime somnolence, palpitations, syncope  Resp: Denies SOB, REYES, cough, sputum, wheezing, hemoptysis  GI: Denies change in bowel habits, diarrhea, weight loss, melena, tarry stools,   nausea, vomiting, jaundice, abdominal pain, dysphagia  : Denies dysuria, nocturia, hematuria  Neuro: Denies tinnitus, headache, visual changes, weakness, dizziness or vertigo  Musculoskeletal: Denies neck pain back pain joint pain.  Skin: Denies rash, itching, dryness.  Endocrine: Denies polydipsia, polyuria  Psychiatric: Denies depression, anxiety      PHYSICAL EXAM:  Vital Signs Last 24 Hrs  T(C): 36.7 (25 Dec 2017 04:14), Max: 36.7 (25 Dec 2017 04:14)  T(F): 98.1 (25 Dec 2017 04:14), Max: 98.1 (25 Dec 2017 04:14)  HR: 82 (25 Dec 2017 06:38) (63 - 90)  BP: 124/69 (25 Dec 2017 04:14) (113/73 - 127/85)  BP(mean): --  RR: 18 (25 Dec 2017 04:14) (18 - 20)  SpO2: 98% (25 Dec 2017 06:38) (94% - 98%)  Daily     Daily   I&O's Summary    24 Dec 2017 07:01  -  25 Dec 2017 07:00  --------------------------------------------------------  IN: 555 mL / OUT: 700 mL / NET: -145 mL        General Appearance: 	 Alert, cooperative, no distress on Bi Pap  HEENT: normocephalic, atraumatic, PERRLA, EOMI, conjunctiva normal, sclera anicteric,   Neck: no JVD,  carotid 2+  bilaterally without bruits, thyroid normal to inspection and palpation, no adenopathy, trachea midline  Lungs:   Coarse BS bilat with decrease BS bases, no rales  Cor:  pmi 5th ICS MCL, regular rate and rhythm, S1 normal intensity, S2 normal intensity, no gallops,  soft Ao ejection murmur or rubs  Abdomen:	 soft, non-tender; bowel sounds normal; no masses,  no organomegaly  Extremities: without cyanosis, clubbing or edema  Vasc: 2-+ PT and DP pulses; no varicosities  Neurologic: A&O x 3 (time, place, person). Symmetric strength; limited exam  Musculoskeletal: no kyphosis, scoliosis; normal gait, normal tone  Skin: no rashes; limited exam    EKG:  Telemetry: chronic A-fib Vpacing    Labs:  CBC Full  -  ( 24 Dec 2017 12:12 )  WBC Count : 9.52 K/uL  Hemoglobin : 8.9 g/dL  Hematocrit : 30.3 %  Platelet Count - Automated : 220 K/uL  Mean Cell Volume : 99.0 fl  Mean Cell Hemoglobin : 29.1 pg  Mean Cell Hemoglobin Concentration : 29.4 gm/dL  Auto Neutrophil # : x  Auto Lymphocyte # : x  Auto Monocyte # : x  Auto Eosinophil # : x  Auto Basophil # : x  Auto Neutrophil % : x  Auto Lymphocyte % : x  Auto Monocyte % : x  Auto Eosinophil % : x  Auto Basophil % : x          PT/INR - ( 24 Dec 2017 12:12 )   PT: 60.6 sec;   INR: 5.19 ratio         PTT - ( 24 Dec 2017 12:12 )  PTT:43.0 sec      Radiology/Imaging:      Impression/Plan: Resp failure: Continue Bi Pap;  Check ABG for Co2 level ;Continue as per pulmonary.                           Increase INR  coumadin on hold                           Increase nutricional support, Boost or Ensure                           A-fib  rate controlled; no changes      Joel Goldberg, MD, FACC  [facility]

## 2017-12-25 NOTE — PROGRESS NOTE ADULT - SUBJECTIVE AND OBJECTIVE BOX
[facility] [phone]        Patient seen and examined in bed.  No new changes noted.  On Bipap        MEDICATIONS  (STANDING):  ALBUTerol    90 MICROgram(s) HFA Inhaler 2 Puff(s) Inhalation every 6 hours  atorvastatin 10 milliGRAM(s) Oral at bedtime  clopidogrel Tablet 75 milliGRAM(s) Oral daily  diltiazem    milliGRAM(s) Oral daily  docusate sodium 100 milliGRAM(s) Oral three times a day  metoprolol     tartrate 25 milliGRAM(s) Oral two times a day  piperacillin/tazobactam IVPB. 3.375 Gram(s) IV Intermittent every 12 hours  polyethylene glycol 3350 17 Gram(s) Oral daily  senna 2 Tablet(s) Oral at bedtime  tiotropium 18 MICROgram(s) Capsule 1 Capsule(s) Inhalation daily      VITAL:  T(C): , Max: 36.7 (12-25-17 @ 04:14)  T(F): , Max: 98.1 (12-25-17 @ 04:14)  HR: 82 (12-25-17 @ 06:38)  BP: 124/69 (12-25-17 @ 04:14)  BP(mean): --  RR: 18 (12-25-17 @ 04:14)  SpO2: 98% (12-25-17 @ 06:38)  Wt(kg): --    I and O's:    12-24 @ 07:01  -  12-25 @ 07:00  --------------------------------------------------------  IN: 555 mL / OUT: 700 mL / NET: -145 mL          PHYSICAL EXAM:    Constitutional: NAD  HEENT: PERRLA    Neck:  No JVD  Respiratory: reduced at left base and + rhonchi  Cardiovascular: S1 and S2  Gastrointestinal: BS+, soft, NT/ND  Extremities: +  peripheral edema  Neurological:   no focal deficits  Psychiatric:  normal affect  : +  Gautam  Skin: No rashes  Access: Not applicable    LABS:                        8.9    9.52  )-----------( 220      ( 24 Dec 2017 12:12 )             30.3     12-24    140  |  96  |  79<H>  ----------------------------<  93  5.6<H>   |  26  |  3.50<H>    Ca    8.9      24 Dec 2017 12:12            Urine Studies:          RADIOLOGY & ADDITIONAL STUDIES:    < from: CT Chest No Cont (12.24.17 @ 17:04) >  EXAM:  CT CHEST                            PROCEDURE DATE:  12/24/2017            INTERPRETATION:  CLINICAL INFORMATION: Cough with thick sputum.    COMPARISON: Chest CT dated 10/24/2017    PROCEDURE:   CT of the Chest was performed without intravenous contrast.  Sagittal and coronal reformats were performed.  Axial MIP reformats were also performed.    FINDINGS:    CHEST:     LUNGS, PLEURA, AND LARGE AIRWAYS: Debris within the trachea and bilateral   mainstem bronchi. Mucoid impaction of bilateral lower lobe bronchi.   Moderate left pleural effusion with complete atelectasis of the left   lower lobe, worsened from prior study. Loculated right pleural effusion   with partial atelectasis of the right lower lobe.  VESSELS: Thoracic aortic andcoronary artery atherosclerosis.  HEART: Cardiomegaly. Status post TAVR. No pericardial effusion.  MEDIASTINUM AND CANDI: No lymphadenopathy.  CHEST WALL AND LOWER NECK: Left-sided cardiac device with leads in the   right atrium and right ventricle. Anasarca.  VISUALIZED UPPER ABDOMEN: Calcified vasculature.  BONES: Multilevel degenerative changes.    IMPRESSION:   Moderate left and loculated right pleural effusions, with increase in   size of the left effusion since 10/24/2017. There is complete atelectasis   of the left lower lobe and partial atelectasis of the right lower lobe.    Debris in the trachea and bilateral mainstem bronchi with mucoid   impaction of bilateral lower lobe bronchi.                SYED BUTLER M.D., RADIOLOGY RESIDENT  This document has been electronically signed.  LETTY JOHNSTON M.D., ATTENDING RADIOLOGIST  This document has been electronically signed. Dec 24 2017  7:09PM                < end of copied text >

## 2017-12-25 NOTE — PROGRESS NOTE ADULT - ASSESSMENT
Ms Dutta is a 87 yo woman with PMHx of right breast CA S/P lumpectomy in 2000, Uterine Ca S/P total hysterectomy in 2012, Skin CA S/P excision of bilateral lower eyelid skin CA, cholecystectomy, Osteoarthritis of bilateral knees, HTN, hyperlipidemia, paroxysmal Afib on coumadin s/p PPM for slow ventricular response, severe aortic stenosis now s/p TAVR April 2015 , who presents with SOB x 1 day, confusion. Found to have NANCY on CKD, hypercapneic respiratory failure, SIRS, elevated LFT and INR. Infection is less likely as no clear source.

## 2017-12-25 NOTE — PROGRESS NOTE ADULT - ASSESSMENT
A/P; 89 y/o female with PMHx of right breast CA S/P lumpectomy in 2000, Uterine Ca S/P total hysterectomy in 2012, Skin CA S/P excision of bilateral lower eyelid skin CA, cholecystectomy, Osteoarthritis of bilateral knees, HTN, hyperlipidemia, paroxysmal Afib on coumadin s/p PPM for slow ventricular response, severe aortic stenosis now s/p TAVR April 2015 , who presents with SOB, cough x 1 day,  confusion and lethargy.  CT chest: Moderate left and loculated right pleural effusions, with increase in   size of the left effusion since 10/24/2017. There is complete atelectasis   of the left lower lobe and partial atelectasis of the right lower lobe.  Debris in the trachea and bilateral mainstem bronchi with mucoid   impaction of bilateral lower lobe bronchi.  Pt with respiratory failure, multifactorial.  On Zosyn.  BC 1/4 with CNS, likely contamination.  UC negative.  Continue Zosyn. Complete tomorrow.

## 2017-12-25 NOTE — CHART NOTE - NSCHARTNOTEFT_GEN_A_CORE
Received call from RN patient with elevated INR  of 5.06, will hold coumadin tonight   check PT/INR in am   Molly Ledesma DNP- C  [id number]

## 2017-12-25 NOTE — PROGRESS NOTE ADULT - SUBJECTIVE AND OBJECTIVE BOX
Patient is a 88y old  Female who presents with a chief complaint of sob  History obtained from family due to confusion (22 Dec 2017 21:26)      SUBJECTIVE / OVERNIGHT EVENTS: on and off BIPAP during the day and cont overnight. eating between BIPAP use. on NC, alert and oriented though lethargic, family at bedside    MEDICATIONS  (STANDING):  ALBUTerol    90 MICROgram(s) HFA Inhaler 2 Puff(s) Inhalation every 6 hours  atorvastatin 10 milliGRAM(s) Oral at bedtime  clopidogrel Tablet 75 milliGRAM(s) Oral daily  diltiazem    milliGRAM(s) Oral daily  docusate sodium 100 milliGRAM(s) Oral three times a day  furosemide   Injectable 60 milliGRAM(s) IV Push every 12 hours  metoprolol     tartrate 25 milliGRAM(s) Oral two times a day  piperacillin/tazobactam IVPB. 3.375 Gram(s) IV Intermittent every 12 hours  polyethylene glycol 3350 17 Gram(s) Oral daily  senna 2 Tablet(s) Oral at bedtime  tiotropium 18 MICROgram(s) Capsule 1 Capsule(s) Inhalation daily    MEDICATIONS  (PRN):      Vital Signs Last 24 Hrs  T(F): 97.9 (12-25-17 @ 12:33), Max: 98.1 (12-25-17 @ 04:14)  HR: 70 (12-25-17 @ 16:43) (62 - 90)  BP: 112/100 (12-25-17 @ 12:33) (112/100 - 124/69)  RR: 18 (12-25-17 @ 12:33) (18 - 20)  SpO2: 96% (12-25-17 @ 16:43) (94% - 98%)  Telemetry:   CAPILLARY BLOOD GLUCOSE        I&O's Summary    24 Dec 2017 07:01  -  25 Dec 2017 07:00  --------------------------------------------------------  IN: 555 mL / OUT: 700 mL / NET: -145 mL    25 Dec 2017 07:01  -  25 Dec 2017 18:36  --------------------------------------------------------  IN: 50 mL / OUT: 150 mL / NET: -100 mL        PHYSICAL EXAM:  GENERAL: NAD, well-developed  HEAD:  Atraumatic, Normocephalic  EYES: EOMI, PERRLA, conjunctiva and sclera clear  NECK: Supple, No JVD  CHEST/LUNG: Clear to auscultation bilaterally; No wheeze  HEART: Regular rate and rhythm; No murmurs, rubs, or gallops  ABDOMEN: Soft, Nontender, Nondistended; Bowel sounds present  EXTREMITIES:  2+ Peripheral Pulses, No clubbing, cyanosis, or edema  PSYCH: AAOx3  NEUROLOGY: non-focal  SKIN: No rashes or lesions    LABS:                        8.2    9.17  )-----------( 186      ( 25 Dec 2017 12:08 )             27.8     12-25    142  |  97  |  80<H>  ----------------------------<  124<H>  5.5<H>   |  31  |  3.87<H>    Ca    8.8      25 Dec 2017 12:08      PT/INR - ( 25 Dec 2017 12:08 )   PT: 59.1 sec;   INR: 5.06 ratio         PTT - ( 25 Dec 2017 12:08 )  PTT:46.4 sec          RADIOLOGY & ADDITIONAL TESTS:    Imaging Personally Reviewed:    Consultant(s) Notes Reviewed:      Care Discussed with Consultants/Other Providers:

## 2017-12-25 NOTE — PROGRESS NOTE ADULT - ASSESSMENT
The patient is an 88-year-old female, presents now with respiratory acidosis, acute kidney injury, and hepatitis.  The patient does have an elevated BNP and evidence of edema, but chest x-ray is clear.  Elevated BNP is likely from right ventricular dysfunction.  Unclear if degree of stenosis of the mitral valve and if that is leading to a degree of pulmonary hypertension.    Hepatitis  NANCY-non oliguric and improving urine output but worsen creatinine   Hyperkalemia  1.	Renal:   Lasix 60mg ivp bid and assess urine ouput.  Cont oleary  2.	Infectious Disease:    Zosyn per ID eval(adjusted for GFR)  3.	Miscellaneous:  The patient is a full do not resuscitate.  Further workup pending above.  4.	- Cont the oleary for strict ins and outs  5.	Pulm-Defer to Pulm re thoracentesis

## 2017-12-25 NOTE — PROGRESS NOTE ADULT - SUBJECTIVE AND OBJECTIVE BOX
OSCAR LOPEZ        [id number]  12-25-17 @ 08:53  ------------------------------------------------------------------------------------  [facility] - [facility]     PROGRESS NOTE    Lethargic but arousable on bipap.  No evidence of pain or discomfort.  -----------------------------------------------------------------------------------  REVIEW OF SYSTEMS:  Constitutional: As per interval history  HEENT: Within normal limits  CV: As per interval history  Resp: As per interval history  GI: Within normal limits   : Within normal limits  Musculoskeletal: Within normal limits  Skin: Within normal limits  Neurological: Within normal limits  Psychiatric: Within normal limits  Endocrine: Within normal limits  Hematologic/Lymphatic: Within normal limits  Allergic/Immunologic: Within normal limits    [ ] Unable to assess ROS because   -------------------------------------------------------------------------------------  MEDICATIONS:     Pulmonary "  ALBUTerol    90 MICROgram(s) HFA Inhaler 2 Puff(s) Inhalation every 6 hours  tiotropium 18 MICROgram(s) Capsule 1 Capsule(s) Inhalation daily    Anti-microbials:  piperacillin/tazobactam IVPB. 3.375 Gram(s) IV Intermittent every 12 hours    Cardiovascular:  diltiazem    milliGRAM(s) Oral daily  metoprolol     tartrate 25 milliGRAM(s) Oral two times a day    Other:  atorvastatin 10 milliGRAM(s) Oral at bedtime  clopidogrel Tablet 75 milliGRAM(s) Oral daily  docusate sodium 100 milliGRAM(s) Oral three times a day  polyethylene glycol 3350 17 Gram(s) Oral daily  senna 2 Tablet(s) Oral at bedtime    --------------------------------------------------------------------------------------    OBJECTIVE:    I&O's Detail    24 Dec 2017 07:01  -  25 Dec 2017 07:00  --------------------------------------------------------  IN:    Oral Fluid: 455 mL    Solution: 100 mL  Total IN: 555 mL    OUT:    Indwelling Catheter - Urethral: 700 mL  Total OUT: 700 mL    Total NET: -145 mL    Daily     Daily     CAPILLARY BLOOD GLUCOSE        ------------------------------------------------------------------------------------------  PHYSICAL EXAM:       ICU Vital Signs Last 24 Hrs  T(C): 36.7 (25 Dec 2017 04:14), Max: 36.7 (25 Dec 2017 04:14)  T(F): 98.1 (25 Dec 2017 04:14), Max: 98.1 (25 Dec 2017 04:14)  HR: 82 (25 Dec 2017 06:38) (63 - 90)  BP: 124/69 (25 Dec 2017 04:14) (113/73 - 127/85)  BP(mean): --  ABP: --  ABP(mean): --  RR: 18 (25 Dec 2017 04:14) (18 - 20)  SpO2: 98% (25 Dec 2017 06:38) (94% - 98%)     Lethargic  HEENT: unremarkable  Neck 2-3+JVD, no stridor, adenopathy or thyromegaly  Oropharynx is mallampati class 4  Cardiac irreg with john  Lungs have coarse bs, decreased at bases  Abdomen is soft and nontender, nondistended  Extrems are warm with 2+ edema  Neurological is lethargic  ________________________________________________________  LABS:                        8.9    9.52  )-----------( 220      ( 24 Dec 2017 12:12 )             30.3     12-24    140  |  96  |  79<H>  ----------------------------<  93  5.6<H>   |  26  |  3.50<H>  12-23    141  |  96  |  78<H>  ----------------------------<  110<H>  5.2   |  34<H>  |  3.32<H>    Ca      8.9      12-24    Ca      8.7      12-23    Phos    5.2     12-23      Mg       2.5     12-23    TPro  6.0  /  Alb  2.7<L>  /  TBili  0.7  /  DBili  x   /  AST  189<H>  /  ALT  323<H>  /  AlkPhos  103  12-23  TPro  6.4  /  Alb  3.1<L>  /  TBili  0.9  /  DBili  x   /  AST  312<H>  /  ALT  418<H>  /  AlkPhos  117  12-22    PT/INR - ( 24 Dec 2017 12:12 )   PT: 60.6 sec;   INR: 5.19 ratio         PTT - ( 24 Dec 2017 12:12 )  PTT:43.0 sec      ABG - ( 24 Dec 2017 14:48 )  pH: 7.30  /  pCO2: 64    /  pO2: 72    / HCO3: 31    / Base Excess: 4.0   /  SaO2: 94        Serum Pro-Brain Natriuretic Peptide: 82394 pg/mL (12-22 @ 15:08)    CARDIAC MARKERS ( 22 Dec 2017 19:56 )  x     / 0.08 ng/mL / x     / x     / x      CARDIAC MARKERS ( 22 Dec 2017 15:08 )  x     / 0.09 ng/mL / 119 U/L / x     / 9.2 ng/mL    MICROBIOLOGY:     RADIOLOGY:  [ ] Reviewed and interpreted by me  __________________________________________________________________    IMPRESSION and RECOMMENDATIONS:    Acute on chronic hypercapnic respiratory failure in the setting of diastolic CHF, worsening renal dysfunction, kyphoscoliosis with total body fluid overload. Possible UTI.    Elevate HOB > 45 degree at all times.  NIV with BIPAP 18/5, backup 12, O2 during sleep and PRN sob or lethargy.  Can try to take off periodically.  No role for any invasive pulmonary procedure or intervention.    Given overall clinical picture and current status, prognosis is very poor.      Gene Norton MD, FCCP, FAA  Pulmonary and Sleep Medicine  [phone]

## 2017-12-26 LAB
ANION GAP SERPL CALC-SCNC: 10 MMOL/L — SIGNIFICANT CHANGE UP (ref 5–17)
BASE EXCESS BLDA CALC-SCNC: 6.1 MMOL/L — HIGH (ref -2–2)
BLD GP AB SCN SERPL QL: NEGATIVE — SIGNIFICANT CHANGE UP
BUN SERPL-MCNC: 85 MG/DL — HIGH (ref 7–23)
CALCIUM SERPL-MCNC: 8.3 MG/DL — LOW (ref 8.4–10.5)
CHLORIDE SERPL-SCNC: 96 MMOL/L — SIGNIFICANT CHANGE UP (ref 96–108)
CO2 BLDA-SCNC: 35 MMOL/L — HIGH (ref 22–30)
CO2 SERPL-SCNC: 34 MMOL/L — HIGH (ref 22–31)
CREAT SERPL-MCNC: 4.18 MG/DL — HIGH (ref 0.5–1.3)
GAS PNL BLDA: SIGNIFICANT CHANGE UP
GLUCOSE SERPL-MCNC: 142 MG/DL — HIGH (ref 70–99)
HCO3 BLDA-SCNC: 33 MMOL/L — HIGH (ref 21–29)
HCT VFR BLD CALC: 24.7 % — LOW (ref 34.5–45)
HCT VFR BLD CALC: 25 % — LOW (ref 34.5–45)
HGB BLD-MCNC: 7.6 G/DL — LOW (ref 11.5–15.5)
HGB BLD-MCNC: 7.8 G/DL — LOW (ref 11.5–15.5)
HOROWITZ INDEX BLDA+IHG-RTO: 40 — SIGNIFICANT CHANGE UP
INR BLD: 4.68 RATIO — HIGH (ref 0.88–1.16)
MCHC RBC-ENTMCNC: 30.9 GM/DL — LOW (ref 32–36)
MCHC RBC-ENTMCNC: 31.1 GM/DL — LOW (ref 32–36)
MCHC RBC-ENTMCNC: 31.5 PG — SIGNIFICANT CHANGE UP (ref 27–34)
MCHC RBC-ENTMCNC: 31.6 PG — SIGNIFICANT CHANGE UP (ref 27–34)
MCV RBC AUTO: 102 FL — HIGH (ref 80–100)
MCV RBC AUTO: 102 FL — HIGH (ref 80–100)
PCO2 BLDA: 68 MMHG — HIGH (ref 32–46)
PH BLDA: 7.3 — LOW (ref 7.35–7.45)
PLATELET # BLD AUTO: 152 K/UL — SIGNIFICANT CHANGE UP (ref 150–400)
PLATELET # BLD AUTO: 157 K/UL — SIGNIFICANT CHANGE UP (ref 150–400)
PO2 BLDA: 101 MMHG — SIGNIFICANT CHANGE UP (ref 74–108)
POTASSIUM SERPL-MCNC: 5.2 MMOL/L — SIGNIFICANT CHANGE UP (ref 3.5–5.3)
POTASSIUM SERPL-SCNC: 5.2 MMOL/L — SIGNIFICANT CHANGE UP (ref 3.5–5.3)
PROTHROM AB SERPL-ACNC: 52.6 SEC — HIGH (ref 9.8–12.7)
RBC # BLD: 2.43 M/UL — LOW (ref 3.8–5.2)
RBC # BLD: 2.46 M/UL — LOW (ref 3.8–5.2)
RBC # FLD: 18.9 % — HIGH (ref 10.3–14.5)
RBC # FLD: 18.9 % — HIGH (ref 10.3–14.5)
RH IG SCN BLD-IMP: POSITIVE — SIGNIFICANT CHANGE UP
SAO2 % BLDA: 98 % — HIGH (ref 92–96)
SODIUM SERPL-SCNC: 140 MMOL/L — SIGNIFICANT CHANGE UP (ref 135–145)
WBC # BLD: 11.8 K/UL — HIGH (ref 3.8–10.5)
WBC # BLD: 12.2 K/UL — HIGH (ref 3.8–10.5)
WBC # FLD AUTO: 11.8 K/UL — HIGH (ref 3.8–10.5)
WBC # FLD AUTO: 12.2 K/UL — HIGH (ref 3.8–10.5)

## 2017-12-26 PROCEDURE — 76705 ECHO EXAM OF ABDOMEN: CPT | Mod: 26,RT

## 2017-12-26 RX ORDER — BUMETANIDE 0.25 MG/ML
1 INJECTION INTRAMUSCULAR; INTRAVENOUS EVERY 12 HOURS
Qty: 0 | Refills: 0 | Status: DISCONTINUED | OUTPATIENT
Start: 2017-12-26 | End: 2018-01-05

## 2017-12-26 RX ORDER — IPRATROPIUM/ALBUTEROL SULFATE 18-103MCG
3 AEROSOL WITH ADAPTER (GRAM) INHALATION EVERY 6 HOURS
Qty: 0 | Refills: 0 | Status: DISCONTINUED | OUTPATIENT
Start: 2017-12-26 | End: 2018-01-05

## 2017-12-26 RX ADMIN — Medication 60 MILLIGRAM(S): at 06:05

## 2017-12-26 RX ADMIN — Medication 120 MILLIGRAM(S): at 05:50

## 2017-12-26 RX ADMIN — Medication 25 MILLIGRAM(S): at 17:04

## 2017-12-26 RX ADMIN — PIPERACILLIN AND TAZOBACTAM 25 GRAM(S): 4; .5 INJECTION, POWDER, LYOPHILIZED, FOR SOLUTION INTRAVENOUS at 05:55

## 2017-12-26 RX ADMIN — Medication 100 MILLIGRAM(S): at 05:50

## 2017-12-26 RX ADMIN — Medication 25 MILLIGRAM(S): at 05:50

## 2017-12-26 RX ADMIN — ALBUTEROL 2 PUFF(S): 90 AEROSOL, METERED ORAL at 11:40

## 2017-12-26 RX ADMIN — Medication 100 MILLIGRAM(S): at 11:39

## 2017-12-26 RX ADMIN — SENNA PLUS 2 TABLET(S): 8.6 TABLET ORAL at 22:22

## 2017-12-26 RX ADMIN — Medication 3 MILLILITER(S): at 16:56

## 2017-12-26 RX ADMIN — POLYETHYLENE GLYCOL 3350 17 GRAM(S): 17 POWDER, FOR SOLUTION ORAL at 11:39

## 2017-12-26 RX ADMIN — TIOTROPIUM BROMIDE 1 CAPSULE(S): 18 CAPSULE ORAL; RESPIRATORY (INHALATION) at 11:39

## 2017-12-26 RX ADMIN — ALBUTEROL 2 PUFF(S): 90 AEROSOL, METERED ORAL at 06:03

## 2017-12-26 RX ADMIN — BUMETANIDE 1 MILLIGRAM(S): 0.25 INJECTION INTRAMUSCULAR; INTRAVENOUS at 17:55

## 2017-12-26 RX ADMIN — CLOPIDOGREL BISULFATE 75 MILLIGRAM(S): 75 TABLET, FILM COATED ORAL at 11:39

## 2017-12-26 RX ADMIN — PIPERACILLIN AND TAZOBACTAM 25 GRAM(S): 4; .5 INJECTION, POWDER, LYOPHILIZED, FOR SOLUTION INTRAVENOUS at 17:56

## 2017-12-26 RX ADMIN — Medication 100 MILLIGRAM(S): at 22:22

## 2017-12-26 RX ADMIN — ATORVASTATIN CALCIUM 10 MILLIGRAM(S): 80 TABLET, FILM COATED ORAL at 22:22

## 2017-12-26 NOTE — PROGRESS NOTE ADULT - ASSESSMENT
The patient is an 88-year-old female, presents now with respiratory acidosis, acute kidney injury, and hepatitis.  The patient does have an elevated BNP and evidence of edema, but chest x-ray is clear.  Elevated BNP is likely from right ventricular dysfunction.  Unclear if degree of stenosis of the mitral valve and if that is leading to a degree of pulmonary hypertension.    Hepatitis  NANCY-non oliguric and this is likely ATN  Hyperkalemia  1.	Renal:   Change to Bumex 1mg ivp bid.    Cont oleary  2.	Infectious Disease:    Zosyn per ID eval(adjusted for GFR)  3.	Miscellaneous:  The patient is a full do not resuscitate.  Further workup pending above.  4.	- Cont the oleary for strict ins and outs  5.	Pulm-Defer to Pulm re thoracentesis      DW son

## 2017-12-26 NOTE — PROGRESS NOTE ADULT - SUBJECTIVE AND OBJECTIVE BOX
Patient is a 88y old  Female who presents with a chief complaint of sob  History obtained from family due to confusion (22 Dec 2017 21:26)      SUBJECTIVE / OVERNIGHT EVENTS: no new c/o, ongoing lethargy, on and off bipap, retaining CO2    MEDICATIONS  (STANDING):  ALBUTerol/ipratropium for Nebulization 3 milliLiter(s) Nebulizer every 6 hours  atorvastatin 10 milliGRAM(s) Oral at bedtime  buMETAnide Injectable 1 milliGRAM(s) IV Push every 12 hours  clopidogrel Tablet 75 milliGRAM(s) Oral daily  diltiazem    milliGRAM(s) Oral daily  docusate sodium 100 milliGRAM(s) Oral three times a day  metoprolol     tartrate 25 milliGRAM(s) Oral two times a day  piperacillin/tazobactam IVPB. 3.375 Gram(s) IV Intermittent every 12 hours  polyethylene glycol 3350 17 Gram(s) Oral daily  senna 2 Tablet(s) Oral at bedtime    MEDICATIONS  (PRN):      Vital Signs Last 24 Hrs  T(F): 97.6 (12-26-17 @ 16:20), Max: 98.1 (12-25-17 @ 20:11)  HR: 76 (12-26-17 @ 17:52) (64 - 98)  BP: 101/66 (12-26-17 @ 17:52) (101/66 - 142/65)  RR: 21 (12-26-17 @ 16:20) (18 - 22)  SpO2: 98% (12-26-17 @ 16:20) (95% - 100%)  Telemetry:   CAPILLARY BLOOD GLUCOSE        I&O's Summary    25 Dec 2017 07:01  -  26 Dec 2017 07:00  --------------------------------------------------------  IN: 780 mL / OUT: 675 mL / NET: 105 mL    26 Dec 2017 07:01  -  26 Dec 2017 19:31  --------------------------------------------------------  IN: 240 mL / OUT: 700 mL / NET: -460 mL        PHYSICAL EXAM:  GENERAL: NAD, well-developed  HEAD:  Atraumatic, Normocephalic  EYES: EOMI, PERRLA, conjunctiva and sclera clear  NECK: Supple, No JVD  CHEST/LUNG: Clear to auscultation bilaterally; No wheeze  HEART: Regular rate and rhythm; No murmurs, rubs, or gallops  ABDOMEN: Soft, Nontender, Nondistended; Bowel sounds present  EXTREMITIES:  2+ Peripheral Pulses, No clubbing, cyanosis, or edema  PSYCH: AAOx3  NEUROLOGY: non-focal  SKIN: No rashes or lesions    LABS:                        7.8    11.8  )-----------( 152      ( 26 Dec 2017 12:56 )             25.0     12-26    140  |  96  |  85<H>  ----------------------------<  142<H>  5.2   |  34<H>  |  4.18<H>    Ca    8.3<L>      26 Dec 2017 11:26      PT/INR - ( 26 Dec 2017 11:26 )   PT: 52.6 sec;   INR: 4.68 ratio         PTT - ( 25 Dec 2017 12:08 )  PTT:46.4 sec          RADIOLOGY & ADDITIONAL TESTS:    Imaging Personally Reviewed:    Consultant(s) Notes Reviewed:      Care Discussed with Consultants/Other Providers:

## 2017-12-26 NOTE — PROGRESS NOTE ADULT - SUBJECTIVE AND OBJECTIVE BOX
Follow-up Pulm Progress Note    No new respiratory events overnight.  Denies increased SOB, chest pain, cough or mucus. somewhat confused.  no distress    Medications:  MEDICATIONS  (STANDING):  ALBUTerol    90 MICROgram(s) HFA Inhaler 2 Puff(s) Inhalation every 6 hours  atorvastatin 10 milliGRAM(s) Oral at bedtime  clopidogrel Tablet 75 milliGRAM(s) Oral daily  diltiazem    milliGRAM(s) Oral daily  docusate sodium 100 milliGRAM(s) Oral three times a day  furosemide   Injectable 60 milliGRAM(s) IV Push every 12 hours  metoprolol     tartrate 25 milliGRAM(s) Oral two times a day  piperacillin/tazobactam IVPB. 3.375 Gram(s) IV Intermittent every 12 hours  polyethylene glycol 3350 17 Gram(s) Oral daily  senna 2 Tablet(s) Oral at bedtime  tiotropium 18 MICROgram(s) Capsule 1 Capsule(s) Inhalation daily    MEDICATIONS  (PRN):      Vent settings (if applicable)      Vital Signs Last 24 Hrs  T(C): 36.3 (26 Dec 2017 04:37), Max: 36.7 (25 Dec 2017 20:11)  T(F): 97.4 (26 Dec 2017 04:37), Max: 98.1 (25 Dec 2017 20:11)  HR: 72 (26 Dec 2017 07:48) (62 - 98)  BP: 112/53 (26 Dec 2017 06:39) (112/53 - 142/65)  BP(mean): --  RR: 20 (26 Dec 2017 06:39) (18 - 22)  SpO2: 98% (26 Dec 2017 07:48) (95% - 99%)    ABG - ( 24 Dec 2017 14:48 )  pH: 7.30  /  pCO2: 64    /  pO2: 72    / HCO3: 31    / Base Excess: 4.0   /  SaO2: 94                    12-25 @ 07:01  -  12-26 @ 07:00  --------------------------------------------------------  IN: 780 mL / OUT: 675 mL / NET: 105 mL          LABS:                        8.2    9.17  )-----------( 186      ( 25 Dec 2017 12:08 )             27.8     12-25    142  |  97  |  80<H>  ----------------------------<  124<H>  5.5<H>   |  31  |  3.87<H>    Ca    8.8      25 Dec 2017 12:08            CAPILLARY BLOOD GLUCOSE        PT/INR - ( 25 Dec 2017 12:08 )   PT: 59.1 sec;   INR: 5.06 ratio         PTT - ( 25 Dec 2017 12:08 )  PTT:46.4 sec          CULTURES:  Culture Results:   No growth to date. (12-24 @ 01:06)  Culture Results:   No growth to date. (12-23 @ 21:37)  Culture Results:   Growth in aerobic and anaerobic bottles: Coag Negative Staphylococcus  Single set isolate, possible contaminant. Contact  Microbiology if susceptibility testing clinically  indicated.  ***Blood Panel PCR results on this specimen are available  approximately 3 hours after the Gram stain result.***  Gram stain, PCR, and/or culture results may not always  correspond due to difference in methodologies.  ************************************************************  This PCR assay was performed using[employer].  The following targets are tested for: Enterococcus,  vancomycin resistant enterococci, Listeria monocytogenes,  coagulase negative staphylococci, S. aureus,  methicillin resistant S. aureus, Streptococcus agalactiae  (Group B), S.pneumoniae, S. pyogenes (Group A),  Acinetobacter baumannii, Enterobacter cloacae, E. coli,  Klebsiella oxytoca, K. pneumoniae, Proteus sp.,  Serratia marcescens, Haemophilus influenzae,  Neisseria meningitidis, Pseudomonas aeruginosa, Candida  albicans, C. glabrata, C krusei, C parapsilosis,  C. tropicalis and the KPC resistance gene. (12-22 @ 18:04)  Culture Results:   No growth to date. (12-22 @ 18:04)  Culture Results:   No growth (12-22 @ 17:42)    Most recent blood culture -- 12-24 @ 01:06   -- -- .Blood Blood-Peripheral 12-24 @ 01:06  Most recent blood culture -- 12-23 @ 21:37   -- -- .Blood Blood-Peripheral 12-23 @ 21:37  Most recent blood culture -- 12-22 @ 18:04   Blood Culture PCR Blood Culture PCR .Blood Blood-Peripheral 12-22 @ 18:04  Most recent blood culture -- 12-22 @ 17:42   -- -- .Urine Clean Catch (Midstream) 12-22 @ 17:42      Physical Examination:  Awake and alert, generally comfortable  HEENT: unremarkable  PULM: decreased bases to auscultation bilaterally, few crackles bilaterally, no significant sputum production  CVS: Regular rate and rhythm, no murmurs, rubs, or gallops  Abd:  soft, non tender  Extrem: No CCE    RADIOLOGY REVIEWED  CXR:    CT chest:

## 2017-12-26 NOTE — PROVIDER CONTACT NOTE (MEDICATION) - RECOMMENDATIONS
Change cardizem from extended release capsule to short acting tablet, change colace pill to liquid, change albuterol inhaler to nebulizer

## 2017-12-26 NOTE — PROVIDER CONTACT NOTE (MEDICATION) - ACTION/TREATMENT ORDERED:
Will pass onto day team to evaluate need for changing cardizem from extended release capsule to short acting tablet, changing colace pill to liquid, & changing albuterol inhaler to nebulizer

## 2017-12-26 NOTE — PROGRESS NOTE ADULT - ASSESSMENT
elderly female, multiple medical issues including cardiac disease, acute on chronic respiratory failure- likely mixture obstructive and restrictive dysfctn, now with decompensated overall status- fluid overload, ? bacterial infection    continue zosyn as per ID  pulmonary hygiene  O2 support- BIPAP alternating with nasal cannula days, BIPAP qhs for ventilatory support  pulmonary hygiene  nebs  treatement of fluid overload  dvt prophylaxis- on coumadin with elevated INR  routine gi prophylaxis as necessary  Oxygen saturation greater than 92%    Molly Soto MD  [phone]  Pulmonary

## 2017-12-26 NOTE — PROGRESS NOTE ADULT - SUBJECTIVE AND OBJECTIVE BOX
[facility] [phone]        Patient seen and examined in bed.  She is about the same        MEDICATIONS  (STANDING):  ALBUTerol    90 MICROgram(s) HFA Inhaler 2 Puff(s) Inhalation every 6 hours  atorvastatin 10 milliGRAM(s) Oral at bedtime  clopidogrel Tablet 75 milliGRAM(s) Oral daily  diltiazem    milliGRAM(s) Oral daily  docusate sodium 100 milliGRAM(s) Oral three times a day  furosemide   Injectable 60 milliGRAM(s) IV Push every 12 hours  metoprolol     tartrate 25 milliGRAM(s) Oral two times a day  piperacillin/tazobactam IVPB. 3.375 Gram(s) IV Intermittent every 12 hours  polyethylene glycol 3350 17 Gram(s) Oral daily  senna 2 Tablet(s) Oral at bedtime  tiotropium 18 MICROgram(s) Capsule 1 Capsule(s) Inhalation daily      VITAL:  T(C): , Max: 36.7 (12-25-17 @ 20:11)  T(F): , Max: 98.1 (12-25-17 @ 20:11)  HR: 70 (12-26-17 @ 11:57)  BP: 111/69 (12-26-17 @ 11:57)  BP(mean): --  RR: 18 (12-26-17 @ 11:57)  SpO2: 99% (12-26-17 @ 11:57)  Wt(kg): --    I and O's:    12-25 @ 07:01  -  12-26 @ 07:00  --------------------------------------------------------  IN: 780 mL / OUT: 675 mL / NET: 105 mL    12-26 @ 07:01  -  12-26 @ 12:18  --------------------------------------------------------  IN: 120 mL / OUT: 400 mL / NET: -280 mL          PHYSICAL EXAM:    Constitutional: NAD  HEENT: PERRLA    Neck: +  JVD  Respiratory: course  Cardiovascular: S1 and S2  Gastrointestinal: BS+, soft, NT/ND  Extremities: No peripheral edema  Neurological:  , no focal deficits  Psychiatric: Normal mood, normal affect  : +  Gautam  Skin: No rashes  Access: Not applicable    LABS:                        7.6    12.2  )-----------( 157      ( 26 Dec 2017 11:26 )             24.7     12-26    140  |  96  |  85<H>  ----------------------------<  142<H>  5.2   |  34<H>  |  4.18<H>    Ca    8.3<L>      26 Dec 2017 11:26            Urine Studies:          RADIOLOGY & ADDITIONAL STUDIES:

## 2017-12-26 NOTE — PROVIDER CONTACT NOTE (MEDICATION) - ASSESSMENT
Pt chewing Cardizem capsule and colace, not holding breath for albuterol inhaler, Pt A&Ox1 alert, but confused

## 2017-12-26 NOTE — PROVIDER CONTACT NOTE (MEDICATION) - BACKGROUND
ADM with AMS and Hypercapnia, Left lung total atelectasis and right lung partial atelectasis,  and tracheal and bronchi mucoid impaction, INR 5.19
Follow up with your Doctor in 1-2 days.  Take Tylenol 650mg orally every 6 hours as needed for pain.  Return to the ER for any persistent/worsening or new symptoms, weakness, numbness or any concerning symptoms.

## 2017-12-27 LAB
ANION GAP SERPL CALC-SCNC: 13 MMOL/L — SIGNIFICANT CHANGE UP (ref 5–17)
BUN SERPL-MCNC: 83 MG/DL — HIGH (ref 7–23)
CALCIUM SERPL-MCNC: 8.1 MG/DL — LOW (ref 8.4–10.5)
CHLORIDE SERPL-SCNC: 95 MMOL/L — LOW (ref 96–108)
CO2 SERPL-SCNC: 32 MMOL/L — HIGH (ref 22–31)
CREAT SERPL-MCNC: 4.29 MG/DL — HIGH (ref 0.5–1.3)
CULTURE RESULTS: SIGNIFICANT CHANGE UP
GLUCOSE BLDC GLUCOMTR-MCNC: 103 MG/DL — HIGH (ref 70–99)
GLUCOSE BLDC GLUCOMTR-MCNC: 97 MG/DL — SIGNIFICANT CHANGE UP (ref 70–99)
GLUCOSE SERPL-MCNC: 110 MG/DL — HIGH (ref 70–99)
HCT VFR BLD CALC: 22.7 % — LOW (ref 34.5–45)
HCT VFR BLD CALC: 23.4 % — LOW (ref 34.5–45)
HGB BLD-MCNC: 7 G/DL — CRITICAL LOW (ref 11.5–15.5)
HGB BLD-MCNC: 7.1 G/DL — LOW (ref 11.5–15.5)
INR BLD: 3.44 RATIO — HIGH (ref 0.88–1.16)
MCHC RBC-ENTMCNC: 28.8 PG — SIGNIFICANT CHANGE UP (ref 27–34)
MCHC RBC-ENTMCNC: 29.9 GM/DL — LOW (ref 32–36)
MCHC RBC-ENTMCNC: 31.1 GM/DL — LOW (ref 32–36)
MCHC RBC-ENTMCNC: 31.4 PG — SIGNIFICANT CHANGE UP (ref 27–34)
MCV RBC AUTO: 101 FL — HIGH (ref 80–100)
MCV RBC AUTO: 96.3 FL — SIGNIFICANT CHANGE UP (ref 80–100)
OB PNL STL: NEGATIVE — SIGNIFICANT CHANGE UP
PLATELET # BLD AUTO: 135 K/UL — LOW (ref 150–400)
PLATELET # BLD AUTO: 163 K/UL — SIGNIFICANT CHANGE UP (ref 150–400)
POTASSIUM SERPL-MCNC: 5 MMOL/L — SIGNIFICANT CHANGE UP (ref 3.5–5.3)
POTASSIUM SERPL-SCNC: 5 MMOL/L — SIGNIFICANT CHANGE UP (ref 3.5–5.3)
PROTHROM AB SERPL-ACNC: 39.9 SEC — HIGH (ref 10–13.1)
RBC # BLD: 2.25 M/UL — LOW (ref 3.8–5.2)
RBC # BLD: 2.43 M/UL — LOW (ref 3.8–5.2)
RBC # FLD: 18.8 % — HIGH (ref 10.3–14.5)
RBC # FLD: 21 % — HIGH (ref 10.3–14.5)
SODIUM SERPL-SCNC: 140 MMOL/L — SIGNIFICANT CHANGE UP (ref 135–145)
SPECIMEN SOURCE: SIGNIFICANT CHANGE UP
WBC # BLD: 10.1 K/UL — SIGNIFICANT CHANGE UP (ref 3.8–10.5)
WBC # BLD: 10.52 K/UL — HIGH (ref 3.8–10.5)
WBC # FLD AUTO: 10.1 K/UL — SIGNIFICANT CHANGE UP (ref 3.8–10.5)
WBC # FLD AUTO: 10.52 K/UL — HIGH (ref 3.8–10.5)

## 2017-12-27 PROCEDURE — 71010: CPT | Mod: 26

## 2017-12-27 RX ORDER — PHYTONADIONE (VIT K1) 5 MG
2.5 TABLET ORAL ONCE
Qty: 0 | Refills: 0 | Status: COMPLETED | OUTPATIENT
Start: 2017-12-27 | End: 2017-12-27

## 2017-12-27 RX ORDER — FUROSEMIDE 40 MG
30 TABLET ORAL
Qty: 0 | Refills: 0 | Status: COMPLETED | OUTPATIENT
Start: 2017-12-27 | End: 2017-12-27

## 2017-12-27 RX ADMIN — BUMETANIDE 1 MILLIGRAM(S): 0.25 INJECTION INTRAMUSCULAR; INTRAVENOUS at 09:18

## 2017-12-27 RX ADMIN — PIPERACILLIN AND TAZOBACTAM 25 GRAM(S): 4; .5 INJECTION, POWDER, LYOPHILIZED, FOR SOLUTION INTRAVENOUS at 09:19

## 2017-12-27 RX ADMIN — Medication 30 MILLIGRAM(S): at 23:22

## 2017-12-27 RX ADMIN — Medication 120 MILLIGRAM(S): at 06:30

## 2017-12-27 RX ADMIN — Medication 30 MILLIGRAM(S): at 18:30

## 2017-12-27 RX ADMIN — PIPERACILLIN AND TAZOBACTAM 25 GRAM(S): 4; .5 INJECTION, POWDER, LYOPHILIZED, FOR SOLUTION INTRAVENOUS at 18:32

## 2017-12-27 RX ADMIN — Medication 100 MILLIGRAM(S): at 06:31

## 2017-12-27 RX ADMIN — Medication 25 MILLIGRAM(S): at 06:30

## 2017-12-27 RX ADMIN — Medication 3 MILLILITER(S): at 06:30

## 2017-12-27 RX ADMIN — Medication 100 MILLIGRAM(S): at 18:31

## 2017-12-27 RX ADMIN — Medication 100 MILLIGRAM(S): at 23:17

## 2017-12-27 RX ADMIN — Medication 3 MILLILITER(S): at 13:00

## 2017-12-27 RX ADMIN — Medication 3 MILLILITER(S): at 18:32

## 2017-12-27 RX ADMIN — Medication 3 MILLILITER(S): at 23:18

## 2017-12-27 RX ADMIN — Medication 25 MILLIGRAM(S): at 18:32

## 2017-12-27 RX ADMIN — CLOPIDOGREL BISULFATE 75 MILLIGRAM(S): 75 TABLET, FILM COATED ORAL at 18:31

## 2017-12-27 RX ADMIN — SENNA PLUS 2 TABLET(S): 8.6 TABLET ORAL at 23:16

## 2017-12-27 RX ADMIN — BUMETANIDE 1 MILLIGRAM(S): 0.25 INJECTION INTRAMUSCULAR; INTRAVENOUS at 18:31

## 2017-12-27 RX ADMIN — Medication 2.5 MILLIGRAM(S): at 19:52

## 2017-12-27 RX ADMIN — ATORVASTATIN CALCIUM 10 MILLIGRAM(S): 80 TABLET, FILM COATED ORAL at 23:17

## 2017-12-27 NOTE — PROGRESS NOTE ADULT - ASSESSMENT
A/P; 89 y/o female with PMHx of right breast CA S/P lumpectomy in 2000, Uterine Ca S/P total hysterectomy in 2012, Skin CA S/P excision of bilateral lower eyelid skin CA, cholecystectomy, Osteoarthritis of bilateral knees, HTN, hyperlipidemia, paroxysmal Afib on coumadin s/p PPM for slow ventricular response, severe aortic stenosis now s/p TAVR April 2015 , who presents with SOB, cough x 1 day,  confusion and lethargy.  CT chest: Moderate left and loculated right pleural effusions, with increase in   size of the left effusion since 10/24/2017. There is complete atelectasis   of the left lower lobe and partial atelectasis of the right lower lobe.  Debris in the trachea and bilateral mainstem bronchi with mucoid   impaction of bilateral lower lobe bronchi.  Pt with respiratory failure, multifactorial.  On Zosyn.  BC 1/4 with CNS, likely contamination. Repeat blood cultures all negative to date.   UC negative.    Recommend:    - On zosyn since 12/22.  ? superimposed bacterial infection.  Will complete 7 day course, and d/c tomorrow.      - Pt continues to receive pulm hygieine, O2 support with Bipap, Nebulizer treatment.  Pulmonary following.    Adrienne Oh  [phone]    Covering Dr. Segovia A/P; 89 y/o female with PMHx of right breast CA S/P lumpectomy in 2000, Uterine Ca S/P total hysterectomy in 2012, Skin CA S/P excision of bilateral lower eyelid skin CA, cholecystectomy, Osteoarthritis of bilateral knees, HTN, hyperlipidemia, paroxysmal Afib on coumadin s/p PPM for slow ventricular response, severe aortic stenosis now s/p TAVR April 2015 , who presents with SOB, cough x 1 day,  confusion and lethargy.  CT chest: Moderate left and loculated right pleural effusions, with increase in   size of the left effusion since 10/24/2017. There is complete atelectasis   of the left lower lobe and partial atelectasis of the right lower lobe.  Debris in the trachea and bilateral mainstem bronchi with mucoid   impaction of bilateral lower lobe bronchi.  Pt with respiratory failure, multifactorial.  On Zosyn.  BC 1/4 with CNS, likely contamination. Repeat blood cultures all negative to date.   UC negative.    Recommend:    - On zosyn since 12/22.  ? superimposed bacterial infection.  Will d/c today and observe off.      - Pt continues to receive pulm hygieine, O2 support with Bipap, Nebulizer treatment.  Pulmonary following.    Adrienne Oh  [phone]    Covering Dr. Segovia

## 2017-12-27 NOTE — PROGRESS NOTE ADULT - SUBJECTIVE AND OBJECTIVE BOX
Follow-up Pulm Progress Note    No new respiratory events overnight.  Remains on BIPAP overnight and prn days  Medications:  MEDICATIONS  (STANDING):  ALBUTerol/ipratropium for Nebulization 3 milliLiter(s) Nebulizer every 6 hours  atorvastatin 10 milliGRAM(s) Oral at bedtime  buMETAnide Injectable 1 milliGRAM(s) IV Push every 12 hours  clopidogrel Tablet 75 milliGRAM(s) Oral daily  diltiazem    milliGRAM(s) Oral daily  docusate sodium 100 milliGRAM(s) Oral three times a day  metoprolol     tartrate 25 milliGRAM(s) Oral two times a day  piperacillin/tazobactam IVPB. 3.375 Gram(s) IV Intermittent every 12 hours  polyethylene glycol 3350 17 Gram(s) Oral daily  senna 2 Tablet(s) Oral at bedtime    MEDICATIONS  (PRN):      Vent settings (if applicable)      Vital Signs Last 24 Hrs  T(C): 36.4 (27 Dec 2017 04:26), Max: 36.6 (26 Dec 2017 20:22)  T(F): 97.5 (27 Dec 2017 04:26), Max: 97.8 (26 Dec 2017 20:22)  HR: 70 (27 Dec 2017 06:30) (70 - 89)  BP: 125/72 (27 Dec 2017 04:26) (101/66 - 125/72)  BP(mean): --  RR: 23 (27 Dec 2017 04:26) (18 - 23)  SpO2: 92% (27 Dec 2017 00:45) (92% - 100%)    ABG - ( 26 Dec 2017 10:47 )  pH: 7.30  /  pCO2: 68    /  pO2: 101   / HCO3: 33    / Base Excess: 6.1   /  SaO2: 98                    12-26 @ 07:01  -  12-27 @ 07:00  --------------------------------------------------------  IN: 440 mL / OUT: 900 mL / NET: -460 mL          LABS:                        7.8    11.8  )-----------( 152      ( 26 Dec 2017 12:56 )             25.0     12-26    140  |  96  |  85<H>  ----------------------------<  142<H>  5.2   |  34<H>  |  4.18<H>    Ca    8.3<L>      26 Dec 2017 11:26            CAPILLARY BLOOD GLUCOSE        PT/INR - ( 26 Dec 2017 11:26 )   PT: 52.6 sec;   INR: 4.68 ratio         PTT - ( 25 Dec 2017 12:08 )  PTT:46.4 sec          CULTURES:  Culture Results:   No growth to date. (12-24 @ 01:06)  Culture Results:   No growth to date. (12-23 @ 21:37)  Culture Results:   Growth in aerobic and anaerobic bottles: Coag Negative Staphylococcus  Single set isolate, possible contaminant. Contact  Microbiology if susceptibility testing clinically  indicated.  ***Blood Panel PCR results on this specimen are available  approximately 3 hours after the Gram stain result.***  Gram stain, PCR, and/or culture results may not always  correspond due to difference in methodologies.  ************************************************************  This PCR assay was performed using[employer].  The following targets are tested for: Enterococcus,  vancomycin resistant enterococci, Listeria monocytogenes,  coagulase negative staphylococci, S. aureus,  methicillin resistant S. aureus, Streptococcus agalactiae  (Group B), S.pneumoniae, S. pyogenes (Group A),  Acinetobacter baumannii, Enterobacter cloacae, E. coli,  Klebsiella oxytoca, K. pneumoniae, Proteus sp.,  Serratia marcescens, Haemophilus influenzae,  Neisseria meningitidis, Pseudomonas aeruginosa, Candida  albicans, C. glabrata, C krusei, C parapsilosis,  C. tropicalis and the KPC resistance gene. (12-22 @ 18:04)  Culture Results:   No growth to date. (12-22 @ 18:04)  Culture Results:   No growth (12-22 @ 17:42)    Most recent blood culture -- 12-24 @ 01:06   -- -- .Blood Blood-Peripheral 12-24 @ 01:06  Most recent blood culture -- 12-23 @ 21:37   -- -- .Blood Blood-Peripheral 12-23 @ 21:37  Most recent blood culture -- 12-22 @ 18:04   Blood Culture PCR Blood Culture PCR .Blood Blood-Peripheral 12-22 @ 18:04  Most recent blood culture -- 12-22 @ 17:42   -- -- .Urine Clean Catch (Midstream) 12-22 @ 17:42      Physical Examination:  Awake and alert, generally comfortable  HEENT: unremarkable  PULM: Clear to auscultation bilaterally, no significant sputum production  CVS: Regular rate and rhythm, no murmurs, rubs, or gallops  Abd:  soft, non tender  Extrem: No CCE    RADIOLOGY REVIEWED  CXR:    CT chest:

## 2017-12-27 NOTE — PROGRESS NOTE ADULT - SUBJECTIVE AND OBJECTIVE BOX
Patient is a 88y old  Female who presents with a chief complaint of sob  History obtained from family due to confusion (22 Dec 2017 21:26)      SUBJECTIVE / OVERNIGHT EVENTS: no new c/o, still w periods of lethargy, responds to BIPAP.     MEDICATIONS  (STANDING):  ALBUTerol/ipratropium for Nebulization 3 milliLiter(s) Nebulizer every 6 hours  atorvastatin 10 milliGRAM(s) Oral at bedtime  buMETAnide Injectable 1 milliGRAM(s) IV Push every 12 hours  clopidogrel Tablet 75 milliGRAM(s) Oral daily  diltiazem    milliGRAM(s) Oral daily  docusate sodium 100 milliGRAM(s) Oral three times a day  furosemide   Injectable 30 milliGRAM(s) IV Push every 3 hours  metoprolol     tartrate 25 milliGRAM(s) Oral two times a day  piperacillin/tazobactam IVPB. 3.375 Gram(s) IV Intermittent every 12 hours  polyethylene glycol 3350 17 Gram(s) Oral daily  senna 2 Tablet(s) Oral at bedtime    MEDICATIONS  (PRN):      Vital Signs Last 24 Hrs  T(F): 97.5 (12-27-17 @ 04:26), Max: 97.8 (12-26-17 @ 20:22)  HR: 88 (12-27-17 @ 16:19) (68 - 89)  BP: 125/72 (12-27-17 @ 04:26) (101/66 - 125/72)  RR: 23 (12-27-17 @ 04:26) (20 - 23)  SpO2: 92% (12-27-17 @ 16:19) (92% - 98%)  Telemetry:   CAPILLARY BLOOD GLUCOSE      POCT Blood Glucose.: 97 mg/dL (27 Dec 2017 16:14)    I&O's Summary    26 Dec 2017 07:01  -  27 Dec 2017 07:00  --------------------------------------------------------  IN: 440 mL / OUT: 900 mL / NET: -460 mL    27 Dec 2017 07:01  -  27 Dec 2017 16:33  --------------------------------------------------------  IN: 0 mL / OUT: 400 mL / NET: -400 mL        PHYSICAL EXAM:  GENERAL: NAD, well-developed  HEAD:  Atraumatic, Normocephalic  EYES: EOMI, PERRLA, conjunctiva and sclera clear  NECK: Supple, No JVD  CHEST/LUNG: Clear to auscultation bilaterally; No wheeze  HEART: Regular rate and rhythm; No murmurs, rubs, or gallops  ABDOMEN: Soft, Nontender, Nondistended; Bowel sounds present  EXTREMITIES:  2+ Peripheral Pulses, No clubbing, cyanosis, or edema  PSYCH: AAOx3  NEUROLOGY: non-focal  SKIN: No rashes or lesions    LABS:                        7.1    10.1  )-----------( 135      ( 27 Dec 2017 11:53 )             22.7     12-27    140  |  95<L>  |  83<H>  ----------------------------<  110<H>  5.0   |  32<H>  |  4.29<H>    Ca    8.1<L>      27 Dec 2017 08:56      PT/INR - ( 27 Dec 2017 08:58 )   PT: 39.9 sec;   INR: 3.44 ratio                   RADIOLOGY & ADDITIONAL TESTS:    Imaging Personally Reviewed:    Consultant(s) Notes Reviewed:      Care Discussed with Consultants/Other Providers:

## 2017-12-27 NOTE — PROGRESS NOTE ADULT - ASSESSMENT
elderly female, multiple medical issues including cardiac disease, acute on chronic respiratory failure- likely mixture obstructive and restrictive dysfctn, now with decompensated overall status- fluid overload, ? bacterial infection    continue zosyn as per ID  pulmonary hygiene  O2 support- BIPAP alternating with nasal cannula days, BIPAP qhs for ventilatory support  pulmonary hygiene  nebs  treatement of fluid overload  dvt prophylaxis- on coumadin with elevated INR  routine gi prophylaxis as necessary  Oxygen saturation greater than 92%    Molly Soto MD  [phone]  Pulmonary

## 2017-12-27 NOTE — PROVIDER CONTACT NOTE (CRITICAL VALUE NOTIFICATION) - ASSESSMENT
Pt aox1-2, responsive, currently on bedrest with continuous bipap. VSS. No signs/sx of distress observed/reported.

## 2017-12-27 NOTE — PROGRESS NOTE ADULT - SUBJECTIVE AND OBJECTIVE BOX
CC: no acute events, pt on CPAP, family at the bedside    TELEMETRY: AF     PHYSICAL EXAM:    T(C): 36.4 (12-27-17 @ 04:26), Max: 36.6 (12-26-17 @ 20:22)  HR: 68 (12-27-17 @ 10:30) (68 - 89)  BP: 125/72 (12-27-17 @ 04:26) (101/66 - 125/72)  RR: 23 (12-27-17 @ 04:26) (18 - 23)  SpO2: 98% (12-27-17 @ 10:30) (92% - 99%)  Wt(kg): --  I&O's Summary    26 Dec 2017 07:01  -  27 Dec 2017 07:00  --------------------------------------------------------  IN: 440 mL / OUT: 900 mL / NET: -460 mL    27 Dec 2017 07:01  -  27 Dec 2017 11:15  --------------------------------------------------------  IN: 0 mL / OUT: 400 mL / NET: -400 mL        Appearance: on CPAP, confused  Cardiovascular: Normal S1 S2,RRR, No JVD, No murmurs  Respiratory: Lungs clear to auscultation	  Gastrointestinal:  Soft, Non-tender, + BS	  Extremities:1+ rafael edema  Vascular: Peripheral pulses palpable 2+ bilaterally     LABS:	 	                          7.0    10.52 )-----------( 163      ( 27 Dec 2017 08:49 )             23.4     12-27    140  |  95<L>  |  83<H>  ----------------------------<  110<H>  5.0   |  32<H>  |  4.29<H>    Ca    8.1<L>      27 Dec 2017 08:56        PT/INR - ( 27 Dec 2017 08:58 )   PT: 39.9 sec;   INR: 3.44 ratio         PTT - ( 25 Dec 2017 12:08 )  PTT:46.4 sec    CARDIAC MARKERS:

## 2017-12-27 NOTE — PROGRESS NOTE ADULT - SUBJECTIVE AND OBJECTIVE BOX
Infectious Diseases progress note:    Subjective:    ROS:  CONSTITUTIONAL:  No fever, chills, rigors  CARDIOVASCULAR:  No chest pain or palpitations  RESPIRATORY:   No SOB, cough, dyspnea on exertion.  No wheezing  GASTROINTESTINAL:  No abd pain, N/V, diarrhea/constipation  EXTREMITIES:  No swelling or joint pain  GENITOURINARY:  No burning on urination, increased frequency or urgency.  No flank pain  NEUROLOGIC:  No HA, visual disturbances  SKIN: No rashes    Allergies    No Known Allergies    Intolerances    digoxin (Rash; Drowsiness)      ANTIBIOTICS/RELEVANT:  antimicrobials  piperacillin/tazobactam IVPB. 3.375 Gram(s) IV Intermittent every 12 hours    immunologic:    OTHER:  ALBUTerol/ipratropium for Nebulization 3 milliLiter(s) Nebulizer every 6 hours  atorvastatin 10 milliGRAM(s) Oral at bedtime  buMETAnide Injectable 1 milliGRAM(s) IV Push every 12 hours  clopidogrel Tablet 75 milliGRAM(s) Oral daily  diltiazem    milliGRAM(s) Oral daily  docusate sodium 100 milliGRAM(s) Oral three times a day  furosemide   Injectable 30 milliGRAM(s) IV Push every 3 hours  metoprolol     tartrate 25 milliGRAM(s) Oral two times a day  polyethylene glycol 3350 17 Gram(s) Oral daily  senna 2 Tablet(s) Oral at bedtime      Objective:  Vital Signs Last 24 Hrs  T(C): 36.4 (27 Dec 2017 04:26), Max: 36.6 (26 Dec 2017 20:22)  T(F): 97.5 (27 Dec 2017 04:26), Max: 97.8 (26 Dec 2017 20:22)  HR: 88 (27 Dec 2017 16:19) (68 - 89)  BP: 125/72 (27 Dec 2017 04:26) (101/66 - 125/72)  BP(mean): --  RR: 23 (27 Dec 2017 04:26) (20 - 23)  SpO2: 92% (27 Dec 2017 16:19) (92% - 98%)    PHYSICAL EXAM:  Constitutional:NAD  Eyes:JEANETTE, EOMI  Ear/Nose/Throat: no thrush, mucositis.  Moist mucous membranes	  Neck:no JVD, no lymphadenopathy, supple  Respiratory: CTA jillian  Cardiovascular: S1S2 RRR, no murmurs  Gastrointestinal:soft, nontender,  nondistended (+) BS  Extremities:no e/e/c  Skin:  no rashes, open wounds or ulcerations        LABS:                        7.1    10.1  )-----------( 135      ( 27 Dec 2017 11:53 )             22.7     12-27    140  |  95<L>  |  83<H>  ----------------------------<  110<H>  5.0   |  32<H>  |  4.29<H>    Ca    8.1<L>      27 Dec 2017 08:56      PT/INR - ( 27 Dec 2017 08:58 )   PT: 39.9 sec;   INR: 3.44 ratio               Rapid RVP Result: [facility]          MICROBIOLOGY:    Culture - Blood (12.24.17 @ 01:06)    Specimen Source: .Blood Blood-Peripheral    Culture Results:   No growth to date.    Culture - Blood (12.23.17 @ 21:37)    Specimen Source: .Blood Blood-Peripheral    Culture Results:   No growth to date.    Culture - Blood (12.22.17 @ 18:04)    Specimen Source: .Blood Blood-Peripheral    Culture Results:   No growth to date.    Culture - Blood (12.22.17 @ 18:04)    -  Coagulase negative Staphylococcus: Detec    Gram Stain:   Growth in aerobic bottle: Gram Positive Cocci in Clusters  Growth in anaerobic bottle: Gram Positive Cocci in Clusters    Specimen Source: .Blood Blood-Peripheral    Organism: Blood Culture PCR    Culture Results:   Growth in aerobic and anaerobic bottles: Coag Negative Staphylococcus  Single set isolate, possible contaminant. Contact  Microbiology if susceptibility testing clinically  indicated.  ***Blood Panel PCR results on this specimen are available  approximately 3 hours after the Gram stain result.***  Gram stain, PCR, and/or culture results may not always  correspond due to difference in methodologies.  ************************************************************  This PCR assay was performed using[employer].  The following targets are tested for: Enterococcus,  vancomycin resistant enterococci, Listeria monocytogenes,  coagulase negative staphylococci, S. aureus,  methicillin resistant S. aureus, Streptococcus agalactiae  (Group B), S.pneumoniae, S. pyogenes (Group A),  Acinetobacter baumannii, Enterobacter cloacae, E. coli,  Klebsiella oxytoca, K. pneumoniae, Proteus sp.,  Serratia marcescens, Haemophilus influenzae,  Neisseria meningitidis, Pseudomonas aeruginosa, Candida  albicans, C. glabrata, C krusei, C parapsilosis,  C. tropicalis and the KPC resistance gene.    Organism Identification: Blood Culture PCR    Method Type: PCR        RADIOLOGY & ADDITIONAL STUDIES:    < from: US Abdomen Upper Quadrant Right (12.26.17 @ 08:50) >  INTERPRETATION:  CLINICAL INFORMATION: Elevated liver function tests and    Ultrasonography of the abdomen was performed.     Comparison: CT chest 12/24/2017.    Liver: Normal in echogenicity and echotexture. Pelvic surface is smooth.   Trace perihepatic ascites.  No evidence for focal mass or intrahepatic ductal dilatation.     Gallbladder: Cholecystectomy    The common bile duct measures 5 mm.      The right kidney measures 9.8 cm in sagittal dimension. Simple cysts the   largest at the lower pole measures 2.0 x 2.6 x 2.2 cm.  There is no hydronephrosis or nephrolithiasis or solid renal mass.    The pancreatic head and body are normal. The tail is not visualized.    The visualized abdominal aorta is unremarkable.  The inferior vena cava is unremarkable.    Right pleural effusion.    IMPRESSION:    Right pleural effusion. Trace perihepatic ascites.    < end of copied text >      < from: CT Chest No Cont (12.24.17 @ 17:04) >  CHEST:     LUNGS, PLEURA, AND LARGE AIRWAYS: Debris within the trachea and bilateral   mainstem bronchi. Mucoid impaction of bilateral lower lobe bronchi.   Moderate left pleural effusion with complete atelectasis of the left   lower lobe, worsened from prior study. Loculated right pleural effusion   with partial atelectasis of the right lower lobe.  VESSELS: Thoracic aortic andcoronary artery atherosclerosis.  HEART: Cardiomegaly. Status post TAVR. No pericardial effusion.  MEDIASTINUM AND CANDI: No lymphadenopathy.  CHEST WALL AND LOWER NECK: Left-sided cardiac device with leads in the   right atrium and right ventricle. Anasarca.  VISUALIZED UPPER ABDOMEN: Calcified vasculature.  BONES: Multilevel degenerative changes.    IMPRESSION:   Moderate left and loculated right pleural effusions, with increase in   size of the left effusion since 10/24/2017. There is complete atelectasis   of the left lower lobe and partial atelectasis of the right lower lobe.    Debris in the trachea and bilateral mainstem bronchi with mucoid   impaction of bilateral lower lobe bronchi.    < end of copied text > Infectious Diseases progress note:    Subjective: NAD, no active cough/sob.  Afebrile.  Had been on bipap during the day.  Son present at bedside.      ROS:  CONSTITUTIONAL:  No fever, chills, rigors  CARDIOVASCULAR:  No chest pain or palpitations  RESPIRATORY:   No SOB, cough, dyspnea on exertion.  No wheezing  GASTROINTESTINAL:  No abd pain, N/V, diarrhea/constipation  EXTREMITIES:  No swelling or joint pain  GENITOURINARY:  No burning on urination, increased frequency or urgency.  No flank pain  NEUROLOGIC:  No HA, visual disturbances  SKIN: No rashes    Allergies    No Known Allergies    Intolerances    digoxin (Rash; Drowsiness)      ANTIBIOTICS/RELEVANT:  antimicrobials  piperacillin/tazobactam IVPB. 3.375 Gram(s) IV Intermittent every 12 hours    immunologic:    OTHER:  ALBUTerol/ipratropium for Nebulization 3 milliLiter(s) Nebulizer every 6 hours  atorvastatin 10 milliGRAM(s) Oral at bedtime  buMETAnide Injectable 1 milliGRAM(s) IV Push every 12 hours  clopidogrel Tablet 75 milliGRAM(s) Oral daily  diltiazem    milliGRAM(s) Oral daily  docusate sodium 100 milliGRAM(s) Oral three times a day  furosemide   Injectable 30 milliGRAM(s) IV Push every 3 hours  metoprolol     tartrate 25 milliGRAM(s) Oral two times a day  polyethylene glycol 3350 17 Gram(s) Oral daily  senna 2 Tablet(s) Oral at bedtime      Objective:  Vital Signs Last 24 Hrs  T(C): 36.4 (27 Dec 2017 04:26), Max: 36.6 (26 Dec 2017 20:22)  T(F): 97.5 (27 Dec 2017 04:26), Max: 97.8 (26 Dec 2017 20:22)  HR: 88 (27 Dec 2017 16:19) (68 - 89)  BP: 125/72 (27 Dec 2017 04:26) (101/66 - 125/72)  BP(mean): --  RR: 23 (27 Dec 2017 04:26) (20 - 23)  SpO2: 92% (27 Dec 2017 16:19) (92% - 98%)    PHYSICAL EXAM:  Constitutional:NAD  Eyes:JEANETTE, EOMI  Ear/Nose/Throat: no thrush, mucositis.  Moist mucous membranes	  Neck:no JVD, no lymphadenopathy, supple  Respiratory: CTA jillian  Cardiovascular: S1S2 RRR, no murmurs  Gastrointestinal:soft, nontender,  nondistended (+) BS  Extremities: b/l UE edema/mild eccymosis  Skin:  no rashes, open wounds or ulcerations        LABS:                        7.1    10.1  )-----------( 135      ( 27 Dec 2017 11:53 )             22.7     12-27    140  |  95<L>  |  83<H>  ----------------------------<  110<H>  5.0   |  32<H>  |  4.29<H>    Ca    8.1<L>      27 Dec 2017 08:56      PT/INR - ( 27 Dec 2017 08:58 )   PT: 39.9 sec;   INR: 3.44 ratio               Rapid RVP Result: [facility]          MICROBIOLOGY:    Culture - Blood (12.24.17 @ 01:06)    Specimen Source: .Blood Blood-Peripheral    Culture Results:   No growth to date.    Culture - Blood (12.23.17 @ 21:37)    Specimen Source: .Blood Blood-Peripheral    Culture Results:   No growth to date.    Culture - Blood (12.22.17 @ 18:04)    Specimen Source: .Blood Blood-Peripheral    Culture Results:   No growth to date.    Culture - Blood (12.22.17 @ 18:04)    -  Coagulase negative Staphylococcus: Detec    Gram Stain:   Growth in aerobic bottle: Gram Positive Cocci in Clusters  Growth in anaerobic bottle: Gram Positive Cocci in Clusters    Specimen Source: .Blood Blood-Peripheral    Organism: Blood Culture PCR    Culture Results:   Growth in aerobic and anaerobic bottles: Coag Negative Staphylococcus  Single set isolate, possible contaminant. Contact  Microbiology if susceptibility testing clinically  indicated.  ***Blood Panel PCR results on this specimen are available  approximately 3 hours after the Gram stain result.***  Gram stain, PCR, and/or culture results may not always  correspond due to difference in methodologies.  ************************************************************  This PCR assay was performed using[employer]  The following targets are tested for: Enterococcus,  vancomycin resistant enterococci, Listeria monocytogenes,  coagulase negative staphylococci, S. aureus,  methicillin resistant S. aureus, Streptococcus agalactiae  (Group B), S.pneumoniae, S. pyogenes (Group A),  Acinetobacter baumannii, Enterobacter cloacae, E. coli,  Klebsiella oxytoca, K. pneumoniae, Proteus sp.,  Serratia marcescens, Haemophilus influenzae,  Neisseria meningitidis, Pseudomonas aeruginosa, Candida  albicans, C. glabrata, C krusei, C parapsilosis,  C. tropicalis and the KPC resistance gene.    Organism Identification: Blood Culture PCR    Method Type: PCR        RADIOLOGY & ADDITIONAL STUDIES:    < from: US Abdomen Upper Quadrant Right (12.26.17 @ 08:50) >  INTERPRETATION:  CLINICAL INFORMATION: Elevated liver function tests and    Ultrasonography of the abdomen was performed.     Comparison: CT chest 12/24/2017.    Liver: Normal in echogenicity and echotexture. Pelvic surface is smooth.   Trace perihepatic ascites.  No evidence for focal mass or intrahepatic ductal dilatation.     Gallbladder: Cholecystectomy    The common bile duct measures 5 mm.      The right kidney measures 9.8 cm in sagittal dimension. Simple cysts the   largest at the lower pole measures 2.0 x 2.6 x 2.2 cm.  There is no hydronephrosis or nephrolithiasis or solid renal mass.    The pancreatic head and body are normal. The tail is not visualized.    The visualized abdominal aorta is unremarkable.  The inferior vena cava is unremarkable.    Right pleural effusion.    IMPRESSION:    Right pleural effusion. Trace perihepatic ascites.    < end of copied text >      < from: CT Chest No Cont (12.24.17 @ 17:04) >  CHEST:     LUNGS, PLEURA, AND LARGE AIRWAYS: Debris within the trachea and bilateral   mainstem bronchi. Mucoid impaction of bilateral lower lobe bronchi.   Moderate left pleural effusion with complete atelectasis of the left   lower lobe, worsened from prior study. Loculated right pleural effusion   with partial atelectasis of the right lower lobe.  VESSELS: Thoracic aortic andcoronary artery atherosclerosis.  HEART: Cardiomegaly. Status post TAVR. No pericardial effusion.  MEDIASTINUM AND CANDI: No lymphadenopathy.  CHEST WALL AND LOWER NECK: Left-sided cardiac device with leads in the   right atrium and right ventricle. Anasarca.  VISUALIZED UPPER ABDOMEN: Calcified vasculature.  BONES: Multilevel degenerative changes.    IMPRESSION:   Moderate left and loculated right pleural effusions, with increase in   size of the left effusion since 10/24/2017. There is complete atelectasis   of the left lower lobe and partial atelectasis of the right lower lobe.    Debris in the trachea and bilateral mainstem bronchi with mucoid   impaction of bilateral lower lobe bronchi.    < end of copied text >

## 2017-12-27 NOTE — PROGRESS NOTE ADULT - ASSESSMENT
Acute/Chronic Resp Failure  MVR  AFIB  s/p PPM    -CV stable  -change bumex to PO BID  -cont NIVPPV  -pulm f/u

## 2017-12-27 NOTE — PROGRESS NOTE ADULT - SUBJECTIVE AND OBJECTIVE BOX
[facility] [phone]        Patient seen and examined in bed.  She is on Bipap        MEDICATIONS  (STANDING):  ALBUTerol/ipratropium for Nebulization 3 milliLiter(s) Nebulizer every 6 hours  atorvastatin 10 milliGRAM(s) Oral at bedtime  buMETAnide Injectable 1 milliGRAM(s) IV Push every 12 hours  clopidogrel Tablet 75 milliGRAM(s) Oral daily  diltiazem    milliGRAM(s) Oral daily  docusate sodium 100 milliGRAM(s) Oral three times a day  metoprolol     tartrate 25 milliGRAM(s) Oral two times a day  piperacillin/tazobactam IVPB. 3.375 Gram(s) IV Intermittent every 12 hours  polyethylene glycol 3350 17 Gram(s) Oral daily  senna 2 Tablet(s) Oral at bedtime      VITAL:  T(C): , Max: 36.6 (12-26-17 @ 20:22)  T(F): , Max: 97.8 (12-26-17 @ 20:22)  HR: 68 (12-27-17 @ 10:30)  BP: 125/72 (12-27-17 @ 04:26)  BP(mean): --  RR: 23 (12-27-17 @ 04:26)  SpO2: 98% (12-27-17 @ 10:30)  Wt(kg): --    I and O's:    12-26 @ 07:01  -  12-27 @ 07:00  --------------------------------------------------------  IN: 440 mL / OUT: 900 mL / NET: -460 mL    12-27 @ 07:01  -  12-27 @ 11:45  --------------------------------------------------------  IN: 0 mL / OUT: 400 mL / NET: -400 mL          PHYSICAL EXAM:    Constitutional: NAD  HEENT: PERRLA    Neck:  + JVD  Respiratory: CTAB/L  Cardiovascular: S1 and S2  Gastrointestinal: BS+, soft, NT/ND  Extremities: +  peripheral edema  Neurological: A/O x 3, no focal deficits  Psychiatric: Normal mood, normal affect  : No Gautam  Skin: No rashes  Access: Not applicable    LABS:                        7.0    10.52 )-----------( 163      ( 27 Dec 2017 08:49 )             23.4     12-27    140  |  95<L>  |  83<H>  ----------------------------<  110<H>  5.0   |  32<H>  |  4.29<H>    Ca    8.1<L>      27 Dec 2017 08:56            Urine Studies:          RADIOLOGY & ADDITIONAL STUDIES:

## 2017-12-28 DIAGNOSIS — R45.1 RESTLESSNESS AND AGITATION: ICD-10-CM

## 2017-12-28 DIAGNOSIS — Z51.5 ENCOUNTER FOR PALLIATIVE CARE: ICD-10-CM

## 2017-12-28 DIAGNOSIS — J96.22 ACUTE AND CHRONIC RESPIRATORY FAILURE WITH HYPERCAPNIA: ICD-10-CM

## 2017-12-28 LAB
ANION GAP SERPL CALC-SCNC: 14 MMOL/L — SIGNIFICANT CHANGE UP (ref 5–17)
BLD GP AB SCN SERPL QL: NEGATIVE — SIGNIFICANT CHANGE UP
BUN SERPL-MCNC: 84 MG/DL — HIGH (ref 7–23)
CALCIUM SERPL-MCNC: 8.2 MG/DL — LOW (ref 8.4–10.5)
CHLORIDE SERPL-SCNC: 94 MMOL/L — LOW (ref 96–108)
CO2 SERPL-SCNC: 32 MMOL/L — HIGH (ref 22–31)
CREAT SERPL-MCNC: 4.36 MG/DL — HIGH (ref 0.5–1.3)
CULTURE RESULTS: SIGNIFICANT CHANGE UP
GLUCOSE SERPL-MCNC: 83 MG/DL — SIGNIFICANT CHANGE UP (ref 70–99)
HCT VFR BLD CALC: 24.7 % — LOW (ref 34.5–45)
HGB BLD-MCNC: 7.8 G/DL — LOW (ref 11.5–15.5)
INR BLD: 1.94 RATIO — HIGH (ref 0.88–1.16)
MCHC RBC-ENTMCNC: 29.3 PG — SIGNIFICANT CHANGE UP (ref 27–34)
MCHC RBC-ENTMCNC: 31.6 GM/DL — LOW (ref 32–36)
MCV RBC AUTO: 92.9 FL — SIGNIFICANT CHANGE UP (ref 80–100)
PLATELET # BLD AUTO: 144 K/UL — LOW (ref 150–400)
POTASSIUM SERPL-MCNC: 4.9 MMOL/L — SIGNIFICANT CHANGE UP (ref 3.5–5.3)
POTASSIUM SERPL-SCNC: 4.9 MMOL/L — SIGNIFICANT CHANGE UP (ref 3.5–5.3)
PROTHROM AB SERPL-ACNC: 22.2 SEC — HIGH (ref 10–13.1)
RBC # BLD: 2.66 M/UL — LOW (ref 3.8–5.2)
RBC # FLD: 21.5 % — HIGH (ref 10.3–14.5)
RH IG SCN BLD-IMP: POSITIVE — SIGNIFICANT CHANGE UP
SODIUM SERPL-SCNC: 140 MMOL/L — SIGNIFICANT CHANGE UP (ref 135–145)
SPECIMEN SOURCE: SIGNIFICANT CHANGE UP
WBC # BLD: 13.59 K/UL — HIGH (ref 3.8–10.5)
WBC # FLD AUTO: 13.59 K/UL — HIGH (ref 3.8–10.5)

## 2017-12-28 PROCEDURE — 99223 1ST HOSP IP/OBS HIGH 75: CPT

## 2017-12-28 RX ORDER — WARFARIN SODIUM 2.5 MG/1
1 TABLET ORAL ONCE
Qty: 0 | Refills: 0 | Status: COMPLETED | OUTPATIENT
Start: 2017-12-28 | End: 2017-12-28

## 2017-12-28 RX ORDER — IPRATROPIUM/ALBUTEROL SULFATE 18-103MCG
3 AEROSOL WITH ADAPTER (GRAM) INHALATION ONCE
Qty: 0 | Refills: 0 | Status: COMPLETED | OUTPATIENT
Start: 2017-12-28 | End: 2017-12-28

## 2017-12-28 RX ADMIN — BUMETANIDE 1 MILLIGRAM(S): 0.25 INJECTION INTRAMUSCULAR; INTRAVENOUS at 05:44

## 2017-12-28 RX ADMIN — BUMETANIDE 1 MILLIGRAM(S): 0.25 INJECTION INTRAMUSCULAR; INTRAVENOUS at 19:36

## 2017-12-28 RX ADMIN — SENNA PLUS 2 TABLET(S): 8.6 TABLET ORAL at 20:23

## 2017-12-28 RX ADMIN — Medication 100 MILLIGRAM(S): at 20:24

## 2017-12-28 RX ADMIN — Medication 3 MILLILITER(S): at 12:14

## 2017-12-28 RX ADMIN — ATORVASTATIN CALCIUM 10 MILLIGRAM(S): 80 TABLET, FILM COATED ORAL at 20:23

## 2017-12-28 RX ADMIN — Medication 100 MILLIGRAM(S): at 15:53

## 2017-12-28 RX ADMIN — Medication 3 MILLILITER(S): at 18:29

## 2017-12-28 RX ADMIN — Medication 3 MILLILITER(S): at 05:44

## 2017-12-28 RX ADMIN — Medication 120 MILLIGRAM(S): at 05:43

## 2017-12-28 RX ADMIN — Medication 3 MILLILITER(S): at 23:54

## 2017-12-28 RX ADMIN — Medication 25 MILLIGRAM(S): at 05:44

## 2017-12-28 RX ADMIN — WARFARIN SODIUM 1 MILLIGRAM(S): 2.5 TABLET ORAL at 20:22

## 2017-12-28 RX ADMIN — CLOPIDOGREL BISULFATE 75 MILLIGRAM(S): 75 TABLET, FILM COATED ORAL at 15:53

## 2017-12-28 RX ADMIN — Medication 25 MILLIGRAM(S): at 18:29

## 2017-12-28 NOTE — PROGRESS NOTE ADULT - ASSESSMENT
The patient is an 88-year-old female, presents now with respiratory acidosis, acute kidney injury, and hepatitis.  The patient does have an elevated BNP and evidence of edema, but chest x-ray is clear.  Elevated BNP is likely from right ventricular dysfunction.  Unclear if degree of stenosis of the mitral valve and if that is leading to a degree of pulmonary hypertension.    Hepatitis  NANCY-non oliguric and this is likely ATN  Anemia    1.	Renal:    Bumex 1mg ivp bid.  Urine output has increased and the creatinine will hopefully plateau soon?  Add zaroxolyn for  synergy today.    2.	Infectious Disease:    Zosyn per ID eval(adjusted for GFR)  3.	Miscellaneous:  The patient is a full do not resuscitate.  Further workup pending above.  4.	- Cont the oleary for strict ins and outs  5.	Pulm- Pulm does not feels a thoracentesis is in order  6.	Anemia-Defer to PCP re blood xfusion     Palliative consult

## 2017-12-28 NOTE — PROGRESS NOTE ADULT - SUBJECTIVE AND OBJECTIVE BOX
OSCAR LOPEZ    Patient is a 88y old  Female who presents with a chief complaint of sob  History obtained from family due to confusion (22 Dec 2017 21:26)    Mental status improved.  Remains on BIPAP, comfortable.     Allergies    No Known Allergies    Intolerances    digoxin (Rash; Drowsiness)    MEDICATIONS  (STANDING):  ALBUTerol/ipratropium for Nebulization 3 milliLiter(s) Nebulizer every 6 hours  atorvastatin 10 milliGRAM(s) Oral at bedtime  buMETAnide Injectable 1 milliGRAM(s) IV Push every 12 hours  clopidogrel Tablet 75 milliGRAM(s) Oral daily  diltiazem    milliGRAM(s) Oral daily  docusate sodium 100 milliGRAM(s) Oral three times a day  metoprolol     tartrate 25 milliGRAM(s) Oral two times a day  polyethylene glycol 3350 17 Gram(s) Oral daily  senna 2 Tablet(s) Oral at bedtime  warfarin 1 milliGRAM(s) Oral once    PHYSICAL EXAM:  Vital Signs Last 24 Hrs  T(C): 36.7 (28 Dec 2017 12:31), Max: 37.2 (27 Dec 2017 18:20)  T(F): 98 (28 Dec 2017 12:31), Max: 98.9 (27 Dec 2017 18:20)  HR: 96 (28 Dec 2017 16:52) (57 - 105)  BP: 124/57 (28 Dec 2017 12:31) (115/61 - 148/70)  BP(mean): --  RR: 18 (28 Dec 2017 12:31) (18 - 974)  SpO2: 100% (28 Dec 2017 16:52) (93% - 100%)  Daily     Daily Weight in k.9 (28 Dec 2017 08:05)  I&O's Summary    27 Dec 2017 07:  -  28 Dec 2017 07:00  --------------------------------------------------------  IN: 550 mL / OUT: 1150 mL / NET: -600 mL    28 Dec 2017 07:  -  28 Dec 2017 17:37  --------------------------------------------------------  IN: 0 mL / OUT: 300 mL / NET: -300 mL        General Appearance: 	 Alert, cooperative, no distress  HEENT: normocephalic, atraumatic, PERRLA, EOMI, conjunctiva normal, sclera anicteric,   Neck: no JVD  Lungs:  clear to auscultation and percussion bilaterally  Cor:  pmi 5th ICS MCL, Irregular rate and rhythm, S1 normal intensity, S2 normal intensity  Abdomen:	 soft, non-tender; bowel sounds normal; no masses,  no organomegaly  Extremities: 1-2+ chronic edema      EKG:  Telemetry: Atrial fibrillation 90s-100    Labs:  CBC Full  -  ( 28 Dec 2017 07:36 )  WBC Count : 13.59 K/uL  Hemoglobin : 7.8 g/dL  Hematocrit : 24.7 %  Platelet Count - Automated : 144 K/uL  Mean Cell Volume : 92.9 fl  Mean Cell Hemoglobin : 29.3 pg  Mean Cell Hemoglobin Concentration : 31.6 gm/dL  Auto Neutrophil # : x  Auto Lymphocyte # : x  Auto Monocyte # : x  Auto Eosinophil # : x  Auto Basophil # : x  Auto Neutrophil % : x  Auto Lymphocyte % : x  Auto Monocyte % : x  Auto Eosinophil % : x  Auto Basophil % : x        PT/INR - ( 28 Dec 2017 07:36 )   PT: 22.2 sec;   INR: 1.94 ratio         Impression/Plan:  Chronic respiratory acidosis  Chronic anemia  Chronic advanced renal insufficiency  Chronic AF    Plan:  prn BIPAP  She is not signficantly volume overloaded on exam; would change diuretics to PO.  Rate control and anticoagulation.  Dipso planning      Leopoldo Funes MD, FACC  [facility]

## 2017-12-28 NOTE — CHART NOTE - NSCHARTNOTEFT_GEN_A_CORE
Notified by RN - patient agitated overnight - pulled out IV, repeatedly pulling on oleary and scratching face. Pt seen and examined at bedside. Will place pt on b/l unsecured mittens overnight for patient safety to cease from pulling out 2nd IV and causing harm pulling on oleary. Will secure IV with dressing. Will f/u primary team in AM to reassess need for mittens. Will continue to monitor.    Misael Matias PA-C  Department of Medicine  #[phone]

## 2017-12-28 NOTE — PROGRESS NOTE ADULT - SUBJECTIVE AND OBJECTIVE BOX
OSCAR LOPEZ        [id number]  12-28-17 @ 10:01  ------------------------------------------------------------------------------------  [facility] - [facility]     PROGRESS NOTE    Lethargic but arousable.  Nonpurposeful.  No evidence of pain.  -----------------------------------------------------------------------------------  REVIEW OF SYSTEMS:  Constitutional: As per interval history  HEENT: Within normal limits  CV: As per interval history  Resp: As per interval history  GI: Within normal limits   : Within normal limits  Musculoskeletal: Within normal limits  Skin: Within normal limits  Neurological: Within normal limits  Psychiatric: Within normal limits  Endocrine: Within normal limits  Hematologic/Lymphatic: Within normal limits  Allergic/Immunologic: Within normal limits    [ ] Unable to assess ROS because   -------------------------------------------------------------------------------------  MEDICATIONS:     Pulmonary "  ALBUTerol/ipratropium for Nebulization 3 milliLiter(s) Nebulizer every 6 hours    Anti-microbials:    Cardiovascular:  buMETAnide Injectable 1 milliGRAM(s) IV Push every 12 hours  diltiazem    milliGRAM(s) Oral daily  metoprolol     tartrate 25 milliGRAM(s) Oral two times a day    Other:  atorvastatin 10 milliGRAM(s) Oral at bedtime  clopidogrel Tablet 75 milliGRAM(s) Oral daily  docusate sodium 100 milliGRAM(s) Oral three times a day  polyethylene glycol 3350 17 Gram(s) Oral daily  senna 2 Tablet(s) Oral at bedtime    --------------------------------------------------------------------------------------  OBJECTIVE:    I&O's Detail    27 Dec 2017 07:01  -  28 Dec 2017 07:00  --------------------------------------------------------  IN:    Packed Red Blood Cells: 350 mL    Solution: 200 mL  Total IN: 550 mL    OUT:    Indwelling Catheter - Urethral: 1150 mL  Total OUT: 1150 mL    Total NET: -600 mL    CAPILLARY BLOOD GLUCOSE    POCT Blood Glucose.: 103 mg/dL (27 Dec 2017 21:17)  POCT Blood Glucose.: 97 mg/dL (27 Dec 2017 16:14)    ------------------------------------------------------------------------------------------  PHYSICAL EXAM:       ICU Vital Signs Last 24 Hrs  T(C): 36.3 (28 Dec 2017 08:44), Max: 37.2 (27 Dec 2017 18:20)  T(F): 97.4 (28 Dec 2017 08:44), Max: 98.9 (27 Dec 2017 18:20)  HR: 80 (28 Dec 2017 08:44) (68 - 105)  BP: 148/70 (28 Dec 2017 08:44) (115/61 - 150/72)  BP(mean): --  ABP: --  ABP(mean): --  RR: 974 (28 Dec 2017 08:44) (18 - 974)  SpO2: 100% (28 Dec 2017 08:44) (92% - 100%)     Lethargic  Neck 2-3+ JVD, stridor, adenopathy or thyromegaly  Oropharynx is mallampati class 4  Cardiac regular, without m/r/g  Lungs coarse, decreased at bases  Abdomen is soft and nontender, nondistended  Extrems are warm, 2+ edema  Neurological is lethargic  ________________________________________________________  LABS:                        7.8    13.59 )-----------( 144      ( 28 Dec 2017 07:36 )             24.7                         7.1    10.1  )-----------( 135      ( 27 Dec 2017 11:53 )             22.7     12-28    140  |  94<L>  |  84<H>  ----------------------------<  83  4.9   |  32<H>  |  4.36<H>  12-27    140  |  95<L>  |  83<H>  ----------------------------<  110<H>  5.0   |  32<H>  |  4.29<H>    Ca      8.2<L>      12-28    Ca      8.1<L>      12-27      PT/INR - ( 28 Dec 2017 07:36 )   PT: 22.2 sec;   INR: 1.94 ratio      ABG - ( 26 Dec 2017 10:47 )  pH: 7.30  /  pCO2: 68    /  pO2: 101   / HCO3: 33    / Base Excess: 6.1   /  SaO2: 98        MICROBIOLOGY:     RADIOLOGY:  [ ] Reviewed and interpreted by me  __________________________________________________________________    IMPRESSION and RECOMMENDATIONS:    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  Elevate HOB  NIPPV  Volume mgmt  ID recs    She is not stable or appropriate for any invasive pulmonary measures at this time.  Would ask palliative care to meet with family for ongoing end of life discussions.    Gene Norton MD, FCCP, FAASM  Pulmonary and Sleep Medicine  [phone]

## 2017-12-28 NOTE — PROGRESS NOTE ADULT - ASSESSMENT
Ms Dtuta is a 87 yo woman with PMHx of right breast CA S/P lumpectomy in 2000, Uterine Ca S/P total hysterectomy in 2012, Skin CA S/P excision of bilateral lower eyelid skin CA, cholecystectomy, Osteoarthritis of bilateral knees, HTN, hyperlipidemia, paroxysmal Afib on coumadin s/p PPM for slow ventricular response, severe aortic stenosis now s/p TAVR April 2015 , who presents with SOB x 1 day, confusion. Found to have NANCY on CKD, hypercapneic respiratory failure, SIRS, elevated LFT and INR. Infection is less likely as no clear source.

## 2017-12-28 NOTE — CONSULT NOTE ADULT - SUBJECTIVE AND OBJECTIVE BOX
HPI:   89 yo woman with PMHx of right breast CA S/P lumpectomy in 2000, Uterine Ca S/P total hysterectomy in 2012, Skin CA S/P excision of bilateral lower eyelid skin CA, cholecystectomy, Osteoarthritis of bilateral knees, HTN, hyperlipidemia, paroxysmal Afib on coumadin s/p PPM for slow ventricular response, severe aortic stenosis now s/p TAVR April 2015 p/w inc SOB due to hypercapnic respiratory failure, PNA.  Pt was also found to have NANCY and acute on chronic diastolic CHF exacerbation  Due to progressive decline more so per the son over the last few months we are seeing for [facility].  Pt is lying in bed agitated with mittens on and son Semaj at bedside.  Pt cannot give ROS due to condition but her son Semaj notes that his mother has been this way before and he has seen her bet better.       PERTINENT PMH REVIEWED:  [ x] YES [ ] NO           SOCIAL HISTORY:   Significant other/partner:  [ ] YES  [ x] NO               Children:  [x ] YES  [ ] NO                   Zoroastrianism/Spirituality: unable to assess  Substance hx:  [ ] YES   [x ] NO                   Tobacco hx:  [ x] YES  [ ] NO                       Alcohol hx: [ ] YES  [x ] NO         Home Opioid hx:  [ ] YES  [ x] NO   Living Situation: [x ] Home  [ ] Long term care  [ ] Rehab [ ] Other    FAMILY HISTORY:  No pertinent family history in first degree relatives    [ x] Family history non-contributory     BASELINE (I)ADLs (prior to admission):  [place]: [ ] total  [ ] moderate [ ] dependent    ADVANCE DIRECTIVES:    DNR [ x] YES [ ] NO                            [ ] Completed  MOLST  [ ] YES [ ] NO                      [ ] Completed  Health Care Proxy [ ] YES  [ ] NO   [ ] Completed  Living Will  [ ] YES [ ] NO             [x ] Surrogate  [ ] HCP  [ ] Guardian:               sam Bernal                                                   Phone#:    Allergies    No Known Allergies    Intolerances    digoxin (Rash; Drowsiness)      MEDICATIONS  (STANDING):  ALBUTerol/ipratropium for Nebulization 3 milliLiter(s) Nebulizer every 6 hours  atorvastatin 10 milliGRAM(s) Oral at bedtime  buMETAnide Injectable 1 milliGRAM(s) IV Push every 12 hours  clopidogrel Tablet 75 milliGRAM(s) Oral daily  diltiazem    milliGRAM(s) Oral daily  docusate sodium 100 milliGRAM(s) Oral three times a day  metoprolol     tartrate 25 milliGRAM(s) Oral two times a day  polyethylene glycol 3350 17 Gram(s) Oral daily  senna 2 Tablet(s) Oral at bedtime  warfarin 1 milliGRAM(s) Oral once    MEDICATIONS  (PRN):      PRESENT SYMPTOMS:  Source: [ ] Patient   [ ] Family   [x ] Team     Pain:                        [x ] No [ ] Yes             [ ] Mild [ ] Moderate [ ] Severe    Onset -  Location -  Duration -  Character -  Alleviating/Aggravating -  Radiation -  Timing -      Dyspnea:                [ ] No [x ] Yes             [x ] Mild [ ] Moderate [ ] Severe    Anxiety:                  [ ] No [x ] Yes             [ ] Mild [ x] Moderate [ ] Severe especially at night    Fatigue:                  [ ] No [ x] Yes             [ ] Mild [ x] Moderate [x ] Severe    Nausea:                  [ x] No [ ] Yes             [ ] Mild [ ] Moderate [ ] Severe    Loss of appetite:   [ ] No [ x] Yes             [x ] Mild x[ ] Moderate [ ] Severe    Constipation:        [ x] No [ ] Yes             [ ] Mild [ ] Moderate [ ] Severe    Other Symptoms:  [ ] All other review of systems negative   [x ] Unable to obtain due to poor mentation     Karnofsky Performance Score/Palliative Performance Status Version 2:  30       %    PHYSICAL EXAM:  Vital Signs Last 24 Hrs  T(C): 36.7 (28 Dec 2017 12:31), Max: 37.2 (27 Dec 2017 18:20)  T(F): 98 (28 Dec 2017 12:31), Max: 98.9 (27 Dec 2017 18:20)  HR: 96 (28 Dec 2017 16:52) (57 - 105)  BP: 124/57 (28 Dec 2017 12:31) (115/61 - 148/70)  BP(mean): --  RR: 18 (28 Dec 2017 12:31) (18 - 974)  SpO2: 100% (28 Dec 2017 16:52) (93% - 100%) I&O's Summary    27 Dec 2017 07:01  -  28 Dec 2017 07:00  --------------------------------------------------------  IN: 550 mL / OUT: 1150 mL / NET: -600 mL    28 Dec 2017 07:01  -  28 Dec 2017 17:14  --------------------------------------------------------  IN: 0 mL / OUT: 300 mL / NET: -300 mL        General:  [ ] Alert  [ ] Oriented x      [ x] Lethargic  [ x] Agitated   [ ] Cachexia   [ ] Unarousable  [x ] Verbal  [ ] Non-Verbal    HEENT:  [ ] Normal   [ x] Dry mouth   [ ] ET Tube    [ ] Trach  [ ] Oral lesions    Lungs:   [ ] Clear [ x] Tachypnea  [ ] Audible excessive secretions   [ ] Rhonchi        [ ] Right [ ] Left [ ] Bilateral  x[ ] Crackles        [ ] Right [ ] Left [ x] Bilateral  [x ] Wheezing     [ ] Right [ ] Left [ x] Bilateral    Cardiovascular:  [ ] Regular [ x] Irregular [ ] Tachycardia   [ ] Bradycardia  [ ] Murmur [ ] Other    Abdomen: [ ] Soft  [x ] Distended   [x ] +BS  [x ] Non tender [ ] Tender  [ ]PEG   [ ]OGT/ NGT   Last BM:       Genitourinary: [ ] Normal [ ] Incontinent   [ ] Oliguria/Anuria   [ x] Gautam    Musculoskeletal:  [ ] Normal   [ ] Weakness  [ ] Bedbound/Wheelchair bound [x ] Edema 2+    Neurological: [ ] No focal deficits  [ x] Cognitive impairment  [ x] Dysphagia [ ] Dysarthria [ ] Paresis [ ] Other     Skin: [ ] Normal   [ x] Pressure ulcer(s)     stage 2 sacrum              [ ] Rash    LABS:                        7.8    13.59 )-----------( 144      ( 28 Dec 2017 07:36 )             24.7     12-28    140  |  94<L>  |  84<H>  ----------------------------<  83  4.9   |  32<H>  |  4.36<H>    Ca    8.2<L>      28 Dec 2017 07:38      PT/INR - ( 28 Dec 2017 07:36 )   PT: 22.2 sec;   INR: 1.94 ratio               Shock: [ ] Septic [ ] Cardiogenic [ ] Neurologic [ ] Hypovolemic  Vasopressors x   Inotrophs x     Protein Calorie Malnutrition: [ ] Mild [ ] Moderate [ ] Severe    Oral Intake: [ ] Unable/mouth care only [ x] Minimal [ ] Moderate [ ] Full Capability  Diet: [x ] NPO [ ] Tube feeds [ ] TPN [ ] Other     RADIOLOGY & ADDITIONAL STUDIES:  < from: Xray Chest 1 View AP- PORTABLE-Urgent (12.27.17 @ 17:51) >  IMPRESSION:     Limited study.    Probable bilateral pleural effusions.    < end of copied text >    REFERRALS:   [ ] Chaplaincy  [ ] Hospice  [ ] Child Life  [ ] Social Work  [ ] Case management [ ] Holistic Therapy

## 2017-12-28 NOTE — PROGRESS NOTE ADULT - SUBJECTIVE AND OBJECTIVE BOX
Patient is a 88y old  Female who presents with a chief complaint of sob  History obtained from family due to confusion (22 Dec 2017 21:26)      SUBJECTIVE / OVERNIGHT EVENTS: lethargic on BIPAP, opens her eyes to her name, somnolent    MEDICATIONS  (STANDING):  ALBUTerol/ipratropium for Nebulization 3 milliLiter(s) Nebulizer every 6 hours  atorvastatin 10 milliGRAM(s) Oral at bedtime  buMETAnide Injectable 1 milliGRAM(s) IV Push every 12 hours  clopidogrel Tablet 75 milliGRAM(s) Oral daily  diltiazem    milliGRAM(s) Oral daily  docusate sodium 100 milliGRAM(s) Oral three times a day  metolazone 5 milliGRAM(s) Oral once  metoprolol     tartrate 25 milliGRAM(s) Oral two times a day  polyethylene glycol 3350 17 Gram(s) Oral daily  senna 2 Tablet(s) Oral at bedtime  warfarin 1 milliGRAM(s) Oral once    MEDICATIONS  (PRN):      Vital Signs Last 24 Hrs  T(F): 98 (12-28-17 @ 12:31), Max: 98.9 (12-27-17 @ 18:20)  HR: 57 (12-28-17 @ 12:31) (57 - 105)  BP: 124/57 (12-28-17 @ 12:31) (115/61 - 150/72)  RR: 18 (12-28-17 @ 12:31) (18 - 974)  SpO2: 95% (12-28-17 @ 12:31) (92% - 100%)  Telemetry:   CAPILLARY BLOOD GLUCOSE      POCT Blood Glucose.: 103 mg/dL (27 Dec 2017 21:17)  POCT Blood Glucose.: 97 mg/dL (27 Dec 2017 16:14)    I&O's Summary    27 Dec 2017 07:01  -  28 Dec 2017 07:00  --------------------------------------------------------  IN: 550 mL / OUT: 1150 mL / NET: -600 mL    28 Dec 2017 07:01  -  28 Dec 2017 13:57  --------------------------------------------------------  IN: 0 mL / OUT: 0 mL / NET: 0 mL        PHYSICAL EXAM:  GENERAL: NAD, well-developed  HEAD:  Atraumatic, Normocephalic  EYES: EOMI, PERRLA, conjunctiva and sclera clear  NECK: Supple, No JVD  CHEST/LUNG: Clear to auscultation bilaterally; No wheeze  HEART: Regular rate and rhythm; No murmurs, rubs, or gallops  ABDOMEN: Soft, Nontender, Nondistended; Bowel sounds present  EXTREMITIES:  2+ Peripheral Pulses, No clubbing, cyanosis, or edema  PSYCH: AAOx3  NEUROLOGY: non-focal  SKIN: No rashes or lesions    LABS:                        7.8    13.59 )-----------( 144      ( 28 Dec 2017 07:36 )             24.7     12-28    140  |  94<L>  |  84<H>  ----------------------------<  83  4.9   |  32<H>  |  4.36<H>    Ca    8.2<L>      28 Dec 2017 07:38      PT/INR - ( 28 Dec 2017 07:36 )   PT: 22.2 sec;   INR: 1.94 ratio                   RADIOLOGY & ADDITIONAL TESTS:    Imaging Personally Reviewed:    Consultant(s) Notes Reviewed:      Care Discussed with Consultants/Other Providers:

## 2017-12-28 NOTE — CONSULT NOTE ADULT - PROBLEM SELECTOR RECOMMENDATION 4
- spoke with both sons and they remain hopeful that their mother will pull thru this.    - they are on board to have a family meeting next week after the holiday

## 2017-12-28 NOTE — PROGRESS NOTE ADULT - SUBJECTIVE AND OBJECTIVE BOX
NEPHROLOGY-[facility] [phone]        Patient seen and examined in bed.  She is still on Bipap        MEDICATIONS  (STANDING):  ALBUTerol/ipratropium for Nebulization 3 milliLiter(s) Nebulizer every 6 hours  atorvastatin 10 milliGRAM(s) Oral at bedtime  buMETAnide Injectable 1 milliGRAM(s) IV Push every 12 hours  clopidogrel Tablet 75 milliGRAM(s) Oral daily  diltiazem    milliGRAM(s) Oral daily  docusate sodium 100 milliGRAM(s) Oral three times a day  metoprolol     tartrate 25 milliGRAM(s) Oral two times a day  polyethylene glycol 3350 17 Gram(s) Oral daily  senna 2 Tablet(s) Oral at bedtime  warfarin 1 milliGRAM(s) Oral once      VITAL:  T(C): , Max: 37.2 (12-27-17 @ 18:20)  T(F): , Max: 98.9 (12-27-17 @ 18:20)  HR: 57 (12-28-17 @ 12:31)  BP: 124/57 (12-28-17 @ 12:31)  BP(mean): --  RR: 18 (12-28-17 @ 12:31)  SpO2: 95% (12-28-17 @ 12:31)  Wt(kg): --    I and O's:    12-27 @ 07:01  -  12-28 @ 07:00  --------------------------------------------------------  IN: 550 mL / OUT: 1150 mL / NET: -600 mL    12-28 @ 07:01  -  12-28 @ 12:54  --------------------------------------------------------  IN: 0 mL / OUT: 0 mL / NET: 0 mL          PHYSICAL EXAM:    Constitutional: NAD  HEENT: PERRLA    Neck:  + JVD  Respiratory: reduced breath sounds  Cardiovascular: S1 and S2  Gastrointestinal: BS+, soft, NT/ND  Extremities: No peripheral edema  Neurological: no focal deficits  Psychiatric: Normal mood, normal affect  : No Gautam  Skin: No rashes  Access: Not applicable    LABS:                        7.8    13.59 )-----------( 144      ( 28 Dec 2017 07:36 )             24.7     12-28    140  |  94<L>  |  84<H>  ----------------------------<  83  4.9   |  32<H>  |  4.36<H>    Ca    8.2<L>      28 Dec 2017 07:38            Urine Studies:          RADIOLOGY & ADDITIONAL STUDIES:

## 2017-12-28 NOTE — CONSULT NOTE ADULT - PROBLEM SELECTOR RECOMMENDATION 3
- would consider starting Haldol 0.5 at night to assist with sundowning/delirium.  This will also help with her sleep wake cycle

## 2017-12-29 LAB
ANION GAP SERPL CALC-SCNC: 11 MMOL/L — SIGNIFICANT CHANGE UP (ref 5–17)
BUN SERPL-MCNC: 87 MG/DL — HIGH (ref 7–23)
CALCIUM SERPL-MCNC: 8 MG/DL — LOW (ref 8.4–10.5)
CHLORIDE SERPL-SCNC: 96 MMOL/L — SIGNIFICANT CHANGE UP (ref 96–108)
CO2 SERPL-SCNC: 34 MMOL/L — HIGH (ref 22–31)
CREAT SERPL-MCNC: 4.63 MG/DL — HIGH (ref 0.5–1.3)
CULTURE RESULTS: SIGNIFICANT CHANGE UP
GLUCOSE SERPL-MCNC: 152 MG/DL — HIGH (ref 70–99)
HCT VFR BLD CALC: 21 % — CRITICAL LOW (ref 34.5–45)
HGB BLD-MCNC: 6.7 G/DL — CRITICAL LOW (ref 11.5–15.5)
INR BLD: 1.27 RATIO — HIGH (ref 0.88–1.16)
MCHC RBC-ENTMCNC: 32 GM/DL — SIGNIFICANT CHANGE UP (ref 32–36)
MCHC RBC-ENTMCNC: 32 PG — SIGNIFICANT CHANGE UP (ref 27–34)
MCV RBC AUTO: 99.9 FL — SIGNIFICANT CHANGE UP (ref 80–100)
PLATELET # BLD AUTO: 131 K/UL — LOW (ref 150–400)
POTASSIUM SERPL-MCNC: 4.3 MMOL/L — SIGNIFICANT CHANGE UP (ref 3.5–5.3)
POTASSIUM SERPL-SCNC: 4.3 MMOL/L — SIGNIFICANT CHANGE UP (ref 3.5–5.3)
PROTHROM AB SERPL-ACNC: 13.8 SEC — HIGH (ref 9.8–12.7)
RBC # BLD: 2.1 M/UL — LOW (ref 3.8–5.2)
RBC # FLD: 18.4 % — HIGH (ref 10.3–14.5)
SODIUM SERPL-SCNC: 141 MMOL/L — SIGNIFICANT CHANGE UP (ref 135–145)
SPECIMEN SOURCE: SIGNIFICANT CHANGE UP
WBC # BLD: 13 K/UL — HIGH (ref 3.8–10.5)
WBC # FLD AUTO: 13 K/UL — HIGH (ref 3.8–10.5)

## 2017-12-29 RX ORDER — PANTOPRAZOLE SODIUM 20 MG/1
40 TABLET, DELAYED RELEASE ORAL
Qty: 0 | Refills: 0 | Status: DISCONTINUED | OUTPATIENT
Start: 2017-12-29 | End: 2018-01-05

## 2017-12-29 RX ORDER — FUROSEMIDE 40 MG
30 TABLET ORAL
Qty: 0 | Refills: 0 | Status: COMPLETED | OUTPATIENT
Start: 2017-12-29 | End: 2017-12-30

## 2017-12-29 RX ORDER — FUROSEMIDE 40 MG
30 TABLET ORAL
Qty: 0 | Refills: 0 | Status: DISCONTINUED | OUTPATIENT
Start: 2017-12-29 | End: 2017-12-29

## 2017-12-29 RX ORDER — ERYTHROPOIETIN 10000 [IU]/ML
10000 INJECTION, SOLUTION INTRAVENOUS; SUBCUTANEOUS
Qty: 0 | Refills: 0 | Status: DISCONTINUED | OUTPATIENT
Start: 2017-12-29 | End: 2018-01-03

## 2017-12-29 RX ADMIN — Medication 3 MILLILITER(S): at 12:06

## 2017-12-29 RX ADMIN — Medication 25 MILLIGRAM(S): at 06:08

## 2017-12-29 RX ADMIN — POLYETHYLENE GLYCOL 3350 17 GRAM(S): 17 POWDER, FOR SOLUTION ORAL at 12:07

## 2017-12-29 RX ADMIN — Medication 100 MILLIGRAM(S): at 12:08

## 2017-12-29 RX ADMIN — PANTOPRAZOLE SODIUM 40 MILLIGRAM(S): 20 TABLET, DELAYED RELEASE ORAL at 18:26

## 2017-12-29 RX ADMIN — Medication 100 MILLIGRAM(S): at 21:37

## 2017-12-29 RX ADMIN — SENNA PLUS 2 TABLET(S): 8.6 TABLET ORAL at 21:37

## 2017-12-29 RX ADMIN — Medication 100 MILLIGRAM(S): at 06:08

## 2017-12-29 RX ADMIN — Medication 120 MILLIGRAM(S): at 06:08

## 2017-12-29 RX ADMIN — Medication 25 MILLIGRAM(S): at 17:20

## 2017-12-29 RX ADMIN — Medication 3 MILLILITER(S): at 05:49

## 2017-12-29 RX ADMIN — ATORVASTATIN CALCIUM 10 MILLIGRAM(S): 80 TABLET, FILM COATED ORAL at 21:37

## 2017-12-29 RX ADMIN — Medication 30 MILLIGRAM(S): at 18:26

## 2017-12-29 RX ADMIN — BUMETANIDE 1 MILLIGRAM(S): 0.25 INJECTION INTRAMUSCULAR; INTRAVENOUS at 06:06

## 2017-12-29 RX ADMIN — Medication 3 MILLILITER(S): at 17:20

## 2017-12-29 RX ADMIN — CLOPIDOGREL BISULFATE 75 MILLIGRAM(S): 75 TABLET, FILM COATED ORAL at 12:07

## 2017-12-29 RX ADMIN — BUMETANIDE 1 MILLIGRAM(S): 0.25 INJECTION INTRAMUSCULAR; INTRAVENOUS at 21:43

## 2017-12-29 NOTE — PROGRESS NOTE ADULT - SUBJECTIVE AND OBJECTIVE BOX
OSCAR LOPEZ    Patient is a 88y old  Female who presents with a chief complaint of sob  History obtained from family due to confusion (22 Dec 2017 21:26)    No real clinical change.  Confused.  On BIPAP.    Allergies    No Known Allergies    Intolerances    digoxin (Rash; Drowsiness)    MEDICATIONS  (STANDING):  ALBUTerol/ipratropium for Nebulization 3 milliLiter(s) Nebulizer every 6 hours  atorvastatin 10 milliGRAM(s) Oral at bedtime  buMETAnide Injectable 1 milliGRAM(s) IV Push every 12 hours  clopidogrel Tablet 75 milliGRAM(s) Oral daily  diltiazem    milliGRAM(s) Oral daily  docusate sodium 100 milliGRAM(s) Oral three times a day  epoetin toño Injectable 24188 Unit(s) SubCutaneous <User Schedule>  metolazone 5 milliGRAM(s) Oral daily  metoprolol     tartrate 25 milliGRAM(s) Oral two times a day  polyethylene glycol 3350 17 Gram(s) Oral daily  senna 2 Tablet(s) Oral at bedtime    MEDICATIONS  (PRN):    PHYSICAL EXAM:  Vital Signs Last 24 Hrs  T(C): 36.6 (29 Dec 2017 04:42), Max: 36.7 (28 Dec 2017 12:31)  T(F): 97.9 (29 Dec 2017 04:42), Max: 98 (28 Dec 2017 12:31)  HR: 98 (29 Dec 2017 06:37) (57 - 112)  BP: 114/63 (29 Dec 2017 06:37) (109/56 - 124/64)  BP(mean): --  RR: 20 (29 Dec 2017 06:37) (18 - 24)  SpO2: 98% (29 Dec 2017 04:42) (95% - 100%)  Daily     Daily Weight in k.8 (29 Dec 2017 09:47)  I&O's Summary    28 Dec 2017 07:01  -  29 Dec 2017 07:00  --------------------------------------------------------  IN: 270 mL / OUT: 900 mL / NET: -630 mL    General Appearance: 	 Alert, cooperative, no distress  HEENT: normocephalic, atraumatic  Neck: JVP estimated at around 10 cm  Lungs:  BL wheezing  Cor:  pmi 5th ICS MCL, Irregular rate and rhythm, S1 normal intensity, S2 normal intensity  Abdomen:	 soft, non-tender; bowel sounds normal; no masses,  no organomegaly  2+ edema    EKG:    Telemetry:  AF/V paced    Labs:  CBC Full  -  ( 28 Dec 2017 07:36 )  WBC Count : 13.59 K/uL  Hemoglobin : 7.8 g/dL  Hematocrit : 24.7 %  Platelet Count - Automated : 144 K/uL  Mean Cell Volume : 92.9 fl  Mean Cell Hemoglobin : 29.3 pg  Mean Cell Hemoglobin Concentration : 31.6 gm/dL  Auto Neutrophil # : x  Auto Lymphocyte # : x  Auto Monocyte # : x  Auto Eosinophil # : x  Auto Basophil # : x  Auto Neutrophil % : x  Auto Lymphocyte % : x  Auto Monocyte % : x  Auto Eosinophil % : x  Auto Basophil % : x        PT/INR - ( 28 Dec 2017 07:36 )   PT: 22.2 sec;   INR: 1.94 ratio         Impression/Plan: Chronic respiratory acidosis  Chronic AF/Diastolic CHF, compensated  Anemia  Advanced CRI    Plan:  Supportive Care/BIPAP as needed  Keep I and Os negative as possible  Prognosis poor  Palliative care followup    Leopoldo Funes MD, FACC  [facility]

## 2017-12-29 NOTE — DIETITIAN INITIAL EVALUATION ADULT. - ENERGY NEEDS
ht.62inches (per last RD note, not noted in current chart) wt.173.9pounds BMI:31.8kg/m2 IBW:110pounds(+/-10%) %IBW:%  Pt is 89 yo F with hx of Ca, cholecystecomy, osteoarthritis of B/L knees, HTN, HLD, proxysmal Afib on coumadin S/P PPM for slow ventricular response, severe aortic stenosis S/P TVAR 2015 P/W SOB d/t hypercapnic respiratory failure, PNA, NANCY.  + 1 generalized, +2 B/L foot, +3 B/L leg and arm Edema noted  Stage 2 bridge of nose Pressure Ulcers noted  Kacey Sanchez MBA RDN CDN Pager [phone]

## 2017-12-29 NOTE — PROGRESS NOTE ADULT - SUBJECTIVE AND OBJECTIVE BOX
Patient is a 88y old  Female who presents with a chief complaint of sob  History obtained from family due to confusion (22 Dec 2017 21:26)      SUBJECTIVE / OVERNIGHT EVENTS: lethargic on and off on  BIPAP, PCC appreciated, family wants aggressive treatment. will have a family meeting next week w PC, son  understands she has renal failure, chf, and resp failure but fees she will "bounce back". no melena, BRBPR. ptn  dropping HH, BP stable    MEDICATIONS  (STANDING):  ALBUTerol/ipratropium for Nebulization 3 milliLiter(s) Nebulizer every 6 hours  atorvastatin 10 milliGRAM(s) Oral at bedtime  buMETAnide Injectable 1 milliGRAM(s) IV Push every 12 hours  clopidogrel Tablet 75 milliGRAM(s) Oral daily  diltiazem    milliGRAM(s) Oral daily  docusate sodium 100 milliGRAM(s) Oral three times a day  epoetin toño Injectable 25249 Unit(s) SubCutaneous <User Schedule>  furosemide   Injectable 30 milliGRAM(s) IV Push every 3 hours  metolazone 5 milliGRAM(s) Oral daily  metoprolol     tartrate 25 milliGRAM(s) Oral two times a day  pantoprazole  Injectable 40 milliGRAM(s) IV Push two times a day  polyethylene glycol 3350 17 Gram(s) Oral daily  senna 2 Tablet(s) Oral at bedtime    MEDICATIONS  (PRN):      Vital Signs Last 24 Hrs  T(F): 98.3 (12-29-17 @ 21:05), Max: 98.4 (12-29-17 @ 18:00)  HR: 98 (12-29-17 @ 21:05) (60 - 112)  BP: 124/64 (12-29-17 @ 21:05) (102/62 - 138/68)  RR: 18 (12-29-17 @ 21:05) (18 - 24)  SpO2: 99% (12-29-17 @ 21:05) (98% - 100%)  Telemetry:   CAPILLARY BLOOD GLUCOSE        I&O's Summary    28 Dec 2017 07:01  -  29 Dec 2017 07:00  --------------------------------------------------------  IN: 270 mL / OUT: 900 mL / NET: -630 mL    29 Dec 2017 07:01  -  29 Dec 2017 22:17  --------------------------------------------------------  IN: 50 mL / OUT: 300 mL / NET: -250 mL        PHYSICAL EXAM:  GENERAL: NAD, well-developed  HEAD:  Atraumatic, Normocephalic  EYES: EOMI, PERRLA, conjunctiva and sclera clear  NECK: Supple, No JVD  CHEST/LUNG: Clear to auscultation bilaterally; No wheeze  HEART: Regular rate and rhythm; No murmurs, rubs, or gallops  ABDOMEN: Soft, Nontender, Nondistended; Bowel sounds present  EXTREMITIES:  2+ Peripheral Pulses, No clubbing, cyanosis, or edema  PSYCH: AAOx3  NEUROLOGY: non-focal  SKIN: No rashes or lesions    LABS:                        6.7    13.0  )-----------( 131      ( 29 Dec 2017 12:02 )             21.0     12-29    141  |  96  |  87<H>  ----------------------------<  152<H>  4.3   |  34<H>  |  4.63<H>    Ca    8.0<L>      29 Dec 2017 12:01      PT/INR - ( 29 Dec 2017 12:01 )   PT: 13.8 sec;   INR: 1.27 ratio                   RADIOLOGY & ADDITIONAL TESTS:    Imaging Personally Reviewed:    Consultant(s) Notes Reviewed:      Care Discussed with Consultants/Other Providers:

## 2017-12-29 NOTE — PROGRESS NOTE ADULT - ASSESSMENT
The patient is an 88-year-old female, presents now with respiratory acidosis, acute kidney injury, and hepatitis.  The patient does have an elevated BNP and evidence of edema, but chest x-ray is clear.  Elevated BNP is likely from right ventricular dysfunction.  Unclear if degree of stenosis of the mitral valve and if that is leading to a degree of pulmonary hypertension.    Hepatitis  NANCY-non oliguric and this is likely ATN  Anemia    1.	Renal:    Bumex 1mg ivp bid.  Urine output has increased and the creatinine will hopefully plateau soon?  Add zaroxolyn for  synergy.  Awaiting labs  2.	Infectious Disease:    Off abx  3.	Miscellaneous:  Palliative care will have a family meeting after the holidays  4.	- Cont the oleary for strict ins and outs  5.	Pulm- Pulm does not feels a thoracentesis is in order  6.	Anemia-Epogen for anemia

## 2017-12-29 NOTE — DIETITIAN INITIAL EVALUATION ADULT. - PROBLEM SELECTOR PLAN 2
-multifactorial from HfPEF? restrictive lung disease and emphysema  -on BIPAP now, no improvement after many hours 12/5, ABG at 9pm improved 7.35/57 after 20/8, will reduced to 18/5.  -duonebs  -DNR/DNI

## 2017-12-29 NOTE — DIETITIAN INITIAL EVALUATION ADULT. - PROBLEM SELECTOR PLAN 1
-most likely 2/2 hypercapnia and NANCY, improving per family  -infection less likely, receiving antibiotics now vancomycin and zosyn

## 2017-12-29 NOTE — DIETITIAN INITIAL EVALUATION ADULT. - OTHER INFO
Pt seen for LOS. Pt on BiPAP sleeping at time of visit, confused, no family at bedside, limited hx attained. Per RN pt able to tolerate NC for meals. Pt followed by palliative. Per last RD note, pt weighed 138.8pounds (10/2017). Per chart, pt's current weight is 173.9pounds with significant edema. Per RN, pt with no N/V, constipation or diarrhea. Last BM 12/27. Pt with no chewing/swallowing difficulties.

## 2017-12-29 NOTE — PROGRESS NOTE ADULT - SUBJECTIVE AND OBJECTIVE BOX
OSCAR LOPEZ        [id number]  17 @ 10:15  ------------------------------------------------------------------------------------  [facility] - [facility]     PROGRESS NOTE    CHIEF COMPLAINT:    INTERVAL HISTORY:     No changes.  Remains obtunded on bipap.      -----------------------------------------------------------------------------------  REVIEW OF SYSTEMS:  unable.  No evidence of pain or discomfort  -------------------------------------------------------------------------------------  MEDICATIONS:     Pulmonary "  ALBUTerol/ipratropium for Nebulization 3 milliLiter(s) Nebulizer every 6 hours    Anti-microbials:    Cardiovascular:  buMETAnide Injectable 1 milliGRAM(s) IV Push every 12 hours  diltiazem    milliGRAM(s) Oral daily  metoprolol     tartrate 25 milliGRAM(s) Oral two times a day    Other:  atorvastatin 10 milliGRAM(s) Oral at bedtime  clopidogrel Tablet 75 milliGRAM(s) Oral daily  docusate sodium 100 milliGRAM(s) Oral three times a day  polyethylene glycol 3350 17 Gram(s) Oral daily  senna 2 Tablet(s) Oral at bedtime    --------------------------------------------------------------------------------------    OBJECTIVE:        I&O's Detail    28 Dec 2017 07:01  -  29 Dec 2017 07:00  --------------------------------------------------------  IN:    Oral Fluid: 270 mL  Total IN: 270 mL    OUT:    Indwelling Catheter - Urethral: 900 mL  Total OUT: 900 mL    Total NET: -630 mL          Daily     Daily Weight in k.8 (29 Dec 2017 09:47)    CAPILLARY BLOOD GLUCOSE        ------------------------------------------------------------------------------------------  PHYSICAL EXAM:       ICU Vital Signs Last 24 Hrs  T(C): 36.6 (29 Dec 2017 04:42), Max: 36.7 (28 Dec 2017 12:31)  T(F): 97.9 (29 Dec 2017 04:42), Max: 98 (28 Dec 2017 12:31)  HR: 98 (29 Dec 2017 06:37) (57 - 112)  BP: 114/63 (29 Dec 2017 06:37) (109/56 - 124/64)  BP(mean): --  ABP: --  ABP(mean): --  RR: 20 (29 Dec 2017 06:37) (18 - 24)  SpO2: 98% (29 Dec 2017 04:42) (95% - 100%)       Lethargic on bipap  HEENT: unremarkable  Neck 2+ JVD, no stridor  Cardiac regular, without m/r/g  Lungs have coarse bs, decreased at bases  Abdomen is soft and nontender, nondistended  Extrems are warm with 2+ edema  Neurological is lethargic  ________________________________________________________  LABS:                        7.8    13.59 )-----------( 144      ( 28 Dec 2017 07:36 )             24.7                         7.1    10.1  )-----------( 135      ( 27 Dec 2017 11:53 )             22.7         140  |  94<L>  |  84<H>  ----------------------------<  83  4.9   |  32<H>  |  4.36<H>      140  |  95<L>  |  83<H>  ----------------------------<  110<H>  5.0   |  32<H>  |  4.29<H>    Ca      8.2<L>          Ca      8.1<L>              PT/INR - ( 28 Dec 2017 07:36 )   PT: 22.2 sec;   INR: 1.94 ratio       MICROBIOLOGY:     RADIOLOGY:  [ ] Reviewed and interpreted by me  __________________________________________________________________    IMPRESSION and RECOMMENDATIONS:    End-stage hypercapnic respiratory failure in the setting of cardiac and severe renal dysfunction and kyphscoliosis.  Elevate HOB  NIPPV  Volume mgmt  ID recs    Palliative care follow-up and discussion    I continued to feel that no invasive pulmonary measures would be of any significant benefit for the patient.    Prognosis remains very poor    Gene Norton MD, FCCP, [facility]  Pulmonary and Sleep Medicine  [phone]

## 2017-12-29 NOTE — PROVIDER CONTACT NOTE (CRITICAL VALUE NOTIFICATION) - ACTION/TREATMENT ORDERED:
1 unit of prbc to be infused.
Repeat cbc.
BMP STAT ordered. Continue to monitor and maintain safety.
Continue to monitor and maintain safety.
Continue to monitor and maintain safety.
Continue to monitor for s/s of bleeding

## 2017-12-29 NOTE — PROGRESS NOTE ADULT - SUBJECTIVE AND OBJECTIVE BOX
NEPHROLOGY-[facility] [phone]        Patient seen and examined in bed.  She is on Bipap        MEDICATIONS  (STANDING):  ALBUTerol/ipratropium for Nebulization 3 milliLiter(s) Nebulizer every 6 hours  atorvastatin 10 milliGRAM(s) Oral at bedtime  buMETAnide Injectable 1 milliGRAM(s) IV Push every 12 hours  clopidogrel Tablet 75 milliGRAM(s) Oral daily  diltiazem    milliGRAM(s) Oral daily  docusate sodium 100 milliGRAM(s) Oral three times a day  metoprolol     tartrate 25 milliGRAM(s) Oral two times a day  polyethylene glycol 3350 17 Gram(s) Oral daily  senna 2 Tablet(s) Oral at bedtime      VITAL:  T(C): , Max: 36.7 (12-28-17 @ 12:31)  T(F): , Max: 98 (12-28-17 @ 12:31)  HR: 98 (12-29-17 @ 06:37)  BP: 114/63 (12-29-17 @ 06:37)  BP(mean): --  RR: 20 (12-29-17 @ 06:37)  SpO2: 98% (12-29-17 @ 04:42)  Wt(kg): --    I and O's:    12-28 @ 07:01  -  12-29 @ 07:00  --------------------------------------------------------  IN: 270 mL / OUT: 900 mL / NET: -630 mL          PHYSICAL EXAM:    Constitutional: NAD  HEENT: PERRLA    Neck:  +  JVD  Respiratory: poor effort  Cardiovascular: S1 and S2  Gastrointestinal: BS+, soft, NT/ND  Extremities: + 3 peripheral edema  Neurological:   no focal deficits     : +  Gautam  Skin: No rashes  Access: Not applicable    LABS:                        7.8    13.59 )-----------( 144      ( 28 Dec 2017 07:36 )             24.7     12-28    140  |  94<L>  |  84<H>  ----------------------------<  83  4.9   |  32<H>  |  4.36<H>    Ca    8.2<L>      28 Dec 2017 07:38            Urine Studies:          RADIOLOGY & ADDITIONAL STUDIES:

## 2017-12-30 LAB
ANION GAP SERPL CALC-SCNC: 16 MMOL/L — SIGNIFICANT CHANGE UP (ref 5–17)
BUN SERPL-MCNC: 93 MG/DL — HIGH (ref 7–23)
CALCIUM SERPL-MCNC: 8.2 MG/DL — LOW (ref 8.4–10.5)
CHLORIDE SERPL-SCNC: 93 MMOL/L — LOW (ref 96–108)
CO2 SERPL-SCNC: 29 MMOL/L — SIGNIFICANT CHANGE UP (ref 22–31)
CREAT SERPL-MCNC: 4.65 MG/DL — HIGH (ref 0.5–1.3)
GLUCOSE SERPL-MCNC: 138 MG/DL — HIGH (ref 70–99)
HCT VFR BLD CALC: 22.3 % — LOW (ref 34.5–45)
HCT VFR BLD CALC: 24.4 % — LOW (ref 34.5–45)
HGB BLD-MCNC: 7.1 G/DL — LOW (ref 11.5–15.5)
HGB BLD-MCNC: 8.1 G/DL — LOW (ref 11.5–15.5)
MCHC RBC-ENTMCNC: 29.8 PG — SIGNIFICANT CHANGE UP (ref 27–34)
MCHC RBC-ENTMCNC: 31.8 GM/DL — LOW (ref 32–36)
MCHC RBC-ENTMCNC: 32.9 PG — SIGNIFICANT CHANGE UP (ref 27–34)
MCHC RBC-ENTMCNC: 33.2 GM/DL — SIGNIFICANT CHANGE UP (ref 32–36)
MCV RBC AUTO: 93.7 FL — SIGNIFICANT CHANGE UP (ref 80–100)
MCV RBC AUTO: 99.3 FL — SIGNIFICANT CHANGE UP (ref 80–100)
OB PNL STL: NEGATIVE — SIGNIFICANT CHANGE UP
PLATELET # BLD AUTO: 127 K/UL — LOW (ref 150–400)
PLATELET # BLD AUTO: 148 K/UL — LOW (ref 150–400)
POTASSIUM SERPL-MCNC: 4.4 MMOL/L — SIGNIFICANT CHANGE UP (ref 3.5–5.3)
POTASSIUM SERPL-SCNC: 4.4 MMOL/L — SIGNIFICANT CHANGE UP (ref 3.5–5.3)
RBC # BLD: 2.38 M/UL — LOW (ref 3.8–5.2)
RBC # BLD: 2.46 M/UL — LOW (ref 3.8–5.2)
RBC # FLD: 17.6 % — HIGH (ref 10.3–14.5)
RBC # FLD: 20 % — HIGH (ref 10.3–14.5)
SODIUM SERPL-SCNC: 138 MMOL/L — SIGNIFICANT CHANGE UP (ref 135–145)
WBC # BLD: 11.7 K/UL — HIGH (ref 3.8–10.5)
WBC # BLD: 15.02 K/UL — HIGH (ref 3.8–10.5)
WBC # FLD AUTO: 11.7 K/UL — HIGH (ref 3.8–10.5)
WBC # FLD AUTO: 15.02 K/UL — HIGH (ref 3.8–10.5)

## 2017-12-30 PROCEDURE — 74176 CT ABD & PELVIS W/O CONTRAST: CPT | Mod: 26

## 2017-12-30 PROCEDURE — 93010 ELECTROCARDIOGRAM REPORT: CPT

## 2017-12-30 RX ADMIN — SENNA PLUS 2 TABLET(S): 8.6 TABLET ORAL at 21:29

## 2017-12-30 RX ADMIN — Medication 3 MILLILITER(S): at 17:29

## 2017-12-30 RX ADMIN — Medication 3 MILLILITER(S): at 00:12

## 2017-12-30 RX ADMIN — Medication 3 MILLILITER(S): at 06:40

## 2017-12-30 RX ADMIN — BUMETANIDE 1 MILLIGRAM(S): 0.25 INJECTION INTRAMUSCULAR; INTRAVENOUS at 06:36

## 2017-12-30 RX ADMIN — ERYTHROPOIETIN 10000 UNIT(S): 10000 INJECTION, SOLUTION INTRAVENOUS; SUBCUTANEOUS at 13:29

## 2017-12-30 RX ADMIN — PANTOPRAZOLE SODIUM 40 MILLIGRAM(S): 20 TABLET, DELAYED RELEASE ORAL at 17:30

## 2017-12-30 RX ADMIN — BUMETANIDE 1 MILLIGRAM(S): 0.25 INJECTION INTRAMUSCULAR; INTRAVENOUS at 18:38

## 2017-12-30 RX ADMIN — Medication 100 MILLIGRAM(S): at 21:29

## 2017-12-30 RX ADMIN — Medication 25 MILLIGRAM(S): at 17:30

## 2017-12-30 RX ADMIN — PANTOPRAZOLE SODIUM 40 MILLIGRAM(S): 20 TABLET, DELAYED RELEASE ORAL at 06:36

## 2017-12-30 RX ADMIN — Medication 30 MILLIGRAM(S): at 00:11

## 2017-12-30 RX ADMIN — ATORVASTATIN CALCIUM 10 MILLIGRAM(S): 80 TABLET, FILM COATED ORAL at 21:29

## 2017-12-30 RX ADMIN — Medication 100 MILLIGRAM(S): at 13:29

## 2017-12-30 RX ADMIN — Medication 120 MILLIGRAM(S): at 06:36

## 2017-12-30 RX ADMIN — Medication 25 MILLIGRAM(S): at 06:36

## 2017-12-30 RX ADMIN — Medication 3 MILLILITER(S): at 11:45

## 2017-12-30 RX ADMIN — Medication 100 MILLIGRAM(S): at 06:36

## 2017-12-30 RX ADMIN — POLYETHYLENE GLYCOL 3350 17 GRAM(S): 17 POWDER, FOR SOLUTION ORAL at 11:45

## 2017-12-30 NOTE — PROGRESS NOTE ADULT - SUBJECTIVE AND OBJECTIVE BOX
OSCAR LOPEZ    Patient is a 88y old  Female who presents with a chief complaint of sob  History obtained from family due to confusion (22 Dec 2017 21:26)    More alert this am.  Still intermittently requiring BIPAP.  Currently off.  Breathing stable.  Pt denies chest pain or palpitations.  No obvious GI bleeding.    MEDICATIONS  (STANDING):  ALBUTerol/ipratropium for Nebulization 3 milliLiter(s) Nebulizer every 6 hours  atorvastatin 10 milliGRAM(s) Oral at bedtime  buMETAnide Injectable 1 milliGRAM(s) IV Push every 12 hours  diltiazem    milliGRAM(s) Oral daily  docusate sodium 100 milliGRAM(s) Oral three times a day  epoetin toño Injectable 34822 Unit(s) SubCutaneous <User Schedule>  metolazone 5 milliGRAM(s) Oral daily  metoprolol     tartrate 25 milliGRAM(s) Oral two times a day  pantoprazole  Injectable 40 milliGRAM(s) IV Push two times a day  polyethylene glycol 3350 17 Gram(s) Oral daily  senna 2 Tablet(s) Oral at bedtime    PHYSICAL EXAM:  Vital Signs Last 24 Hrs  T(C): 36.7 (30 Dec 2017 14:30), Max: 36.9 (29 Dec 2017 18:00)  T(F): 98 (30 Dec 2017 14:30), Max: 98.4 (29 Dec 2017 18:00)  HR: 78 (30 Dec 2017 14:30) (78 - 102)  BP: 108/57 (30 Dec 2017 14:30) (108/57 - 138/68)  BP(mean): --  RR: 18 (30 Dec 2017 14:30) (18 - 20)  SpO2: 100% (30 Dec 2017 14:30) (95% - 100%)  Daily     Daily   I&O's Summary    29 Dec 2017 07:01  -  30 Dec 2017 07:00  --------------------------------------------------------  IN: 470 mL / OUT: 925 mL / NET: -455 mL    30 Dec 2017 07:01  -  30 Dec 2017 15:58  --------------------------------------------------------  IN: 225 mL / OUT: 400 mL / NET: -175 mL    General Appearance: 	 Alert, cooperative, no distress  Neck: JVP estimaed at 10 cm  Lungs:  clear to auscultation and percussion bilaterally  Cor:  pmi 5th ICS MCL, Irregular rate and rhythm, S1 normal intensity, S2 normal intensity, GHAZALA  Abdomen:	 soft, non-tender; bowel sounds normal; no masses,  no organomegaly  Extremities: 2+ edema    EKG:  Telemetry: AF/V paced    Labs:  CBC Full  -  ( 30 Dec 2017 12:09 )  WBC Count : 15.02 K/uL  Hemoglobin : 7.1 g/dL  Hematocrit : 22.3 %  Platelet Count - Automated : 148 K/uL  Mean Cell Volume : 93.7 fl  Mean Cell Hemoglobin : 29.8 pg  Mean Cell Hemoglobin Concentration : 31.8 gm/dL  Auto Neutrophil # : x  Auto Lymphocyte # : x  Auto Monocyte # : x  Auto Eosinophil # : x  Auto Basophil # : x  Auto Neutrophil % : x  Auto Lymphocyte % : x  Auto Monocyte % : x  Auto Eosinophil % : x  Auto Basophil % : x      Impression/Plan: Chronic respiratory acidosis, requiring BIPAP  Chronic diastolic CHF, compensated  Chronic AF, rate controlled  Advanced CRI  Chronic anemia  History of TAVR    Plan:  Supportive care  BIPAP prn  Keep fluid balance slightly negative  Follow H and H, Heme check stools  OK to hold coumadin until occult GI bleed has been excluded  May just be chronic anemia due to advanced CRI, question starting Epogen as per renal  Prognosis remains poor    Leopoldo Funes MD, [facility]  [facility]

## 2017-12-30 NOTE — PROGRESS NOTE ADULT - SUBJECTIVE AND OBJECTIVE BOX
OSCAR LOPEZ        [id number]  12-30-17 @ 10:15  ------------------------------------------------------------------------------------  [facility] - [facility]     PROGRESS NOTE    More alert, easily arousable on bipap but confused.  Denies pain or discomfort.    -----------------------------------------------------------------------------------  REVIEW OF SYSTEMS:  Constitutional: As per interval history  HEENT: Within normal limits  CV: As per interval history  Resp: As per interval history  GI: Within normal limits   : Within normal limits  Musculoskeletal: Within normal limits  Skin: Within normal limits  Neurological: Within normal limits  Psychiatric: Within normal limits  Endocrine: Within normal limits  Hematologic/Lymphatic: Within normal limits  Allergic/Immunologic: Within normal limits    [ ] Unable to assess ROS because   -------------------------------------------------------------------------------------  MEDICATIONS:     Pulmonary "  ALBUTerol/ipratropium for Nebulization 3 milliLiter(s) Nebulizer every 6 hours    Anti-microbials:    Cardiovascular:  buMETAnide Injectable 1 milliGRAM(s) IV Push every 12 hours  diltiazem    milliGRAM(s) Oral daily  metolazone 5 milliGRAM(s) Oral daily  metoprolol     tartrate 25 milliGRAM(s) Oral two times a day    Other:  atorvastatin 10 milliGRAM(s) Oral at bedtime  docusate sodium 100 milliGRAM(s) Oral three times a day  epoetin toño Injectable 73246 Unit(s) SubCutaneous <User Schedule>  pantoprazole  Injectable 40 milliGRAM(s) IV Push two times a day  polyethylene glycol 3350 17 Gram(s) Oral daily  senna 2 Tablet(s) Oral at bedtime    --------------------------------------------------------------------------------------    OBJECTIVE:    I&O's Detail    29 Dec 2017 07:01  -  30 Dec 2017 07:00  --------------------------------------------------------  IN:    Oral Fluid: 470 mL  Total IN: 470 mL    OUT:    Indwelling Catheter - Urethral: 625 mL    Voided: 300 mL  Total OUT: 925 mL    Total NET: -455 mL    Daily     Daily     CAPILLARY BLOOD GLUCOSE  ------------------------------------------------------------------------------------------  PHYSICAL EXAM:       ICU Vital Signs Last 24 Hrs  T(C): 36.7 (30 Dec 2017 04:55), Max: 36.9 (29 Dec 2017 18:00)  T(F): 98 (30 Dec 2017 04:55), Max: 98.4 (29 Dec 2017 18:00)  HR: 88 (30 Dec 2017 06:45) (60 - 102)  BP: 108/63 (30 Dec 2017 06:30) (102/62 - 138/68)  BP(mean): --  ABP: --  ABP(mean): --  RR: 18 (30 Dec 2017 04:55) (18 - 20)  SpO2: 98% (30 Dec 2017 06:45) (95% - 100%)     relatively comfortable.  HEENT: unremarkable  Neck 2+ JVD, stridor, adenopathy or thyromegaly  Cardiac regular, without m/r/g  Lungs coarse, otherwise decreased at the bases.  Abdomen is soft and nontender, nondistended  Extrems with 3+ edema  Neurological is grossly intact, nonfocal  ________________________________________________________  LABS:                        8.1    11.7  )-----------( 127      ( 30 Dec 2017 00:38 )             24.4                         6.7    13.0  )-----------( 131      ( 29 Dec 2017 12:02 )             21.0     12-29    141  |  96  |  87<H>  ----------------------------<  152<H>  4.3   |  34<H>  |  4.63<H>  12-28    140  |  94<L>  |  84<H>  ----------------------------<  83  4.9   |  32<H>  |  4.36<H>    Ca      8.0<L>      12-29    Ca      8.2<L>      12-28      PT/INR - ( 29 Dec 2017 12:01 )   PT: 13.8 sec;   INR: 1.27 ratio      MICROBIOLOGY:     RADIOLOGY:  CT abd reviewed.  __________________________________________________________________    IMPRESSION and RECOMMENDATIONS:    Advanced hypercapnic respiratory failure in the setting of cardiac and severe renal dysfunction and kyphscoliosis.  Elevate HOB  NIV during sleep and PRN  Volume mgmt  ID recs  Workup for anemia per primary team    Palliative care follow-up and discussion    I continued to feel that no invasive pulmonary measures would be of any significant benefit for the patient.    Prognosis remains very poor      Gene Norton MD, [facility], [facility]  Pulmonary and Sleep Medicine  [phone]

## 2017-12-30 NOTE — PROGRESS NOTE ADULT - SUBJECTIVE AND OBJECTIVE BOX
Patient is a 88y old  Female who presents with a chief complaint of sob  History obtained from family due to confusion (22 Dec 2017 21:26)      SUBJECTIVE / OVERNIGHT EVENTS: awake, alert on NC, states has pain in LLE. anasarca on exam, no discoloration of LLE    MEDICATIONS  (STANDING):  ALBUTerol/ipratropium for Nebulization 3 milliLiter(s) Nebulizer every 6 hours  atorvastatin 10 milliGRAM(s) Oral at bedtime  buMETAnide Injectable 1 milliGRAM(s) IV Push every 12 hours  diltiazem    milliGRAM(s) Oral daily  docusate sodium 100 milliGRAM(s) Oral three times a day  epoetin toño Injectable 22290 Unit(s) SubCutaneous <User Schedule>  metolazone 5 milliGRAM(s) Oral daily  metoprolol     tartrate 25 milliGRAM(s) Oral two times a day  pantoprazole  Injectable 40 milliGRAM(s) IV Push two times a day  polyethylene glycol 3350 17 Gram(s) Oral daily  senna 2 Tablet(s) Oral at bedtime    MEDICATIONS  (PRN):      Vital Signs Last 24 Hrs  T(F): 98 (12-30-17 @ 14:30), Max: 98.3 (12-29-17 @ 21:05)  HR: 105 (12-30-17 @ 17:25) (78 - 105)  BP: 125/66 (12-30-17 @ 17:25) (108/57 - 138/68)  RR: 18 (12-30-17 @ 14:30) (18 - 20)  SpO2: 100% (12-30-17 @ 14:30) (95% - 100%)  Telemetry:   CAPILLARY BLOOD GLUCOSE        I&O's Summary    29 Dec 2017 07:01  -  30 Dec 2017 07:00  --------------------------------------------------------  IN: 470 mL / OUT: 925 mL / NET: -455 mL    30 Dec 2017 07:01  -  30 Dec 2017 18:36  --------------------------------------------------------  IN: 225 mL / OUT: 400 mL / NET: -175 mL        PHYSICAL EXAM:  GENERAL: NAD, well-developed  HEAD:  Atraumatic, Normocephalic  EYES: EOMI, PERRLA, conjunctiva and sclera clear  NECK: Supple, No JVD  CHEST/LUNG: Clear to auscultation bilaterally; No wheeze  HEART: Regular rate and rhythm; No murmurs, rubs, or gallops  ABDOMEN: Soft, Nontender, Nondistended; Bowel sounds present  EXTREMITIES:  2+ Peripheral Pulses, No clubbing, cyanosis, or edema  PSYCH: AAOx3  NEUROLOGY: non-focal  SKIN: No rashes or lesions    LABS:                        7.1    15.02 )-----------( 148      ( 30 Dec 2017 12:09 )             22.3     12-30    138  |  93<L>  |  93<H>  ----------------------------<  138<H>  4.4   |  29  |  4.65<H>    Ca    8.2<L>      30 Dec 2017 12:10      PT/INR - ( 29 Dec 2017 12:01 )   PT: 13.8 sec;   INR: 1.27 ratio                   RADIOLOGY & ADDITIONAL TESTS:    Imaging Personally Reviewed:    Consultant(s) Notes Reviewed:      Care Discussed with Consultants/Other Providers:

## 2017-12-30 NOTE — PROGRESS NOTE ADULT - SUBJECTIVE AND OBJECTIVE BOX
NEPHROLOGY-[facility]  [phone]  Martha Lyle NP      Patient seen and examined in bed. Awake, alert, responsive. Not sob, denies chest pain. Was eating breakfast.      MEDICATIONS  (STANDING):  ALBUTerol/ipratropium for Nebulization 3 milliLiter(s) Nebulizer every 6 hours  atorvastatin 10 milliGRAM(s) Oral at bedtime  buMETAnide Injectable 1 milliGRAM(s) IV Push every 12 hours  diltiazem    milliGRAM(s) Oral daily  docusate sodium 100 milliGRAM(s) Oral three times a day  epoetin toño Injectable 17181 Unit(s) SubCutaneous <User Schedule>  metolazone 5 milliGRAM(s) Oral daily  metoprolol     tartrate 25 milliGRAM(s) Oral two times a day  pantoprazole  Injectable 40 milliGRAM(s) IV Push two times a day  polyethylene glycol 3350 17 Gram(s) Oral daily  senna 2 Tablet(s) Oral at bedtime      VITAL:  T(C): , Max: 36.9 (12-29-17 @ 18:00)  T(F): , Max: 98.4 (12-29-17 @ 18:00)  HR: 88 (12-30-17 @ 06:45)  BP: 108/63 (12-30-17 @ 06:30)  BP(mean): --  RR: 18 (12-30-17 @ 04:55)  SpO2: 98% (12-30-17 @ 06:45)  Wt(kg): --    I and O's:    12-29 @ 07:01  -  12-30 @ 07:00  --------------------------------------------------------  IN: 470 mL / OUT: 925 mL / NET: -455 mL        PHYSICAL EXAM:    Constitutional: NAD    Neck:  + JVD    Respiratory: Coarse breath sounds B/L    Cardiovascular: S1 and S2    Gastrointestinal: BS+, soft, NT/ND    Extremities: + 3 B/L LE edema    : (+) Oleary    Skin: No rashes    Access: Not applicable    LABS:                        8.1    11.7  )-----------( 127      ( 30 Dec 2017 00:38 )             24.4     29 Dec 2017 12:01    141    |  96     |  87<H>  ----------------------------<  152<H>  4.3     |  34<H>  |  4.63<H>    Ca    8.0<L>      29 Dec 2017 12:01      Urine Studies:    RADIOLOGY & ADDITIONAL STUDIES:      ASSESSMENT/PLAN:     88 year-old female, presents now with respiratory acidosis, acute kidney injury, and hepatitis.  The patient does have an elevated BNP and evidence of edema, but chest x-ray is clear.  Elevated BNP is likely from right ventricular dysfunction.  Unclear if degree of stenosis of the mitral valve and if that is leading to a degree of pulmonary hypertension.    Hepatitis  NANCY-non oliguric likely ATN  Anemia    1.	Renal:     Continue Bumex 1mg IVP bid.  Urine output has increased and the creatinine will hopefully plateau soon.   Zaroxolyn was added for  synergy.  Awaiting today's BMP  2.	Infectious Disease:    Off abx  3.	Miscellaneous:  Palliative care will have a family meeting next week  4.	- Continue  oleary catheter for strict in's and out's  5.	Pulmonary- No invasive pulmonary measures at this time per Pulm service  6.	Anemia-Epogen for anemia

## 2017-12-31 LAB
ANION GAP SERPL CALC-SCNC: 20 MMOL/L — HIGH (ref 5–17)
BUN SERPL-MCNC: 102 MG/DL — HIGH (ref 7–23)
CALCIUM SERPL-MCNC: 8.6 MG/DL — SIGNIFICANT CHANGE UP (ref 8.4–10.5)
CHLORIDE SERPL-SCNC: 95 MMOL/L — LOW (ref 96–108)
CO2 SERPL-SCNC: 28 MMOL/L — SIGNIFICANT CHANGE UP (ref 22–31)
CREAT SERPL-MCNC: 4.89 MG/DL — HIGH (ref 0.5–1.3)
GLUCOSE SERPL-MCNC: 129 MG/DL — HIGH (ref 70–99)
HCT VFR BLD CALC: 24.9 % — LOW (ref 34.5–45)
HGB BLD-MCNC: 7.8 G/DL — LOW (ref 11.5–15.5)
MCHC RBC-ENTMCNC: 29.4 PG — SIGNIFICANT CHANGE UP (ref 27–34)
MCHC RBC-ENTMCNC: 31.3 GM/DL — LOW (ref 32–36)
MCV RBC AUTO: 94 FL — SIGNIFICANT CHANGE UP (ref 80–100)
PLATELET # BLD AUTO: 185 K/UL — SIGNIFICANT CHANGE UP (ref 150–400)
POTASSIUM SERPL-MCNC: 4.4 MMOL/L — SIGNIFICANT CHANGE UP (ref 3.5–5.3)
POTASSIUM SERPL-SCNC: 4.4 MMOL/L — SIGNIFICANT CHANGE UP (ref 3.5–5.3)
RBC # BLD: 2.65 M/UL — LOW (ref 3.8–5.2)
RBC # FLD: 19.3 % — HIGH (ref 10.3–14.5)
SODIUM SERPL-SCNC: 143 MMOL/L — SIGNIFICANT CHANGE UP (ref 135–145)
WBC # BLD: 13.93 K/UL — HIGH (ref 3.8–10.5)
WBC # FLD AUTO: 13.93 K/UL — HIGH (ref 3.8–10.5)

## 2017-12-31 PROCEDURE — 73700 CT LOWER EXTREMITY W/O DYE: CPT | Mod: 26,LT

## 2017-12-31 RX ADMIN — Medication 25 MILLIGRAM(S): at 17:57

## 2017-12-31 RX ADMIN — BUMETANIDE 1 MILLIGRAM(S): 0.25 INJECTION INTRAMUSCULAR; INTRAVENOUS at 19:38

## 2017-12-31 RX ADMIN — BUMETANIDE 1 MILLIGRAM(S): 0.25 INJECTION INTRAMUSCULAR; INTRAVENOUS at 05:19

## 2017-12-31 RX ADMIN — Medication 25 MILLIGRAM(S): at 05:19

## 2017-12-31 RX ADMIN — Medication 120 MILLIGRAM(S): at 05:19

## 2017-12-31 RX ADMIN — Medication 3 MILLILITER(S): at 13:19

## 2017-12-31 RX ADMIN — PANTOPRAZOLE SODIUM 40 MILLIGRAM(S): 20 TABLET, DELAYED RELEASE ORAL at 17:56

## 2017-12-31 RX ADMIN — Medication 100 MILLIGRAM(S): at 13:19

## 2017-12-31 RX ADMIN — POLYETHYLENE GLYCOL 3350 17 GRAM(S): 17 POWDER, FOR SOLUTION ORAL at 13:19

## 2017-12-31 RX ADMIN — Medication 100 MILLIGRAM(S): at 05:19

## 2017-12-31 RX ADMIN — Medication 3 MILLILITER(S): at 05:20

## 2017-12-31 RX ADMIN — Medication 3 MILLILITER(S): at 17:56

## 2017-12-31 RX ADMIN — ATORVASTATIN CALCIUM 10 MILLIGRAM(S): 80 TABLET, FILM COATED ORAL at 21:34

## 2017-12-31 RX ADMIN — PANTOPRAZOLE SODIUM 40 MILLIGRAM(S): 20 TABLET, DELAYED RELEASE ORAL at 05:19

## 2017-12-31 NOTE — PROGRESS NOTE ADULT - SUBJECTIVE AND OBJECTIVE BOX
Patient is a 88y old  Female who presents with a chief complaint of sob  History obtained from family due to confusion (22 Dec 2017 21:26)      SUBJECTIVE / OVERNIGHT EVENTS: more lethargic today, presently on NC. son at bedside.    MEDICATIONS  (STANDING):  ALBUTerol/ipratropium for Nebulization 3 milliLiter(s) Nebulizer every 6 hours  atorvastatin 10 milliGRAM(s) Oral at bedtime  buMETAnide Injectable 1 milliGRAM(s) IV Push every 12 hours  diltiazem    milliGRAM(s) Oral daily  docusate sodium 100 milliGRAM(s) Oral three times a day  epoetin toño Injectable 63765 Unit(s) SubCutaneous <User Schedule>  metolazone 5 milliGRAM(s) Oral daily  metoprolol     tartrate 25 milliGRAM(s) Oral two times a day  pantoprazole  Injectable 40 milliGRAM(s) IV Push two times a day  polyethylene glycol 3350 17 Gram(s) Oral daily  senna 2 Tablet(s) Oral at bedtime    MEDICATIONS  (PRN):      Vital Signs Last 24 Hrs  T(F): 98.4 (12-31-17 @ 14:19), Max: 98.8 (12-31-17 @ 05:08)  HR: 83 (12-31-17 @ 14:19) (83 - 89)  BP: 98/56 (12-31-17 @ 14:19) (98/56 - 136/71)  RR: 18 (12-31-17 @ 14:19) (17 - 19)  SpO2: 100% (12-31-17 @ 14:19) (95% - 100%)  Telemetry:   CAPILLARY BLOOD GLUCOSE        I&O's Summary    30 Dec 2017 07:01  -  31 Dec 2017 07:00  --------------------------------------------------------  IN: 475 mL / OUT: 930 mL / NET: -455 mL    31 Dec 2017 07:01  -  31 Dec 2017 19:18  --------------------------------------------------------  IN: 200 mL / OUT: 0 mL / NET: 200 mL        PHYSICAL EXAM:  GENERAL: NAD, well-developed  HEAD:  Atraumatic, Normocephalic  EYES: EOMI, PERRLA, conjunctiva and sclera clear  NECK: Supple, No JVD  CHEST/LUNG: Clear to auscultation bilaterally; No wheeze  HEART: Regular rate and rhythm; No murmurs, rubs, or gallops  ABDOMEN: Soft, Nontender, Nondistended; Bowel sounds present  EXTREMITIES:  2+ Peripheral Pulses, No clubbing, cyanosis, or edema  PSYCH: AAOx3  NEUROLOGY: non-focal  SKIN: No rashes or lesions    LABS:                        7.8    13.93 )-----------( 185      ( 31 Dec 2017 13:47 )             24.9     12-31    143  |  95<L>  |  102<H>  ----------------------------<  129<H>  4.4   |  28  |  4.89<H>    Ca    8.6      31 Dec 2017 13:43                RADIOLOGY & ADDITIONAL TESTS:    Imaging Personally Reviewed:    Consultant(s) Notes Reviewed:      Care Discussed with Consultants/Other Providers:

## 2017-12-31 NOTE — PROGRESS NOTE ADULT - SUBJECTIVE AND OBJECTIVE BOX
OSCAR LOPEZ        [id number]  12-31-17 @ 09:21  ------------------------------------------------------------------------------------  [facility] - [facility]     PROGRESS NOTE    Per RN, confused and refused bipap last night, became obtunded and back on BIPAP.  Now arousable on bipap but confused.  Denies pain.  -----------------------------------------------------------------------------------  REVIEW OF SYSTEMS:  unable  -------------------------------------------------------------------------------------  MEDICATIONS:     Pulmonary "  ALBUTerol/ipratropium for Nebulization 3 milliLiter(s) Nebulizer every 6 hours    Anti-microbials:    Cardiovascular:  buMETAnide Injectable 1 milliGRAM(s) IV Push every 12 hours  diltiazem    milliGRAM(s) Oral daily  metolazone 5 milliGRAM(s) Oral daily  metoprolol     tartrate 25 milliGRAM(s) Oral two times a day    Other:  atorvastatin 10 milliGRAM(s) Oral at bedtime  docusate sodium 100 milliGRAM(s) Oral three times a day  epoetin toño Injectable 16356 Unit(s) SubCutaneous <User Schedule>  pantoprazole  Injectable 40 milliGRAM(s) IV Push two times a day  polyethylene glycol 3350 17 Gram(s) Oral daily  senna 2 Tablet(s) Oral at bedtime    --------------------------------------------------------------------------------------    OBJECTIVE:    I&O's Detail    30 Dec 2017 07:01  -  31 Dec 2017 07:00  --------------------------------------------------------  IN:    Oral Fluid: 475 mL  Total IN: 475 mL    OUT:    Indwelling Catheter - Urethral: 930 mL  Total OUT: 930 mL    Total NET: -455 mL    Daily     Daily     CAPILLARY BLOOD GLUCOSE      ------------------------------------------------------------------------------------------  PHYSICAL EXAM:       ICU Vital Signs Last 24 Hrs  T(C): 37.1 (31 Dec 2017 05:08), Max: 37.1 (31 Dec 2017 05:08)  T(F): 98.8 (31 Dec 2017 05:08), Max: 98.8 (31 Dec 2017 05:08)  HR: 84 (31 Dec 2017 05:50) (78 - 105)  BP: 110/57 (31 Dec 2017 05:50) (108/57 - 136/71)  BP(mean): --  ABP: --  ABP(mean): --  RR: 18 (31 Dec 2017 05:08) (17 - 19)  SpO2: 100% (31 Dec 2017 05:08) (95% - 100%)       Lethargic on bipap  HEENT: unremarkable  Neck 3+ JVD, no stridor  Oropharynx is mallampati class 4  Cardiac regular, without m/r/g  Lungs are coarse and decreased at bases  Abdomen is soft and nontender, nondistended  Extrems are warm with 2-3+ edema  Neurological lethargic  ________________________________________________________  LABS:                        7.1    15.02 )-----------( 148      ( 30 Dec 2017 12:09 )             22.3                         8.1    11.7  )-----------( 127      ( 30 Dec 2017 00:38 )             24.4     12-30    138  |  93<L>  |  93<H>  ----------------------------<  138<H>  4.4   |  29  |  4.65<H>  12-29    141  |  96  |  87<H>  ----------------------------<  152<H>  4.3   |  34<H>  |  4.63<H>    Ca      8.2<L>      12-30    Ca      8.0<L>      12-29        PT/INR - ( 29 Dec 2017 12:01 )   PT: 13.8 sec;   INR: 1.27 ratio       MICROBIOLOGY:     RADIOLOGY:  CT abd images reviewed.  __________________________________________________________________    IMPRESSION and RECOMMENDATIONS:    Given florid renal failure in the setting of cardiovascular disease, kyphoscoliosis and now anemia, with total body fluid overload, there is little to offer her except for NIV/bipap as tolerates.  She is not a candidate for any invasive pulmonary measures (including thoro) which would afford no benefit regardless.    Elevate HOB  NIV/bipap as able  Duoneb treatments  Aspiration precautions.    Management of anemia per primary service    Palliative care follow-up.    Please call for issues through Tuesday.      Gene Norton MD, FCCP, FAASM  Pulmonary and Sleep Medicine  [phone]

## 2017-12-31 NOTE — PROGRESS NOTE ADULT - SUBJECTIVE AND OBJECTIVE BOX
NEPHROLOGY-[facility]  [phone]  Martha Lyle NP      Patient seen and examined in bed. Awake, alert, responsive. Confused.       MEDICATIONS  (STANDING):  ALBUTerol/ipratropium for Nebulization 3 milliLiter(s) Nebulizer every 6 hours  atorvastatin 10 milliGRAM(s) Oral at bedtime  buMETAnide Injectable 1 milliGRAM(s) IV Push every 12 hours  diltiazem    milliGRAM(s) Oral daily  docusate sodium 100 milliGRAM(s) Oral three times a day  epoetin toño Injectable 96975 Unit(s) SubCutaneous <User Schedule>  metolazone 5 milliGRAM(s) Oral daily  metoprolol     tartrate 25 milliGRAM(s) Oral two times a day  pantoprazole  Injectable 40 milliGRAM(s) IV Push two times a day  polyethylene glycol 3350 17 Gram(s) Oral daily  senna 2 Tablet(s) Oral at bedtime      VITAL:  T(C): , Max: 37.1 (12-31-17 @ 05:08)  T(F): , Max: 98.8 (12-31-17 @ 05:08)  HR: 84 (12-31-17 @ 05:50)  BP: 110/57 (12-31-17 @ 05:50)  BP(mean): --  RR: 18 (12-31-17 @ 05:08)  SpO2: 100% (12-31-17 @ 05:08)  Wt(kg): --    I and O's:    12-30 @ 07:01  -  12-31 @ 07:00  --------------------------------------------------------  IN: 475 mL / OUT: 930 mL / NET: -455 mL      PHYSICAL EXAM:    Constitutional: NAD  Respiratory: Coarse b/l breath sounds  Cardiovascular: S1 and S2  Gastrointestinal: BS+, soft, NT/ND  Extremities: 3+peripheral edema  : + Oleary  Skin: No rashes  Access: Not applicable    LABS:                        7.1    15.02 )-----------( 148      ( 30 Dec 2017 12:09 )             22.3     30 Dec 2017 12:10    138    |  93<L>  |  93<H>  ----------------------------<  138<H>  4.4     |  29     |  4.65<H>  29 Dec 2017 12:01    141    |  96     |  87<H>  ----------------------------<  152<H>  4.3     |  34<H>  |  4.63<H>    Ca    8.2<L>      30 Dec 2017 12:10  Ca    8.0<L>      29 Dec 2017 12:01    12/31 labwork pending collection      Urine Studies:      RADIOLOGY & ADDITIONAL STUDIES:    ASSESSMENT/PLAN:   88 year-old female, presents now with respiratory acidosis, acute kidney injury, and hepatitis.  The patient does have an elevated BNP and evidence of edema, but chest x-ray is clear.  Elevated BNP is likely from right ventricular dysfunction.  Unclear if degree of stenosis of the mitral valve and if that is leading to a degree of pulmonary hypertension.    Hepatitis  NANCY-non oliguric likely ATN  Anemia    1.	Renal:  Continue Bumex 1mg IVP bid and Zaroxolyn 5mg daily.  Creatinine will hopefully plateau soon.  Trend renal profile.  Awaiting today's labwork  2.	Infectious Disease:    Off antibiotics   3.	Miscellaneous:  Palliative care will have a family meeting this week  4.	- Continue  oleary catheter for strict in's and out's  5.	Pulmonary- No invasive pulmonary measures at this time per Pulm service  6.	Anemia-Epogen for anemia. Trend hgb/hct and tranfuse prbcs as needed

## 2017-12-31 NOTE — PROGRESS NOTE ADULT - SUBJECTIVE AND OBJECTIVE BOX
OSCAR LOPEZ    Patient is a 88y old  Female who presents with a chief complaint of sob  History obtained from family due to confusion (22 Dec 2017 21:26)    No real clinical change. Confused today.  Breathing stable now; off BIPAP at present.    Allergies    No Known Allergies    Intolerances    digoxin (Rash; Drowsiness)    MEDICATIONS  (STANDING):  ALBUTerol/ipratropium for Nebulization 3 milliLiter(s) Nebulizer every 6 hours  atorvastatin 10 milliGRAM(s) Oral at bedtime  buMETAnide Injectable 1 milliGRAM(s) IV Push every 12 hours  diltiazem    milliGRAM(s) Oral daily  docusate sodium 100 milliGRAM(s) Oral three times a day  epoetin toño Injectable 04497 Unit(s) SubCutaneous <User Schedule>  metolazone 5 milliGRAM(s) Oral daily  metoprolol     tartrate 25 milliGRAM(s) Oral two times a day  pantoprazole  Injectable 40 milliGRAM(s) IV Push two times a day  polyethylene glycol 3350 17 Gram(s) Oral daily  senna 2 Tablet(s) Oral at bedtime    MEDICATIONS  (PRN):    PHYSICAL EXAM:  Vital Signs Last 24 Hrs  T(C): 36.9 (31 Dec 2017 14:19), Max: 37.1 (31 Dec 2017 05:08)  T(F): 98.4 (31 Dec 2017 14:19), Max: 98.8 (31 Dec 2017 05:08)  HR: 83 (31 Dec 2017 14:19) (83 - 105)  BP: 98/56 (31 Dec 2017 14:19) (98/56 - 136/71)  BP(mean): --  RR: 18 (31 Dec 2017 14:19) (17 - 19)  SpO2: 100% (31 Dec 2017 14:19) (95% - 100%)  Daily     Daily   I&O's Summary    30 Dec 2017 07:01  -  31 Dec 2017 07:00  --------------------------------------------------------  IN: 475 mL / OUT: 930 mL / NET: -455 mL        General Appearance: 	 Alert, cooperative, no distress, Confused  Neck: JVP estimated at 10 cm  Lungs:  clear to auscultation and percussion bilaterally  Cor:  pmi 5th ICS MCL, Irregular rate and rhythm, S1 normal intensity, S2 normal intensity, GHAZALA  Abdomen:	 soft, non-tender; bowel sounds normal; no masses,  no organomegaly  Extremities: 2+ edema      EKG:  Telemetry: AF rate controlled    Labs:  CBC Full  -  ( 30 Dec 2017 12:09 )  WBC Count : 15.02 K/uL  Hemoglobin : 7.1 g/dL  Hematocrit : 22.3 %  Platelet Count - Automated : 148 K/uL  Mean Cell Volume : 93.7 fl  Mean Cell Hemoglobin : 29.8 pg  Mean Cell Hemoglobin Concentration : 31.8 gm/dL  Auto Neutrophil # : x  Auto Lymphocyte # : x  Auto Monocyte # : x  Auto Eosinophil # : x  Auto Basophil # : x  Auto Neutrophil % : x  Auto Lymphocyte % : x  Auto Monocyte % : x  Auto Eosinophil % : x  Auto Basophil % : x      Impression/Plan: Chronic respiratory acidosis  Diastolic CHF, compenstaed  Chronic AF  Advanced ESRD    Continue supportive care  No new specific cardiac recommendations  Observe off of coumadin/antiplatelet, transfuse as necessary  Prognosis is very poor  BIPAP as needed;   BUN/Cr continue to increase; she is not that wet by my examination, I would discontinue zaroxolyn and change diuretics to PO, as per renal.  Leopoldo Funes MD, FACC  [facility]

## 2018-01-01 LAB
ANION GAP SERPL CALC-SCNC: 17 MMOL/L — SIGNIFICANT CHANGE UP (ref 5–17)
BUN SERPL-MCNC: 104 MG/DL — HIGH (ref 7–23)
CALCIUM SERPL-MCNC: 9.3 MG/DL — SIGNIFICANT CHANGE UP (ref 8.4–10.5)
CHLORIDE SERPL-SCNC: 91 MMOL/L — LOW (ref 96–108)
CO2 SERPL-SCNC: 29 MMOL/L — SIGNIFICANT CHANGE UP (ref 22–31)
CREAT SERPL-MCNC: 4.91 MG/DL — HIGH (ref 0.5–1.3)
GLUCOSE SERPL-MCNC: 138 MG/DL — HIGH (ref 70–99)
HCT VFR BLD CALC: 25 % — LOW (ref 34.5–45)
HGB BLD-MCNC: 7.7 G/DL — LOW (ref 11.5–15.5)
INR BLD: 1.11 RATIO — SIGNIFICANT CHANGE UP (ref 0.88–1.16)
MCHC RBC-ENTMCNC: 30 PG — SIGNIFICANT CHANGE UP (ref 27–34)
MCHC RBC-ENTMCNC: 30.8 GM/DL — LOW (ref 32–36)
MCV RBC AUTO: 97.3 FL — SIGNIFICANT CHANGE UP (ref 80–100)
PLATELET # BLD AUTO: 203 K/UL — SIGNIFICANT CHANGE UP (ref 150–400)
POTASSIUM SERPL-MCNC: 4.5 MMOL/L — SIGNIFICANT CHANGE UP (ref 3.5–5.3)
POTASSIUM SERPL-SCNC: 4.5 MMOL/L — SIGNIFICANT CHANGE UP (ref 3.5–5.3)
PROTHROM AB SERPL-ACNC: 12.6 SEC — SIGNIFICANT CHANGE UP (ref 10–13.1)
RBC # BLD: 2.57 M/UL — LOW (ref 3.8–5.2)
RBC # FLD: 19.4 % — HIGH (ref 10.3–14.5)
SODIUM SERPL-SCNC: 137 MMOL/L — SIGNIFICANT CHANGE UP (ref 135–145)
WBC # BLD: 11.33 K/UL — HIGH (ref 3.8–10.5)
WBC # FLD AUTO: 11.33 K/UL — HIGH (ref 3.8–10.5)

## 2018-01-01 RX ADMIN — PANTOPRAZOLE SODIUM 40 MILLIGRAM(S): 20 TABLET, DELAYED RELEASE ORAL at 06:30

## 2018-01-01 RX ADMIN — Medication 120 MILLIGRAM(S): at 06:30

## 2018-01-01 RX ADMIN — BUMETANIDE 1 MILLIGRAM(S): 0.25 INJECTION INTRAMUSCULAR; INTRAVENOUS at 06:30

## 2018-01-01 RX ADMIN — Medication 25 MILLIGRAM(S): at 17:50

## 2018-01-01 RX ADMIN — Medication 25 MILLIGRAM(S): at 06:30

## 2018-01-01 RX ADMIN — PANTOPRAZOLE SODIUM 40 MILLIGRAM(S): 20 TABLET, DELAYED RELEASE ORAL at 17:51

## 2018-01-01 RX ADMIN — Medication 3 MILLILITER(S): at 06:30

## 2018-01-01 RX ADMIN — Medication 3 MILLILITER(S): at 00:38

## 2018-01-01 RX ADMIN — Medication 3 MILLILITER(S): at 12:37

## 2018-01-01 RX ADMIN — Medication 3 MILLILITER(S): at 17:50

## 2018-01-01 RX ADMIN — ATORVASTATIN CALCIUM 10 MILLIGRAM(S): 80 TABLET, FILM COATED ORAL at 21:29

## 2018-01-01 RX ADMIN — BUMETANIDE 1 MILLIGRAM(S): 0.25 INJECTION INTRAMUSCULAR; INTRAVENOUS at 17:50

## 2018-01-01 NOTE — PROVIDER CONTACT NOTE (OTHER) - RECOMMENDATIONS
?order dose of coumadin tonight
D/c Restraints
JESUS Finnegan made aware
JESUS Finnegan made aware; hold PO meds for now?
JESUS Finnegan made aware; per JESUS pablo AM meds
Notify NP
Shira Cuello NP made aware.
Shira Cuello NP made aware.
To assess pt at bedside, Hand mittens.

## 2018-01-01 NOTE — PROGRESS NOTE ADULT - SUBJECTIVE AND OBJECTIVE BOX
OSCAR LOPEZ        [id number]  01-01-18 @ 11:22  ------------------------------------------------------------------------------------  [facility]     PROGRESS NOTE  Arousable but confused.   No specific complaints.    -----------------------------------------------------------------------------------  REVIEW OF SYSTEMS:  Constitutional: As per interval history  HEENT: Within normal limits  CV: As per interval history  Resp: As per interval history  GI: Within normal limits   : Within normal limits  Musculoskeletal: Within normal limits  Skin: Within normal limits  Neurological: Within normal limits  Psychiatric: Within normal limits  Endocrine: Within normal limits  Hematologic/Lymphatic: Within normal limits  Allergic/Immunologic: Within normal limits    [ ] Unable to assess ROS because   -------------------------------------------------------------------------------------  MEDICATIONS:     Pulmonary "  ALBUTerol/ipratropium for Nebulization 3 milliLiter(s) Nebulizer every 6 hours      Anti-microbials:      Cardiovascular:  buMETAnide Injectable 1 milliGRAM(s) IV Push every 12 hours  diltiazem    milliGRAM(s) Oral daily  metolazone 5 milliGRAM(s) Oral daily  metoprolol     tartrate 25 milliGRAM(s) Oral two times a day      Other:  atorvastatin 10 milliGRAM(s) Oral at bedtime  docusate sodium 100 milliGRAM(s) Oral three times a day  epoetin toño Injectable 38956 Unit(s) SubCutaneous <User Schedule>  pantoprazole  Injectable 40 milliGRAM(s) IV Push two times a day  polyethylene glycol 3350 17 Gram(s) Oral daily  senna 2 Tablet(s) Oral at bedtime    --------------------------------------------------------------------------------------    OBJECTIVE:        I&O's Detail    31 Dec 2017 07:01  -  01 Jan 2018 07:00  --------------------------------------------------------  IN:    Oral Fluid: 200 mL  Total IN: 200 mL    OUT:    Indwelling Catheter - Urethral: 975 mL  Total OUT: 975 mL    Total NET: -775 mL      01 Jan 2018 07:01  -  01 Jan 2018 11:22  --------------------------------------------------------  IN:    Oral Fluid: 100 mL  Total IN: 100 mL    OUT:  Total OUT: 0 mL    Total NET: 100 mL          Daily     Daily     CAPILLARY BLOOD GLUCOSE        ------------------------------------------------------------------------------------------  PHYSICAL EXAM:       ICU Vital Signs Last 24 Hrs  T(C): 36.7 (01 Jan 2018 04:30), Max: 36.9 (31 Dec 2017 14:19)  T(F): 98.1 (01 Jan 2018 04:30), Max: 98.4 (31 Dec 2017 14:19)  HR: 92 (01 Jan 2018 09:04) (71 - 99)  BP: 125/64 (01 Jan 2018 06:26) (98/56 - 131/62)  BP(mean): --  ABP: --  ABP(mean): --  RR: 18 (01 Jan 2018 06:26) (18 - 18)  SpO2: 98% (01 Jan 2018 09:04) (94% - 100%)       Comfortable  HEENT: unremarkable  Neck 2-3+ JVD, no stridor  Oropharynx is mallampati class 4  Cardiac regular, without m/r/g  Lungs with coarse BS bilaterally, decreased at bases  Abdomen is soft and nontender, nondistended  Extrems are warm, 2-3+ edema  Neurological is grossly intact, nonfocal  ________________________________________________________  LABS:                        7.7    11.33 )-----------( 203      ( 01 Jan 2018 08:08 )             25.0                         7.8    13.93 )-----------( 185      ( 31 Dec 2017 13:47 )             24.9     01-01    137  |  91<L>  |  104<H>  ----------------------------<  138<H>  4.5   |  29  |  4.91<H>  12-31    143  |  95<L>  |  102<H>  ----------------------------<  129<H>  4.4   |  28  |  4.89<H>    Ca      9.3      01-01    Ca      8.6      12-31        PT/INR - ( 01 Jan 2018 10:36 )   PT: 12.6 sec;   INR: 1.11 ratio        MICROBIOLOGY:     RADIOLOGY:  [ ] Reviewed and interpreted by me  __________________________________________________________________    IMPRESSION and RECOMMENDATIONS:    Elevate HOB  BIPAP/NIV as tolerates  Volume mgmt  Palliative care follow-up  Mgmt per primary service      Gene Norton MD, FCCP, FAASM  Pulmonary and Sleep Medicine  [phone]

## 2018-01-01 NOTE — PROGRESS NOTE ADULT - SUBJECTIVE AND OBJECTIVE BOX
OSCAR LOPEZ    Patient is a 88y old  Female who presents with a chief complaint of sob  History obtained from family due to confusion (22 Dec 2017 21:26)    Somnolent but arousable.  Still with intermittent confusion.  UOP decreased. Breathing stable.  Volume status looks increased today.    Allergies    No Known Allergies    MEDICATIONS  (STANDING):  ALBUTerol/ipratropium for Nebulization 3 milliLiter(s) Nebulizer every 6 hours  atorvastatin 10 milliGRAM(s) Oral at bedtime  buMETAnide Injectable 1 milliGRAM(s) IV Push every 12 hours  diltiazem    milliGRAM(s) Oral daily  docusate sodium 100 milliGRAM(s) Oral three times a day  epoetin toño Injectable 45671 Unit(s) SubCutaneous <User Schedule>  metolazone 5 milliGRAM(s) Oral daily  metoprolol     tartrate 25 milliGRAM(s) Oral two times a day  pantoprazole  Injectable 40 milliGRAM(s) IV Push two times a day  polyethylene glycol 3350 17 Gram(s) Oral daily  senna 2 Tablet(s) Oral at bedtime      PHYSICAL EXAM:  Vital Signs Last 24 Hrs  T(C): 36.3 (2018 12:24), Max: 36.7 (2018 04:30)  T(F): 97.4 (2018 12:24), Max: 98.1 (2018 04:30)  HR: 72 (2018 12:24) (71 - 99)  BP: 101/57 (2018 12:24) (101/57 - 131/62)  BP(mean): --  RR: 18 (2018 12:24) (18 - 18)  SpO2: 100% (2018 12:24) (94% - 100%)  Daily     Daily Weight in k.2 (2018 14:01)  I&O's Summary    31 Dec 2017 07:  -  2018 07:00  --------------------------------------------------------  IN: 200 mL / OUT: 975 mL / NET: -775 mL    2018 07:01  -  2018 16:34  --------------------------------------------------------  IN: 100 mL / OUT: 150 mL / NET: -50 mL        General Appearance: Lethargic, arousable,  no acute distress  Neck: JVP elevated at about 12 cm  Lungs:  clear to auscultation and percussion bilaterally  Cor:  pmi 5th ICS MCL, Irregular rate and rhythm, S1 normal intensity, S2 normal intensity, GHAZALA  Abdomen:	 soft, non-tender; bowel sounds normal; no masses,  no organomegaly  Extremities: 2+ edema      EKG:  Telemetry: AF    Labs:  CBC Full  -  ( 2018 08:08 )  WBC Count : 11.33 K/uL  Hemoglobin : 7.7 g/dL  Hematocrit : 25.0 %  Platelet Count - Automated : 203 K/uL  Mean Cell Volume : 97.3 fl  Mean Cell Hemoglobin : 30.0 pg  Mean Cell Hemoglobin Concentration : 30.8 gm/dL  Auto Neutrophil # : x  Auto Lymphocyte # : x  Auto Monocyte # : x  Auto Eosinophil # : x  Auto Basophil # : x  Auto Neutrophil % : x  Auto Lymphocyte % : x  Auto Monocyte % : x  Auto Eosinophil % : x  Auto Basophil % : x    Basic Metabolic Panel in AM (18 @ 08:40)    Sodium, Serum: 137 mmol/L    Potassium, Serum: 4.5: Mild hemolysis. Results may be falsely elevated. mmol/L    Chloride, Serum: 91 mmol/L    Carbon Dioxide, Serum: 29 mmol/L    Anion Gap, Serum: 17 mmol/L    Blood Urea Nitrogen, Serum: 104 mg/dL    Creatinine, Serum: 4.91 mg/dL    Glucose, Serum: 138 mg/dL    Calcium, Total Serum: 9.3 mg/dL    eGFR if Non : 7: Interpretative comment  The units for eGFR are ml/min/1.73m2 (normalized body surface area). The  eGFR is calculated from a serum creatinine using the CKD-EPI equation.  Other variables required for calculation are race, age and sex. Among  patients with chronic kidney disease (CKD), the eGFR is useful in  determining the stage of disease according to KDOQI CKD classification.  All eGFR results are reported numerically with the following  interpretation.          GFR                    With                 Without     (ml/min/1.73 m2)    Kidney Damage       Kidney Damage        >= 90                    Stage 1                     Normal        60-89                    Stage 2                     Decreased GFR        30-59     Stage 3                     Stage 3        15-29                    Stage 4                     Stage 4        < 15                      Stage 5                     Stage 5  Each stage of CKD assumes that the associated GFR level has been in  effect for at least 3 months. Determination of stages one and two (with  eGFR > 59 ml/min/m2) requires estimation of kidney damage for at least 3  months as defined by structural or functional abnormalities.  Limitations: All estimates of GFR will be less accurate for patients at  extremes of muscle mass (including but not limited to frail elderly,  critically ill, or cancer patients), those with unusual diets, and those  with conditions associated with reduced secretion or extrarenal  elimination of creatinine. The eGFR equation is not recommended for use  in patients with unstable creatinine levels. mL/min/1.73M2    eGFR if African American: 9 mL/min/1.73M2          Impression/Plan: Chronic respiratory acidosis, requiring BIPAP  ESRD now, with GFR less than 10  Chronic diastolic CHF, with increased volume status today  Chronic AF, off anticoagulation due to marked anemia  Anemia, likely multifactorial due in part to advanced renal disease    Plan:  Continue supportive care, BIPAP  Continue current diuretics  Palliative care followup, Prognosis very poor, Discussed with family-they are in agreement    Leopoldo Funes MD, FACC  [facility]

## 2018-01-01 NOTE — PROGRESS NOTE ADULT - SUBJECTIVE AND OBJECTIVE BOX
Nephrology Progress Note    No acute events overnight  Patient looks comfortable and not in distress  Denies pain or sob  Oriented to name but not to place or time  Not coherent at times    PHYSICAL EXAM    Vital Signs Last 24 Hrs  T(C): 36.7 (01 Jan 2018 04:30), Max: 36.9 (31 Dec 2017 14:19)  T(F): 98.1 (01 Jan 2018 04:30), Max: 98.4 (31 Dec 2017 14:19)  HR: 99 (01 Jan 2018 06:26) (71 - 99)  BP: 125/64 (01 Jan 2018 06:26) (98/56 - 131/62)  BP(mean): --  RR: 18 (01 Jan 2018 06:26) (18 - 18)  SpO2: 98% (01 Jan 2018 06:26) (94% - 100%)  I&O's Summary    31 Dec 2017 07:01  -  01 Jan 2018 07:00  --------------------------------------------------------  IN: 200 mL / OUT: 975 mL / NET: -775 mL      GA: awake and alert, oriented to self but not to place or time  EYES: EOMI, clear conj, anicteric sclera  ENT: dry MM, dry cracked lips, neck supple  LUNGS: CTABL, no wrc, normal effort  HEART: S1S2 normal, ++ peripheral edema  ABD: soft, NT/ND/BS+, no peritoneal signs  ; + oleary  EXT: well perfused, ++ edema  NEURO: awake and alert, confused  SKIN: warm and dry, no rash on visible skin      LABS Pending today                        7.7    11.33 )-----------( 203      ( 01 Jan 2018 08:08 )             25.0     12-31    143  |  95<L>  |  102<H>  ----------------------------<  129<H>  4.4   |  28  |  4.89<H>    Ca    8.6      31 Dec 2017 13:43        IMAGING:    ASSESSMENT: 87 yo woman with PMHx of right breast CA S/P lumpectomy in 2000, Uterine Ca S/P total hysterectomy in 2012, Skin CA S/P excision of bilateral lower eyelid skin CA, cholecystectomy, Osteoarthritis of bilateral knees, HTN, hyperlipidemia, paroxysmal Afib on coumadin s/p PPM for slow ventricular response, severe aortic stenosis now s/p TAVR April 2015 , who presents with SOB x 1 day, confusion. Found to have NANCY on CKD, hypercapneic respiratory failure, SIRS, elevated LFT and INR.    1. 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 if contributing to her mental status  2. CKD 3B-4 with recent baseline cr of 1.7-2.3 likely CRS/microvascular disease  3: AGMA likely due to uremia and Metabolic alkalosis due to intravascular volume contraction in the setting of diuretics:   4. Anemia likely multifactorial including anemia of CKD  5. Hypoalbuminemia likely due to chronic disease and low PO intake  6. Liver dysfunction considering increased transaminases and increased INR likely due to right heart failure - management per primary team  7. Hypercapnic respiratory failure and diastolic HF exacerbation- management per primary team      RECOMMENDATIONS:  - Worsening BUN: no indication for RRT at present  - Continue Bumex 1mg IVP bid and Zaroxolyn 5mg daily.  Creatinine will hopefully plateau soon.  Labs pending  - monitor ins and outs: noted UOP of 975 cc last 24 hrs and 775 negative  - keep net negative of 500 cc considering some clinical signs of dehydration: dry MM  - Monitor renal panel daily  - continue epogen,   - monitor H&H and maintain Htc of > 24 considering cardiac issues    Ani Parker MD  [facility]  [phone]

## 2018-01-01 NOTE — PROGRESS NOTE ADULT - SUBJECTIVE AND OBJECTIVE BOX
Patient is a 88y old  Female who presents with a chief complaint of sob  History obtained from family due to confusion (22 Dec 2017 21:26)      SUBJECTIVE / OVERNIGHT EVENTS: awake, alert, ate today, hasnt been on BIPAP all day.    MEDICATIONS  (STANDING):  ALBUTerol/ipratropium for Nebulization 3 milliLiter(s) Nebulizer every 6 hours  atorvastatin 10 milliGRAM(s) Oral at bedtime  buMETAnide Injectable 1 milliGRAM(s) IV Push every 12 hours  diltiazem    milliGRAM(s) Oral daily  docusate sodium 100 milliGRAM(s) Oral three times a day  epoetin toño Injectable 59378 Unit(s) SubCutaneous <User Schedule>  metolazone 5 milliGRAM(s) Oral daily  metoprolol     tartrate 25 milliGRAM(s) Oral two times a day  pantoprazole  Injectable 40 milliGRAM(s) IV Push two times a day  polyethylene glycol 3350 17 Gram(s) Oral daily  senna 2 Tablet(s) Oral at bedtime    MEDICATIONS  (PRN):      Vital Signs Last 24 Hrs  T(F): 97.3 (01-01-18 @ 19:37), Max: 98.1 (01-01-18 @ 04:30)  HR: 85 (01-01-18 @ 19:37) (71 - 99)  BP: 128/66 (01-01-18 @ 19:37) (101/57 - 145/82)  RR: 18 (01-01-18 @ 19:37) (18 - 18)  SpO2: 100% (01-01-18 @ 19:37) (94% - 100%)  Telemetry:   CAPILLARY BLOOD GLUCOSE        I&O's Summary    31 Dec 2017 07:01  -  01 Jan 2018 07:00  --------------------------------------------------------  IN: 200 mL / OUT: 975 mL / NET: -775 mL    01 Jan 2018 07:01  -  01 Jan 2018 20:05  --------------------------------------------------------  IN: 100 mL / OUT: 150 mL / NET: -50 mL        PHYSICAL EXAM:  GENERAL: NAD, well-developed  HEAD:  Atraumatic, Normocephalic  EYES: EOMI, PERRLA, conjunctiva and sclera clear  NECK: Supple, No JVD  CHEST/LUNG: Clear to auscultation bilaterally; No wheeze  HEART: Regular rate and rhythm; No murmurs, rubs, or gallops  ABDOMEN: Soft, Nontender, Nondistended; Bowel sounds present  EXTREMITIES:  2+ Peripheral Pulses, No clubbing, cyanosis, or edema  PSYCH: AAOx3  NEUROLOGY: non-focal  SKIN: No rashes or lesions    LABS:                        7.7    11.33 )-----------( 203      ( 01 Jan 2018 08:08 )             25.0     01-01    137  |  91<L>  |  104<H>  ----------------------------<  138<H>  4.5   |  29  |  4.91<H>    Ca    9.3      01 Jan 2018 08:40      PT/INR - ( 01 Jan 2018 10:36 )   PT: 12.6 sec;   INR: 1.11 ratio                   RADIOLOGY & ADDITIONAL TESTS:    Imaging Personally Reviewed:    Consultant(s) Notes Reviewed:      Care Discussed with Consultants/Other Providers:

## 2018-01-02 LAB
ANION GAP SERPL CALC-SCNC: 14 MMOL/L — SIGNIFICANT CHANGE UP (ref 5–17)
BUN SERPL-MCNC: 108 MG/DL — HIGH (ref 7–23)
CALCIUM SERPL-MCNC: 8.8 MG/DL — SIGNIFICANT CHANGE UP (ref 8.4–10.5)
CHLORIDE SERPL-SCNC: 95 MMOL/L — LOW (ref 96–108)
CO2 SERPL-SCNC: 30 MMOL/L — SIGNIFICANT CHANGE UP (ref 22–31)
CREAT SERPL-MCNC: 4.92 MG/DL — HIGH (ref 0.5–1.3)
GLUCOSE SERPL-MCNC: 163 MG/DL — HIGH (ref 70–99)
HCT VFR BLD CALC: 25.3 % — LOW (ref 34.5–45)
HGB BLD-MCNC: 7.5 G/DL — LOW (ref 11.5–15.5)
MAGNESIUM SERPL-MCNC: 2.5 MG/DL — SIGNIFICANT CHANGE UP (ref 1.6–2.6)
MCHC RBC-ENTMCNC: 29.3 PG — SIGNIFICANT CHANGE UP (ref 27–34)
MCHC RBC-ENTMCNC: 29.6 GM/DL — LOW (ref 32–36)
MCV RBC AUTO: 98.8 FL — SIGNIFICANT CHANGE UP (ref 80–100)
PHOSPHATE SERPL-MCNC: 7 MG/DL — HIGH (ref 2.5–4.5)
PLATELET # BLD AUTO: 228 K/UL — SIGNIFICANT CHANGE UP (ref 150–400)
POTASSIUM SERPL-MCNC: 4.6 MMOL/L — SIGNIFICANT CHANGE UP (ref 3.5–5.3)
POTASSIUM SERPL-SCNC: 4.6 MMOL/L — SIGNIFICANT CHANGE UP (ref 3.5–5.3)
RBC # BLD: 2.56 M/UL — LOW (ref 3.8–5.2)
RBC # FLD: 19.3 % — HIGH (ref 10.3–14.5)
SODIUM SERPL-SCNC: 139 MMOL/L — SIGNIFICANT CHANGE UP (ref 135–145)
WBC # BLD: 11.28 K/UL — HIGH (ref 3.8–10.5)
WBC # FLD AUTO: 11.28 K/UL — HIGH (ref 3.8–10.5)

## 2018-01-02 RX ADMIN — Medication 3 MILLILITER(S): at 11:27

## 2018-01-02 RX ADMIN — BUMETANIDE 1 MILLIGRAM(S): 0.25 INJECTION INTRAMUSCULAR; INTRAVENOUS at 05:00

## 2018-01-02 RX ADMIN — Medication 3 MILLILITER(S): at 00:53

## 2018-01-02 RX ADMIN — Medication 3 MILLILITER(S): at 18:23

## 2018-01-02 RX ADMIN — ATORVASTATIN CALCIUM 10 MILLIGRAM(S): 80 TABLET, FILM COATED ORAL at 22:16

## 2018-01-02 RX ADMIN — PANTOPRAZOLE SODIUM 40 MILLIGRAM(S): 20 TABLET, DELAYED RELEASE ORAL at 05:01

## 2018-01-02 RX ADMIN — ERYTHROPOIETIN 10000 UNIT(S): 10000 INJECTION, SOLUTION INTRAVENOUS; SUBCUTANEOUS at 11:53

## 2018-01-02 RX ADMIN — Medication 3 MILLILITER(S): at 23:10

## 2018-01-02 RX ADMIN — Medication 3 MILLILITER(S): at 05:00

## 2018-01-02 RX ADMIN — Medication 25 MILLIGRAM(S): at 05:01

## 2018-01-02 RX ADMIN — Medication 120 MILLIGRAM(S): at 05:02

## 2018-01-02 RX ADMIN — Medication 100 MILLIGRAM(S): at 22:16

## 2018-01-02 RX ADMIN — BUMETANIDE 1 MILLIGRAM(S): 0.25 INJECTION INTRAMUSCULAR; INTRAVENOUS at 18:24

## 2018-01-02 RX ADMIN — PANTOPRAZOLE SODIUM 40 MILLIGRAM(S): 20 TABLET, DELAYED RELEASE ORAL at 18:24

## 2018-01-02 RX ADMIN — Medication 25 MILLIGRAM(S): at 18:24

## 2018-01-02 RX ADMIN — SENNA PLUS 2 TABLET(S): 8.6 TABLET ORAL at 22:16

## 2018-01-02 NOTE — PROGRESS NOTE ADULT - ASSESSMENT
89 yo woman with PMHx of right breast CA S/P lumpectomy in 2000, Uterine Ca S/P total hysterectomy in 2012, Skin CA S/P excision of bilateral lower eyelid skin CA, cholecystectomy, Osteoarthritis of bilateral knees, HTN, hyperlipidemia, paroxysmal Afib on coumadin s/p PPM for slow ventricular response, severe aortic stenosis now s/p TAVR April 2015 , who presents with SOB, confusion. Found to have NANCY on CKD, hypercapneic respiratory failure, SIRS.    1. 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 CKD 3B-4 with recent baseline cr of 1.7-2.3 likely CRS/microvascular disease  3: AGMA likely due to uremia and Metabolic alkalosis due to intravascular volume contraction in the setting of diuretics:   4. Anemia likely multifactorial including anemia of CKD  5. Hypoalbuminemia likely due to chronic disease and low PO intake  6. Liver dysfunction considering increased transaminases and increased INR likely due to right heart failure - management per primary team  7. Hypercapnic respiratory failure and diastolic HF exacerbation- management per primary team      RECOMMENDATIONS:  - Worsening BUN: no  HD  - Continue Bumex 1mg IVP bid and Zaroxolyn 5mg daily.  Creatinine will hopefully plateau soon.    - monitor ins and outs:   - Monitor renal panel daily  - continue epogen,   - monitor H&H and maintain Htc of > 24 considering cardiac issues    Palliative is following and to sched a family meeting

## 2018-01-02 NOTE — PROGRESS NOTE ADULT - ASSESSMENT
multiple medical issues including cardiac disease, HTN, hyoxemia with chronic worsening respiratory status, afib, prior breast CA with generalized decline with hypercapnia and hypoxemia due primarily to fluid overload and cardiac decompensation.  Also with confusion    continue current rx  close attention to fluid and cardiac status  BIPAP qhs and prn as pt allows  palliative care input  dvt prophylaxis  routine gi prophylaxis as necessary  Oxygen saturation greater than 92%    Molly Soto MD  [phone]  Pulmonary

## 2018-01-02 NOTE — PROGRESS NOTE ADULT - SUBJECTIVE AND OBJECTIVE BOX
NEPHROLOGY-[facility] [phone]        Patient seen and examined in bed.  She was pleasantly confused.            MEDICATIONS  (STANDING):  ALBUTerol/ipratropium for Nebulization 3 milliLiter(s) Nebulizer every 6 hours  atorvastatin 10 milliGRAM(s) Oral at bedtime  buMETAnide Injectable 1 milliGRAM(s) IV Push every 12 hours  diltiazem    milliGRAM(s) Oral daily  docusate sodium 100 milliGRAM(s) Oral three times a day  epoetin toño Injectable 37759 Unit(s) SubCutaneous <User Schedule>  metolazone 5 milliGRAM(s) Oral daily  metoprolol     tartrate 25 milliGRAM(s) Oral two times a day  pantoprazole  Injectable 40 milliGRAM(s) IV Push two times a day  polyethylene glycol 3350 17 Gram(s) Oral daily  senna 2 Tablet(s) Oral at bedtime      VITAL:  T(C): , Max: 37.4 (01-02-18 @ 04:22)  T(F): , Max: 99.3 (01-02-18 @ 04:22)  HR: 74 (01-02-18 @ 09:06)  BP: 124/61 (01-02-18 @ 04:22)  BP(mean): --  RR: 18 (01-02-18 @ 04:22)  SpO2: 99% (01-02-18 @ 09:06)  Wt(kg): --    I and O's:    01-01 @ 07:01  -  01-02 @ 07:00  --------------------------------------------------------  IN: 100 mL / OUT: 250 mL / NET: -150 mL    01-02 @ 07:01  -  01-02 @ 11:49  --------------------------------------------------------  IN: 125 mL / OUT: 200 mL / NET: -75 mL          PHYSICAL EXAM:    Constitutional: NAD confused  HEENT: PERRLA    Neck:  + JVD  Respiratory: poor effort  Cardiovascular: S1 and S2  Gastrointestinal: BS+, soft, NT/ND  Extremities: +  peripheral edema  Neurological: no focal deficits  Psychiatric: Normal mood, normal affect  : +  Gautam  Skin: No rashes  Access: Not applicable    LABS:                        7.5    11.28 )-----------( 228      ( 02 Jan 2018 07:02 )             25.3     01-02    139  |  95<L>  |  108<H>  ----------------------------<  163<H>  4.6   |  30  |  4.92<H>    Ca    8.8      02 Jan 2018 07:06  Phos  7.0     01-02  Mg     2.5     01-02            Urine Studies:          RADIOLOGY & ADDITIONAL STUDIES:

## 2018-01-02 NOTE — PROGRESS NOTE ADULT - ASSESSMENT
88 y/0 debilitated female with Hx of right breast CA S/P lumpectomy in 2000, Uterine Ca S/P total hysterectomy in 2012, Skin CA S/P excision of bilateral lower eyelid skin CA, cholecystectomy, Osteoarthritis of bilateral knees, HTN, hyperlipidemia, paroxysmal Afib on coumadin s/p PPM for slow ventricular response, severe aortic stenosiss/p TAVR 2015 p/w inc SOB due to hypercapnic respiratory failure, PNA, NANCY

## 2018-01-02 NOTE — ADVANCED PRACTICE NURSE CONSULT - ASSESSMENT
Spoke with pt's daughter in law Azeb (called pt's son Michael to provide information re: hospice care as requested by PCT but he requested I speak with his wife).  Explained hospice POC, home hospice services.  Pt has had 24h care at home prior to this admission but family is not sure pt can be cared for at home any longer.  Will assess pt for inpt hospice care on 1/3 and discuss with Palliative Care Team and [facility] Medical Director

## 2018-01-02 NOTE — PROGRESS NOTE ADULT - SUBJECTIVE AND OBJECTIVE BOX
OSCAR LOPEZ    Patient is a 88y old  Female who presents with a chief complaint of sob  History obtained from family due to confusion (22 Dec 2017 21:26)    No real clinical change.  Somnolent.  Comfortable at rest.    Allergies    No Known Allergies    Intolerances    digoxin (Rash; Drowsiness)    MEDICATIONS  (STANDING):  ALBUTerol/ipratropium for Nebulization 3 milliLiter(s) Nebulizer every 6 hours  atorvastatin 10 milliGRAM(s) Oral at bedtime  buMETAnide Injectable 1 milliGRAM(s) IV Push every 12 hours  diltiazem    milliGRAM(s) Oral daily  docusate sodium 100 milliGRAM(s) Oral three times a day  epoetin toño Injectable 91263 Unit(s) SubCutaneous <User Schedule>  metolazone 5 milliGRAM(s) Oral daily  metoprolol     tartrate 25 milliGRAM(s) Oral two times a day  pantoprazole  Injectable 40 milliGRAM(s) IV Push two times a day  polyethylene glycol 3350 17 Gram(s) Oral daily  senna 2 Tablet(s) Oral at bedtime      PHYSICAL EXAM:  Vital Signs Last 24 Hrs  T(C): 37.4 (2018 04:22), Max: 37.4 (2018 04:22)  T(F): 99.3 (2018 04:22), Max: 99.3 (2018 04:22)  HR: 74 (2018 09:06) (72 - 94)  BP: 124/61 (2018 04:22) (101/57 - 145/82)  BP(mean): --  RR: 18 (2018 04:22) (18 - 18)  SpO2: 99% (2018 09:06) (93% - 100%)  Daily     Daily Weight in k.2 (2018 14:01)  I&O's Summary    2018 07:01  -  2018 07:00  --------------------------------------------------------  IN: 100 mL / OUT: 250 mL / NET: -150 mL    2018 07:01  -  2018 11:03  --------------------------------------------------------  IN: 125 mL / OUT: 200 mL / NET: -75 mL    General Appearance: 	 Alert, cooperative, no acute distress  Neck: JVP estimated at 10-12 cm  Lungs:  clear to auscultation and percussion bilaterally  Cor:  pmi 5th ICS MCL, Irregular rate and rhythm, S1 normal intensity, S2 normal intensity, GHAZALA  Abdomen:	 soft, non-tender; bowel sounds normal; no masses,  no organomegaly  Extremities: 2+ edema    EKG:  Telemetry: AF    Labs:  CBC Full  -  ( 2018 07:02 )  WBC Count : 11.28 K/uL  Hemoglobin : 7.5 g/dL  Hematocrit : 25.3 %  Platelet Count - Automated : 228 K/uL  Mean Cell Volume : 98.8 fl  Mean Cell Hemoglobin : 29.3 pg  Mean Cell Hemoglobin Concentration : 29.6 gm/dL  Auto Neutrophil # : x  Auto Lymphocyte # : x  Auto Monocyte # : x  Auto Eosinophil # : x  Auto Basophil # : x  Auto Neutrophil % : x  Auto Lymphocyte % : x  Auto Monocyte % : x  Auto Eosinophil % : x  Auto Basophil % : x    Basic Metabolic Panel in AM (18 @ 07:06)    Sodium, Serum: 139 mmol/L    Potassium, Serum: 4.6 mmol/L    Chloride, Serum: 95 mmol/L    Carbon Dioxide, Serum: 30 mmol/L    Anion Gap, Serum: 14 mmol/L    Blood Urea Nitrogen, Serum: 108 mg/dL    Creatinine, Serum: 4.92 mg/dL    Glucose, Serum: 163 mg/dL    Calcium, Total Serum: 8.8 mg/dL    eGFR if Non : 7: Interpretative comment  The units for eGFR are ml/min/1.73m2 (normalized body surface area). The  eGFR is calculated from a serum creatinine using the CKD-EPI equation.  Other variables required for calculation are race, age and sex. Among  patients with chronic kidney disease (CKD), the eGFR is useful in  determining the stage of disease according to KDOQI CKD classification.  All eGFR results are reported numerically with the following  interpretation.          GFR                    With                 Without     (ml/min/1.73 m2)    Kidney Damage       Kidney Damage        >= 90                    Stage 1                     Normal        60-89                    Stage 2                     Decreased GFR        30-59     Stage 3                     Stage 3        15-29                    Stage 4                     Stage 4        < 15                      Stage 5                     Stage 5  Each stage of CKD assumes that the associated GFR level has been in  effect for at least 3 months. Determination of stages one and two (with  eGFR > 59 ml/min/m2) requires estimation of kidney damage for at least 3  months as defined by structural or functional abnormalities.  Limitations: All estimates of GFR will be less accurate for patients at  extremes of muscle mass (including but not limited to frail elderly,  critically ill, or cancer patients), those with unusual diets, and those  with conditions associated with reduced secretion or extrarenal  elimination of creatinine. The eGFR equation is not recommended for use  in patients with unstable creatinine levels. mL/min/1.73M2    eGFR if African American: 8 mL/min/1.73M2    Impression/Plan:   ESRD  Chronic diastolic CHF with volume overload  Chronic AF  Chronic respiratory acidosis requiring BIPAP    Plan:  Supportive care/BIPAP  Continue same diuretics, although she is still volume overloaded, I don't think she would be a dialysis candidate  Prognosis very poor, I explained this at length yesterday with family    Leopoldo Funes MD, FACC  [facility]

## 2018-01-02 NOTE — PROGRESS NOTE ADULT - SUBJECTIVE AND OBJECTIVE BOX
Patient is a 88y old  Female who presents with a chief complaint of sob  History obtained from family due to confusion (22 Dec 2017 21:26)      SUBJECTIVE / OVERNIGHT EVENTS: ptn pulled O2 off, has cyanotic lips, awake, alert, not agitated, she is confused. denies feeling pain    MEDICATIONS  (STANDING):  ALBUTerol/ipratropium for Nebulization 3 milliLiter(s) Nebulizer every 6 hours  atorvastatin 10 milliGRAM(s) Oral at bedtime  buMETAnide Injectable 1 milliGRAM(s) IV Push every 12 hours  diltiazem    milliGRAM(s) Oral daily  docusate sodium 100 milliGRAM(s) Oral three times a day  epoetin toño Injectable 10269 Unit(s) SubCutaneous <User Schedule>  metolazone 5 milliGRAM(s) Oral daily  metoprolol     tartrate 25 milliGRAM(s) Oral two times a day  pantoprazole  Injectable 40 milliGRAM(s) IV Push two times a day  polyethylene glycol 3350 17 Gram(s) Oral daily  senna 2 Tablet(s) Oral at bedtime    MEDICATIONS  (PRN):      Vital Signs Last 24 Hrs  T(F): 98.5 (01-02-18 @ 12:38), Max: 99.3 (01-02-18 @ 04:22)  HR: 77 (01-02-18 @ 12:38) (74 - 94)  BP: 110/66 (01-02-18 @ 12:38) (110/66 - 128/66)  RR: 18 (01-02-18 @ 12:38) (18 - 18)  SpO2: 98% (01-02-18 @ 12:38) (93% - 100%)  Telemetry:   CAPILLARY BLOOD GLUCOSE        I&O's Summary    01 Jan 2018 07:01  -  02 Jan 2018 07:00  --------------------------------------------------------  IN: 100 mL / OUT: 250 mL / NET: -150 mL    02 Jan 2018 07:01  -  02 Jan 2018 18:50  --------------------------------------------------------  IN: 300 mL / OUT: 350 mL / NET: -50 mL        PHYSICAL EXAM:  GENERAL: NAD, well-developed  HEAD:  Atraumatic, Normocephalic  EYES: EOMI, PERRLA, conjunctiva and sclera clear  NECK: Supple, No JVD  CHEST/LUNG: Clear to auscultation bilaterally; No wheeze  HEART: Regular rate and rhythm; No murmurs, rubs, or gallops  ABDOMEN: Soft, Nontender, Nondistended; Bowel sounds present  EXTREMITIES:  2+ Peripheral Pulses, No clubbing, cyanosis, or edema  PSYCH: AAOx3  NEUROLOGY: non-focal  SKIN: No rashes or lesions    LABS:                        7.5    11.28 )-----------( 228      ( 02 Jan 2018 07:02 )             25.3     01-02    139  |  95<L>  |  108<H>  ----------------------------<  163<H>  4.6   |  30  |  4.92<H>    Ca    8.8      02 Jan 2018 07:06  Phos  7.0     01-02  Mg     2.5     01-02      PT/INR - ( 01 Jan 2018 10:36 )   PT: 12.6 sec;   INR: 1.11 ratio                   RADIOLOGY & ADDITIONAL TESTS:    Imaging Personally Reviewed:    Consultant(s) Notes Reviewed:      Care Discussed with Consultants/Other Providers:

## 2018-01-02 NOTE — PROGRESS NOTE ADULT - ATTENDING COMMENTS
goals of care d/w son, ptn is DNR. x 30 min. d/w Palliative care, agree ptn is terminal and in need of hospice. dima azevedo

## 2018-01-02 NOTE — PROGRESS NOTE ADULT - SUBJECTIVE AND OBJECTIVE BOX
Follow-up Pulm Progress Note    No new respiratory events overnight.  Denies increased SOB, chest pain, cough or mucus. overall comfortable without acute distress    Medications:  MEDICATIONS  (STANDING):  ALBUTerol/ipratropium for Nebulization 3 milliLiter(s) Nebulizer every 6 hours  atorvastatin 10 milliGRAM(s) Oral at bedtime  buMETAnide Injectable 1 milliGRAM(s) IV Push every 12 hours  diltiazem    milliGRAM(s) Oral daily  docusate sodium 100 milliGRAM(s) Oral three times a day  epoetin toño Injectable 88540 Unit(s) SubCutaneous <User Schedule>  metolazone 5 milliGRAM(s) Oral daily  metoprolol     tartrate 25 milliGRAM(s) Oral two times a day  pantoprazole  Injectable 40 milliGRAM(s) IV Push two times a day  polyethylene glycol 3350 17 Gram(s) Oral daily  senna 2 Tablet(s) Oral at bedtime    MEDICATIONS  (PRN):      Vent settings (if applicable)      Vital Signs Last 24 Hrs  T(C): 37.4 (02 Jan 2018 04:22), Max: 37.4 (02 Jan 2018 04:22)  T(F): 99.3 (02 Jan 2018 04:22), Max: 99.3 (02 Jan 2018 04:22)  HR: 94 (02 Jan 2018 04:22) (72 - 94)  BP: 124/61 (02 Jan 2018 04:22) (101/57 - 145/82)  BP(mean): --  RR: 18 (02 Jan 2018 04:22) (18 - 18)  SpO2: 93% (02 Jan 2018 04:22) (93% - 100%)          01-01 @ 07:01  -  01-02 @ 07:00  --------------------------------------------------------  IN: 100 mL / OUT: 250 mL / NET: -150 mL          LABS:                        7.5    11.28 )-----------( 228      ( 02 Jan 2018 07:02 )             25.3     01-01    137  |  91<L>  |  104<H>  ----------------------------<  138<H>  4.5   |  29  |  4.91<H>    Ca    9.3      01 Jan 2018 08:40            CAPILLARY BLOOD GLUCOSE        PT/INR - ( 01 Jan 2018 10:36 )   PT: 12.6 sec;   INR: 1.11 ratio                   CULTURES:        Physical Examination:  Awake and alert, generally comfortable  HEENT: unremarkable  PULM: Clear to auscultation bilaterally, no significant sputum production  CVS: Regular rate and rhythm, no murmurs, rubs, or gallops  Abd:  soft, non tender  Extrem: No CCE    RADIOLOGY REVIEWED  CXR:    CT chest:

## 2018-01-02 NOTE — PROGRESS NOTE ADULT - SUBJECTIVE AND OBJECTIVE BOX
INTERVAL HPI/OVERNIGHT EVENTS:  Pt much more alert but agitated in the bed,  ROS limited due to condition     Allergies    No Known Allergies    Intolerances    digoxin (Rash; Drowsiness)      ADVANCE DIRECTIVES:    DNR: [ ] NO [ x] YES (Date) MOLST [ ] NO [ ] YES (Date)    MEDICATIONS  (STANDING):  ALBUTerol/ipratropium for Nebulization 3 milliLiter(s) Nebulizer every 6 hours  atorvastatin 10 milliGRAM(s) Oral at bedtime  buMETAnide Injectable 1 milliGRAM(s) IV Push every 12 hours  diltiazem    milliGRAM(s) Oral daily  docusate sodium 100 milliGRAM(s) Oral three times a day  epoetin toño Injectable 14462 Unit(s) SubCutaneous <User Schedule>  metolazone 5 milliGRAM(s) Oral daily  metoprolol     tartrate 25 milliGRAM(s) Oral two times a day  pantoprazole  Injectable 40 milliGRAM(s) IV Push two times a day  polyethylene glycol 3350 17 Gram(s) Oral daily  senna 2 Tablet(s) Oral at bedtime    MEDICATIONS  (PRN):      PRESENT SYMPTOMS:  Source: [ ] Patient   [ ] Family   [x ] Team     Pain:                        x[ ] No [ ] Yes             [ ] Mild [ ] Moderate [ ] Severe    Onset -  Location -  Duration -  Character -  Alleviating/Aggravating -  Radiation -  Timing -    Dyspnea:                [ ] No [ x] Yes             [x ] Mild [ ] Moderate [ ] Severe    Anxiety:                  [ ] No [ x] Yes             [ ] Mild [ x] Moderate [ ] Severe    Fatigue:                  [ ] No [x ] Yes             [ x] Mild [ ] Moderate [ ] Severe    Nausea:                  [ x] No [ ] Yes             [ ] Mild [ ] Moderate [ ] Severe    Loss of appetite:   [ ] No [ x] Yes             [ x] Mild [ ] Moderate [ ] Severe    Constipation:        [x ] No [ ] Yes             [ ] Mild [ ] Moderate [ ] Severe    Other Symptoms:  [ ] All other review of systems negative   [x ] Unable to obtain due to poor mentation     Karnofsky Performance Score/Palliative Performance Status Version 2:     30    %    PHYSICAL EXAM:  Vital Signs Last 24 Hrs  T(C): 36.9 (02 Jan 2018 12:38), Max: 37.4 (02 Jan 2018 04:22)  T(F): 98.5 (02 Jan 2018 12:38), Max: 99.3 (02 Jan 2018 04:22)  HR: 77 (02 Jan 2018 12:38) (74 - 94)  BP: 110/66 (02 Jan 2018 12:38) (110/66 - 145/82)  BP(mean): --  RR: 18 (02 Jan 2018 12:38) (18 - 18)  SpO2: 98% (02 Jan 2018 12:38) (93% - 100%) I&O's Summary    01 Jan 2018 07:01  -  02 Jan 2018 07:00  --------------------------------------------------------  IN: 100 mL / OUT: 250 mL / NET: -150 mL    02 Jan 2018 07:01  -  02 Jan 2018 16:07  --------------------------------------------------------  IN: 300 mL / OUT: 350 mL / NET: -50 mL        General:  [ ] Alert  [x ] Oriented x  1    [ x] Lethargic  [ ] Agitated   [ ] Cachexia   [ ] Unarousable  [ ] Verbal  [ ] Non-Verbal    HEENT:  [ ] Normal   [x ] Dry mouth   [ ] ET Tube    [ ] Trach  [ ] Oral lesions    Lungs:   [ ] Clear [ ] Tachypnea  [ ] Audible excessive secretions   [ ] Rhonchi        [ ] Right [ ] Left [ ] Bilateral  [ x] Crackles        [ ] Right [ ] Left [ x] Bilateral bases   [ ] Wheezing     [ ] Right [ ] Left [ ] Bilateral    Cardiovascular:  [ ] Regular [ ] Irregular [ ] Tachycardia   [ ] Bradycardia  [ x] Murmur [ ] Other    Abdomen: [ ] Soft  [ ] Distended   [ x] +BS  [ ] Non tender [ ] Tender  [ ]PEG   [ ]OGT/ NGT   Last BM:       Genitourinary: [ ] Normal [x ] Incontinent   [ ] Oliguria/Anuria   [ ] Gautam    Musculoskeletal:  [ ] Normal   [ ] Weakness  [x ] Bedbound/Wheelchair bound x ] Edema decreased    Neurological: [ ] No focal deficits  [ x] Cognitive impairment  [ ] Dysphagia [ ] Dysarthria [ ] Paresis [ ] Other     Skin: [ x] Normal   [ ] Pressure ulcer(s)                  [ ] Rash    LABS:                        7.5    11.28 )-----------( 228      ( 02 Jan 2018 07:02 )             25.3     01-02    139  |  95<L>  |  108<H>  ----------------------------<  163<H>  4.6   |  30  |  4.92<H>    Ca    8.8      02 Jan 2018 07:06  Phos  7.0     01-02  Mg     2.5     01-02      PT/INR - ( 01 Jan 2018 10:36 )   PT: 12.6 sec;   INR: 1.11 ratio               Shock: [ ] Septic [ ] Cardiogenic [ ] Neurologic [ ] Hypovolemic  Vasopressors x   Inotrophs x     Oral Intake: [ ] Unable/mouth care only [x ] Minimal [ ] Moderate [ ] Full Capability    Diet: [ ] NPO [ ] Tube feeds [ ] TPN [ ] Other     RADIOLOGY & ADDITIONAL STUDIES:    REFERRALS:   [ ] Chaplaincy  [ x] Hospice  [ ] Child Life  [ ] Social Work  [ ] Case management [ ] Holistic Therapy

## 2018-01-03 RX ORDER — HALOPERIDOL DECANOATE 100 MG/ML
0.5 INJECTION INTRAMUSCULAR
Qty: 0 | Refills: 0 | Status: DISCONTINUED | OUTPATIENT
Start: 2018-01-03 | End: 2018-01-05

## 2018-01-03 RX ORDER — HYDROMORPHONE HYDROCHLORIDE 2 MG/ML
0.2 INJECTION INTRAMUSCULAR; INTRAVENOUS; SUBCUTANEOUS EVERY 6 HOURS
Qty: 0 | Refills: 0 | Status: DISCONTINUED | OUTPATIENT
Start: 2018-01-03 | End: 2018-01-05

## 2018-01-03 RX ADMIN — Medication 3 MILLILITER(S): at 06:30

## 2018-01-03 RX ADMIN — BUMETANIDE 1 MILLIGRAM(S): 0.25 INJECTION INTRAMUSCULAR; INTRAVENOUS at 17:31

## 2018-01-03 RX ADMIN — POLYETHYLENE GLYCOL 3350 17 GRAM(S): 17 POWDER, FOR SOLUTION ORAL at 11:31

## 2018-01-03 RX ADMIN — Medication 120 MILLIGRAM(S): at 06:30

## 2018-01-03 RX ADMIN — Medication 3 MILLILITER(S): at 11:31

## 2018-01-03 RX ADMIN — Medication 3 MILLILITER(S): at 23:02

## 2018-01-03 RX ADMIN — Medication 100 MILLIGRAM(S): at 06:30

## 2018-01-03 RX ADMIN — HYDROMORPHONE HYDROCHLORIDE 0.2 MILLIGRAM(S): 2 INJECTION INTRAMUSCULAR; INTRAVENOUS; SUBCUTANEOUS at 17:34

## 2018-01-03 RX ADMIN — BUMETANIDE 1 MILLIGRAM(S): 0.25 INJECTION INTRAMUSCULAR; INTRAVENOUS at 06:29

## 2018-01-03 RX ADMIN — PANTOPRAZOLE SODIUM 40 MILLIGRAM(S): 20 TABLET, DELAYED RELEASE ORAL at 17:31

## 2018-01-03 RX ADMIN — HYDROMORPHONE HYDROCHLORIDE 0.2 MILLIGRAM(S): 2 INJECTION INTRAMUSCULAR; INTRAVENOUS; SUBCUTANEOUS at 23:02

## 2018-01-03 RX ADMIN — HALOPERIDOL DECANOATE 0.5 MILLIGRAM(S): 100 INJECTION INTRAMUSCULAR at 17:32

## 2018-01-03 RX ADMIN — HYDROMORPHONE HYDROCHLORIDE 0.2 MILLIGRAM(S): 2 INJECTION INTRAMUSCULAR; INTRAVENOUS; SUBCUTANEOUS at 17:31

## 2018-01-03 RX ADMIN — Medication 25 MILLIGRAM(S): at 06:31

## 2018-01-03 RX ADMIN — PANTOPRAZOLE SODIUM 40 MILLIGRAM(S): 20 TABLET, DELAYED RELEASE ORAL at 06:31

## 2018-01-03 RX ADMIN — Medication 3 MILLILITER(S): at 17:31

## 2018-01-03 NOTE — PROGRESS NOTE ADULT - ASSESSMENT
88 y/0 debilitated female with Hx of right breast CA S/P lumpectomy in 2000, Uterine Ca S/P total hysterectomy in 2012, Skin CA S/P excision of bilateral lower eyelid skin CA, cholecystectomy, Osteoarthritis of bilateral knees, HTN, hyperlipidemia, paroxysmal Afib on coumadin s/p PPM for slow ventricular response, severe aortic stenosiss/p TAVR 2015 p/w inc SOB due to hypercapnic respiratory failure, PNA, NANCY in dying process

## 2018-01-03 NOTE — PROGRESS NOTE ADULT - SUBJECTIVE AND OBJECTIVE BOX
OSCAR LOPEZ    Patient is a 88y old  Female who presents with a chief complaint of sob  History obtained from family due to confusion (22 Dec 2017 21:26)    Clinically unchanged.    Allergies    No Known Allergies    Intolerances    digoxin (Rash; Drowsiness)    MEDICATIONS  (STANDING):  ALBUTerol/ipratropium for Nebulization 3 milliLiter(s) Nebulizer every 6 hours  buMETAnide Injectable 1 milliGRAM(s) IV Push every 12 hours  haloperidol    Injectable 0.5 milliGRAM(s) IV Push two times a day  HYDROmorphone  Injectable 0.2 milliGRAM(s) IV Push every 6 hours  pantoprazole  Injectable 40 milliGRAM(s) IV Push two times a day    PHYSICAL EXAM:  Vital Signs Last 24 Hrs  T(C): 37 (03 Jan 2018 11:25), Max: 37.2 (03 Jan 2018 04:28)  T(F): 98.6 (03 Jan 2018 11:25), Max: 98.9 (03 Jan 2018 04:28)  HR: 88 (03 Jan 2018 11:25) (78 - 97)  BP: 110/55 (03 Jan 2018 11:25) (99/60 - 118/67)  BP(mean): --  RR: 18 (03 Jan 2018 11:25) (18 - 18)  SpO2: 98% (03 Jan 2018 11:25) (96% - 98%)  Daily     Daily   I&O's Summary    02 Jan 2018 07:01  -  03 Jan 2018 07:00  --------------------------------------------------------  IN: 620 mL / OUT: 350 mL / NET: 270 mL    03 Jan 2018 07:01  -  03 Jan 2018 15:33  --------------------------------------------------------  IN: 250 mL / OUT: 0 mL / NET: 250 mL        General Appearance: 	Somnolent and confused  Neck: JVP elevated at 12 cm  Lungs:  clear to auscultation and percussion bilaterally  Cor:  pmi 5th ICS MCL, Irregular rate and rhythm, S1 normal intensity, S2 normal intensity, GHAZALA  Abdomen:	 soft, non-tender; bowel sounds normal; no masses,  no organomegaly  Extremities:  2+ edema      EKG:  Telemetry:    Labs:  CBC Full  -  ( 02 Jan 2018 07:02 )  WBC Count : 11.28 K/uL  Hemoglobin : 7.5 g/dL  Hematocrit : 25.3 %  Platelet Count - Automated : 228 K/uL  Mean Cell Volume : 98.8 fl  Mean Cell Hemoglobin : 29.3 pg  Mean Cell Hemoglobin Concentration : 29.6 gm/dL  Auto Neutrophil # : x  Auto Lymphocyte # : x  Auto Monocyte # : x  Auto Eosinophil # : x  Auto Basophil # : x  Auto Neutrophil % : x  Auto Lymphocyte % : x  Auto Monocyte % : x  Auto Eosinophil % : x  Auto Basophil % : x      Impression/Plan: Multiple comorbidities, including ESRD GFR less than 10 and not a dialysis candidate  Chronic respiratory acidosis  Chronic diastolic CHF/AF/s/p TAVR    Agree with comfort care and transfer to inpatient palliative care unit.    Leopoldo Funes MD, FACC  [facility]

## 2018-01-03 NOTE — PROGRESS NOTE ADULT - ASSESSMENT
89 yo woman with PMHx of right breast CA S/P lumpectomy in 2000, Uterine Ca S/P total hysterectomy in 2012, Skin CA S/P excision of bilateral lower eyelid skin CA, cholecystectomy, Osteoarthritis of bilateral knees, HTN, hyperlipidemia, paroxysmal Afib on coumadin s/p PPM for slow ventricular response, severe aortic stenosis now s/p TAVR April 2015 , who presents with SOB, confusion. Found to have NANCY on CKD, hypercapneic respiratory failure, SIRS.    1. 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 CKD 3B-4 with recent baseline cr of 1.7-2.3 likely CRS/microvascular disease  3: Anemia likely multifactorial including anemia of CKD  4. Hypoalbuminemia likely due to chronic disease and low PO intake  5. Liver dysfunction considering increased transaminases and increased INR likely due to right heart failure - management per primary team  6. Hypercapnic respiratory failure and diastolic HF exacerbation- management per primary team      RECOMMENDATIONS:  -  No  HD; Continue Bumex 1mg IVP bid and Zaroxolyn 5mg daily.  Creatinine will hopefully plateau soon.    - monitor ins and outs:   - Monitor renal panel daily  - continue epogen,   - palliative care noted

## 2018-01-03 NOTE — PROGRESS NOTE ADULT - SUBJECTIVE AND OBJECTIVE BOX
NEPHROLOGY-[facility] [phone]        Patient seen and examined in bed.  No new changes        MEDICATIONS  (STANDING):  ALBUTerol/ipratropium for Nebulization 3 milliLiter(s) Nebulizer every 6 hours  atorvastatin 10 milliGRAM(s) Oral at bedtime  buMETAnide Injectable 1 milliGRAM(s) IV Push every 12 hours  diltiazem    milliGRAM(s) Oral daily  docusate sodium 100 milliGRAM(s) Oral three times a day  epoetin toño Injectable 93200 Unit(s) SubCutaneous <User Schedule>  metolazone 5 milliGRAM(s) Oral daily  metoprolol     tartrate 25 milliGRAM(s) Oral two times a day  pantoprazole  Injectable 40 milliGRAM(s) IV Push two times a day  polyethylene glycol 3350 17 Gram(s) Oral daily  senna 2 Tablet(s) Oral at bedtime      VITAL:  T(C): , Max: 37.2 (01-03-18 @ 04:28)  T(F): , Max: 98.9 (01-03-18 @ 04:28)  HR: 87 (01-03-18 @ 09:47)  BP: 115/67 (01-03-18 @ 06:27)  BP(mean): --  RR: 18 (01-03-18 @ 04:28)  SpO2: 98% (01-03-18 @ 09:47)  Wt(kg): --    I and O's:    01-02 @ 07:01  -  01-03 @ 07:00  --------------------------------------------------------  IN: 620 mL / OUT: 350 mL / NET: 270 mL          PHYSICAL EXAM:    Constitutional: NAD  HEENT: PERRLA    Neck:  + JVD  Respiratory: poor effort  Cardiovascular: S1 and S2  Gastrointestinal: BS+, soft, NT/ND  Extremities: No peripheral edema  Neurological: A/O x 3, no focal deficits  Psychiatric: Normal mood, normal affect  : No Gautam  Skin: No rashes  Access: Not applicable    LABS:                        7.5    11.28 )-----------( 228      ( 02 Jan 2018 07:02 )             25.3     01-02    139  |  95<L>  |  108<H>  ----------------------------<  163<H>  4.6   |  30  |  4.92<H>    Ca    8.8      02 Jan 2018 07:06  Phos  7.0     01-02  Mg     2.5     01-02            Urine Studies:          RADIOLOGY & ADDITIONAL STUDIES:

## 2018-01-03 NOTE — PROGRESS NOTE ADULT - SUBJECTIVE AND OBJECTIVE BOX
INTERVAL HPI/OVERNIGHT EVENTS:   Pt continues to have agitation. + dyspnea, + confusion, opens eyes rest of ros is limited due to condition    Allergies    No Known Allergies    Intolerances    digoxin (Rash; Drowsiness)      ADVANCE DIRECTIVES:    DNR: [ ] NO [x ] YES (Date) MOLST [ ] NO [ ] YES (Date)    MEDICATIONS  (STANDING):  ALBUTerol/ipratropium for Nebulization 3 milliLiter(s) Nebulizer every 6 hours  buMETAnide Injectable 1 milliGRAM(s) IV Push every 12 hours  haloperidol    Injectable 0.5 milliGRAM(s) IV Push two times a day  HYDROmorphone  Injectable 0.2 milliGRAM(s) IV Push every 6 hours  pantoprazole  Injectable 40 milliGRAM(s) IV Push two times a day    MEDICATIONS  (PRN):      PRESENT SYMPTOMS:  Source: [ ] Patient   [ ] Family   x[ ] Team     Pain:                        [ x] No [ ] Yes             [ ] Mild [ ] Moderate [ ] Severe    Onset -  Location -  Duration -  Character -  Alleviating/Aggravating -  Radiation -  Timing -    Dyspnea:                [ ] No [ x] Yes             [ ] Mild [ x] Moderate [ ] Severe    Anxiety:                  [ ] No [x ] Yes             [ ] Mild [ x] Moderate [ ] Severe    Fatigue:                  [ ] No [ x] Yes             [ ] Mild [ ] Moderate [x ] Severe    Nausea:                  [x ] No [ ] Yes             [ ] Mild [ ] Moderate [ ] Severe    Loss of appetite:   [ ] No [x ] Yes             [ ] Mild [ ] Moderate [x ] Severe  x  Constipation:        [ x] No [ ] Yes             [ ] Mild [ ] Moderate [ x] Severe    Other Symptoms:  [ ] All other review of systems negative   [x ] Unable to obtain due to poor mentation     Karnofsky Performance Score/Palliative Performance Status Version 2:    30     %    PHYSICAL EXAM:  Vital Signs Last 24 Hrs  T(C): 37 (03 Jan 2018 11:25), Max: 37.2 (03 Jan 2018 04:28)  T(F): 98.6 (03 Jan 2018 11:25), Max: 98.9 (03 Jan 2018 04:28)  HR: 88 (03 Jan 2018 11:25) (78 - 97)  BP: 110/55 (03 Jan 2018 11:25) (99/60 - 118/67)  BP(mean): --  RR: 18 (03 Jan 2018 11:25) (18 - 18)  SpO2: 98% (03 Jan 2018 11:25) (96% - 98%) I&O's Summary    02 Jan 2018 07:01  -  03 Jan 2018 07:00  --------------------------------------------------------  IN: 620 mL / OUT: 350 mL / NET: 270 mL    03 Jan 2018 07:01  -  03 Jan 2018 15:11  --------------------------------------------------------  IN: 250 mL / OUT: 0 mL / NET: 250 mL        General:  [ ] Alert  [ ] Oriented x      [x ] Lethargic  [x ] Agitated   [ ] Cachexia   [ ] Unarousable  [ ] Verbal  [ ] Non-Verbal    HEENT:  [ ] Normal   [x ] Dry mouth   [ ] ET Tube    [ ] Trach  [ ] Oral lesions    Lungs:   [ ] Clear [x ] Tachypnea  [ ] Audible excessive secretions   [ ] Rhonchi        [ ] Right [ ] Left [ ] Bilateral  [ x] Crackles        [ ] Right [ ] Left [ x] Bilateral  [ ] Wheezing     [ ] Right [ ] Left [ ] Bilateral    Cardiovascular:  [ ] Regular [ ] Irregular [x ] Tachycardia   [ ] Bradycardia  [ x] Murmur [ ] Other    Abdomen: [ x] Soft  [ ] Distended   [ ] +BS  [ ] Non tender [ ] Tender  [ ]PEG   [ ]OGT/ NGT   Last BM:       Genitourinary: [ ] Normal [ ] Incontinent   [ ] Oliguria/Anuria   [ x] Gautam    Musculoskeletal:  [ ] Normal   [ ] Weakness  [x ] Bedbound/Wheelchair bound [x ] Edema    Neurological: [ ] No focal deficits  [ x] Cognitive impairment  [ ] Dysphagia [ ] Dysarthria [ ] Paresis [ ] Other     Skin: [ x] Normal   [ ] Pressure ulcer(s)                  [ ] Rash    LABS:                        7.5    11.28 )-----------( 228      ( 02 Jan 2018 07:02 )             25.3     01-02    139  |  95<L>  |  108<H>  ----------------------------<  163<H>  4.6   |  30  |  4.92<H>    Ca    8.8      02 Jan 2018 07:06  Phos  7.0     01-02  Mg     2.5     01-02            Shock: [ ] Septic [ ] Cardiogenic [ ] Neurologic [ ] Hypovolemic  Vasopressors x   Inotrophs x     Oral Intake: [ ] Unable/mouth care only [ ] Minimal [ ] Moderate [ ] Full Capability    Diet: [ ] NPO [ ] Tube feeds [ ] TPN [ ] Other     RADIOLOGY & ADDITIONAL STUDIES:    REFERRALS:   [ ] Chaplaincy  x[ ] Hospice  [ ] Child Life  [ ] Social Work  [ ] Case management [ ] Holistic Therapy

## 2018-01-03 NOTE — PROGRESS NOTE ADULT - ASSESSMENT
multiple medical issues including cardiac disease, HTN, hyoxemia with chronic worsening respiratory status, afib, prior breast CA with generalized decline with hypercapnia and hypoxemia due primarily to fluid overload and cardiac decompensation.  Also with confusion-improved    continue current rx  close attention to fluid and cardiac status  BIPAP qhs and prn as pt allows  palliative care input  dvt prophylaxis  routine gi prophylaxis as necessary  Oxygen saturation greater than 92%  palliative input noted    Molly Soto MD  [phone]  Pulmonary

## 2018-01-03 NOTE — ADVANCED PRACTICE NURSE CONSULT - ASSESSMENT
Pt is approved for inpatient hospice care at [facility].  Consents faxed to pt's dtr in law Azeb early this afternoon.  They were re-faxed at about 3 p.m. because she did not receive the original fax.  Pt's son came to Azeb's office to sign consents later this afternoon and signed consents were received at 5 p.m.  Pt's family requested pt not be moved this evening.  [facility] RN will follow up 1/4/18 with CM to arrange for pt's transfer, weather permitting.  All needed [facility] to [facility] ().

## 2018-01-03 NOTE — PROGRESS NOTE ADULT - SUBJECTIVE AND OBJECTIVE BOX
Patient is a 88y old  Female who presents with a chief complaint of sob  History obtained from family due to confusion (22 Dec 2017 21:26)      SUBJECTIVE / OVERNIGHT EVENTS:     MEDICATIONS  (STANDING):  ALBUTerol/ipratropium for Nebulization 3 milliLiter(s) Nebulizer every 6 hours  buMETAnide Injectable 1 milliGRAM(s) IV Push every 12 hours  haloperidol    Injectable 0.5 milliGRAM(s) IV Push two times a day  HYDROmorphone  Injectable 0.2 milliGRAM(s) IV Push every 6 hours  pantoprazole  Injectable 40 milliGRAM(s) IV Push two times a day    MEDICATIONS  (PRN):      Vital Signs Last 24 Hrs  T(F): 98.6 (01-03-18 @ 11:25), Max: 98.9 (01-03-18 @ 04:28)  HR: 75 (01-03-18 @ 17:26) (75 - 97)  BP: 136/53 (01-03-18 @ 17:26) (99/60 - 136/53)  RR: 18 (01-03-18 @ 11:25) (18 - 18)  SpO2: 98% (01-03-18 @ 16:03) (96% - 98%)  Telemetry:   CAPILLARY BLOOD GLUCOSE        I&O's Summary    02 Jan 2018 07:01  -  03 Jan 2018 07:00  --------------------------------------------------------  IN: 620 mL / OUT: 350 mL / NET: 270 mL    03 Jan 2018 07:01  -  03 Jan 2018 17:58  --------------------------------------------------------  IN: 250 mL / OUT: 0 mL / NET: 250 mL        PHYSICAL EXAM:  GENERAL: NAD, well-developed  HEAD:  Atraumatic, Normocephalic  EYES: EOMI, PERRLA, conjunctiva and sclera clear  NECK: Supple, No JVD  CHEST/LUNG: Clear to auscultation bilaterally; No wheeze  HEART: Regular rate and rhythm; No murmurs, rubs, or gallops  ABDOMEN: Soft, Nontender, Nondistended; Bowel sounds present  EXTREMITIES:  2+ Peripheral Pulses, No clubbing, cyanosis, or edema  PSYCH: AAOx3  NEUROLOGY: non-focal  SKIN: No rashes or lesions    LABS:                        7.5    11.28 )-----------( 228      ( 02 Jan 2018 07:02 )             25.3     01-02    139  |  95<L>  |  108<H>  ----------------------------<  163<H>  4.6   |  30  |  4.92<H>    Ca    8.8      02 Jan 2018 07:06  Phos  7.0     01-02  Mg     2.5     01-02                RADIOLOGY & ADDITIONAL TESTS:    Imaging Personally Reviewed:    Consultant(s) Notes Reviewed:      Care Discussed with Consultants/Other Providers:

## 2018-01-03 NOTE — PROGRESS NOTE ADULT - SUBJECTIVE AND OBJECTIVE BOX
Follow-up Pulm Progress Note    No new respiratory events overnight.  Denies increased SOB, chest pain, cough or mucus. more alert    Medications:  MEDICATIONS  (STANDING):  ALBUTerol/ipratropium for Nebulization 3 milliLiter(s) Nebulizer every 6 hours  atorvastatin 10 milliGRAM(s) Oral at bedtime  buMETAnide Injectable 1 milliGRAM(s) IV Push every 12 hours  diltiazem    milliGRAM(s) Oral daily  docusate sodium 100 milliGRAM(s) Oral three times a day  epoetin toño Injectable 21178 Unit(s) SubCutaneous <User Schedule>  metolazone 5 milliGRAM(s) Oral daily  metoprolol     tartrate 25 milliGRAM(s) Oral two times a day  pantoprazole  Injectable 40 milliGRAM(s) IV Push two times a day  polyethylene glycol 3350 17 Gram(s) Oral daily  senna 2 Tablet(s) Oral at bedtime    MEDICATIONS  (PRN):      Vent settings (if applicable)      Vital Signs Last 24 Hrs  T(C): 37.2 (03 Jan 2018 04:28), Max: 37.2 (03 Jan 2018 04:28)  T(F): 98.9 (03 Jan 2018 04:28), Max: 98.9 (03 Jan 2018 04:28)  HR: 78 (03 Jan 2018 06:48) (74 - 97)  BP: 115/67 (03 Jan 2018 06:27) (99/60 - 118/67)  BP(mean): --  RR: 18 (03 Jan 2018 04:28) (18 - 18)  SpO2: 97% (03 Jan 2018 06:48) (96% - 99%)          01-02 @ 07:01  -  01-03 @ 07:00  --------------------------------------------------------  IN: 620 mL / OUT: 350 mL / NET: 270 mL          LABS:                        7.5    11.28 )-----------( 228      ( 02 Jan 2018 07:02 )             25.3     01-02    139  |  95<L>  |  108<H>  ----------------------------<  163<H>  4.6   |  30  |  4.92<H>    Ca    8.8      02 Jan 2018 07:06  Phos  7.0     01-02  Mg     2.5     01-02            CAPILLARY BLOOD GLUCOSE        PT/INR - ( 01 Jan 2018 10:36 )   PT: 12.6 sec;   INR: 1.11 ratio                   CULTURES:        Physical Examination:  Awake and alert, generally comfortable  HEENT: unremarkable  PULM: Clear to auscultation bilaterally, no significant sputum production  CVS: Regular rate and rhythm, no murmurs, rubs, or gallops  Abd:  soft, non tender  Extrem: No CCE    RADIOLOGY REVIEWED  CXR:    CT chest:

## 2018-01-04 ENCOUNTER — TRANSCRIPTION ENCOUNTER (OUTPATIENT)
Age: 83
End: 2018-01-04

## 2018-01-04 RX ORDER — IPRATROPIUM/ALBUTEROL SULFATE 18-103MCG
3 AEROSOL WITH ADAPTER (GRAM) INHALATION
Qty: 0 | Refills: 0 | COMMUNITY
Start: 2018-01-04

## 2018-01-04 RX ORDER — HALOPERIDOL DECANOATE 100 MG/ML
0.5 INJECTION INTRAMUSCULAR
Qty: 0 | Refills: 0 | COMMUNITY
Start: 2018-01-04

## 2018-01-04 RX ORDER — HYDROMORPHONE HYDROCHLORIDE 2 MG/ML
0.2 INJECTION INTRAMUSCULAR; INTRAVENOUS; SUBCUTANEOUS
Qty: 0 | Refills: 0 | COMMUNITY
Start: 2018-01-04

## 2018-01-04 RX ORDER — BUMETANIDE 0.25 MG/ML
1 INJECTION INTRAMUSCULAR; INTRAVENOUS
Qty: 0 | Refills: 0 | COMMUNITY
Start: 2018-01-04

## 2018-01-04 RX ORDER — HYDROMORPHONE HYDROCHLORIDE 2 MG/ML
0.2 INJECTION INTRAMUSCULAR; INTRAVENOUS; SUBCUTANEOUS
Qty: 0 | Refills: 0 | Status: DISCONTINUED | OUTPATIENT
Start: 2018-01-04 | End: 2018-01-05

## 2018-01-04 RX ORDER — PANTOPRAZOLE SODIUM 20 MG/1
40 TABLET, DELAYED RELEASE ORAL
Qty: 0 | Refills: 0 | COMMUNITY
Start: 2018-01-04

## 2018-01-04 RX ADMIN — PANTOPRAZOLE SODIUM 40 MILLIGRAM(S): 20 TABLET, DELAYED RELEASE ORAL at 06:32

## 2018-01-04 RX ADMIN — Medication 3 MILLILITER(S): at 06:33

## 2018-01-04 RX ADMIN — HYDROMORPHONE HYDROCHLORIDE 0.2 MILLIGRAM(S): 2 INJECTION INTRAMUSCULAR; INTRAVENOUS; SUBCUTANEOUS at 18:34

## 2018-01-04 RX ADMIN — HYDROMORPHONE HYDROCHLORIDE 0.2 MILLIGRAM(S): 2 INJECTION INTRAMUSCULAR; INTRAVENOUS; SUBCUTANEOUS at 12:06

## 2018-01-04 RX ADMIN — HYDROMORPHONE HYDROCHLORIDE 0.2 MILLIGRAM(S): 2 INJECTION INTRAMUSCULAR; INTRAVENOUS; SUBCUTANEOUS at 23:03

## 2018-01-04 RX ADMIN — HALOPERIDOL DECANOATE 0.5 MILLIGRAM(S): 100 INJECTION INTRAMUSCULAR at 06:32

## 2018-01-04 RX ADMIN — Medication 3 MILLILITER(S): at 18:04

## 2018-01-04 RX ADMIN — HYDROMORPHONE HYDROCHLORIDE 0.2 MILLIGRAM(S): 2 INJECTION INTRAMUSCULAR; INTRAVENOUS; SUBCUTANEOUS at 18:04

## 2018-01-04 RX ADMIN — HYDROMORPHONE HYDROCHLORIDE 0.2 MILLIGRAM(S): 2 INJECTION INTRAMUSCULAR; INTRAVENOUS; SUBCUTANEOUS at 11:36

## 2018-01-04 RX ADMIN — HALOPERIDOL DECANOATE 0.5 MILLIGRAM(S): 100 INJECTION INTRAMUSCULAR at 18:05

## 2018-01-04 RX ADMIN — Medication 3 MILLILITER(S): at 11:35

## 2018-01-04 RX ADMIN — Medication 3 MILLILITER(S): at 23:56

## 2018-01-04 RX ADMIN — HYDROMORPHONE HYDROCHLORIDE 0.2 MILLIGRAM(S): 2 INJECTION INTRAMUSCULAR; INTRAVENOUS; SUBCUTANEOUS at 23:35

## 2018-01-04 RX ADMIN — PANTOPRAZOLE SODIUM 40 MILLIGRAM(S): 20 TABLET, DELAYED RELEASE ORAL at 18:04

## 2018-01-04 RX ADMIN — HYDROMORPHONE HYDROCHLORIDE 0.2 MILLIGRAM(S): 2 INJECTION INTRAMUSCULAR; INTRAVENOUS; SUBCUTANEOUS at 06:33

## 2018-01-04 RX ADMIN — HYDROMORPHONE HYDROCHLORIDE 0.2 MILLIGRAM(S): 2 INJECTION INTRAMUSCULAR; INTRAVENOUS; SUBCUTANEOUS at 00:00

## 2018-01-04 RX ADMIN — BUMETANIDE 1 MILLIGRAM(S): 0.25 INJECTION INTRAMUSCULAR; INTRAVENOUS at 06:37

## 2018-01-04 RX ADMIN — HYDROMORPHONE HYDROCHLORIDE 0.2 MILLIGRAM(S): 2 INJECTION INTRAMUSCULAR; INTRAVENOUS; SUBCUTANEOUS at 07:20

## 2018-01-04 RX ADMIN — BUMETANIDE 1 MILLIGRAM(S): 0.25 INJECTION INTRAMUSCULAR; INTRAVENOUS at 18:03

## 2018-01-04 NOTE — DISCHARGE NOTE ADULT - SECONDARY DIAGNOSIS.
Respiratory failure with hypercapnia, unspecified chronicity Acute on chronic diastolic congestive heart failure Drop in hemoglobin Chronic atrial fibrillation Elevated INR Elevated LFTs

## 2018-01-04 NOTE — DISCHARGE NOTE ADULT - CARE PLAN
Principal Discharge DX:	Encephalopathy, unspecified  Goal:	will transfer to PCU  Instructions for follow-up, activity and diet:	lethargic most likely 2/2 hypercapnia and NANCY,  [facility]  Secondary Diagnosis:	Respiratory failure with hypercapnia, unspecified chronicity  Goal:	will transfer to pcu  Instructions for follow-up, activity and diet:	lethargic, on comfort care  c/w duonebs  c/w DNR/DNI  Dilaudid as directed.  Secondary Diagnosis:	Acute on chronic diastolic congestive heart failure  Instructions for follow-up, activity and diet:	c/w bumex.  Secondary Diagnosis:	Drop in hemoglobin  Instructions for follow-up, activity and diet:	was on coumadin, admitted w coagulopathy, dropped HH, given Vit K, guiac neg. given 1 U PRBC on 12/27 and 1 unit 12/29. acute hematoma LLE . off AC and off plavix.   Comfort care.  Secondary Diagnosis:	Chronic atrial fibrillation  Instructions for follow-up, activity and diet:	off AC rate controlled.  Secondary Diagnosis:	Elevated INR  Instructions for follow-up, activity and diet:	now resolved, hold coumadin 2/2 acute LLE hematoma.  Secondary Diagnosis:	Elevated LFTs  Instructions for follow-up, activity and diet:	comfort measurement

## 2018-01-04 NOTE — PROGRESS NOTE ADULT - SUBJECTIVE AND OBJECTIVE BOX
NEPHROLOGY-[facility] [phone]        Patient seen and examined in bed.  No real changes noted        MEDICATIONS  (STANDING):  ALBUTerol/ipratropium for Nebulization 3 milliLiter(s) Nebulizer every 6 hours  buMETAnide Injectable 1 milliGRAM(s) IV Push every 12 hours  haloperidol    Injectable 0.5 milliGRAM(s) IV Push two times a day  HYDROmorphone  Injectable 0.2 milliGRAM(s) IV Push every 6 hours  pantoprazole  Injectable 40 milliGRAM(s) IV Push two times a day      VITAL:  T(C): , Max: 37.1 (01-03-18 @ 20:06)  T(F): , Max: 98.7 (01-03-18 @ 20:06)  HR: 113 (01-04-18 @ 10:46)  BP: 119/68 (01-04-18 @ 04:45)  BP(mean): --  RR: 18 (01-04-18 @ 04:45)  SpO2: 99% (01-04-18 @ 10:46)  Wt(kg): --    I and O's:    01-03 @ 07:01  -  01-04 @ 07:00  --------------------------------------------------------  IN: 350 mL / OUT: 0 mL / NET: 350 mL      Height (cm): 162.56 (01-04 @ 11:20)    PHYSICAL EXAM:    Constitutional: NAD  HEENT: PERRLA    Neck:  +  JVD  Respiratory: CTAB/L  Cardiovascular: S1 and S2  Gastrointestinal: BS+, soft, NT/ND  Extremities: No peripheral edema  Neurological: A/O x 3, no focal deficits  Psychiatric: Normal mood, normal affect  : No Gautam  Skin: No rashes  Access: Not applicable    LABS:                Urine Studies:          RADIOLOGY & ADDITIONAL STUDIES:

## 2018-01-04 NOTE — DISCHARGE NOTE ADULT - PLAN OF CARE
will transfer to [facility] lethargic most likely 2/2 hypercapnia and NANCY,  [facility]. [facility] lethargic, on comfort care  c/w duonebs  c/w DNR/DNI  Dilaudid as directed. c/w bumex. was on coumadin, admitted w coagulopathy, dropped HH, given Vit K, guiac neg. given 1 U PRBC on 12/27 and 1 unit 12/29. acute hematoma LLE . off AC and off plavix.   Comfort care. off AC rate controlled. now resolved, hold coumadin 2/2 acute LLE hematoma. comfort measurement

## 2018-01-04 NOTE — DISCHARGE NOTE ADULT - HOSPITAL COURSE
to be completed Ms Dutta is a 89 yo woman with PMHx of right breast CA S/P lumpectomy in 2000, Uterine Ca S/P total hysterectomy in 2012, Skin CA S/P excision of bilateral lower eyelid skin CA, cholecystectomy, Osteoarthritis of bilateral knees, HTN, hyperlipidemia, paroxysmal Afib on coumadin s/p PPM for slow ventricular response, severe aortic stenosis now s/p TAVR April 2015 , who presents with SOB x 1 day, confusion. Found to have NANCY on CKD, hypercapneic respiratory failure, elevated LFT and INR. Infection is less likely as no clear source.   Hypercapneic respiratory failure - Bipap prn & at night.  NANCY w/ urinary retention, s/p oleary &  on Zosyn for UTI.  12/26 abdominal US: Right pleural effusion. Trace perihepatic ascites. Paracentesis needed,   12/27: HGB 7.1, s/p 1 unit of prbc, can give vitamin K 2.5mg po x1, post CXR results.  Agitated overnight   12/28: Palliative consult called, INR 1.94, coumadin 1mg po x 1, no invasive procedure at this time.   12/29: HGB 6.7, 1 unit of prbc ordered, INR 1.27, will hold coumadin, Prognosis remains very poor, PPI , stool occult ordered   12/30: CT Abd/pelvis - Partially imaged intramuscular hematoma involving the left upper thigh/hip adductor musculature. Extensive anasarca. Bilateral pleural effusions. off coumadin  Palliative f/u due to debility and ongoing ESRD prognosis is poor and QOL/hospice should be focus, start dilaudid 0.2mg IVp Q6H ATC, start dilaudid 0.2mg IVP as needed for SOB.   Pt will be transferred to PCU for comfort care.

## 2018-01-04 NOTE — DISCHARGE NOTE ADULT - PATIENT PORTAL LINK FT
“You can access the [facility] Patient Portal, offered by [facility], by registering with the following website: [email or web address]”

## 2018-01-04 NOTE — PROGRESS NOTE ADULT - ASSESSMENT
89 yo woman with PMHx of right breast CA S/P lumpectomy in 2000, Uterine Ca S/P total hysterectomy in 2012, Skin CA S/P excision of bilateral lower eyelid skin CA, cholecystectomy, Osteoarthritis of bilateral knees, HTN, hyperlipidemia, paroxysmal Afib on coumadin s/p PPM for slow ventricular response, severe aortic stenosis now s/p TAVR April 2015 , who presents with SOB, confusion. Found to have NANCY on CKD, hypercapneic respiratory failure, SIRS.    1. 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 CKD 3B-4 with recent baseline cr of 1.7-2.3 likely CRS/microvascular disease  3: Anemia likely multifactorial including anemia of CKD  4. Hypoalbuminemia likely due to chronic disease and low PO intake  5. Liver dysfunction considering increased transaminases and increased INR likely due to right heart failure - management per primary team  6. Hypercapnic respiratory failure and diastolic HF exacerbation- management per primary team      RECOMMENDATIONS:  -  No  HD; Continue Bumex 1mg IVP bid  as more of a palliative approach at this point  - Dain schilling dc'd  - As she is moving towards hospice there is no point in checking labs  - palliative care noted and trying to xfer to PCU

## 2018-01-04 NOTE — DISCHARGE NOTE ADULT - MEDICATION SUMMARY - MEDICATIONS TO TAKE
I will START or STAY ON the medications listed below when I get home from the hospital:    HYDROmorphone  -- 0.2 milligram(s) intravenous every 6 hours  -- Indication: For Comfort measurement    HYDROmorphone  -- 0.2 milligram(s) intravenous every 2 hours, As Needed for labored breathing  -- Indication: For labored breathing    HYDROmorphone  -- 0.2 milligram(s) intravenous every 2 hours, As Needed breakthrough pain  -- Indication: For Pain control    haloperidol  -- 0.5 milligram(s) intravenous 2 times a day  -- Indication: For Encephalopathy, unspecified    ipratropium-albuterol 0.5 mg-2.5 mg/3 mLinhalation solution  -- 3 milliliter(s) inhaled every 6 hours  -- Indication: For Acute on chronic respiratory failure with hypercapnia    albuterol 2.5 mg/3 mL (0.083%) inhalation solution  -- 2.5 milligram(s) inhaled every 6 hours, As Needed  -- Indication: For Acute on chronic respiratory failure with hypercapnia    bumetanide 0.25 mg/mL injectable solution  -- 1 milligram(s) injectable 2 times a day  -- Indication: For Acute on chronic diastolic congestive heart failure    pantoprazole 40 mg intravenous injection  -- 40 milligram(s) intravenous 2 times a day  -- Indication: For Drop in hemoglobin

## 2018-01-04 NOTE — DISCHARGE NOTE ADULT - MEDICATION SUMMARY - MEDICATIONS TO STOP TAKING
I will STOP taking the medications listed below when I get home from the hospital:    Vitamin C 500 mg oral tablet  -- 1 tab(s) by mouth once a day    atorvastatin 10 mg oral tablet  -- 1 tab(s) by mouth once a day (at bedtime)    Multiple Vitamins oral tablet  -- 1 tab(s) by mouth once a day    Plavix 75 mg oral tablet  -- 1 tab(s) by mouth once a day    folic acid 1 mg oral tablet  -- 1 tab(s) by mouth once a day    metoprolol tartrate 25 mg oral tablet  -- 1 tab(s) by mouth 2 times a day    dilTIAZem 120 mg/24 hours oral capsule, extended release  -- 1 cap(s) by mouth once a day    ferrous sulfate 325 mg (65 mg elemental iron) oral tablet  -- 1 tab(s) by mouth once a day with breakfast    senna oral tablet  -- 2 tab(s) by mouth once a day (at bedtime)    docusate sodium 100 mg oral capsule  -- 1 cap(s) by mouth 3 times a day    polyethylene glycol 3350 oral powder for reconstitution  -- 17 gram(s) by mouth once a day    budesonide 0.5 mg/2 mL inhalation suspension  -- 1 dose(s) inhaled 2 times a day    QUEtiapine  -- 12.5 milligram(s) by mouth once a day at 2000    Coumadin  -- 1.5 milligram(s) by mouth every other day in evening starting tonight 11/1    Lasix 20 mg oral tablet  -- 3 tab(s) by mouth Monday through Friday    Lasix 80 mg oral tablet  -- 1 tab(s) by mouth Saturday and Sunday    Coumadin 3 mg oral tablet  -- 1.5mg tuesday, wednesday, friday, saturday, sunday  3mg monday, thursday    Coumadin 3 mg oral tablet  -- 1 tab(s) by mouth 2 times a week - start 11/2 monday and thursday    SEROquel 25 mg oral tablet  -- 0.5 tab(s) by mouth once a day (at bedtime)

## 2018-01-04 NOTE — PROGRESS NOTE ADULT - SUBJECTIVE AND OBJECTIVE BOX
Patient is a 88y old  Female who presents with a chief complaint of sob  History obtained from family due to confusion (22 Dec 2017 21:26)      SUBJECTIVE / OVERNIGHT EVENTS: on venti mask, lethargic, awaiting transfer to hospice.     MEDICATIONS  (STANDING):  ALBUTerol/ipratropium for Nebulization 3 milliLiter(s) Nebulizer every 6 hours  buMETAnide Injectable 1 milliGRAM(s) IV Push every 12 hours  haloperidol    Injectable 0.5 milliGRAM(s) IV Push two times a day  HYDROmorphone  Injectable 0.2 milliGRAM(s) IV Push every 6 hours  pantoprazole  Injectable 40 milliGRAM(s) IV Push two times a day    MEDICATIONS  (PRN):      Vital Signs Last 24 Hrs  T(F): 97.3 (01-04-18 @ 04:45), Max: 98.7 (01-03-18 @ 20:06)  HR: 85 (01-04-18 @ 06:20) (66 - 91)  BP: 119/68 (01-04-18 @ 04:45) (110/55 - 136/53)  RR: 18 (01-04-18 @ 04:45) (18 - 18)  SpO2: 96% (01-04-18 @ 06:20) (94% - 98%)  Telemetry:   CAPILLARY BLOOD GLUCOSE        I&O's Summary    03 Jan 2018 07:01  -  04 Jan 2018 07:00  --------------------------------------------------------  IN: 350 mL / OUT: 0 mL / NET: 350 mL        PHYSICAL EXAM:  GENERAL: NAD, well-developed  HEAD:  Atraumatic, Normocephalic  EYES: EOMI, PERRLA, conjunctiva and sclera clear  NECK: Supple, No JVD  CHEST/LUNG: Clear to auscultation bilaterally; No wheeze  HEART: Regular rate and rhythm; No murmurs, rubs, or gallops  ABDOMEN: Soft, Nontender, Nondistended; Bowel sounds present  EXTREMITIES:  2+ Peripheral Pulses, No clubbing, cyanosis, or edema  PSYCH: AAOx3  NEUROLOGY: non-focal  SKIN: No rashes or lesions    LABS:                    RADIOLOGY & ADDITIONAL TESTS:    Imaging Personally Reviewed:    Consultant(s) Notes Reviewed:      Care Discussed with Consultants/Other Providers:

## 2018-01-05 VITALS — HEART RATE: 94 BPM | OXYGEN SATURATION: 95 %

## 2018-01-05 PROCEDURE — 82962 GLUCOSE BLOOD TEST: CPT

## 2018-01-05 PROCEDURE — 36600 WITHDRAWAL OF ARTERIAL BLOOD: CPT

## 2018-01-05 PROCEDURE — 81001 URINALYSIS AUTO W/SCOPE: CPT

## 2018-01-05 PROCEDURE — 84132 ASSAY OF SERUM POTASSIUM: CPT

## 2018-01-05 PROCEDURE — 86900 BLOOD TYPING SEROLOGIC ABO: CPT

## 2018-01-05 PROCEDURE — 82435 ASSAY OF BLOOD CHLORIDE: CPT

## 2018-01-05 PROCEDURE — 85027 COMPLETE CBC AUTOMATED: CPT

## 2018-01-05 PROCEDURE — 84295 ASSAY OF SERUM SODIUM: CPT

## 2018-01-05 PROCEDURE — 82553 CREATINE MB FRACTION: CPT

## 2018-01-05 PROCEDURE — 83880 ASSAY OF NATRIURETIC PEPTIDE: CPT

## 2018-01-05 PROCEDURE — P9011: CPT

## 2018-01-05 PROCEDURE — 94660 CPAP INITIATION&MGMT: CPT

## 2018-01-05 PROCEDURE — 94640 AIRWAY INHALATION TREATMENT: CPT

## 2018-01-05 PROCEDURE — 71250 CT THORAX DX C-: CPT

## 2018-01-05 PROCEDURE — 87633 RESP VIRUS 12-25 TARGETS: CPT

## 2018-01-05 PROCEDURE — 85014 HEMATOCRIT: CPT

## 2018-01-05 PROCEDURE — 93005 ELECTROCARDIOGRAM TRACING: CPT

## 2018-01-05 PROCEDURE — 86923 COMPATIBILITY TEST ELECTRIC: CPT

## 2018-01-05 PROCEDURE — 76705 ECHO EXAM OF ABDOMEN: CPT

## 2018-01-05 PROCEDURE — 80074 ACUTE HEPATITIS PANEL: CPT

## 2018-01-05 PROCEDURE — 86901 BLOOD TYPING SEROLOGIC RH(D): CPT

## 2018-01-05 PROCEDURE — 85730 THROMBOPLASTIN TIME PARTIAL: CPT

## 2018-01-05 PROCEDURE — 36430 TRANSFUSION BLD/BLD COMPNT: CPT

## 2018-01-05 PROCEDURE — 87581 M.PNEUMON DNA AMP PROBE: CPT

## 2018-01-05 PROCEDURE — 87040 BLOOD CULTURE FOR BACTERIA: CPT

## 2018-01-05 PROCEDURE — 82550 ASSAY OF CK (CPK): CPT

## 2018-01-05 PROCEDURE — 84100 ASSAY OF PHOSPHORUS: CPT

## 2018-01-05 PROCEDURE — 80048 BASIC METABOLIC PNL TOTAL CA: CPT

## 2018-01-05 PROCEDURE — 87086 URINE CULTURE/COLONY COUNT: CPT

## 2018-01-05 PROCEDURE — 83735 ASSAY OF MAGNESIUM: CPT

## 2018-01-05 PROCEDURE — 73700 CT LOWER EXTREMITY W/O DYE: CPT

## 2018-01-05 PROCEDURE — 82272 OCCULT BLD FECES 1-3 TESTS: CPT

## 2018-01-05 PROCEDURE — 87150 DNA/RNA AMPLIFIED PROBE: CPT

## 2018-01-05 PROCEDURE — 82947 ASSAY GLUCOSE BLOOD QUANT: CPT

## 2018-01-05 PROCEDURE — 85610 PROTHROMBIN TIME: CPT

## 2018-01-05 PROCEDURE — 82803 BLOOD GASES ANY COMBINATION: CPT

## 2018-01-05 PROCEDURE — 87486 CHLMYD PNEUM DNA AMP PROBE: CPT

## 2018-01-05 PROCEDURE — 86850 RBC ANTIBODY SCREEN: CPT

## 2018-01-05 PROCEDURE — 99285 EMERGENCY DEPT VISIT HI MDM: CPT | Mod: 25

## 2018-01-05 PROCEDURE — 82330 ASSAY OF CALCIUM: CPT

## 2018-01-05 PROCEDURE — 80053 COMPREHEN METABOLIC PANEL: CPT

## 2018-01-05 PROCEDURE — 74176 CT ABD & PELVIS W/O CONTRAST: CPT

## 2018-01-05 PROCEDURE — 87798 DETECT AGENT NOS DNA AMP: CPT

## 2018-01-05 PROCEDURE — 96374 THER/PROPH/DIAG INJ IV PUSH: CPT

## 2018-01-05 PROCEDURE — 84484 ASSAY OF TROPONIN QUANT: CPT

## 2018-01-05 PROCEDURE — 83605 ASSAY OF LACTIC ACID: CPT

## 2018-01-05 PROCEDURE — 71045 X-RAY EXAM CHEST 1 VIEW: CPT

## 2018-01-05 RX ORDER — QUETIAPINE FUMARATE 200 MG/1
0.5 TABLET, FILM COATED ORAL
Qty: 0 | Refills: 0 | COMMUNITY

## 2018-01-05 RX ORDER — WARFARIN SODIUM 2.5 MG/1
1 TABLET ORAL
Qty: 0 | Refills: 0 | COMMUNITY

## 2018-01-05 RX ORDER — ALBUTEROL 90 UG/1
2.5 AEROSOL, METERED ORAL
Qty: 0 | Refills: 0 | COMMUNITY
Start: 2018-01-05

## 2018-01-05 RX ORDER — FUROSEMIDE 40 MG
3 TABLET ORAL
Qty: 0 | Refills: 0 | COMMUNITY

## 2018-01-05 RX ORDER — CLOPIDOGREL BISULFATE 75 MG/1
1 TABLET, FILM COATED ORAL
Qty: 0 | Refills: 0 | COMMUNITY

## 2018-01-05 RX ORDER — WARFARIN SODIUM 2.5 MG/1
1.5 TABLET ORAL
Qty: 0 | Refills: 0 | COMMUNITY

## 2018-01-05 RX ORDER — FOLIC ACID 0.8 MG
1 TABLET ORAL
Qty: 0 | Refills: 0 | COMMUNITY

## 2018-01-05 RX ORDER — FUROSEMIDE 40 MG
1 TABLET ORAL
Qty: 0 | Refills: 0 | COMMUNITY

## 2018-01-05 RX ORDER — ALBUTEROL 90 UG/1
2.5 AEROSOL, METERED ORAL EVERY 6 HOURS
Qty: 0 | Refills: 0 | Status: DISCONTINUED | OUTPATIENT
Start: 2018-01-05 | End: 2018-01-05

## 2018-01-05 RX ADMIN — Medication 3 MILLILITER(S): at 05:07

## 2018-01-05 RX ADMIN — HYDROMORPHONE HYDROCHLORIDE 0.2 MILLIGRAM(S): 2 INJECTION INTRAMUSCULAR; INTRAVENOUS; SUBCUTANEOUS at 11:38

## 2018-01-05 RX ADMIN — PANTOPRAZOLE SODIUM 40 MILLIGRAM(S): 20 TABLET, DELAYED RELEASE ORAL at 05:10

## 2018-01-05 RX ADMIN — HYDROMORPHONE HYDROCHLORIDE 0.2 MILLIGRAM(S): 2 INJECTION INTRAMUSCULAR; INTRAVENOUS; SUBCUTANEOUS at 06:06

## 2018-01-05 RX ADMIN — HYDROMORPHONE HYDROCHLORIDE 0.2 MILLIGRAM(S): 2 INJECTION INTRAMUSCULAR; INTRAVENOUS; SUBCUTANEOUS at 11:39

## 2018-01-05 RX ADMIN — HYDROMORPHONE HYDROCHLORIDE 0.2 MILLIGRAM(S): 2 INJECTION INTRAMUSCULAR; INTRAVENOUS; SUBCUTANEOUS at 05:49

## 2018-01-05 RX ADMIN — HALOPERIDOL DECANOATE 0.5 MILLIGRAM(S): 100 INJECTION INTRAMUSCULAR at 05:10

## 2018-01-05 RX ADMIN — BUMETANIDE 1 MILLIGRAM(S): 0.25 INJECTION INTRAMUSCULAR; INTRAVENOUS at 05:08

## 2018-01-05 NOTE — PROGRESS NOTE ADULT - PROBLEM SELECTOR PROBLEM 6
Encephalopathy, unspecified
NANCY (acute kidney injury)
NANCY (acute kidney injury)
[facility]
Elevated INR
NANCY (acute kidney injury)

## 2018-01-05 NOTE — PROGRESS NOTE ADULT - PROBLEM SELECTOR PLAN 8
-hepatocellular   -hepatitis panel  -RUELEN cho  -s/p CCY
2/2 CHF
2/2 CHF

## 2018-01-05 NOTE — PROGRESS NOTE ADULT - PROVIDER SPECIALTY LIST ADULT
CCU
Cardiology
Infectious Disease
Internal Medicine
Nephrology
Palliative Care
Pulmonology
Cardiology
Cardiology
Pulmonology
Nephrology
Cardiology
Palliative Care
Internal Medicine
Internal Medicine

## 2018-01-05 NOTE — PROGRESS NOTE ADULT - PROBLEM SELECTOR PLAN 3
on IV Bumex  , accepted into Hospice. awaiting transfer to inptn unit today
- start haldol 0.5mg IVP as needed for agitation
off IV lasix 60 mg , now on Bumex as per renal and zaroxlyn  resp status is guarded
- start haldol 0.5mg IVP BID
-appears dry now but with elevated BNP, NANCY  -hold lasix for now
off IV lasix 60 mg , now on Bumex as per renal  resp status improving
off IV lasix 60 mg , now on Bumex as per renal and zaroxlyn  resp status improving
on IV Bumex  , accepted into Hospice. awaiting transfer to inn unit
on IV Bumex  , accepted into Hospice. awaiting transfer to inn unit
on IV Bumex and po Zaroxlyn as per Renal , negative IS/Os balance is the goal.  resp status is guarded
on IV Lasix and Bumex as per Renal , negative IS/Os balance is the goal.  resp status is guarded
on IV lasix 60 mg   resp status improving
received IV lasix 60 mg today  resp status improving
Progressive renal dysfunction. Consider renal consultation.
on IV Bumex and po Zaroxlyn as per Renal , negative IS/Os balance is the goal.  resp status is guarded

## 2018-01-05 NOTE — PROGRESS NOTE ADULT - PROBLEM SELECTOR PLAN 7
now resolved, hold coumadin 2/2 acute LLE hematoma
now resolved, hold coumadin 2/2 acute LLE hematoma
-hepatocellular   -hepatitis panel  -RUELEN cho  -s/p CCY
now resolved, hold coumadin 2/2 acute LLE hematoma
now resolved, hold coumadin 2/2 acute on chronic anemia

## 2018-01-05 NOTE — PROGRESS NOTE ADULT - PROBLEM SELECTOR PROBLEM 5
Hypercapnic respiratory failure
AF (atrial fibrillation)
AF (atrial fibrillation)
NANCY (acute kidney injury)
AF (atrial fibrillation)
NANCY (acute kidney injury)

## 2018-01-05 NOTE — PROGRESS NOTE ADULT - SUBJECTIVE AND OBJECTIVE BOX
Follow-up Pulm Progress Note    No new respiratory events overnight.  Denies increased SOB, chest pain, cough or mucus. Loojks comfortable on nasal cannula    Medications:  MEDICATIONS  (STANDING):  ALBUTerol/ipratropium for Nebulization 3 milliLiter(s) Nebulizer every 6 hours  buMETAnide Injectable 1 milliGRAM(s) IV Push every 12 hours  haloperidol    Injectable 0.5 milliGRAM(s) IV Push two times a day  HYDROmorphone  Injectable 0.2 milliGRAM(s) IV Push every 6 hours  pantoprazole  Injectable 40 milliGRAM(s) IV Push two times a day    MEDICATIONS  (PRN):  HYDROmorphone  Injectable 0.2 milliGRAM(s) IV Push every 2 hours PRN Labored breathing  HYDROmorphone  Injectable 0.2 milliGRAM(s) IV Push every 2 hours PRN Breakthrough pain      Vent settings (if applicable)      Vital Signs Last 24 Hrs  T(C): 36.8 (05 Jan 2018 05:04), Max: 36.8 (05 Jan 2018 05:04)  T(F): 98.2 (05 Jan 2018 05:04), Max: 98.2 (05 Jan 2018 05:04)  HR: 95 (05 Jan 2018 05:13) (60 - 121)  BP: 124/79 (05 Jan 2018 05:04) (104/67 - 124/79)  BP(mean): --  RR: 18 (05 Jan 2018 05:04) (18 - 18)  SpO2: 97% (05 Jan 2018 05:13) (97% - 99%)          01-04 @ 07:01  -  01-05 @ 07:00  --------------------------------------------------------  IN: 360 mL / OUT: 1 mL / NET: 359 mL          LABS:                CAPILLARY BLOOD GLUCOSE                  CULTURES:        Physical Examination:  Awake and alert, generally comfortable  HEENT: unremarkable  PULM: Clear to auscultation bilaterally, no significant sputum production  CVS: Regular rate and rhythm, no murmurs, rubs, or gallops  Abd:  soft, non tender  Extrem: No CCE    RADIOLOGY REVIEWED  CXR:    CT chest:

## 2018-01-05 NOTE — PROGRESS NOTE ADULT - PROBLEM SELECTOR PLAN 5
Per pulmonary. Improved on exam today.
-rate controlled, diltiazem 120 daily  was on coumadin, admitted w coagulopathy, dropped HH, given Vit K, guiac neg. given 1 U PRBC on 12/27 and 1 unit 12/29. HH still not rising. acute hematoma LLE
-rate controlled, diltiazem 120 daily  was on coumadin, admitted w coagulopathy, dropped HH, given Vit K, guiac neg. given 1 U PRBC on 12/27 and 1 unit 12/29. HH still not rising. acute hematoma LLE
not improving, on IV BUMEX and po Zaroxlyn , oleary in place
-most likely pre-renal as appears dry, 2/2 aggressive diuresis similar to last admission  -hold lasix, 1L NS today, IVF 100cc/hour over 10 hours per renal consult
-rate controlled, diltiazem 120 daily  was on coumadin, admitted w coagulopathy, dropped HH, given Vit K, guiac neg. given 1 U PRBC on 12/27 and 1 unit 12/29. HH still not rising. acute hematoma LLE
-rate controlled, diltiazem 120 daily  was on coumadin, admitted w coagulopathy, dropped HH, given Vit K, guiac neg. given 1 U PRBC on 27th and 1 unit today. HH still not rising. will check CT A/B for r/o bleed.
improving, on  IV lasix . OFF IVF, oleary in place
improving, on  IV lasix . OFF IVF, oleary in place
improving, received IV lasix today. OFF IVF
not improving, on IV BUMEX and po Zaroxlyn , oleary in place
off AC rate controlled
off AC rate controlled

## 2018-01-05 NOTE — PROGRESS NOTE ADULT - SUBJECTIVE AND OBJECTIVE BOX
NEPHROLOGY-[facility] [phone]        Patient seen and examined in bed.  No other major changes noted        MEDICATIONS  (STANDING):  buMETAnide Injectable 1 milliGRAM(s) IV Push every 12 hours  haloperidol    Injectable 0.5 milliGRAM(s) IV Push two times a day  HYDROmorphone  Injectable 0.2 milliGRAM(s) IV Push every 6 hours  pantoprazole  Injectable 40 milliGRAM(s) IV Push two times a day      VITAL:  T(C): , Max: 36.8 (01-05-18 @ 05:04)  T(F): , Max: 98.2 (01-05-18 @ 05:04)  HR: 94 (01-05-18 @ 10:09)  BP: 124/79 (01-05-18 @ 05:04)  BP(mean): --  RR: 18 (01-05-18 @ 05:04)  SpO2: 95% (01-05-18 @ 10:09)  Wt(kg): --    I and O's:    01-04 @ 07:01 - 01-05 @ 07:00  --------------------------------------------------------  IN: 360 mL / OUT: 1 mL / NET: 359 mL    01-05 @ 07:01 - 01-05 @ 11:20  --------------------------------------------------------  IN: 110 mL / OUT: 0 mL / NET: 110 mL          PHYSICAL EXAM:    Constitutional: NAD  HEENT: PERRLA    Neck:  No JVD  Respiratory: CTAB/L  Cardiovascular: S1 and S2  Gastrointestinal: BS+, soft, NT/ND  Extremities: No peripheral edema  Neurological: A/O x 3, no focal deficits  Psychiatric: Normal mood, normal affect  : No Gautam  Skin: No rashes  Access: Not applicable    LABS:                Urine Studies:          RADIOLOGY & ADDITIONAL STUDIES:

## 2018-01-05 NOTE — PROGRESS NOTE ADULT - PROBLEM SELECTOR PLAN 2
-multifactorial from HfPEF? restrictive lung disease and emphysema  -now alert and awake off BIPAP. cont BIPAP at hs and prn. awaiting transfer to inn hospice  -duonebs  -DNR/DNI  b/l sign pleural effusions  PCC input appreciated
- pt is not a candidate for HD, renal recs
-multifactorial from HfPEF? restrictive lung disease and emphysema  -on BIPAP primarily. appears to have reached end of life hypercapneic resp failure w volume overload 2/2 dCHF and CKD5  -duonebs  -DNR/DNI  b/l sign pleural effusions  PCC requested.
- pt is not a candidate for HD, renal recs
-multifactorial from HfPEF? restrictive lung disease and emphysema  -now alert and awake off BIPAP. cont BIPAP at hs and prn  -duonebs  -DNR/DNI  b/l sign pleural effusions  PCC input appreciated
-multifactorial from HfPEF? restrictive lung disease and emphysema  -off BIPAP now, had it overnight, cont 4L NC O2, s/p IV lasix 60 mg today  -duonebs  -DNR/DNI
-multifactorial from HfPEF? restrictive lung disease and emphysema  -off BIPAP on and off during the day, continuous overnight, cont 4L NC O2, s/p IV lasix 60 mg   -duonebs  -DNR/DNI  b/l sign pleural effusions. will d/w IR  therapeutic Thoracenthesis.
-multifactorial from HfPEF? restrictive lung disease and emphysema  -off BIPAP on and off during the day, continuous overnight, cont 4L NC O2, s/p IV lasix 60 mg   -duonebs  -DNR/DNI  b/l sign pleural effusions. would consider therapeutic Thoracenthesis.
-multifactorial from HfPEF? restrictive lung disease and emphysema  -off BIPAP on and off during the day, continuous overnight, cont 4L NC O2, s/p IV lasix 60 mg   -duonebs  -DNR/DNI  b/l sign pleural effusions. would consider therapeutic Thoracenthesis.
-multifactorial from HfPEF? restrictive lung disease and emphysema  -on BIPAP now, no improvement after many hours 12/5, ABG at 9pm improved 7.35/57 after 20/8, will reduced to 18/5.  -duonebs  -DNR/DNI
-multifactorial from HfPEF? restrictive lung disease and emphysema  -on BIPAP primarily. appears to have reached end of life hypercapneic resp failure w volume overload 2/2 dCHF and CKD5  -duonebs  -DNR/DNI  b/l sign pleural effusions  PCC input appreciated
-multifactorial from HfPEF? restrictive lung disease and emphysema  lethargic, on comfort care.. [facility] [place] [facility]  -jillian  -DNR/DNI  b/l pleural effusions  PCC input appreciated
-multifactorial from HfPEF? restrictive lung disease and emphysema  lethargic, on comfort care.. [facility]  -jillian  -DNR/DNI  b/l pleural effusions  PCC input appreciated
Elevated INR. Hold coumadin and monitor. Suspect will resolved slowly with elevated LFT and poor po intake. Vit K po if increases further or evidence of bleeding.
-multifactorial from HfPEF? restrictive lung disease and emphysema  -now alert and awake off BIPAP. cont BIPAP at hs and prn  -duonebs  -DNR/DNI  b/l sign pleural effusions  PCC input appreciated

## 2018-01-05 NOTE — PROGRESS NOTE ADULT - PROBLEM SELECTOR PROBLEM 8
Elevated LFTs

## 2018-01-05 NOTE — PROVIDER CONTACT NOTE (OTHER) - REASON
D/C restraints
Patient is agitated and refusing to wear BIPAP
Patient noted to be very restless, pulling the Iv lines, Gatuam, removing  tele wires. Picking nose and lips.
Patient removed oleary catheter
Pt refusing to keep Bipap on, Pt keeps pulling off Bipap mask
Telemetry Alarm: 9 beats WCT
pt lethargic; pt on BIPAP. pt has AM PO meds due
pt lethargic; pt on BIPAP. pt has PM PO meds due
pt's /82. VSS.
no coumadin order

## 2018-01-05 NOTE — PROGRESS NOTE ADULT - PROBLEM SELECTOR PROBLEM 4
R/O Drop in hemoglobin
Palliative care encounter
AF (atrial fibrillation)
Palliative care encounter
R/O Drop in hemoglobin
Elevated INR
R/O Drop in hemoglobin

## 2018-01-05 NOTE — PROVIDER CONTACT NOTE (OTHER) - SITUATION
D/C Restraints, Pt not pulling line calm and cooperative
Patient is agitated and refusing to wear BIPAP
Patient noted to be very restless, pulling the Iv lines, Gautam, removing  tele wires.
Patient noted to have pulled out her oleary catheter.
Patient's telemetry alarm displaying 9 beats WCT for the first time.
Pt refusing to keep Bipap on, Pt keeps pulling off Bipap mask
pt lethargic; pt on BIPAP. pt has AM PO meds due
pt lethargic; pt on BIPAP. pt has PM PO meds due
pt's /82. VSS.
Pt's INR 1.27, no order for coumadin at this time

## 2018-01-05 NOTE — PROGRESS NOTE ADULT - PROBLEM SELECTOR PLAN 6
Improved. On IV antibiotics. Per ID. DNR.
not improving, on IV BUMEX and Zaroxlyn , oleary in place
not improving, on IV BUMEX and Zaroxlyn , oleary in place
now resolved, dose w coumadin
-no signs of bleeding, on coumadin 1.5 daily, 3 on monday/thursday  -hold coumadin for now
-no signs of bleeding, on coumadin 1.5 daily, 3 on monday/thursday  -hold coumadin for now 2/2 coagulopathy. if IR agress to do thoracenthesis will treat w po VIT K 2.5 mg
not improving, on IV BUMEX and Lasix , oleary in place
not improving, on IV BUMEX and Zaroxlyn , oleary in place

## 2018-01-05 NOTE — PROGRESS NOTE ADULT - ASSESSMENT
87 yo woman with PMHx of right breast CA S/P lumpectomy in 2000, Uterine Ca S/P total hysterectomy in 2012, Skin CA S/P excision of bilateral lower eyelid skin CA, cholecystectomy, Osteoarthritis of bilateral knees, HTN, hyperlipidemia, paroxysmal Afib on coumadin s/p PPM for slow ventricular response, severe aortic stenosis now s/p TAVR April 2015 , who presents with SOB, confusion. Found to have NANCY on CKD, hypercapneic respiratory failure, SIRS.    1. 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 CKD 3B-4 with recent baseline cr of 1.7-2.3 likely CRS/microvascular disease  3: Anemia likely multifactorial including anemia of CKD  4. Hypoalbuminemia likely due to chronic disease and low PO intake  5. Liver dysfunction considering increased transaminases and increased INR likely due to right heart failure - management per primary team  6. Hypercapnic respiratory failure and diastolic HF exacerbation- management per primary team      RECOMMENDATIONS:  -  No  HD; Continue Bumex 1mg IVP bid  as more of a palliative approach at this point  - Dain is dc'd  - As she is moving towards hospice there is no point in checking labs  - palliative care noted and dc today

## 2018-01-05 NOTE — PROGRESS NOTE ADULT - NSHPATTENDINGPLANDISCUSS_GEN_ALL_CORE
PCP and 
PCP and Pulm attd
Pulm attd
patient.
son, Pulm, NP, ID, renal

## 2018-01-05 NOTE — PROGRESS NOTE ADULT - PROBLEM SELECTOR PROBLEM 1
Acute on chronic respiratory failure with hypercapnia
Encephalopathy, unspecified
Acute on chronic respiratory failure with hypercapnia
Encephalopathy, unspecified
Chronic diastolic congestive heart failure
Encephalopathy, unspecified
Encephalopathy, unspecified

## 2018-01-05 NOTE — PROGRESS NOTE ADULT - PROBLEM SELECTOR PROBLEM 7
Elevated INR
Elevated INR
Elevated LFTs
Elevated INR
Elevated LFTs

## 2018-01-05 NOTE — PROGRESS NOTE ADULT - PROBLEM SELECTOR PLAN 4
Elevated INR. Hold coumadin and monitor. Suspect will resolved slowly with elevated LFT and poor po intake. Vit K po if increases further or evidence of bleeding.
despite epogen has acute on chronic anemia, not responding to 2 units PRBC. guiac neg. CT A?P reveals hematoma in left thigh, not clear the extent of it. Hemodynamically stable, keep hemoglobin above 7. get CT LLE. INR in nl range. off AC and off plavix
despite epogen has acute on chronic anemia, not responding to 2 units PRBC. guiac neg. CT A?P reveals hematoma in left thigh, not clear the extent of it. Hemodynamically stable, keep hemoglobin above 7. get CT LLE. INR in nl range. off AC and off plavix
- > 30 min discussion with pt son Michael via phone.  Explained that his mother is debilitated and declined and the next step should be to focus on her QOL and comfort.  He stated that he has been told that she will not improve and that this is hard for him at this point.  Explained hospice services and he is agreeable to meeting with hospice at this point.  He also wants me to discuss this with his wife.    Hospice referral placed and d/w Dr Young
-rate controlled, diltiazem 120 daily  -on coumadin,
- > 30 min discussion with pt daughter in law about pt current condition and next steps,  explined that she is ES and goal is comfort.  She agreed on inpt hospice referral.  [facility] and awaiting transport.
-rate controlled, diltiazem 120 daily  -on coumadin, holding for elevated INR
despite epogen has acute on chronic anemia, not responding to 2 units PRBC. guiac neg. CT A?P reveals hematoma in left thigh, not clear the extent of it. Hemodynamically stable, keep hemoglobin above 7. get CT LLE. INR in nl range. off AC and off plavix
despite epogen has acute on chronic anemia, not responding to 2 units PRBC. guiac neg. get CT A/B to R/O intrabd bleed. INR in nl range. DC plavix
despite epogen has acute on chronic anemia, was on coumadin, admitted w coagulopathy, dropped HH, given Vit K, guiac neg. given 1 U PRBC on 12/27 and 1 unit 12/29. acute hematoma LLE . off AC and off plavix
despite epogen has acute on chronic anemia, was on coumadin, admitted w coagulopathy, dropped HH, given Vit K, guiac neg. given 1 U PRBC on 12/27 and 1 unit 12/29. acute hematoma LLE . off AC and off plavix

## 2018-01-05 NOTE — PROGRESS NOTE ADULT - PROBLEM SELECTOR PROBLEM 3
Acute on chronic diastolic congestive heart failure
Agitation
Acute on chronic diastolic congestive heart failure
Agitation
NANCY (acute kidney injury)
Acute on chronic diastolic congestive heart failure

## 2018-01-05 NOTE — PROVIDER CONTACT NOTE (OTHER) - ASSESSMENT
No change in pt status, no overt signs or symptoms of bleeding. Pt pending a hemmocult but has not had a stool yet.
Patient is agitated and refusing to wear BIPAP. Patient is sating >94% on 5L NC.
Pt AOx2, lethargic. VSS. Pt on BIPAP
Pt not pulling lines  or bipap mask calm and cooperative
Upon assessment, patient is in bed, restless, pulling at her telemetry monitoring leads and oxygen.  Gautam catheter noted to have been pulled out.  Balloon remains inflated, no visible trauma noted to site.
Upon assessment, patient is resting comfortably in bed, NAD, with no complaints at this time.
VSS. Blood transfusion completed at 2315. INR-3.44restless, pulling the Iv lines, Gautam, removing  tele wires. Picking nose and lips.
VSS. Pt o2 saturation 99% on 5L NC with humidification.

## 2018-01-05 NOTE — PROVIDER CONTACT NOTE (OTHER) - ACTION/TREATMENT ORDERED:
No dose of coumadin tonight for concern for bleed. WCTM
As per provider keep pt on 5L NC and continue to monitor.
Advised to insert new oleary catheter as patient is on strict I/O monitoring.  Oleary catheter inserted with sterile technique, STAT lock placed.  Will continue to closely monitor.
D/c Restraints
Florentino Alvarez made aware and will contact patient's grand-daughter who is a PA at this hospital to talk to pt about wearing BIPAP. Continue to monitor patient's pulse ox
JESUS Finnegan made aware; per JESUS Turk ok to hold meds. will continue to monitor.
JESUS Finnegan made aware; per JESUS Turk ok to hold meds. will continue to monitor.
JESUS Finnegan made aware; will continue to monitor
No new orders noted at this time.  Will continue to monitor.
PA ordered to initiate B/L hand mittens now. PA came and evaluated pt at bedside.

## 2018-01-05 NOTE — PROGRESS NOTE ADULT - ASSESSMENT
multiple medical issues including cardiac disease, HTN, hyoxemia with chronic worsening respiratory status, afib, prior breast CA with generalized decline with hypercapnia and hypoxemia due primarily to fluid overload and cardiac decompensation.  Also with confusion-improved, but overall prognosis for meaningful recovery poor.    continue current rx  close attention to fluid and cardiac status  BIPAP qhs and prn as pt allows  palliative care input noted- hospice planning  dvt prophylaxis  routine gi prophylaxis as necessary  Oxygen saturation greater than 92%  change albuterol nebs to prn  we will follow intermittently.  please call for acute changes  pt is DNR    Molly Soto MD  [phone]  Pulmonary

## 2018-01-05 NOTE — PROVIDER CONTACT NOTE (OTHER) - NAME OF MD/NP/PA/DO NOTIFIED:
Darrin Pantoja NP
Florentino Alvarez
JESUS Finnegan
Kobi Adam
Medicine PA, Florencio Douglas.
Shira Cuello, NP
Shira Cuello, NP
Elijah BOWLING

## 2018-01-05 NOTE — PROGRESS NOTE ADULT - SUBJECTIVE AND OBJECTIVE BOX
Patient is a 88y old  Female who presents with a chief complaint of sob  History obtained from family due to confusion (22 Dec 2017 21:26)      SUBJECTIVE / OVERNIGHT EVENTS: lethargic, awaiting transfer to hospice    MEDICATIONS  (STANDING):  buMETAnide Injectable 1 milliGRAM(s) IV Push every 12 hours  haloperidol    Injectable 0.5 milliGRAM(s) IV Push two times a day  HYDROmorphone  Injectable 0.2 milliGRAM(s) IV Push every 6 hours  pantoprazole  Injectable 40 milliGRAM(s) IV Push two times a day    MEDICATIONS  (PRN):  ALBUTerol    0.083% 2.5 milliGRAM(s) Nebulizer every 6 hours PRN Shortness of Breath and/or Wheezing  HYDROmorphone  Injectable 0.2 milliGRAM(s) IV Push every 2 hours PRN Labored breathing  HYDROmorphone  Injectable 0.2 milliGRAM(s) IV Push every 2 hours PRN Breakthrough pain      Vital Signs Last 24 Hrs  T(F): 98.2 (01-05-18 @ 05:04), Max: 98.2 (01-05-18 @ 05:04)  HR: 94 (01-05-18 @ 10:09) (94 - 110)  BP: 124/79 (01-05-18 @ 05:04) (104/67 - 124/79)  RR: 18 (01-05-18 @ 05:04) (18 - 18)  SpO2: 95% (01-05-18 @ 10:09) (95% - 99%)  Telemetry:   CAPILLARY BLOOD GLUCOSE        I&O's Summary    04 Jan 2018 07:01  -  05 Jan 2018 07:00  --------------------------------------------------------  IN: 360 mL / OUT: 1 mL / NET: 359 mL    05 Jan 2018 07:01  -  05 Jan 2018 20:56  --------------------------------------------------------  IN: 110 mL / OUT: 0 mL / NET: 110 mL        PHYSICAL EXAM:  GENERAL: NAD, well-developed  HEAD:  Atraumatic, Normocephalic  EYES: EOMI, PERRLA, conjunctiva and sclera clear  NECK: Supple, No JVD  CHEST/LUNG: Clear to auscultation bilaterally; No wheeze  HEART: Regular rate and rhythm; No murmurs, rubs, or gallops  ABDOMEN: Soft, Nontender, Nondistended; Bowel sounds present  EXTREMITIES:  2+ Peripheral Pulses, No clubbing, cyanosis, or edema  PSYCH: AAOx3  NEUROLOGY: non-focal  SKIN: No rashes or lesions    LABS:                    RADIOLOGY & ADDITIONAL TESTS:    Imaging Personally Reviewed:    Consultant(s) Notes Reviewed:      Care Discussed with Consultants/Other Providers:

## 2018-01-05 NOTE — PROVIDER CONTACT NOTE (OTHER) - BACKGROUND
Admit dx dyspnea
Admitted for dyspnea.
Patient admitted for dyspnea.  PMH of severe aortic stenosis, uterine cancer, arthritis, HTN, breast cancer, atrial fibrillation, dyslipidemia.
Patient admitted for dyspnea.  PMH of severe aortic stenosis, uterine cancer, arthritis, HTN, breast cancer, atrial fibrillation, dyslipidemia.
Patient admitted to unit with hypercapnic respiratory failure and UTI
Pt admitted for dyspnea. pmh-afib, htn, arthritis, uterine cancer
adm with hypercapnia, chf
Pt admitted with respiratory failure with extensive history, CHF, UTI, Afib on coumadin, but with recent drop in H+H with concern for ?GIB

## 2018-01-05 NOTE — PROGRESS NOTE ADULT - PROBLEM SELECTOR PROBLEM 2
NANCY (acute kidney injury)
Hypercapnic respiratory failure
NANCY (acute kidney injury)
Chronic atrial fibrillation
Hypercapnic respiratory failure
Hypercapnic respiratory failure

## 2018-01-05 NOTE — PROVIDER CONTACT NOTE (OTHER) - DATE AND TIME:
01-Jan-2018 15:00
01-Jan-2018 16:00
05-Jan-2018 02:58
23-Dec-2017 00:50
23-Dec-2017 06:30
23-Dec-2017 22:50
28-Dec-2017 00:30
29-Dec-2017 06:46
30-Dec-2017 23:30
29-Dec-2017 22:40

## 2018-01-05 NOTE — PROGRESS NOTE ADULT - PROBLEM SELECTOR PLAN 1
- due to ES COPD and CHF  - c/w care per pulm and cards but due to debility and ongoing ESRD prognosis is poor and QOL/hospice should be focus
-most likely 2/2 hypercapnia and NANCY, now resolved  -infection less likely,completed empiric course of ABx. .has a lot of debris in bronchial tree on Ct Chest.  all Cx NTD, coag neg staph on 1/4 bottles, contaminant
- due to ES COPD and CHF  - c/w care per pulm and cards but due to debility and ongoing ESRD prognosis is poor and QOL/hospice should be focus  - start dilaudid 0.2mg IVp Q6H ATC  - start dilaudid 0.2mg IVP as needed for SOB
-most likely 2/2 hypercapnia and NANCY, improving per family  -infection less likely, receiving empiric antibiotics now vancomycin and zosyn
-most likely 2/2 hypercapnia and NANCY, now resolved  -infection less likely, receiving empiric antibiotics w zosyn. all Cx NTD, coag neg staph on 1/4 bottles, contaminant
-most likely 2/2 hypercapnia and NANCY, now resolved  -infection less likely, receiving empiric antibiotics w zosyn.has a lot of debris in bronchial tree on Ct Chest.  all Cx NTD, coag neg staph on 1/4 bottles, contaminant
-most likely 2/2 hypercapnia and NANCY, now resolved  -infection less likely,completed empiric course of ABx. .has a lot of debris in bronchial tree on Ct Chest.  all Cx NTD, coag neg staph on 1/4 bottles, contaminant
-most likely 2/2 hypercapnia and NANCY, now resolved  -infection less likely,completed empiric course of ABx. .has a lot of debris in bronchial tree on Ct Chest.  all Cx NTD, coag neg staph on 1/4 bottles, contaminant
lethargic  -most likely 2/2 hypercapnia and NANCY,  -infection less likely ,completed empiric course of ABx.  [facility]ic  -most likely 2/2 hypercapnia and NANCY,  -infection less likely ,completed empiric course of ABx.  [facility]
today is awake, alert  -most likely 2/2 hypercapnia and NANCY, now resolved  -infection less likely,completed empiric course of ABx. .has a lot of debris in bronchial tree on Ct Chest.  all Cx NTD, coag neg staph on 1/4 bottles, contaminant
today is awake, alert, confused  -most likely 2/2 hypercapnia and NANCY,  -infection less likely,completed empiric course of ABx. .has a lot of debris in bronchial tree on Ct Chest.  all Cx NTD, coag neg staph on 1/4 bottles, contaminant
Stable off diuretic for now. To monitor and consider resuming next 24-48 hours.
-most likely 2/2 hypercapnia and NANCY, now resolved  -infection less likely,completed empiric course of ABx. .has a lot of debris in bronchial tree on Ct Chest.  all Cx NTD, coag neg staph on 1/4 bottles, contaminant
today is awake, alert, confused  -most likely 2/2 hypercapnia and NANCY,  -infection less likely ,completed empiric course of ABx.

## 2018-01-05 NOTE — PROGRESS NOTE ADULT - ATTENDING COMMENTS
goals of care d/w son, ptn is DNR. x 30 min. d/w Palliative care, agree ptn is terminal and in need of hospice. dima azveedo

## 2018-04-18 NOTE — CONSULT NOTE ADULT - ASSESSMENT
88 y/0 debilitated female with Hx of right breast CA S/P lumpectomy in 2000, Uterine Ca S/P total hysterectomy in 2012, Skin CA S/P excision of bilateral lower eyelid skin CA, cholecystectomy, Osteoarthritis of bilateral knees, HTN, hyperlipidemia, paroxysmal Afib on coumadin s/p PPM for slow ventricular response, severe aortic stenosiss/p TAVR 2015 p/w inc SOB due to hypercapnic respiratory failure, PNA, NANCY
88y Female with history of AF, AS S/P TAVR, CHF and pulmonary disease brought to ER with increased confusion and respiratory distress. She has acute kidney injury, elevated LFT and INR. Suspect hepatic congestion/ischemia. Hold coumadin. Panculture. Obtain viral screening with respiratory symptoms. Hold diuretics 24-48 hours and monitor.
Quality 431: Preventive Care And Screening: Unhealthy Alcohol Use - Screening: Patient screened for unhealthy alcohol use using a single question and scores 2 or greater episodes per year and brief intervention did not occur
Quality 131: Pain Assessment And Follow-Up: Pain assessment using a standardized tool is documented as negative, no follow-up plan required
A/P; 89 y/o female with PMHx of right breast CA S/P lumpectomy in 2000, Uterine Ca S/P total hysterectomy in 2012, Skin CA S/P excision of bilateral lower eyelid skin CA, cholecystectomy, Osteoarthritis of bilateral knees, HTN, hyperlipidemia, paroxysmal Afib on coumadin s/p PPM for slow ventricular response, severe aortic stenosis now s/p TAVR April 2015 , who presents with SOB, cough x 1 day,  confusion and lethargy over the past few days.  Patient was recently admitted 10/15-11/2 for AMS, NANCY on CKD thought to be pre-renal 2/2 overdiuresis and diarrhea, hypercapneic/hypoxemic respiratory failure 2/2 emphysema? respiratory muscle weakness/kyphoscoliosiss/restrictive lung disease with moderate b/l pleural effusions,  improved with BIPAP, acute on chronic diastolic CHF w/ EF 55%. She was discharged to rehab and then home 12/1.   Since being home she has not returned to her previous independent lifestyle, requires 24/7 care from HHA and family, has had difficulty completing daily tasks, ambulating due to lethargy and SOB. OP providers have increased lasix to current 60mg daily, 80 twice/week without improvement.   ROS negative for fever, chills, chest pain, nausea, vomiting, diarrhea, dysuria.   CXR with  Small bilateral pleural effusions with adjacent atelectasis.   Pt is given Zosyn, vancomycin.  BC 1/4 with CNS, likely contamination.  UC negative.  No PNA on CXR.  ? CHF/COPD exacerbation.  No further Vancomycin, Continue Zosyn.
Quality 130: Documentation Of Current Medications In The Medical Record: Current Medications Documented
Quality 110: Preventive Care And Screening: Influenza Immunization: Influenza Immunization Administered during Influenza season
Detail Level: Detailed
Quality 111:Pneumonia Vaccination Status For Older Adults: Pneumococcal Vaccination Previously Received
Quality 226: Preventive Care And Screening: Tobacco Use: Screening And Cessation Intervention: Patient screened for tobacco and never smoked

## 2018-12-13 NOTE — CONSULT NOTE ADULT - SUBJECTIVE AND OBJECTIVE BOX
Pt presents with a sore throat since yesterday. Mom states his appetite has been decreased. Received Tylenol today at 1200   HPI:  86 yo F with PMHx of right breast CA S/P lumpectomy in 2000, Uterine Ca S/P total hysterectomy in 2012, Skin CA S/P excision of bilateral lower eyelid skin CA, HTN, hyperlipidemia, paroxysmal Afib on coumadin, severe aortic stenosis s/p TAVR, who presented on 10/16 with bradycardia, weakness, and NANCY. Dermatology was consulted for a growth on the L dorsal hand. Present for over one year. Believes it is getting larger. Not bleeding or ulcerating. No tx tried. Asymptomatic.    PAST MEDICAL & SURGICAL HISTORY:  Severe aortic stenosis  Uterine cancer  Arthritis  HTN (hypertension)  Breast cancer diagnosed in 2000: s/p right lumpectomy and right axillary lymph node removal, radiation  AF (atrial fibrillation): on coumadin and aspirin  Dyslipidemia  H/O: hysterectomy: 4/2012  s/p surgical excision of left eye Skin cancer: Bilateral lower eye lids  S/P D&C  S/P cholecystectomy      REVIEW OF SYSTEMS      General: no fevers/chills	    Skin/Breast: see HPI  	  Ophthalmologic: no eye pain or change in vision  	  ENMT: no dysphagia or change in hearing    Respiratory and Thorax: +SOB  	  Cardiovascular: no palpitations or chest pain    Gastrointestinal: no abdominal pain or blood in stool     Genitourinary: no dysuria or frequency    Musculoskeletal: +weakness    Neurological:no numbness , or tingling    MEDICATIONS  (STANDING):  ALBUTerol/ipratropium for Nebulization 3 milliLiter(s) Nebulizer every 6 hours  ascorbic acid 500 milliGRAM(s) Oral daily  atorvastatin 10 milliGRAM(s) Oral at bedtime  buDESOnide   0.5 milliGRAM(s) Respule 0.5 milliGRAM(s) Inhalation every 12 hours  cefTRIAXone   IVPB      cefTRIAXone   IVPB 1 Gram(s) IV Intermittent every 24 hours  clopidogrel Tablet 75 milliGRAM(s) Oral daily  ferrous    sulfate 325 milliGRAM(s) Oral daily  influenza   Vaccine 0.5 milliLiter(s) IntraMuscular once  metoprolol 12.5 milliGRAM(s) Oral two times a day  multivitamin 1 Tablet(s) Oral daily    MEDICATIONS  (PRN):      Allergies    No Known Allergies    Intolerances    digoxin (Rash; Drowsiness)      SOCIAL HISTORY:    FAMILY HISTORY:  No pertinent family history in first degree relatives      Vital Signs Last 24 Hrs  T(C): 36.4 (20 Oct 2017 04:23), Max: 36.9 (19 Oct 2017 14:13)  T(F): 97.5 (20 Oct 2017 04:23), Max: 98.4 (19 Oct 2017 14:13)  HR: 91 (20 Oct 2017 08:45) (66 - 91)  BP: 135/80 (20 Oct 2017 04:23) (103/60 - 135/80)  BP(mean): --  RR: 22 (20 Oct 2017 04:23) (18 - 22)  SpO2: 100% (20 Oct 2017 08:45) (97% - 100%)    PHYSICAL EXAM:     The patient was alert and oriented X 3, well nourished, and in no  apparent distress.  OP showed no ulcerations  There was no visible lymphadenopathy.  Conjunctiva were non injected  There was no clubbing or edema of extremities.  The scalp, hair, face, eyebrows, lips, OP, neck, chest, back,   extremities X 4, nails were examined.  There was no hyperhidrosis or bromhidrosis.    Of note on skin exam:   L dorsal hand: 2 cm pink, pedunculated nodule    LABS:                        8.8    5.55  )-----------( 189      ( 20 Oct 2017 08:24 )             29.6     10-20    141  |  108  |  46<H>  ----------------------------<  542<HH>  3.9   |  24  |  1.71<H>    Ca    7.9<L>      20 Oct 2017 08:28    TPro  4.8<L>  /  Alb  x   /  TBili  x   /  DBili  x   /  AST  x   /  ALT  x   /  AlkPhos  x   10-19    PT/INR - ( 20 Oct 2017 08:24 )   PT: 40.3 sec;   INR: 3.48 ratio HPI:  86 yo F with PMHx of right breast CA S/P lumpectomy in 2000, Uterine Ca S/P total hysterectomy in 2012, Skin CA (unknown type) S/P mohs of bilateral lower eyelid, HTN, hyperlipidemia, paroxysmal Afib on coumadin, severe aortic stenosis s/p TAVR, who presented on 10/16 with bradycardia, weakness, and NANCY. Dermatology was consulted for a growth on the L dorsal hand. Present for over one year. Believes it is getting larger. Not bleeding. Occasionally drains serous material. No tx tried. Asymptomatic.    PAST MEDICAL & SURGICAL HISTORY:  Severe aortic stenosis  Uterine cancer  Arthritis  HTN (hypertension)  Breast cancer diagnosed in 2000: s/p right lumpectomy and right axillary lymph node removal, radiation  AF (atrial fibrillation): on coumadin and aspirin  Dyslipidemia  H/O: hysterectomy: 4/2012  s/p surgical excision of left eye Skin cancer: Bilateral lower eye lids  S/P D&C  S/P cholecystectomy      REVIEW OF SYSTEMS      General: no fevers/chills	    Skin/Breast: see HPI  	  Ophthalmologic: no eye pain or change in vision  	  ENMT: no dysphagia or change in hearing    Respiratory and Thorax: +SOB  	  Cardiovascular: no palpitations or chest pain    Gastrointestinal: no abdominal pain or blood in stool     Genitourinary: no dysuria or frequency    Musculoskeletal: +weakness    Neurological:no numbness , or tingling    MEDICATIONS  (STANDING):  ALBUTerol/ipratropium for Nebulization 3 milliLiter(s) Nebulizer every 6 hours  ascorbic acid 500 milliGRAM(s) Oral daily  atorvastatin 10 milliGRAM(s) Oral at bedtime  buDESOnide   0.5 milliGRAM(s) Respule 0.5 milliGRAM(s) Inhalation every 12 hours  cefTRIAXone   IVPB      cefTRIAXone   IVPB 1 Gram(s) IV Intermittent every 24 hours  clopidogrel Tablet 75 milliGRAM(s) Oral daily  ferrous    sulfate 325 milliGRAM(s) Oral daily  influenza   Vaccine 0.5 milliLiter(s) IntraMuscular once  metoprolol 12.5 milliGRAM(s) Oral two times a day  multivitamin 1 Tablet(s) Oral daily    MEDICATIONS  (PRN):      Allergies    No Known Allergies    Intolerances    digoxin (Rash; Drowsiness)      SOCIAL HISTORY:    FAMILY HISTORY:  No pertinent family history in first degree relatives      Vital Signs Last 24 Hrs  T(C): 36.4 (20 Oct 2017 04:23), Max: 36.9 (19 Oct 2017 14:13)  T(F): 97.5 (20 Oct 2017 04:23), Max: 98.4 (19 Oct 2017 14:13)  HR: 91 (20 Oct 2017 08:45) (66 - 91)  BP: 135/80 (20 Oct 2017 04:23) (103/60 - 135/80)  BP(mean): --  RR: 22 (20 Oct 2017 04:23) (18 - 22)  SpO2: 100% (20 Oct 2017 08:45) (97% - 100%)    PHYSICAL EXAM:     The patient was alert and oriented X 3, well nourished, and in no  apparent distress.  OP showed no ulcerations  There was no visible lymphadenopathy.  Conjunctiva were non injected  There was no clubbing or edema of extremities.  The scalp, hair, face, eyebrows, lips, OP, neck, chest, back,   extremities X 4, nails were examined.  There was no hyperhidrosis or bromhidrosis.    Of note on skin exam:   L dorsal hand: 2 cm pink, pedunculated nodule with superficial ulceration    LABS:                        8.8    5.55  )-----------( 189      ( 20 Oct 2017 08:24 )             29.6     10-20    141  |  108  |  46<H>  ----------------------------<  542<HH>  3.9   |  24  |  1.71<H>    Ca    7.9<L>      20 Oct 2017 08:28    TPro  4.8<L>  /  Alb  x   /  TBili  x   /  DBili  x   /  AST  x   /  ALT  x   /  AlkPhos  x   10-19    PT/INR - ( 20 Oct 2017 08:24 )   PT: 40.3 sec;   INR: 3.48 ratio

## 2019-09-30 NOTE — PROGRESS NOTE ADULT - ASSESSMENT
A/P: 87 year old female with PMHx of right breast CA S/P lumpectomy in 2000, Uterine Ca S/P total hysterectomy in 2012, Skin CA S/P excision of bilateral lower eyelid skin CA, cholecystectomy, Osteoarthritis of bilateral knees, HTN, hyperlipidemia, paroxysmal Afib on coumadin, severe aortic stenosis now s/p TAVR, who presents with generalized weakness for 2 weeks.  Patient reports that over the last two weeks she had noticed increased weakness, episodes of shortness of breath at rest.  No chest pain. No fevers, no chills, no nausea or vomiting.  Pt with episodes of lethargy. Requires BiPAP for respiratory acidosis.  Noted to have pyuria and sensitive  K.pneumo in UC.    Recommend:    Currently on Ceftriaxone for suspected UTI - growing sensitive Klebsiella pnuem  Pt is Afebrile. No leukocytosis.  Continue short course of ABs.  (Duration 5 days from 10/18 - 10/23)    Adrienne Oh  [phone]    (Covering Dr. Segovia) General

## 2019-12-09 NOTE — DISCHARGE NOTE ADULT - DISCHARGE DATE
01-Nov-2017 Ketoconazole Counseling:   Patient counseled regarding improving absorption with orange juice.  Adverse effects include but are not limited to breast enlargement, headache, diarrhea, nausea, upset stomach, liver function test abnormalities, taste disturbance, and stomach pain.  There is a rare possibility of liver failure that can occur when taking ketoconazole. The patient understands that monitoring of LFTs may be required, especially at baseline. The patient verbalized understanding of the proper use and possible adverse effects of ketoconazole.  All of the patient's questions and concerns were addressed.

## 2021-02-22 NOTE — DISCHARGE NOTE ADULT - CARE PROVIDERS DIRECT ADDRESSES
Shane Conley called requesting a refill of the below medication which has been pended for you:     Requested Prescriptions     Pending Prescriptions Disp Refills    hydroCHLOROthiazide (HYDRODIURIL) 25 MG tablet [Pharmacy Med Name: HYDROCHLOROTHIAZIDE 25MG TABS] 30 tablet 7     Sig: TAKE ONE TABLET BY MOUTH EVERY DAY    amLODIPine (NORVASC) 5 MG tablet [Pharmacy Med Name: AMLODIPINE BESYLATE 5MG TABS] 90 tablet 3     Sig: TAKE ONE TABLET BY MOUTH EVERY DAY AS DIRECTED       Last Appointment Date: 10/9/2020  Next Appointment Date: 3/11/2021    No Known Allergies
,DirectAddress_Unknown,DirectAddress_Unknown,[email or web address],DirectAddress_Unknown

## 2021-07-21 NOTE — PROGRESS NOTE ADULT - ASSESSMENT
Patient calling- would like MEDNAXhart sent when approved. Status Open today. obscure gib.  no endosocopic eval warranted risk>> benefit follwo h/h closely

## 2021-08-11 NOTE — H&P ADULT - PROBLEM SELECTOR PLAN 1
-most likely 2/2 hypercapnia and NANCY, improving per family  -infection less likely, receiving antibiotics now vancomycin and zosyn patient

## 2022-04-26 NOTE — H&P ADULT - NSHPPHYSICALEXAM_GEN_ALL_CORE
Vital Signs Last 24 Hrs  T(C): 36.7 (16 Oct 2017 02:51), Max: 37.4 (16 Oct 2017 00:21)  T(F): 98 (16 Oct 2017 02:51), Max: 99.3 (16 Oct 2017 00:21)  HR: 61 (16 Oct 2017 02:51) (52 - 61)  BP: 102/65 (16 Oct 2017 02:51) (102/65 - 139/59)  BP(mean): --  RR: 18 (16 Oct 2017 02:51) (18 - 22)  SpO2: 97% (16 Oct 2017 02:51) (97% - 99%)    GENERAL: No acute distress, well-developed  HEAD:  Atraumatic, Normocephalic  EYES: EOMI, PERRLA, conjunctiva and sclera clear  ENT: Oral mucosa dry, brownish sticky material (does NOT appear to be blood) caked on top of teeth?-pt denies recent food intake and wasn't sure what this was.  NECK: Neck supple  CHEST/LUNG: Clear to auscultation bilaterally; No wheeze, no rales, no rhonchi.    HEART: IRR, No murmurs, rubs, or gallops  ABDOMEN: Soft, Nontender, Nondistended; Bowel sounds present  EXTREMITIES:  No clubbing, cyanosis.  Bilateral pitting edema, R side 3+, left side 1+  VASCULAR: Posterior tibialis pulses intact bilaterally  PSYCH: Normal behavior, normal affect  NEUROLOGY: AAOx3  SKIN: grossly warm and dry
2 seconds or less

## 2022-08-18 NOTE — DIETITIAN INITIAL EVALUATION ADULT. - 25 CAL
We are committed to providing our patients with their test results in a timely manner. If you have access to [facility], you will receive your results within 5 to 7 days. If your results are normal, a member of our office may call you or you may receive a letter in the mail within 10 to 15 days of your testing. If your results have something additional to discuss, a member of our office will contact you by phone. If at any time you have questions related your results, please feel free to call our office at [phone]   [phone]

## 2022-12-08 NOTE — DIETITIAN INITIAL EVALUATION ADULT. - PROBLEM SELECTOR PLAN 2
Pt here with elevated creatinine concerning for NANCY  Unclear cause but given dry mucous membranes on exam and pt complaint of significant thirst but not drinking liquids may be prerenal?  Check urine electrolytes and urea nitrogen to calculate FeUrea  Trend creatinine on BMP Pt w/ 2lb wt gain since last admission. clinically insignificant

## 2022-12-23 NOTE — PATIENT PROFILE ADULT. - FUNCTIONAL LEVEL PRIOR: TRANSFERRING
No evidence of active ischemia.  AF controlled.  Bp acceptable.  To follow while admitted. (0) independent

## 2023-03-27 NOTE — PROVIDER CONTACT NOTE (CRITICAL VALUE NOTIFICATION) - SITUATION
Jose Carlos PORSHA Garciamaggie has an upcoming lab appointment:    Future Appointments   Date Time Provider Department Center   3/31/2023  2:00 PM AN LAB [facility]   5/31/2023  8:00 AM  PFL B [facility]   5/31/2023  9:00 AM  PFL 6 MINUTE WALK 1 [facility]   5/31/2023  9:30 AM Naveed Verde MD [facility]     Patient is scheduled for the following lab(s): Hepatitis B Core Ab, and surface Antigen ordered by Naveed Verde MD    There is no order available. Please review and place either future orders or HMPO (Review of Health Maintenance Protocol Orders), as appropriate.    Health Maintenance Due   Topic     ANNUAL REVIEW OF HM ORDERS      HEPATITIS C SCREENING      LIPID      Kacie Mcpherson, [facility]     Blood culture positive growth in aerobic bottle gram pos. cocci in clusters

## 2023-08-21 NOTE — PROVIDER CONTACT NOTE (CRITICAL VALUE NOTIFICATION) - NS PROVIDER READ BACK TO LAB
[address] Discharge Call    2023    Patient: Lakia Ann Patient : 1968   MRN: [id number]  Reason for Admission: ESRD   Discharge Date: 23 RARS: Readmission Risk Score: 48.7         Discharge Facility:  [address] Course:    [facility] ED N/v with a recent 5th toe amputation R.   to  [facility] ESRD   to  [facility]. HFU made:  None  Has in past had HD on MWF. Has a Management Plan. Sig Hx:   ESRD with HD, TAVR hx, CHF, DMII, COPD, afib    DME:     Conversation:   Left HIPPA compliant message regarding the nature of the call and a request for a return call with my contact information      Chaya Closs, BSN, [phone]  [facility] Transition Nurse         Follow up plan:             [facility] [place] [facility]      Do you have all of your prescriptions and are they filled?: Yes         Do you have support at home?: Partner/Spouse/SO      Care Transitions Interventions         No future appointments.     Chaya Closs, BSN, RN   [address] Transition Nurse  [phone] yes

## 2024-09-03 NOTE — PROGRESS NOTE ADULT - SUBJECTIVE AND OBJECTIVE BOX
OSCAR LOPEZ    Feeling better.  Breathing better.  Still has not ambulated much.  Heart rate in AF better controlled, since starting cardizem, now mostly under 100.    Allergies    No Known Allergies    Intolerances    digoxin (Rash; Drowsiness)    MEDICATIONS  (STANDING):  ALBUTerol/ipratropium for Nebulization 3 milliLiter(s) Nebulizer every 6 hours  ascorbic acid 500 milliGRAM(s) Oral daily  atorvastatin 10 milliGRAM(s) Oral at bedtime  buDESOnide   0.5 milliGRAM(s) Respule 0.5 milliGRAM(s) Inhalation every 12 hours  clopidogrel Tablet 75 milliGRAM(s) Oral daily  diltiazem    milliGRAM(s) Oral daily  ferrous    sulfate 325 milliGRAM(s) Oral daily  influenza   Vaccine 0.5 milliLiter(s) IntraMuscular once  metoprolol 12.5 milliGRAM(s) Oral two times a day  multivitamin 1 Tablet(s) Oral daily    PHYSICAL EXAM:  Vital Signs Last 24 Hrs  T(C): 36.9 (25 Oct 2017 04:25), Max: 36.9 (24 Oct 2017 14:12)  T(F): 98.4 (25 Oct 2017 04:25), Max: 98.4 (24 Oct 2017 14:12)  HR: 95 (25 Oct 2017 08:45) (64 - 95)  BP: 136/64 (25 Oct 2017 04:25) (121/65 - 138/68)  BP(mean): --  RR: 22 (25 Oct 2017 04:25) (18 - 22)  SpO2: 97% (25 Oct 2017 08:45) (97% - 99%)  Daily     Daily Weight in k.5 (25 Oct 2017 07:20)  I&O's Summary    24 Oct 2017 07:01  -  25 Oct 2017 07:00  --------------------------------------------------------  IN: 960 mL / OUT: 850 mL / NET: 110 mL    25 Oct 2017 07:01  -  25 Oct 2017 10:56  --------------------------------------------------------  IN: 240 mL / OUT: 0 mL / NET: 240 mL    General Appearance: 	 Alert, cooperative, no distress  Neck: JVP now elevated to 15 cm  Lungs:  Decreased BS at bases  Cor:  pmi 5th ICS MCL, Irregular rate and rhythm, S1 normal intensity, S2 normal intensity, GHAZALA  Extremities: 1-2+ BL edema      EKG:  Telemetry:  AF     Labs:  CBC Full  -  ( 25 Oct 2017 07:26 )  WBC Count : 7.74 K/uL  Hemoglobin : 7.8 g/dL  Hematocrit : 26.7 %  Platelet Count - Automated : 211 K/uL  Mean Cell Volume : 94.7 fl  Mean Cell Hemoglobin : 27.7 pg  Mean Cell Hemoglobin Concentration : 29.2 gm/dL  Auto Neutrophil # : x  Auto Lymphocyte # : x  Auto Monocyte # : x  Auto Eosinophil # : x  Auto Basophil # : x  Auto Neutrophil % : x  Auto Lymphocyte % : x  Auto Monocyte % : x  Auto Eosinophil % : x  Auto Basophil % : x    Basic Metabolic Panel in AM (10.25.17 @ 08:35)    Sodium, Serum: 139 mmol/L    Potassium, Serum: 5.5 mmol/L    Chloride, Serum: 103 mmol/L    Carbon Dioxide, Serum: 28 mmol/L    Anion Gap, Serum: 8 mmol/L    Blood Urea Nitrogen, Serum: 45 mg/dL    Creatinine, Serum: 1.45 mg/dL    Glucose, Serum: 103 mg/dL    Calcium, Total Serum: 9.0 mg/dL    eGFR if Non : 32: Interpretative comment  The units for eGFR are ml/min/1.73m2 (normalized body surface area). The  eGFR is calculated from a serum creatinine using the CKD-EPI equation.  Other variables required for calculation are race, age and sex. Among  patients with chronic kidney disease (CKD), the eGFR is useful in  determining the stage of disease according to KDOQI CKD classification.  All eGFR results are reported numerically with the following  interpretation.          GFR                    With                 Without     (ml/min/1.73 m2)    Kidney Damage       Kidney Damage        >= 90                    Stage 1                     Normal        60-89                    Stage 2                     Decreased GFR        30-59     Stage 3                     Stage 3        15-29                    Stage 4                     Stage 4        < 15                      Stage 5                     Stage 5  Each stage of CKD assumes that the associated GFR level has been in  effect for at least 3 months. Determination of stages one and two (with  eGFR > 59 ml/min/m2) requires estimation of kidney damage for at least 3  months as defined by structural or functional abnormalities.  Limitations: All estimates of GFR will be less accurate for patients at  extremes of muscle mass (including but not limited to frail elderly,  critically ill, or cancer patients), those with unusual diets, and those  with conditions associated with reduced secretion or extrarenal  elimination of creatinine. The eGFR equation is not recommended for use  in patients with unstable creatinine levels. mL/min/1.73M2    eGFR if African American: 37 mL/min/1.73M2    PT/INR - ( 25 Oct 2017 07:26 )   PT: 26.0 sec;   INR: 2.26 ratio        Impression/Plan:  Acute renal failure, now with improved Cr  Respiratory acidosis, acute now improved off BIPAP  Chronic AF on anticoagulation with improved rate control with cardizem and metoprolol  Chronic diastolic CHF, now with increased volume    Plan: Would restart diuretics now, lasix 40 mg daily  Transfuse one unit pRBC for hemoglobin <8.  If transfuse would give one dose of IV lasix.  Continue cardizem and metoprolol, and coumadin  Dispo planning hopefully rehab by the end of the week    Leopoldo Funes MD, FACC  [facility]   Oksana Jimenez is a 68 year old female who is here for consult regarding lesion on back.    HANK Olsen  asked my opinion concerning this.  It had been present for  unknown peroid since on back.   H/o other BCCA    PMH:   ALLERGIES:  Patient has no known allergies.  Current Outpatient Medications   Medication Sig Dispense Refill    acyclovir (ZOVIRAX) 400 MG tablet Take 400 mg by mouth daily.      Calcium Carbonate 1500 (600 Ca) MG Tab Take 600 mg by mouth.      cephalexin (KEFLEX) 500 MG capsule Take 500 mg by mouth.      cholestyramine light (PREVALITE) 4 GM/DOSE powder Take 4 g by mouth daily.      pantoprazole (PROTONIX) 40 MG tablet Take 40 mg by mouth.      Turmeric, Curcuma Longa, (Curcumin) Powder       predniSONE (DELTASONE) 20 MG tablet Take one po TID for 5 days, then BID for days, then daily for 5 days. Take with food 30 tablet 0    Cholecalciferol 2000 units Tab 1 CAPSULE DAILY      Omega-3 Fatty Acids (FISH OIL PO) None Entered      calcium carbonate-vitamin D (CALCIUM 600+D) 600-200 MG-UNIT tablet None Entered      DOCOSAHEXAENOIC ACID PO Take 1 capsule by mouth daily.      VITAMIN C, CALCIUM ASCORBATE, PO       CALCIUM PO       cyanocobalamin 100 MCG tablet       TURMERIC PO       levothyroxine (SYNTHROID, LEVOTHROID) 50 MCG tablet       omeprazole (PRILOSEC) 10 MG capsule Take 10 mg by mouth.      cycloSPORINE (RESTASIS) 0.05 % ophthalmic emulsion Apply 1 drop to eye.       No current facility-administered medications for this visit.     Patient Active Problem List   Diagnosis    Seborrheic keratosis    History of basal cell carcinoma (BCC)    Rosacea    Inflamed seborrheic keratosis    Actinic keratosis    Neoplasm of uncertain behavior of skin    Screening exam for skin cancer    Cherry angioma    Lentigines    SK (seborrheic keratosis)    Other viral warts       ROS:  no bleeding, CP, SOB  All other systems reviewed and were negative    OBJECTIVE:   Visit Vitals  Temp 97.9 °F (36.6 °C)  (Temporal)   Ht 5' 4\" (1.626 m)   Wt 65.7 kg (144 lb 13.5 oz)   BMI 24.86 kg/m²     /Whitefemale  moves well  alert and oriented x 3, no jaundice  HENT: NC, AT    trachea midline,     Clear  ext:   no cyanosis, clubbing, edema  CN 2-12 nl  Patient has a  1.5  cm lesion of the back, skin tag next to it.     No results found for: \"WBC\"  No results found for: \"RBC\"  No results found for: \"HCT\"  No results found for: \"HGB\"  No results found for: \"PLT\"    Path BCCA, margins neg on shave   B. Central back, shave biopsy:  -Pigmented basal cell carcinoma, nodular type, peripheral and deep biopsy edges are free of lesion in the tissue planes examined    ASSESSMENT:   BCCA of the back  Discussed skin cancer, path examination on a shave, derm recommendations .    .     Discussed skin anatomy, histology, and risks of treatment or observation.   Discussed what would happen if we left it alone.    Discussed how margins and decisions on treatment are determined.   Risks of excision and observation discussed, the actual procedure discussed in detail along with cosmetic results, risks of positive margins, recurrence, infection, pain, and bleeding.     PLAN:  Informed consent was obtained, risk-benefits explained, questions answered.  Alternatives discussed.    [facility] protocal  Followed. Patient and site identified and confirmed.  Possibility of repeat excision or inadequate margins also discussed.   Betadine prep, 1% lidocaine with epinephrine local (unless an appendage).    Incision(s) were made in skin with the scalpel, excision of lesion(s) carried down  into subcutaneous tissue.  Lesion(s) excised in its(their) entirety.  Wound(s) closed in 2 layers.  Skin and subcutaneous tissue were closed      Lesion(s) size 1.5 cm  Excision(s) size 2.5 cm  Wound(s) closure length 2.5     Wound care instructions given.   Return for suture removal in 7 days.    No apparent complications.    Specimen was (were)   sent to  pathology.  Electronically signed by: Angel Walsh MD  9/3/2024